# Patient Record
Sex: MALE | Race: WHITE | NOT HISPANIC OR LATINO | Employment: OTHER | URBAN - METROPOLITAN AREA
[De-identification: names, ages, dates, MRNs, and addresses within clinical notes are randomized per-mention and may not be internally consistent; named-entity substitution may affect disease eponyms.]

---

## 2017-06-07 ENCOUNTER — HOSPITAL ENCOUNTER (INPATIENT)
Facility: CLINIC | Age: 62
LOS: 15 days | Discharge: HOME | DRG: 885 | End: 2017-06-23
Attending: EMERGENCY MEDICINE | Admitting: PSYCHIATRY & NEUROLOGY

## 2017-06-07 DIAGNOSIS — F31.9 BIPOLAR I DISORDER (H): Primary | ICD-10-CM

## 2017-06-07 LAB
ANION GAP SERPL CALCULATED.3IONS-SCNC: 7 MMOL/L (ref 3–14)
BASOPHILS # BLD AUTO: 0 10E9/L (ref 0–0.2)
BASOPHILS NFR BLD AUTO: 0.1 %
BUN SERPL-MCNC: 26 MG/DL (ref 7–30)
CALCIUM SERPL-MCNC: 8.7 MG/DL (ref 8.5–10.1)
CHLORIDE SERPL-SCNC: 110 MMOL/L (ref 94–109)
CO2 SERPL-SCNC: 28 MMOL/L (ref 20–32)
CREAT SERPL-MCNC: 1.05 MG/DL (ref 0.66–1.25)
DIFFERENTIAL METHOD BLD: ABNORMAL
EOSINOPHIL # BLD AUTO: 0 10E9/L (ref 0–0.7)
EOSINOPHIL NFR BLD AUTO: 0.5 %
ERYTHROCYTE [DISTWIDTH] IN BLOOD BY AUTOMATED COUNT: 14.5 % (ref 10–15)
ETHANOL SERPL-MCNC: <0.01 G/DL
GFR SERPL CREATININE-BSD FRML MDRD: 72 ML/MIN/1.7M2
GLUCOSE SERPL-MCNC: 101 MG/DL (ref 70–99)
HCT VFR BLD AUTO: 44.6 % (ref 40–53)
HGB BLD-MCNC: 15.3 G/DL (ref 13.3–17.7)
IMM GRANULOCYTES # BLD: 0 10E9/L (ref 0–0.4)
IMM GRANULOCYTES NFR BLD: 0.1 %
LYMPHOCYTES # BLD AUTO: 1.2 10E9/L (ref 0.8–5.3)
LYMPHOCYTES NFR BLD AUTO: 15.1 %
MCH RBC QN AUTO: 30.5 PG (ref 26.5–33)
MCHC RBC AUTO-ENTMCNC: 34.3 G/DL (ref 31.5–36.5)
MCV RBC AUTO: 89 FL (ref 78–100)
MONOCYTES # BLD AUTO: 0.7 10E9/L (ref 0–1.3)
MONOCYTES NFR BLD AUTO: 9.2 %
NEUTROPHILS # BLD AUTO: 5.9 10E9/L (ref 1.6–8.3)
NEUTROPHILS NFR BLD AUTO: 75 %
NRBC # BLD AUTO: 0 10*3/UL
NRBC BLD AUTO-RTO: 0 /100
PLATELET # BLD AUTO: 138 10E9/L (ref 150–450)
POTASSIUM SERPL-SCNC: 4 MMOL/L (ref 3.4–5.3)
RBC # BLD AUTO: 5.02 10E12/L (ref 4.4–5.9)
SODIUM SERPL-SCNC: 145 MMOL/L (ref 133–144)
WBC # BLD AUTO: 7.8 10E9/L (ref 4–11)

## 2017-06-07 PROCEDURE — 25000132 ZZH RX MED GY IP 250 OP 250 PS 637: Performed by: EMERGENCY MEDICINE

## 2017-06-07 PROCEDURE — 99285 EMERGENCY DEPT VISIT HI MDM: CPT | Mod: 25 | Performed by: EMERGENCY MEDICINE

## 2017-06-07 PROCEDURE — 80048 BASIC METABOLIC PNL TOTAL CA: CPT | Performed by: EMERGENCY MEDICINE

## 2017-06-07 PROCEDURE — 85025 COMPLETE CBC W/AUTO DIFF WBC: CPT | Performed by: EMERGENCY MEDICINE

## 2017-06-07 PROCEDURE — 99285 EMERGENCY DEPT VISIT HI MDM: CPT | Mod: Z6 | Performed by: EMERGENCY MEDICINE

## 2017-06-07 PROCEDURE — 80320 DRUG SCREEN QUANTALCOHOLS: CPT | Performed by: EMERGENCY MEDICINE

## 2017-06-07 RX ORDER — OLANZAPINE 10 MG/2ML
10 INJECTION, POWDER, FOR SOLUTION INTRAMUSCULAR ONCE
Status: DISCONTINUED | OUTPATIENT
Start: 2017-06-07 | End: 2017-06-07

## 2017-06-07 RX ORDER — OLANZAPINE 5 MG/1
10 TABLET, ORALLY DISINTEGRATING ORAL ONCE
Status: DISCONTINUED | OUTPATIENT
Start: 2017-06-07 | End: 2017-06-07

## 2017-06-07 RX ORDER — OLANZAPINE 5 MG/1
10 TABLET, ORALLY DISINTEGRATING ORAL ONCE
Status: COMPLETED | OUTPATIENT
Start: 2017-06-07 | End: 2017-06-07

## 2017-06-07 RX ORDER — OLANZAPINE 10 MG/2ML
INJECTION, POWDER, FOR SOLUTION INTRAMUSCULAR
Status: DISCONTINUED
Start: 2017-06-07 | End: 2017-06-07 | Stop reason: WASHOUT

## 2017-06-07 RX ADMIN — OLANZAPINE 10 MG: 5 TABLET, ORALLY DISINTEGRATING ORAL at 14:20

## 2017-06-07 ASSESSMENT — ENCOUNTER SYMPTOMS
AGITATION: 1
HALLUCINATIONS: 0

## 2017-06-07 NOTE — ED NOTES
Bed: HW03  Expected date: 6/7/17  Expected time: 2:05 PM  Means of arrival: Ambulance  Comments:  [place] [facility]  62Y M  Psych eval, currently under arrest

## 2017-06-07 NOTE — ED NOTES
Pt increasingly agitated.  He is barely staying in his room.  He remains hyper sexual.  Spoke with MD.  Pt is refusing to cooperate with instructions.  MD ordered seclusion.

## 2017-06-07 NOTE — ED NOTES
Pt is more calm and cooperative.  He is willing to follow instructions.  A chair is brought into the room and patient is sitting in the room.  Door remains unlocked.

## 2017-06-07 NOTE — IP AVS SNAPSHOT
MRN:[id number]                      After Visit Summary   6/7/2017    Austyn Leach    MRN: [id number]           Thank you!     Thank you for choosing [facility] for your care. Our goal is always to provide you with excellent care.        Patient Information     Date Of Birth          1955        Designated Caregiver       Most Recent Value    Caregiver    Will someone help with your care after discharge? no [I'll take care of myself, I've lived alone before]      About your hospital stay     You were admitted on:  June 8, 2017 You last received care in the:  UR 20[facility]    You were discharged on:  June 23, 2017       Who to Call     For medical emergencies, please call 911.  For non-urgent questions about your medical care, please call your primary care provider or clinic, [phone]          Attending Provider     Provider Specialty    Irasema Can MD Emergency Medicine    Jeff Spears MD Emergency Medicine    Reuben Lopez MD Psychiatry    [facility], Rohan Fisher MD Psychiatry    [facility], Grant Power MD Psychiatry       Primary Care Provider Office Phone # Fax #    Anna Reyes [phone]      Further instructions from your care team       Behavioral Discharge Planning and Instructions    Summary: Patient admitted for delusional, aggressive and inappropriate sexual behavior. A petition for commitment with [facility] was supported. He is now on a stay of commitment and will be discharged to home to work with his out patient team.     Main Diagnosis: Bipolar Disorder    Major Treatments, Procedures and Findings: psychological assessment, medication management, [facility] resources    Symptoms to Report: feeling more aggressive, increased confusion, losing more sleep, mood getting worse or thoughts of suicide    Lifestyle Adjustment: please take medication as prescribed and follow up with case management    Psychiatry Follow-up:     Patient does not have insurance at  "this time. He is utilizing the crisis appointment with [facility].     [facility] [place] [facility] [place] [phone]  [address] [place]  Dr. Morales Wednesday July 5th @ 9:30am  You will need to set up a therapy appointment if you are going to continue services with Dr. Morales        [facility] : Betty Pepe  [phone]        Resources:   [facility] Crisis: [phone]  Crisis Intervention: 462.286.4621 or 504-849-0646 (TTY: 669.508.5114).  Call anytime for help.  National [place] on Mental Illness (www.[place].nicole.org): 270.562.1100 or 585-374-2017.  National Suicide Prevention Line (www.mentalhealthmn.org): 590-719-GSNC (4143)  Mental Health Association of [place] (www.mentalhealth.org): 859.285.4425 or 485-021-3014    General Medication Instructions:   See your medication sheet(s) for instructions.   Take all medicines as directed.  Make no changes unless your doctor suggests them.   Go to all your doctor visits.  Be sure to have all your required lab tests. This way, your medicines can be refilled on time.  Do not use any drugs not prescribed by your doctor.  Avoid alcohol.      The treatment team has appreciated the opportunity to work with you.  We wish you the best in the future. If you have any questions or concerns our unit number is [phone].    Pending Results     No orders found from 6/5/2017 to 6/8/2017.            Statement of Approval     Ordered          06/23/17 1205  I have reviewed and agree with all the recommendations and orders detailed in this document.  EFFECTIVE NOW     Approved and electronically signed by:  Grant Marques MD             Admission Information     Date & Time Provider Department Dept. Phone    6/7/2017 Grant Marques MD UR 20NB [phone]      Your Vitals Were     Blood Pressure Pulse Temperature Respirations Height Weight    132/71 94 97  F (36.1  C) (Oral) 16 1.854 m (6' 1\") 88.5 kg (195 lb)    Pulse Oximetry BMI (Body Mass " "Index)                99% 25.73 kg/m2          Geno Information     Geno lets you send messages to your doctor, view your test results, renew your prescriptions, schedule appointments and more. To sign up, go to [email or web address] . Click on \"Log in\" on the left side of the screen, which will take you to the Welcome page. Then click on \"Sign up Now\" on the right side of the page.     You will be asked to enter the access code listed below, as well as some personal information. Please follow the directions to create your username and password.     Your access code is: [email or web address]  Expires: 2017  1:48 PM     Your access code will  in 90 days. If you need help or a new code, please call your [facility] clinic or [phone].        Care EveryWhere ID     This is your Care EveryWhere ID. This could be used by other organizations to access your [facility] medical records  [id number]        Equal Access to Services     CATHERINE MONIQUE : Hadii aad ku hadasho Soambarali, waaxda luqadaha, qaybta kaalmada adeegyada, janet chin . So [facility] [phone].    ATENCIÓN: Si habla español, tiene a gunderson disposición servicios gratuitos de asistencia lingüística. Llame al [phone].    We comply with applicable federal civil rights laws and [place] laws. We do not discriminate on the basis of race, color, national origin, age, disability sex, sexual orientation or gender identity.               Review of your medicines      START taking        Dose / Directions    OLANZapine 15 MG tablet   Commonly known as:  zyPREXA   Used for:  Bipolar I disorder (H)        Dose:  15 mg   Take 1 tablet (15 mg) by mouth At Bedtime   Quantity:  30 tablet   Refills:  1            Where to get your medicines      These medications were sent to [facility] [place], [place] - [address]  [address] [address]     Phone:  [phone]     OLANZapine 15 MG tablet    "             Protect others around you: Learn how to safely use, store and throw away your medicines at www.disposemymeds.org.             Medication List: This is a list of all your medications and when to take them. Check marks below indicate your daily home schedule. Keep this list as a reference.      Medications           Morning Afternoon Evening Bedtime As Needed    OLANZapine 15 MG tablet   Commonly known as:  zyPREXA   Take 1 tablet (15 mg) by mouth At Bedtime   Last time this was given:  10 mg on 6/15/2017  1:20 AM

## 2017-06-07 NOTE — ED NOTES
Pt moderately uncooperative, came out of bathroom with genitals exposed. Security able to redirect pt. Delusional, paranoid. Says people are putting drugs in him. Making overtly sexual comments to female staff. Yelling about needing a  and insisting on a cigarette. Pt given water and threw it on the floor. Becoming quieter and calmer over the past 15 minutes. Will continue to monitor.

## 2017-06-07 NOTE — IP AVS SNAPSHOT
[address] [address]    Phone:  [phone]                                       After Visit Summary   6/7/2017    Austyn Leach    MRN: [id number]           After Visit Summary Signature Page     I have received my discharge instructions, and my questions have been answered. I have discussed any challenges I see with this plan with the nurse or doctor.    ..........................................................................................................................................  Patient/Patient Representative Signature      ..........................................................................................................................................  Patient Representative Print Name and Relationship to Patient    ..................................................               ................................................  Date                                            Time    ..........................................................................................................................................  Reviewed by Signature/Title    ...................................................              ..............................................  Date                                                            Time

## 2017-06-07 NOTE — PROGRESS NOTES
"Pt agitated and refusing to stay in room while staff address safety and privacy of a different pt.  Staff continually trying to redirect pt to room, which he continues to refuse.  Pt making sexually inapproapriate comments to multiple female staff, threatening to touch staff inappropriately, stating \"you have an amazing body.  You have beautiful hair.  I know what I would do to you.\"  Pt asked to walk to seclusion room for purpose of opening more rooms as well as ability to shut door if necessary.  Pt continued to be extremely sexually inappropriate to staff, given option to stay in room with door closed due to safety and privacy concerns of other patients and staff.  Pt again refused and was brought to seclusion room by staff.  "

## 2017-06-07 NOTE — ED NOTES
Pt instructed that he needs to go to the seclusion room for refusing to stay in his room and for his ongoing hypersexual threatening behavior.  Pt refuses to walk to room.  Code 21 team on standby and assisted with placing patient in seclusion which occurred without incident.  Pt immediately begins to calm down when staff leaves the room.

## 2017-06-07 NOTE — ED PROVIDER NOTES
"  History     Chief Complaint   Patient presents with     Agitation     bizarre behavior, paranoid, predatory toward children     The history is provided by a relative (son).     Austyn Leach is a 62 year old male who presents to the ED via EMS due to \"patient driving around city saying boys are stupid and girls are smart.  And having signs taped to himself and car saying similar.\"  He told police that the FBI was watching him on the internet, he is an inventor. \" Son called and says the patient has delusional disorder.  (Son Minh Elizondo #[phone]).  His son tells me that his father has refused treatment/meds in the past and has only gone to see a provider a few times due to threats by family.  The patient lives with step mom.  Today he posted paragraphs of very graphic disturbing sexual comments which caused the patient's neighborhood to become upset and call police.   They are apparently concerned about the children of the neighborhood based on the facebook comments.   The step mom left the house due to the neighborhood response.  The patient then left their home and police found him in above mentioned state.  The son says the patient has never actually hurt anyone but they are concerned about his progressive worsening, complete lack of insight into his disorder and has lost boundaries.     I have reviewed the Medications, Allergies, Past Medical and Surgical History, and Social History in the Epic system.    Review of Systems   Psychiatric/Behavioral: Positive for agitation and behavioral problems. Negative for hallucinations.   All other systems reviewed and are negative.      Physical Exam   BP: (!) 157/130 (pt insists on crossing his legs very tightly during BP check)  Pulse: 125  Temp:  (refused)  Resp: 20  SpO2: 99 %  Physical Exam   Constitutional: He is oriented to person, place, and time.   disheveled   HENT:   Head: Normocephalic and atraumatic.   Right Ear: External ear normal.   Left Ear: " External ear normal.   Eyes: EOM are normal.   Neck: Normal range of motion.   Cardiovascular: Normal rate, regular rhythm and normal heart sounds.    Pulmonary/Chest: Effort normal and breath sounds normal.   Abdominal: Soft.   Musculoskeletal: Normal range of motion.   Neurological: He is alert and oriented to person, place, and time.   Skin: Skin is warm and dry.   Psychiatric: His speech is normal. His affect is labile. Thought content is delusional. Cognition and memory are normal. He expresses impulsivity and inappropriate judgment. He is inattentive.   Nursing note and vitals reviewed.      ED Course     ED Course     Procedures         No results found for this or any previous visit (from the past 24 hour(s)).    Critical care time: 60 min       Assessments & Plan (with Medical Decision Making)   The patient arrived screaming at staff.  He was brought via EMS on transfer hold.  He has hx of delusional disorder and posted very graphic sexual comments on [employer].  Per his son he has refused medicine, has been getting worse, and they are worried about his lack of insight.  He required zyprexa 10 mg po and restraints due to his inappropriate behavior with staff (sexual).  He was placed on a 72 hour hold and will be admitted to inpatient for safety concerns to others.      In person face to face assessment completed, including an evaluation of the patient's immediate reaction to the intervention, complete review of systems assessment, behavioral assessment and review/assessment of history, drugs and medications, recent labs, etc., and behavioral condition.  The patient experienced: No adverse physical outcome from seclusion/restraint initiation.  The intervention of restraint or seclusion needs to continue.          I have reviewed the nursing notes.    I have reviewed the findings, diagnosis, plan and need for follow up with the patient.    There are no discharge medications for this patient.      Final  diagnoses:   Delusional Disorder       6/7/2017   [facility], [place], EMERGENCY DEPARTMENT     Irasema Can MD  06/09/17 2020       Irasema Can MD  06/09/17 2021

## 2017-06-08 PROBLEM — F22 DELUSIONS (H): Status: ACTIVE | Noted: 2017-06-08

## 2017-06-08 LAB
AMPHETAMINES UR QL SCN: NORMAL
BARBITURATES UR QL: NORMAL
BENZODIAZ UR QL: NORMAL
CANNABINOIDS UR QL SCN: NORMAL
COCAINE UR QL: NORMAL
ETHANOL UR QL SCN: NORMAL
OPIATES UR QL SCN: NORMAL

## 2017-06-08 PROCEDURE — 25000132 ZZH RX MED GY IP 250 OP 250 PS 637: Performed by: FAMILY MEDICINE

## 2017-06-08 PROCEDURE — 25000132 ZZH RX MED GY IP 250 OP 250 PS 637: Performed by: PSYCHIATRY & NEUROLOGY

## 2017-06-08 PROCEDURE — 80320 DRUG SCREEN QUANTALCOHOLS: CPT | Performed by: EMERGENCY MEDICINE

## 2017-06-08 PROCEDURE — 80307 DRUG TEST PRSMV CHEM ANLYZR: CPT | Performed by: EMERGENCY MEDICINE

## 2017-06-08 PROCEDURE — 12400001 ZZH R&B MH UMMC

## 2017-06-08 RX ORDER — TRAZODONE HYDROCHLORIDE 50 MG/1
50 TABLET, FILM COATED ORAL
Status: DISCONTINUED | OUTPATIENT
Start: 2017-06-08 | End: 2017-06-23 | Stop reason: HOSPADM

## 2017-06-08 RX ORDER — BISACODYL 10 MG
10 SUPPOSITORY, RECTAL RECTAL DAILY PRN
Status: DISCONTINUED | OUTPATIENT
Start: 2017-06-08 | End: 2017-06-23 | Stop reason: HOSPADM

## 2017-06-08 RX ORDER — HALOPERIDOL 5 MG/ML
5-10 INJECTION INTRAMUSCULAR EVERY 4 HOURS PRN
Status: DISCONTINUED | OUTPATIENT
Start: 2017-06-08 | End: 2017-06-23 | Stop reason: HOSPADM

## 2017-06-08 RX ORDER — HYDROXYZINE HYDROCHLORIDE 25 MG/1
25-50 TABLET, FILM COATED ORAL EVERY 4 HOURS PRN
Status: DISCONTINUED | OUTPATIENT
Start: 2017-06-08 | End: 2017-06-23 | Stop reason: HOSPADM

## 2017-06-08 RX ORDER — OLANZAPINE 5 MG/1
10 TABLET, ORALLY DISINTEGRATING ORAL ONCE
Status: COMPLETED | OUTPATIENT
Start: 2017-06-08 | End: 2017-06-08

## 2017-06-08 RX ORDER — ALUMINA, MAGNESIA, AND SIMETHICONE 2400; 2400; 240 MG/30ML; MG/30ML; MG/30ML
30 SUSPENSION ORAL EVERY 4 HOURS PRN
Status: DISCONTINUED | OUTPATIENT
Start: 2017-06-08 | End: 2017-06-23 | Stop reason: HOSPADM

## 2017-06-08 RX ORDER — HALOPERIDOL 5 MG/1
5-10 TABLET ORAL EVERY 4 HOURS PRN
Status: DISCONTINUED | OUTPATIENT
Start: 2017-06-08 | End: 2017-06-23 | Stop reason: HOSPADM

## 2017-06-08 RX ORDER — OLANZAPINE 10 MG/2ML
10 INJECTION, POWDER, FOR SOLUTION INTRAMUSCULAR
Status: DISCONTINUED | OUTPATIENT
Start: 2017-06-08 | End: 2017-06-22

## 2017-06-08 RX ORDER — OLANZAPINE 10 MG/1
10 TABLET ORAL
Status: DISCONTINUED | OUTPATIENT
Start: 2017-06-08 | End: 2017-06-22

## 2017-06-08 RX ORDER — ACETAMINOPHEN 325 MG/1
650 TABLET ORAL EVERY 4 HOURS PRN
Status: DISCONTINUED | OUTPATIENT
Start: 2017-06-08 | End: 2017-06-23 | Stop reason: HOSPADM

## 2017-06-08 RX ORDER — LORAZEPAM 2 MG/ML
1 INJECTION INTRAMUSCULAR EVERY 4 HOURS PRN
Status: DISCONTINUED | OUTPATIENT
Start: 2017-06-08 | End: 2017-06-23 | Stop reason: HOSPADM

## 2017-06-08 RX ORDER — LORAZEPAM 1 MG/1
1 TABLET ORAL EVERY 4 HOURS PRN
Status: DISCONTINUED | OUTPATIENT
Start: 2017-06-08 | End: 2017-06-23 | Stop reason: HOSPADM

## 2017-06-08 RX ADMIN — OLANZAPINE 10 MG: 5 TABLET, ORALLY DISINTEGRATING ORAL at 15:53

## 2017-06-08 RX ADMIN — HALOPERIDOL 10 MG: 5 TABLET ORAL at 17:32

## 2017-06-08 RX ADMIN — HALOPERIDOL 10 MG: 5 TABLET ORAL at 23:18

## 2017-06-08 RX ADMIN — TRAZODONE HYDROCHLORIDE 50 MG: 50 TABLET ORAL at 23:18

## 2017-06-08 NOTE — ED NOTES
The pt finally is going to the mental health.  He is up and about. He is confrontational mildly.  Will give a dose of zyprexa     Jeff Spears MD  06/08/17 3794

## 2017-06-08 NOTE — ED NOTES
Pt requesting to move into room 12 where he can have a bed instead of mattress on the floor. He agrees to remain calm and cooperative.

## 2017-06-08 NOTE — PROGRESS NOTES
06/08/17 1716   Patient Belongings   Did you bring any home meds/supplements to the hospital?  No   Patient Belongings cell phone/electronics;clothing;shoes;keys   Disposition of Belongings Pt room on station 20, locker on station 20   Belongings Search Yes   Clothing Search Yes   Second Staff Mathew, Psych Assoc   General Info Comment See note     In patient room:  White t-shirt, glasses, jeans    In patient locker:  Shoes, underwear, Dress shirt, Keys, [place] ID, cigs, Samsung cell phone (works)    Nothing was sent to security.  Pt had no other belongings on admission.  Pt had no cash, credit/debit cards, or medications.      ADMISSION:  I am responsible for any personal items that are not sent to the safe or pharmacy. [facility] is not responsible for loss, theft or damage of any property in my possession.    Patient Signature _____________________ Date/Time _____________________    Staff Signature _______________________ Date/Time _____________________    2nd Staff person, if patient is unable/unwilling to sign  ___________________________________ Date/Time _____________________    DISCHARGE:  My personal items have been returned to me.   Patient Signature _____________________ Date/Time _____________________

## 2017-06-08 NOTE — PROGRESS NOTES
"Writer completed clothing search with staff for pt.  Pt flashed his genitals at male staff multiple times.  Pt required frequent redirection to close the bathroom door.  Pt kept laughing at staff stating \"What I am not huizar, I go shower at the gym all the time\".  Staff told pt that it was not appropriate, but pt displayed no evidence of learning.  "

## 2017-06-08 NOTE — PROGRESS NOTES
"Pt was agitated, making inappropriate comments, and loud in the milieu.  Pt was asked to take PRN medication, which he refused so staff asked pt to sit in his room for 30 minutes, due to inappropriate behaviors, which he was agreeable.  Pt then requested PRN medication.  Writer approached pt with PRN PO haldol 10 mg, which pt was willing to take.  Pt took the medication, but attempted to \"cheek\" the medication under his tongue.  Pt swallowed the medication when pt realized staff had identified that he had \"cheeked\" the medication.  "

## 2017-06-08 NOTE — PHARMACY-ADMISSION MEDICATION HISTORY
Admission medication history interview status for the 6/7/2017 admission is complete. See Epic admission navigator for allergy information, pharmacy, prior to admission medications and immunization status.     Medication history interview sources:  chart review, Care Everywhere review, called [facility] and [facility] (pharmacies listed in our [facility]), reviewed [facility] pharmacies    Changes made to PTA medication list (reason)  Added: none  Deleted: none  Changed: none    Additional medication history information (including reliability of information, actions taken by pharmacist):  -Discussed patient with nurse and psych associate. The nurse did not recommend I talk to the patient due to his hypersexuality. Patient was also difficult with the nurse when he asked the patient about his medications earlier. Nurse thinks my conversation with the patient would not be productive and I agree based off of staff comments/notes.  -Reviewed chart and called pharmacies instead. [facility], and [facility] do not have any record of filling prescriptions for the patient ever. It seems doubtful he takes any medications at this time. An office visit note from November 2015 in Care Everywhere did not mention any medications or chronic conditions.    Prior to Admission medications    Not on File     Medication history completed by:   Makenna Merino, Pharm.D.

## 2017-06-08 NOTE — PLAN OF CARE
Problem: General Plan of Care (Inpatient Behavioral)  Goal: Individualization/Patient Specific Goal (IP Behavioral)  Illness Management Recovery model: Objectives    Patient will identify reason(s) for hospitalization from their perspective.  Patient will identify a minimum of three goals for discharge.  Patient will identify a minimum of three triggers that may increase their symptoms.  Patient will identify a minimum of three coping skills they can do to stay well.   Patient will identify their support system to demonstrate readiness for discharge.  Outcome: No Change  Patient verbalized poor insights about his MI. He is unable to verbalize understanding of the reason for his admission to the hospital. He denied having a MI. Patient is unable to identify goals for this hospitalization, stressors, or support system at this time.   Patient also declined to take medications while here in the hospital.

## 2017-06-09 PROCEDURE — 99223 1ST HOSP IP/OBS HIGH 75: CPT | Mod: AI | Performed by: PSYCHIATRY & NEUROLOGY

## 2017-06-09 PROCEDURE — 12400001 ZZH R&B MH UMMC

## 2017-06-09 PROCEDURE — 97150 GROUP THERAPEUTIC PROCEDURES: CPT | Mod: GO

## 2017-06-09 RX ORDER — OLANZAPINE 5 MG/1
5 TABLET ORAL AT BEDTIME
Status: DISCONTINUED | OUTPATIENT
Start: 2017-06-09 | End: 2017-06-12

## 2017-06-09 ASSESSMENT — ACTIVITIES OF DAILY LIVING (ADL)
ORAL_HYGIENE: INDEPENDENT
GROOMING: INDEPENDENT;PROMPTS
GROOMING: INDEPENDENT;PROMPTS
ORAL_HYGIENE: INDEPENDENT
DRESS: INDEPENDENT
DRESS: INDEPENDENT

## 2017-06-09 NOTE — PROGRESS NOTES
Pt declined to sign MANDA for his wife and son. Writer left message with PPS screener Lenora Pepe regarding the above.

## 2017-06-09 NOTE — PROGRESS NOTES
Betty Pepe from [facility] came out to screen patient. She will call on Monday with their decision.

## 2017-06-09 NOTE — PROGRESS NOTES
Initial Psychosocial Assessment    I have reviewed the chart, met with the patient, and developed Care Plan. Information for assessment was obtained from: Pt, medical record                                                           72 hour hold    Presenting Problem: Patient was brought to the hospital by paramedics for delusions, inappropriate sexual behavior and aggression. A petition for commitment was presented to [facility].      History of Mental Health and Chemical Dependency:  Pt has been hospitalized at [facility]      Significant Life Events   (Illness, Abuse, Trauma, Death):    Family:  and two adult children    Living Situation: lives with wife in [place]      Educational Background: reports educated as an        Financial Status: [employer]    Legal Issues:  none    Ethnic/Cultural Issues: none    Spiritual Orientation:  Not assessed     Service History: none    Social Functioning (organization, interests): not assessed    Current Treatment Providers are:  none      Social Service Assessment/Plan:     Provide safe environment  Psychological assessment  Medication management per psychiatry  Offer groups  CTC to follow up with [facility]

## 2017-06-09 NOTE — PROGRESS NOTES
Austyn was upset today because he said he should have gotten his patient rights last night and didn't get them until I gave them to him today.  He wanted me to sign the patient bill of rights handout with the date and time it was given to him.  This was not done.  He called patient relations.  He then called 911 saying that patient relations advised him to call 911.  Explained to him that he cannot call 911 from this unit and his calls will be dialed for him from now on.  He remains angry and edgy about not getting his bill of rights last night.  He asked this RN's name (first and last).  He was given my first name.  He said the internal revenue service would be looking me up.  He said this in a threatening manner.

## 2017-06-09 NOTE — PLAN OF CARE
Problem: General Plan of Care (Inpatient Behavioral)  Goal: Team Discussion  Team Plan:   BEHAVIORAL TEAM DISCUSSION     Participants: ESTHER Leung, RN Lita Forrest  Progress: none  Continued Stay Criteria/Rationale: 72 hour hold     Precautions:   Behavioral Orders   Procedures     Code 1 - Restrict to Unit     Routine Programming       As clinically indicated     Sexual precautions       For both male and female pts and staff.  Pt is to have male only for 1:1.     Status 15       Every 15 minutes.     Status Individual Observation       Male only 1:1.       Order Specific Question:   CONTINUOUS 24 hours / day       Answer:   Distance and Exceptions       Order Specific Question:   Distance       Answer:   5 feet       Order Specific Question:   Exceptions       Answer:   none     Plan: petition for commitment, with ESTHER dunne to continue contact with [facility]  Rationale for change in precautions or plan: continue with 1:1 staffing for sexually inappropriate behavior

## 2017-06-09 NOTE — PROGRESS NOTES
"INITIAL OT ATTENDANCE:  Pt participated in OT clinic and self-initiated task to draw a picture of his backyard and rainbow bridge. He told OTR the bridge was rainbow colored and his wife was mad at him because the rainbow was a common symbol for huizar people. He commented that huizar people \"have S.  E.  X.  differently\"   He also made multiple statements about thinking that dead people and animals are \"orbs\" and every time he posts something about his theory on the Internet someone takes it down.  He made unusual statements about the FBI and IRS also, related to his orb theory.     Male staff was with pt on 1:1 during full time in group session.        Pt will be given Self Assessment form, explain the purpose of including them in their treatment plan and offer options for meeting their needs.       "

## 2017-06-09 NOTE — PROGRESS NOTES
"Austyn claims he had a wallet with $600.00 in it.  It is not on the property list.  I called the ED and they would not look for it.  They said if he'd had anything they would have sent it to security.  I called security and they didn't have anything.  Security said, 'the ED rarely sends us anything.\"  He then thought that it was possible that the police put it in his car.  "

## 2017-06-10 PROCEDURE — 12400001 ZZH R&B MH UMMC

## 2017-06-10 PROCEDURE — 90853 GROUP PSYCHOTHERAPY: CPT

## 2017-06-10 ASSESSMENT — ACTIVITIES OF DAILY LIVING (ADL)
GROOMING: INDEPENDENT
DRESS: INDEPENDENT
LAUNDRY: WITH SUPERVISION
ORAL_HYGIENE: INDEPENDENT

## 2017-06-10 NOTE — PROGRESS NOTES
"Attended OT group focused on topic of identifying positive aspects about self. He requested to not tell his name to author, \"I'm just here observing\". He did speak up and participate when opportunity was offered at his turns. He stated he is happy \"my wife wants a divorce. She wants my money. My son wants to go to college and play video games. I can live on the streets.\" A day he would like to relive was \"The day I invented removing negative ions which can change the weather. 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 Initially invented that.\" Comments were tangential and at times difficult to follow. He teased \"It's hard to change something in my self, when you start with perfection. No, nothing is ever perfect.\" Some jokes were in context, and with comments that were directing towards poor boundaries, he seemed willing to be redirected. As session progressed, he seemed more willing to participate. Generally appears easily distracted, guarded, cautious with answers and information wanting to divulge, with question of some disorganization of thought.  "

## 2017-06-10 NOTE — PLAN OF CARE
"Problem: General Plan of Care (Inpatient Behavioral)  Goal: Individualization/Patient Specific Goal (IP Behavioral)  Illness Management Recovery model: Objectives    Patient will identify reason(s) for hospitalization from their perspective.  Patient will identify a minimum of three goals for discharge.  Patient will identify a minimum of three triggers that may increase their symptoms.  Patient will identify a minimum of three coping skills they can do to stay well.   Patient will identify their support system to demonstrate readiness for discharge.   Outcome: No Change  Illness Management Recovery model:  Ways to [place].     Patient identified the following specific strategies to cope with their symptoms:     1. \"I don't believe in shit like this\"             "

## 2017-06-10 NOTE — PROGRESS NOTES
"Pt filled out MANDA for his wife. In addition, pt states that \"I don't want to take any medications. I was cheeking them the other day, so people would think I was following the rules. People told me to do whatever the hospital told me to do.\" Writer assured pt that he does not need to take his medications. However, that he should speak to his doctor regarding his medications.     Pt declined his scheduled HS Zyprexa    "

## 2017-06-10 NOTE — PROGRESS NOTES
"Austyn slept a maximum of 6 hours this night shift.  When awake, he is in the lounge and is very loud, irritable, and demanding. Many complaints.  Many statements about calling his  and reading his \"record\". Does not take redirection well.  Odd statements such as \"I am allergic to water\".  Will continue to monitor.  "

## 2017-06-10 NOTE — PLAN OF CARE
Problem: Psychotic Symptoms  Goal: Psychotic Symptoms  Pt will have safe behaviors on the unit until discharge.  Pt will maintain safe sexual behaviors on the unit before discharge.  Pt will be medication compliant on the unit until discharge.  Pt will be visible and safe in the milieu until discharge.  Pt will develop coping skills before discharge.  Pt will have decreased paranoia before discharge.  Pt will deny SI and SIB thoughts before discharge.  Pt will sleep 8 hours a night before discharge.  Pt will have adequate intake before discharge.   Outcome: No Change  Nursing assessment completed; assessed mood, anxiety, thoughts, and behavior. No significant change in clinical presentation this period: patient remains grandiose, refuses to take medications as ordered and although he has been going to groups he may not participate appropriately or up to expectation nor is he socially engaged with staff or peers.  Encouraged participation in groups and in developing of healthy coping skills. Denies hallucinations, denies any thought or intent to harm self or others; also denies depression and anxiety both of which he rated at 0 out 10, where ten is the worst case of clinical presentation. Mood is labile and easily irritated, affect corresponds with labile mood; speech is clear but can be tangential and incoherent  and he appears unkempt and disheveled, hygiene and grooming only fair. Thought process appears to be irrational and not focused on current treatment activities, grandiose with evidence of delusions. Insight into present condition is poor and judgment remains impaired. Patient remains on Status Individual Observation for safety and his behaviors remain unpredictable enough to require 1:1 staff on all shifts. No actual acts of aggression this period. Will continue to assess per protocol. Refer to daily notes for implementation of individualized treatment plan.

## 2017-06-10 NOTE — H&P
Austyn Leach was seen for 70 minutes on 06/09/2017.  Greater than 50% of the time was spent in counseling and coordinating care, clarifying diagnostic prognostic issues, presence of support in community.      CHIEF COMPLAINT AND REASON FOR ADMISSION:  The patient is a 62-year-old   male admitted on 72-hour hold via EMS due to disorganized and psychotic behavior.      HISTORY OF PRESENT ILLNESS:  The patient presented as minimally reliable historian, challenging and easily agitated.  He states that he lives with his mother; however, things have not been going on between the two of them and wife had mentioned divorce a number of times.  The patient would not comment on why she wanted to do so.  He reports occasional periods of getting not enough sleep; however, typically does not stay awake for a number of nights in a row.  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  He told me about his last invention: abilities to remove clouds from the alona without any energy involved.  Also talked about making invention such as cards playing robot and vacuum poring station.  Talks about being able to use a device called celescope to watch electron's movement.  While talking to other members of the treatment team, the patient also stated that the [place] controlled the weather around the world and it is the reason why we have tornados and rain on the people's houses.  Was talking about government spying on people and controlling people.  Reports that he had been targeted and someone has put a virus in his computer, focused on people erasing information from his phone.  Talked about the FBI targeting him because of his inventions.  Reports that he has been putting things on Facebook about the women being smarter than the men.  Believes that people were honking while he was driving because they had seen what he had posted on the Internet.  He denied that he had sexually exposed himself in the  "Emergency Department, that he has been targeting children; however, he admitted that he was randomly crossing women on the street, offering children and adults food because \"I would like to give people food.\"  He also says that he had \"bridge painted in the rainbow colors for years.\"      PAST PSYCHIATRIC HISTORY:  Reports 1 previous psychiatric hospitalization at [facility] but would not tell under what circumstances and would not discuss his symptoms present.  When asked how long he had all these ideas, he told me \"those were not ideas, those are facts.\"  When asked how long he knew about those facts, said that for at least 5 years.  He will not provide any additional information.  According to collateral sources, the patient strongly disagred that he has mental illness.  Was offered a neuroleptic medication but has always refused it.  His son Leo said that the patient was diagnosed with delusional disorder and had refused treatment/meds in the past.  Has only gone to see provider a few times due to pressure by the family.  Has a history of posting some graphic and disturbing sexual comments which caused the patient's neighborhood to become upset and call the police and this is how the patient was admitted.  There was also report that the patient has been driving around the cities saying that boys are stupid and girls are smart and having signs taped to himself and car saying similar.      ALLERGIES:  Reports being allergic to bees and to propranolol.        MEDICATIONS:  Does not take any medications on a regular basis.      PAST MEDICAL HISTORY:  Told me that he has bad knees, probably arthritis in both knees and that he had great success with cortisone shots, \"cortisone shots had cured my knees for 3 years.\"      FAMILY HISTORY AND SOCIAL HISTORY:  Said that he lives with his wife, has 2 grownup children but no grandchildren.  According to other information, the patient lives with stepmother who left " house because of the patient's behavior.  It is unclear if he has any family history of mental illness.      PHYSICAL EXAMINATION:  Please refer to Dr. Irasema Can's emergency room note from 06/07/2017.        REVIEW OF SYSTEMS:  A 12-point review of system was positive for mental health, otherwise negative.      VITAL SIGNS:  Temperature 97.9, respirations 17, blood pressure 144/75, heart rate 80.      MENTAL STATUS EXAMINATION:  The patient is disheveled and unshaven  male dressed in hospital garbs, has poor dentition, few front teeth missing.  He is irritable and challenging, tends to talk in short phrases, then, goes on long tangents.  He sounded sarcastic.  He voices clearly delusional ideas and repeatedly denied ever experiencing auditory or visual hallucinations.  Thought processes are illogical but superficially organized.  There is no clear loosening of associations or flight of ideas.  He is alert and oriented x3, appears to have average intelligence with appropriate usage of vocabulary.  Ability to focus and concentrate, immediate short and long-term memory were intact.  He had mild upper extremity tremor.  Denied presence of suicidal or homicidal thoughts.  He denied that he sexually exposed himself in the emergency room and has been targeting children.  I would describe his insight and judgment as both poor.      IMPRESSION:  Other psychotic disorder.  Prior diagnosis of delusional disorder, persecutory type.  Rule out schizophrenia, paranoid with late onset.  The patient reports prior excessive drinking.  Denies abusing alcohol now.      TREATMENT PLAN:  The patient is going to be continued on a 72-hour hold.  Will file for petition for commitment and [facility] (I offered patient neuroleptics and he refused it).  Denied that he had any mental illness.  His care will be taken over on Monday by another provider.         SLIM LIAO MD             D: 06/09/2017 16:44   T: 06/09/2017 21:31    MT:       Name:     ASHANTI ESPINOZA   MRN:      [id number]        Account:      [id number]   :      1955           Admitted:     [id number]      Document: [id number]

## 2017-06-10 NOTE — PROGRESS NOTES
"Pt continues on male only SIO for hypersexual behavior (exposing his genitals, inappropriate comments to female staff).  Pt has a blocked bed for a private room.  Pt had inappropriate comments in the milieu and needed frequent staff redirection.  Pt has made other pt's feel uncomfortable on the unit.    Pt requested to fill out 20 release of informations, so that everyone can help him alexandra the hospital.  When pt's family calls, he refuses all calls stating \"Why do those idiots keep calling me I don't want to talk to them\".    Pt refused HS medication.  Pt stated \"My doctor says I don't need to take anything\".     Pt later approached writer and asked what would happen if he did not take medications.  Writer stated he would not be forced to take medications, but the pt would need to maintain safe behaviors on the unit.    Pt then told writer that \"I have spit out every pill you have given me so far\".  Writer had given pt PRN haldol medication the previous evening shift.  Writer had carefully inspected pt's mouth for \"cheeking\".  Writer gave pt an entire glass of water with his medications.    Pt would require ODT to ensure pt does not cheek medications.    Will continue to monitor and assess.  "

## 2017-06-11 PROCEDURE — 12400001 ZZH R&B MH UMMC

## 2017-06-11 ASSESSMENT — ACTIVITIES OF DAILY LIVING (ADL)
DRESS: PROMPTS
HYGIENE/GROOMING: PROMPTS
ORAL_HYGIENE: PROMPTS
DRESS: INDEPENDENT
LAUNDRY: UNABLE TO COMPLETE
GROOMING: INDEPENDENT
ORAL_HYGIENE: INDEPENDENT

## 2017-06-11 NOTE — PROGRESS NOTES
Pt has been visible all shift, he was yelling at staff this morning, and on the phone. He seemed to become more quieter later in the shift. He also was paranoid, believing staff was recording his phone calls. He denies any Suicidal thoughts or plans to hurt others.

## 2017-06-11 NOTE — PROGRESS NOTES
"Patient did have a  \"goodshift\" , stated daily goal to \"make people happy towards changing all the negative information  posted on face book in relation to men forced to have vasectomy and women hysterectomy to control population and and welfare,which the police are not happy about because someone complain to the police and they brought me here\"  Pt  plans towards discharge include\" No ideas towards discharge in denial of any mental health symptoms for hospitalization therefore confused about having any steps towards discharge\"  Pt .attended, participate in groups,socialized  and  was visible in the milieu.   Pt appears normal for age,  fussy  anxious, irritable, agitated and paranoid  Patient had agood concentration, cooperative, pleasant and calm, affect is agitated alert subdued flat and is hopeful.   Patient denies  pain. Patient is eating 100%.  Patient denies current or recent suicidal ideation ,Pt denies SIB  HI: denies current or recent homicidal ideation or behaviors.  Hallucinations: NO  auditory and visual hallucination  Patient is progressing toward goals. Concerns (explain) - Go home and help my wife with  domestic work, cut grass , maintenance and help fix stuff around the house  Patient evaluation continues as patient is encouraged to participate in groups an develop healthy coping skills.  Pt reported cheeking  meds ,throwng it away cause meds cause problem and mess up his blood.   06/11/17 9219   Behavioral Health   Hallucinations denies / not responding to hallucinations   Thinking confused;distractable;paranoid   Orientation person: oriented;place: oriented;date: oriented;time: oriented   Memory confabulation;baseline memory   Insight denial of illness;poor   Judgement impaired   Eye Contact at examiner   Affect blunted, flat;sad;tense;full range affect;irritable   Mood labile;irritable   Physical Appearance/Attire appears stated age;attire appropriate to age and situation   Hygiene (Pt indicated " wantingto shower when followed through he decl)   Suicidality (Pt deniesSI/SIB)   Self Injury (Pt denies SI/SIB)   Activity (Pt participated in group,socialized,visible in the milieu)   Speech tangential;clear;coherent   Medication Sensitivity no stated side effects;no observed side effects  (Pt indicated cheeking meds,throwing away,meds cause problem)   Psychomotor / Gait balanced;steady   Psycho Education   Type of Intervention 1:1 intervention   Response participates with encouragement   Hours 0.5   Daily Care   Activity activity encouraged   Patient Performed Hygiene (Pt did not shower during this shift)   Activities of Daily Living   Hygiene/Grooming independent   Oral Hygiene independent   Dress independent   Laundry unable to complete   Room Organization independent   Activity   Activity Level of Assistance independent   Behavioral Health Interventions   Psychotic Symptoms encourage nutrition and hydration;encourage participation / independence with adls;provide emotional support;establish therapeutic relationship;assist with developing & utilizing healthy coping strategies;build upon strengths   Social and Therapeutic Interventions (Psychotic Symptoms) encourage socialization with peers;encourage effective boundaries with peers;encourage participation in therapeutic groups and milieu activities

## 2017-06-11 NOTE — BRIEF OP NOTE
Pt's wife visited, whom pt had previously said that he wanted little to no contact with.  Visit was initially going fine, but staff noticed that pt's wife seemed to antagonize and pick at him unnecessarily, getting him riled up.  She would ask him the same question over and over, as if he was unsure of what he wanted at that time.  For instance, even though pt had said he was not hungry, his wife continually tried to offer him the sandwich she brought every couple of minutes.  Staff had to end visit a few minutes early as pt became more and more agitated.

## 2017-06-12 PROCEDURE — 12400001 ZZH R&B MH UMMC

## 2017-06-12 PROCEDURE — 25000132 ZZH RX MED GY IP 250 OP 250 PS 637: Performed by: PSYCHIATRY & NEUROLOGY

## 2017-06-12 PROCEDURE — 99232 SBSQ HOSP IP/OBS MODERATE 35: CPT | Performed by: PSYCHIATRY & NEUROLOGY

## 2017-06-12 RX ORDER — OLANZAPINE 5 MG/1
5 TABLET, ORALLY DISINTEGRATING ORAL AT BEDTIME
Status: DISCONTINUED | OUTPATIENT
Start: 2017-06-12 | End: 2017-06-19

## 2017-06-12 RX ADMIN — OLANZAPINE 10 MG: 10 TABLET, FILM COATED ORAL at 19:23

## 2017-06-12 ASSESSMENT — ACTIVITIES OF DAILY LIVING (ADL)
GROOMING: INDEPENDENT
ORAL_HYGIENE: INDEPENDENT
DRESS: STREET CLOTHES;INDEPENDENT
LAUNDRY: WITH SUPERVISION

## 2017-06-12 NOTE — PROGRESS NOTES
"[facility], [place]   Psychiatric Progress Note        Interim History:   The patient's care was discussed with the treatment team during the daily team meeting and/or staff's chart notes were reviewed.  Staff report patient has been refusing or cheeking medications. Petition for [facility] filed and needed additional signatures.     The patient reports that he had a good weekend. Is doing well. Says that his wife visited. Says that he doesn't want to be on medications as he was on \"propefenal\" in the past and he had bad side effects. Mood is \"anxious to go home.\" Reports good sleep and appetite. Denies SI or HI. Denies AH or VH. Denies paranoia.          Medications:       OLANZapine zydis  5 mg Oral At Bedtime          Allergies:     Allergies   Allergen Reactions     Bee Venom      Propranolol           Labs:   No results found for this or any previous visit (from the past 24 hour(s)).       Psychiatric Examination:     /80  Pulse 71  Temp 98.4  F (36.9  C) (Oral)  Resp 18  Ht 1.854 m (6' 1\")  Wt 87.1 kg (192 lb)  SpO2 99%  BMI 25.33 kg/m2  Weight is 192 lbs 0 oz  Body mass index is 25.33 kg/(m^2).                Sitting Orthostatic BP: 145/84      Sitting Orthostatic Pulse: 64 bpm      Standing Orthostatic BP: 156/88      Standing Orthostatic Pulse: 81 bpm       Appearance: awake, alert and adequately groomed  Attitude:  cooperative  Eye Contact:  fair  Mood:  anxious and good  Affect:  mood congruent  Speech:  rambling  Psychomotor Behavior:  no evidence of tardive dyskinesia, dystonia, or tics  Thought Process:  tangental  Associations:  no loose associations  Thought Content:  no evidence of suicidal ideation or homicidal ideation  Insight:  limited  Judgement:  limited  Oriented to:  time, person, and place  Attention Span and Concentration:  fair  Recent and Remote Memory:  poor         Precautions:     Behavioral Orders   Procedures     Code 1 - Restrict to Unit     " Routine Programming     As clinically indicated     Sexual precautions     For both male and female pts and staff.  Pt is to have male only for 1:1.     Status 15     Every 15 minutes.     Status Individual Observation     Male only 1:1.     Order Specific Question:   CONTINUOUS 24 hours / day     Answer:   Distance and Exceptions     Order Specific Question:   Distance     Answer:   5 feet     Order Specific Question:   Exceptions     Answer:   none          DIagnoses:     Other psychotic disorder.  Prior diagnosis of delusional disorder, persecutory type.  Rule out schizophrenia, paranoid with late onset.         Plan:     1) Change Zyprexa 5mg Qhs to ODT.   2) Petition for commitment with [facility] filed.   3) Disposition to be determined. Patient to show improvement in terms of psychosis prior to discharge.

## 2017-06-13 PROCEDURE — 25000132 ZZH RX MED GY IP 250 OP 250 PS 637: Performed by: PSYCHIATRY & NEUROLOGY

## 2017-06-13 PROCEDURE — H2032 ACTIVITY THERAPY, PER 15 MIN: HCPCS

## 2017-06-13 PROCEDURE — 12400001 ZZH R&B MH UMMC

## 2017-06-13 PROCEDURE — 90853 GROUP PSYCHOTHERAPY: CPT

## 2017-06-13 PROCEDURE — 99232 SBSQ HOSP IP/OBS MODERATE 35: CPT | Performed by: PSYCHIATRY & NEUROLOGY

## 2017-06-13 RX ADMIN — OLANZAPINE 5 MG: 5 TABLET, ORALLY DISINTEGRATING ORAL at 21:07

## 2017-06-13 RX ADMIN — HYDROXYZINE HYDROCHLORIDE 50 MG: 25 TABLET ORAL at 00:25

## 2017-06-13 ASSESSMENT — ACTIVITIES OF DAILY LIVING (ADL)
HYGIENE/GROOMING: INDEPENDENT
DRESS: INDEPENDENT
ORAL_HYGIENE: INDEPENDENT
ORAL_HYGIENE: INDEPENDENT
DRESS: SCRUBS (BEHAVIORAL HEALTH)
GROOMING: INDEPENDENT
LAUNDRY: WITH SUPERVISION

## 2017-06-13 NOTE — PROGRESS NOTES
"   06/13/17 1340   Behavioral Health   Hallucinations denies / not responding to hallucinations   Thinking poor concentration;distractable   Orientation time: oriented;date: oriented;place: oriented;person: oriented   Memory baseline memory   Insight poor   Judgement impaired   Eye Contact at examiner   Affect irritable;angry   Mood irritable;anxious   Physical Appearance/Attire appears stated age   Hygiene well groomed   Suicidality other (see comments)  (Denies)   Self Injury other (see comment)  (Denies)   Activity hyperactive (agitated, impulsive)   Speech coherent;clear   Psychomotor / Gait steady;balanced   Psycho Education   Type of Intervention 1:1 intervention   Response participates, initiates socially appropriate   Hours 0.5   Activities of Daily Living   Hygiene/Grooming independent   Oral Hygiene independent   Dress scrubs (behavioral health)   Laundry with supervision   Room Organization independent   Activity   Activity Level of Assistance independent   Behavioral Health Interventions   Psychotic Symptoms maintain safety precautions;reality orientation;assist patient in developing safety plan;assist patient in following safety plan;provide emotional support   Social and Therapeutic Interventions (Psychotic Symptoms) encourage socialization with peers;encourage participation in therapeutic groups and milieu activities   Pt  Called the writer almaz, casey apple and  showing fingers fuck you,The pt appeared aggressive behind another peer and he became aggressive to this writer and he was six inches from the writer and statedt\" kiss me and that you are not huizar'. He appears intrusive and not redirectable to staff. He appears abusive to staff and other peers. He was given time out couple time to his room and he was observed on the phone. Pt was heard on phone telling someone that he saw a six year old girl [place] the beach and video recorded her. He asked one of the staff to give him her  phone number and " pt would kiss her feet.

## 2017-06-13 NOTE — PROGRESS NOTES
"[facility], [place]   Psychiatric Progress Note        Interim History:   The patient's care was discussed with the treatment team during the daily team meeting and/or staff's chart notes were reviewed.  Staff report that the patient's petition has been supported.     The patient reports that \"these drugs are good - I feel drunk all the time.\" Mood is good. Focused on how he feels on Zyprexa. Denies SI or HI. Denies AH or VH. Says that \"three priests are coming to visit me.\" Says that his handwriting has changed on documents he has signed so he thinks someone is forging his signature. Also feels that his phone numbers written down are disappearing from his room.          Medications:       OLANZapine zydis  5 mg Oral At Bedtime          Allergies:     Allergies   Allergen Reactions     Bee Venom      Propranolol           Labs:   No results found for this or any previous visit (from the past 24 hour(s)).       Psychiatric Examination:     /80  Pulse 71  Temp 98  F (36.7  C) (Oral)  Resp 16  Ht 1.854 m (6' 1\")  Wt 86.6 kg (191 lb)  SpO2 99%  BMI 25.2 kg/m2  Weight is 191 lbs 0 oz  Body mass index is 25.2 kg/(m^2).                Sitting Orthostatic BP: 145/84      Sitting Orthostatic Pulse: 64 bpm      Standing Orthostatic BP: 156/88      Standing Orthostatic Pulse: 81 bpm       Appearance: awake, alert and adequately groomed  Attitude:  cooperative  Eye Contact:  fair  Mood:  good  Affect:  intensity is heightened  Speech:  rambling  Psychomotor Behavior:  no evidence of tardive dyskinesia, dystonia, or tics  Thought Process:  tangental  Associations:  no loose associations  Thought Content:  no evidence of suicidal ideation or homicidal ideation, delusional thinking noted  Insight:  limited  Judgement:  limited  Oriented to:  time, person, and place  Attention Span and Concentration:  fair  Recent and Remote Memory:  poor         Precautions:     Behavioral Orders "   Procedures     Code 1 - Restrict to Unit     Routine Programming     As clinically indicated     Sexual precautions     For both male and female pts and staff.  Pt is to have male only for 1:1.     Status 15     Every 15 minutes.     Status Individual Observation     Male only 1:1.     Order Specific Question:   CONTINUOUS 24 hours / day     Answer:   Distance and Exceptions     Order Specific Question:   Distance     Answer:   5 feet     Order Specific Question:   Exceptions     Answer:   none     Status Individual Observation     Order Specific Question:   CONTINUOUS 24 hours / day     Answer:   Distance and Exceptions     Order Specific Question:   Distance     Answer:   5 feet     Order Specific Question:   Exceptions     Answer:   meetings and assessments / procedures     Order Specific Question:   Exceptions     Answer:   therapeutic groups     Order Specific Question:   Exceptions     Answer:   family and visitors are present          DIagnoses:     Other psychotic disorder.  Prior diagnosis of delusional disorder, persecutory type.  Rule out schizophrenia, paranoid with late onset.         Plan:     1) Continue Zyprexa 5mg Qhs ODT. Will add IM backup if patient refuses.   2) Petition for commitment with [employer] filed and supported.   3) Continue SIO for now. Patient continues to be intrusive.   4) Disposition to be determined. Patient to show improvement in terms of psychosis prior to discharge.

## 2017-06-13 NOTE — PROGRESS NOTES
During 1:1 pt was intrusive and inappropriate towards staff and other patients, making inappropriate comments.For example: offering staff to kiss staff feet, asking for staff number, saying to other patients that he will miss all the female staffs and patients, defending his comment with a response that he's not huizar for saying that.

## 2017-06-13 NOTE — PLAN OF CARE
Problem: Psychotic Symptoms  Goal: Psychotic Symptoms  Pt will have safe behaviors on the unit until discharge.  Pt will maintain safe sexual behaviors on the unit before discharge.  Pt will be medication compliant on the unit until discharge.  Pt will be visible and safe in the milieu until discharge.  Pt will develop coping skills before discharge.  Pt will have decreased paranoia before discharge.  Pt will deny SI and SIB thoughts before discharge.  Pt will sleep 8 hours a night before discharge.  Pt will have adequate intake before discharge.   Outcome: No Change  48 Hour Assessment    Pt remains on 24 hour SIO 1:1    Pt was pleasant and agreeable to meet with writer. He states that he does not want to take medications because he does not want to become addicted. Writer told pt that he should voice these concerns at this provider. Pt did become agitated with the support staff who was monitoring his SIO. He was swearing at staff, and displaying general irritation. Staff redirected pt, but he continued to appear irritable. Did not display inappropriate sexual behaviors.    Pt took prn Zyprexa 10 mg, so HS dose of Zyprexa was not given.     SI/SIB: Pt denies     Auditory/Visual Hallucinations: Pt denies     No additional concerns at this time. Will continue to monitor and assess.

## 2017-06-13 NOTE — SIGNIFICANT EVENT
"Pts wife, Angela Wilkinson, called at 0545 requesting information on pt's status and how his new medication worked throughout the night. RN checked for MNADA and found one to be declined by pt. RN informed pts wife that she was unable to share any information at this time. Pt's wife became very upset demanding to know why, stating that she was \"on the list\", stating that every other time she called she was given information, and threatening to report this RN. RN apologized for any misunderstandings that may have occurred but reaffirmed that at this time no information would be shared. Pt's continued above statements for approximately 10 minutes becoming increasingly angry before RN stated she had to end the phone call. At this time there is not a signed MANDA for pt's wife.   "

## 2017-06-13 NOTE — PROGRESS NOTES
"Pt came to desk at 0540 reporting that the medications that he took make him feel \"really good\". Pt then asked how police could test him for medications if he was pulled over while driving, specifically if they could do a blood test on the spot. RN stated she was unsure how they verified medications and that they would likely do a sobriety test. RN also told pt numerous times that he should not drive if they medications made him feel impaired. Pt responded \"Oh, but it would be so much fun\". Pt then commented that RN \"looked good this morning\". RN did not respond. Pt then returned to his room.   "

## 2017-06-13 NOTE — PROGRESS NOTES
"Attended 0.5 of 2.0 OT groups today.   During topic group focused on setting boundaries, pt held side conversation with peers and occasionally made off topic verbal comment \"let's party\".     He left mid session and did not return.  No evidence of learning.   "

## 2017-06-13 NOTE — SIGNIFICANT EVENT
At 0605 pts son Minh Nelson called to see if his father was okay. MANDA present and signed. Son informed that his father was okay. Son expressed confusion as to why his step-mother was not given information. RN relayed generic information regarding ROIs and state law regarding patient information. Pt's son expressed understanding and ended phone call.    At 0620 pt's wife called again reporting that all of the other nurses have been giving her information in regards to her  and his medications. RN again apologized for situation but stated that she could do nothing more at this time. Pts wife demanded that this RN review chart again, find her name, and call her back with an update. Pts wife then hung up.     Upon further search of chart, RN found three ROIs for pts wife in various placed throughout chart. One MANDA was not signed or dated. ONe was dated 6/9 and signed. The third was stapled with the previous, was dated 6/9 and 'Declined' was written.     Clarification needed for MANDA for wife.

## 2017-06-13 NOTE — PROGRESS NOTES
Early in the shift patient made several explicit sexual and drug-related comments in conversation with staff and other patients. When staff insisted that such comments are inappropriate on the psychiatric unit the patient became highly angry with that staff and repeatedly targeted that staff with obscene insults for the remainder of the evening. The patient remained calm and pleasant when interacting with other staff and patients but continued to direct sexual, violent and otherwise obscene comments towards the particular staff until the end of the shift.

## 2017-06-14 PROCEDURE — 25000125 ZZHC RX 250: Performed by: PSYCHIATRY & NEUROLOGY

## 2017-06-14 PROCEDURE — S0166 INJ OLANZAPINE 2.5MG: HCPCS | Performed by: PSYCHIATRY & NEUROLOGY

## 2017-06-14 PROCEDURE — 25000128 H RX IP 250 OP 636: Performed by: PSYCHIATRY & NEUROLOGY

## 2017-06-14 PROCEDURE — H2032 ACTIVITY THERAPY, PER 15 MIN: HCPCS

## 2017-06-14 PROCEDURE — 90853 GROUP PSYCHOTHERAPY: CPT

## 2017-06-14 PROCEDURE — 12400001 ZZH R&B MH UMMC

## 2017-06-14 RX ADMIN — HALOPERIDOL LACTATE 10 MG: 5 INJECTION, SOLUTION INTRAMUSCULAR at 23:14

## 2017-06-14 RX ADMIN — OLANZAPINE 10 MG: 10 INJECTION, POWDER, LYOPHILIZED, FOR SOLUTION INTRAMUSCULAR at 21:04

## 2017-06-14 RX ADMIN — LORAZEPAM 1 MG: 2 INJECTION INTRAMUSCULAR; INTRAVENOUS at 23:23

## 2017-06-14 ASSESSMENT — ACTIVITIES OF DAILY LIVING (ADL)
ORAL_HYGIENE: INDEPENDENT
HYGIENE/GROOMING: PROMPTS
LAUNDRY: UNABLE TO COMPLETE
ORAL_HYGIENE: INDEPENDENT
GROOMING: PROMPTS
DRESS: SCRUBS (BEHAVIORAL HEALTH)

## 2017-06-14 NOTE — PROGRESS NOTES
"Austyn attended an OT discussion group focusing on personal traits and life roles. Comments ranged from off-topic to provocative (e.g. \"I don't think anyone goes to hell no matter what terrible things they do; we're all going to heaven\"). Named \",\" \"inventor\" and \"orbs\" as three of his primary life roles. Appeared not to be engaging in the group in a therapeutic manner but instead used the opportunity to bring up uncomfortable and strange topics.     "

## 2017-06-14 NOTE — PROGRESS NOTES
06/13/17 2246   Behavioral Health   Hallucinations denies / not responding to hallucinations   Thinking distractable;poor concentration   Orientation place: oriented   Memory baseline memory   Insight poor   Judgement impaired   Affect blunted, flat   Mood mood is calm   Suicidality other (see comments)  (pt denies)   Self Injury other (see comment)  (pt denies)   Activity other (see comment)  (community meeting)   Activities of Daily Living   Hygiene/Grooming independent   Oral Hygiene independent   Dress independent   Room Organization independent   Behavioral Health Interventions   Psychotic Symptoms maintain safety precautions;maintain safe secure environment   Social and Therapeutic Interventions (Psychotic Symptoms) encourage socialization with peers;encourage effective boundaries with peers;encourage participation in therapeutic groups and milieu activities

## 2017-06-14 NOTE — PROGRESS NOTES
"Pt was visible off and on, he was talking to another client about dating, and what he likes in a date, and staff asked him to stop talking about his dating proclivities. He yelled at staff, and said, \"shut the hell up,and your voice is making me sick.\" He went to a group, and denies all mental health issues.  "

## 2017-06-14 NOTE — PROGRESS NOTES
"Pt continues on SIO.  Pt can have inappropriate comments (hypersexual comments, drug use, etc.) in the milieu and needs redirection. When pt is redirected he becomes easily agitated.    Writer overheard pt talking to a staff member stating that the medications he took (zyprexa) make him feel \"loopy\" and \"like a balloon\" and that it would be fun to drive while sedated from zyprexa. Staff told pt that was a poor idea, but pt stated \"the  would never know and it sounds like fun\".    Pt requested his HS medication (5 mg zydis, SEE MAR).  Writer placed the medication in pt's hand.  Pt brought his hand to his mouth, but tried to \"pocket the medication\", by not placing the medication in his mouth, and instead holding the medication in his hand.  Writer realized pt tried to hide the medication.  Pt became upset that he was discovered. Writer observed pt place the medication on his tongue and writer observed the medication dissolve.      Pt later told writer that the zydis gave him tooth pain and that he would need all of his teeth pulled.      Pt has displayed poor insight into his mental health.  Pt has displayed poor judgement concerning his mental health.  "

## 2017-06-15 PROCEDURE — 25000132 ZZH RX MED GY IP 250 OP 250 PS 637: Performed by: PSYCHIATRY & NEUROLOGY

## 2017-06-15 PROCEDURE — 12400001 ZZH R&B MH UMMC

## 2017-06-15 PROCEDURE — 99233 SBSQ HOSP IP/OBS HIGH 50: CPT | Performed by: PSYCHIATRY & NEUROLOGY

## 2017-06-15 PROCEDURE — 90853 GROUP PSYCHOTHERAPY: CPT

## 2017-06-15 PROCEDURE — 99207 ZZC CDG-MDM COMPONENT: MEETS MODERATE - UP CODED: CPT | Performed by: PSYCHIATRY & NEUROLOGY

## 2017-06-15 RX ADMIN — TRAZODONE HYDROCHLORIDE 50 MG: 50 TABLET ORAL at 01:20

## 2017-06-15 RX ADMIN — OLANZAPINE 10 MG: 10 TABLET, FILM COATED ORAL at 01:20

## 2017-06-15 RX ADMIN — HYDROXYZINE HYDROCHLORIDE 50 MG: 25 TABLET ORAL at 01:20

## 2017-06-15 RX ADMIN — OLANZAPINE 5 MG: 5 TABLET, ORALLY DISINTEGRATING ORAL at 21:01

## 2017-06-15 ASSESSMENT — ACTIVITIES OF DAILY LIVING (ADL)
ORAL_HYGIENE: PROMPTS
HYGIENE/GROOMING: PROMPTS
DRESS: STREET CLOTHES

## 2017-06-15 NOTE — PLAN OF CARE
Problem: Restraint/Seclusion for Violent Self-Destructive Behavior  Goal: Prevent future episodes of restraint or seclusion  Identify nonphysical alternatives to restraint or seclusion.  Identify additional de-escalation supportive measures to use as alternatives to restraint or seclusion.   Outcome: Improving  Debriefing was completed--pt met criteria to be taken out of seclusion. Pt was taken out of seclusion at 0215. Pt reported that he understood why he was in seclusion and contracted for safe behaviors while in the unit. Pt was unable to answer what could be done differently next time. Pt patient was offered food and fluids but refused. Pt denied any discomfort. Pt was able to walk to his room with staff assistance.

## 2017-06-15 NOTE — PROVIDER NOTIFICATION
Debriefing was completed--pt met criteria to be taken out of seclusion. Pt was taken out of seclusion at 0215. Pt reported that he understood why he was in seclusion and contracted for safe behaviors while in the unit. Pt was unable to answer what could be done differently next time. Pt patient was offered food and fluids but refused. Pt denied any discomfort. Pt was able to walk to his room with staff assistance.

## 2017-06-15 NOTE — PROGRESS NOTES
While in presence of 1:1 staff, pt attended OT leisure group.  He sat apart from peers and observed, but primarily sat with his eyes closed and head down.  While at the beginning of session he introduced himself to others (in context of introduction activity) he did not respond to OTR when called on, by name, to participate in group activity.

## 2017-06-15 NOTE — PROGRESS NOTES
"Pt reportedly became increasingly agitated while in the seclusion room. Pt was kicking seclusion room door and yelling for 5 uninterrupted minutes. Pt refused prompts to de-escalate. Code green called, breif hold initiated for <30 seconds to administer ativan 1mg and Haldol 10mg IM for increased aggression & agitation. No injury noted after brief hold, respirations are even, pt continues to yell and kick the door demanding, \"I want to speak with my wife, give me a telephone, tell her I want a divorce, I'm not going to court tomorrow.\" Pt continues to present with disorganized thinking and aggressive/unpredicatble behaviors. Seclusion continued.  "

## 2017-06-15 NOTE — PROGRESS NOTES
"Writer met with pt for face to face assessment.  Pt was lying on the seclusion mattress.  Writer attempted to talk to pt about the events leading to seclusion.  Pt just screamed \"fuck you\" at writer.  Pt was not receptive to any discussion.  Writer told pt the criteria for the seclusion to discontinue, but pt just screamed \"fuck you\".    1:1 staff stated pt had been lying on the mattress for the previous 30 minutes, ignoring any question asked of him.      Pt is in no acute distress.  Pt has no complaints of pain.  Pt has no apparent signs of injury.      Will continue to monitor and assess.  "

## 2017-06-15 NOTE — PLAN OF CARE
"Problem: Psychotic Symptoms  Goal: Psychotic Symptoms  Pt will have safe behaviors on the unit until discharge.  Pt will maintain safe sexual behaviors on the unit before discharge.  Pt will be medication compliant on the unit until discharge.  Pt will be visible and safe in the milieu until discharge.  Pt will develop coping skills before discharge.  Pt will have decreased paranoia before discharge.  Pt will deny SI and SIB thoughts before discharge.  Pt will sleep 8 hours a night before discharge.  Pt will have adequate intake before discharge.   Outcome: No Change     Attempted to meet with pt for 1:1, pt refused.  Data is from observations.  Pt continues on SIO for sexual precautions and requiring frequent redirection in the milieu.  Pt denies all mental health concerns.  Pt      Pt met with court  physician at the start of the shift.  Pt told the  that he believed he would be DC on 6/15.  Court  stated to writer that when she told pt that he might not DC tomorrow he tried to \"stare her down\".      After the conversation with the court  pt was agitated and irritable in the lounge.   Pt required frequent redirection from staff.  Staff asked pt multiple times to return to his room due to non-therapeutic language (sexual discussions, drug use, threatening language).  Pt required multiple staff to redirect him to his room.  When in his room, pt displayed inappropriate behaviors.  He \"mooned\" (flashed his butt) at the male 1:1  staff who was monitoring the pt.  When a female staff went to talk to the pt, he made sexually inappropriate remarks.    At approx 2045, pt was extremely agitated in the lounge, because his 1:1 staff had previously been on a code with the pt.  Pt was asked to go to his room. Pt refused at first, but with staff redirection pt walked to his room.  Writer approached pt with his HS medications.  Pt stated he was going to \"create problems\" and cause a disturbance on " "the unit and stated he was going to \"kick the medications out of your (Writer's) hand.\".  Pt tried to rush past staff and did not want to complete the 15 minute break that staff had created as a limit.  Pt was posturing aggressively towards staff and yelling.  Pt was assessed as a threat to others by writer.    Tonia Moss was called for pt's escalating behavior.  Pt was brought to seclusion on station 22 utilizing [facility] procedures.  Pt received IM medication (see mar).  See accompanying notes for specific information regarding code.    Later on in the shift, pt was kicking at the seclusion door and would not stop.  Pt was extremely agitated.  Code green was called and pt was given IM medication (SEE MAR).      Will continue to monitor and assess.      "

## 2017-06-15 NOTE — PROGRESS NOTES
"Pt was yelling, verbally threatening staff and peers in the milieu.  Pt was asked to quiet down and retreat to room for an open time out several times.  Pt refused multiple de-escalation attempts: offer for medication, change in environment, distraction activities.  When offered scheduled meds Pt attempted to kick meds out of RN's hand, pt also postured repeatedly, slamming his fists on tables in the milieu, stating \"I'm gonna cause some real trouble in here!\"  Pt received 10mg IM zyprexa and was taken via back board to seclusion room. No injury noted. Pt continues to yell and threaten staff.     Doctor notified  No medication changes at this time     06/14/17 2120   Seclusion or Restraint Order   Length of Order 4   Order Obtained Yes   Attending Physician Notified Yes  (Deb Naegele)   Attending Physician's Name Deb Naegele   Patient Experienced No adverse physical outcome from seclusion/restraint initiation   Continuation of Seclus/Restraint indicated at this time Yes     "

## 2017-06-15 NOTE — PROGRESS NOTES
"Pt went to court at 830 am this morning, prior to his leaving the unit he was swearing at staff and angry that he had to go to court. He stated that,\"this fucking place is killing me.\" Also, his appearance was very messy and he was unkempt.  "

## 2017-06-15 NOTE — PROGRESS NOTES
Pt was in seclusion upon arrival to the shift.This staff checked on the patient, who appeared to be sleeping at 0030. Staff watching the patient informed this staff that the patient was up shortly upon my arrival, questioning when he would be taken out of seclusion.     After checking the orders, this staff realized that the seclusion order that was initiated at 2120 had  before the 4-hour guilherme at 0120. This staff called the Dr. Naegele who approved to restart the seclusion order at 0115. This staff went to debrief the patient out of seclusion, but the patient was resistant--refused to contract for safe behavior on the unit and the pt reported that he didn't know the reason why he was in seclusion. Pt did agree to take his PO medications-Trazodone 50mg, Hydroxyzine 50mg, and Zyprexa 10mg at 0120. Staff will attempt to debrief the patient at 0200.

## 2017-06-15 NOTE — PROGRESS NOTES
"[facility], [place]   Psychiatric Progress Note        Interim History:   The patient's care was discussed with the treatment team during the daily team meeting and/or staff's chart notes were reviewed.  Staff report that the patient did end up in seclusion last night. Has court today.     The patient is seen briefly before he is taken to court. Informs this provider that \"they gave me three hypos last night after tying me down.\" Rambling speech. Denies SI or HI.          Medications:       OLANZapine zydis  5 mg Oral At Bedtime          Allergies:     Allergies   Allergen Reactions     Bee Venom      Propranolol           Labs:   No results found for this or any previous visit (from the past 24 hour(s)).       Psychiatric Examination:     /78  Pulse 79  Temp 97  F (36.1  C)  Resp 16  Ht 1.854 m (6' 1\")  Wt 86.2 kg (190 lb)  SpO2 99%  BMI 25.07 kg/m2  Weight is 190 lbs 0 oz  Body mass index is 25.07 kg/(m^2).                Sitting Orthostatic BP: 145/84      Sitting Orthostatic Pulse: 64 bpm      Standing Orthostatic BP: 156/88      Standing Orthostatic Pulse: 81 bpm       Appearance: awake, alert and adequately groomed  Attitude:  cooperative  Eye Contact:  fair  Mood:  angry  Affect:  intensity is heightened  Speech:  rambling  Psychomotor Behavior:  no evidence of tardive dyskinesia, dystonia, or tics  Thought Process:  tangental  Associations:  no loose associations  Thought Content:  no evidence of suicidal ideation or homicidal ideation, delusional thinking noted  Insight:  limited  Judgement:  limited  Oriented to:  time, person, and place  Attention Span and Concentration:  fair  Recent and Remote Memory:  poor         Precautions:     Behavioral Orders   Procedures     Code 1 - Restrict to Unit     Routine Programming     As clinically indicated     Sexual precautions     For both male and female pts and staff.  Pt is to have male only for 1:1.     Status 15     " Every 15 minutes.     Status Individual Observation     Male only 1:1.     Order Specific Question:   CONTINUOUS 24 hours / day     Answer:   Distance and Exceptions     Order Specific Question:   Distance     Answer:   5 feet     Order Specific Question:   Exceptions     Answer:   none     Status Individual Observation     Order Specific Question:   CONTINUOUS 24 hours / day     Answer:   Distance and Exceptions     Order Specific Question:   Distance     Answer:   5 feet     Order Specific Question:   Exceptions     Answer:   meetings and assessments / procedures     Order Specific Question:   Exceptions     Answer:   therapeutic groups     Order Specific Question:   Exceptions     Answer:   family and visitors are present          DIagnoses:     Other psychotic disorder.  Prior diagnosis of delusional disorder, persecutory type.  Rule out schizophrenia, paranoid with late onset.         Plan:     1) Continue Zyprexa 5mg Qhs ODT. Will add IM backup if patient refuses.   2) Petition for commitment with [employer] filed and supported.   3) Continue SIO for now. Patient continues to be intrusive.   4) Disposition to be determined. Patient to show improvement in terms of psychosis prior to discharge.

## 2017-06-15 NOTE — PROGRESS NOTES
"Pt's wife (Angela) called (MANDA signed). Angela asked to speak with Pt, this writer informed Angela that Pt was unavailable to speak at this time.  Angela then asked \"did anything happen today?\" Staff informed Angela that Pt became aggressive and was placed in seclusion to maintain safety to self and others.  "

## 2017-06-16 PROCEDURE — 97150 GROUP THERAPEUTIC PROCEDURES: CPT | Mod: GO

## 2017-06-16 PROCEDURE — 90853 GROUP PSYCHOTHERAPY: CPT

## 2017-06-16 PROCEDURE — 99207 ZZC CDG-MDM COMPONENT: MEETS MODERATE - UP CODED: CPT | Performed by: PSYCHIATRY & NEUROLOGY

## 2017-06-16 PROCEDURE — 25000132 ZZH RX MED GY IP 250 OP 250 PS 637: Performed by: PSYCHIATRY & NEUROLOGY

## 2017-06-16 PROCEDURE — 99233 SBSQ HOSP IP/OBS HIGH 50: CPT | Performed by: PSYCHIATRY & NEUROLOGY

## 2017-06-16 PROCEDURE — 12400003 ZZH R&B MH CRITICAL UMMC

## 2017-06-16 RX ADMIN — OLANZAPINE 5 MG: 5 TABLET, ORALLY DISINTEGRATING ORAL at 21:55

## 2017-06-16 ASSESSMENT — ACTIVITIES OF DAILY LIVING (ADL)
DRESS: PROMPTS
ORAL_HYGIENE: INDEPENDENT
HYGIENE/GROOMING: INDEPENDENT
DRESS: INDEPENDENT
ORAL_HYGIENE: PROMPTS
HYGIENE/GROOMING: PROMPTS

## 2017-06-16 NOTE — PROGRESS NOTES
"[facility], [place]   Psychiatric Progress Note        Interim History:   The patient's care was discussed with the treatment team during the daily team meeting and/or staff's chart notes were reviewed.  Staff report that the patient has continued to be intrusive and impulsive and needs the SIO.     The patient reports that he has a lot of tooth pain and wants \"them all pulled.\" Didn't sleep well due to the tooth pain. Mood is \"okay.\" Denies SI or HI. Denies AH or VH. Agreeable to stay on the medication for now. Has been attending some groups.          Medications:       OLANZapine zydis  5 mg Oral At Bedtime          Allergies:     Allergies   Allergen Reactions     Bee Venom      Propranolol           Labs:   No results found for this or any previous visit (from the past 24 hour(s)).       Psychiatric Examination:     /78  Pulse 79  Temp 98.3  F (36.8  C) (Oral)  Resp 16  Ht 1.854 m (6' 1\")  Wt 86.2 kg (190 lb)  SpO2 99%  BMI 25.07 kg/m2  Weight is 190 lbs 0 oz  Body mass index is 25.07 kg/(m^2).                Sitting Orthostatic BP: 145/84      Sitting Orthostatic Pulse: 64 bpm      Standing Orthostatic BP: 156/88      Standing Orthostatic Pulse: 81 bpm       Appearance: awake, alert and adequately groomed  Attitude:  somewhat cooperative  Eye Contact:  fair  Mood:  okay  Affect:  less elevated  Speech:  rambling  Psychomotor Behavior:  no evidence of tardive dyskinesia, dystonia, or tics  Thought Process:  tangental, focused on tooth  Associations:  no loose associations  Thought Content:  no evidence of suicidal ideation or homicidal ideation, delusional thinking noted  Insight:  limited  Judgement:  limited  Oriented to:  time, person, and place  Attention Span and Concentration:  fair  Recent and Remote Memory:  poor         Precautions:     Behavioral Orders   Procedures     Code 1 - Restrict to Unit     Routine Programming     As clinically indicated     Sexual " precautions     For both male and female pts and staff.  Pt is to have male only for 1:1.     Status 15     Every 15 minutes.     Status Individual Observation     Male only 1:1.     Order Specific Question:   CONTINUOUS 24 hours / day     Answer:   Distance and Exceptions     Order Specific Question:   Distance     Answer:   5 feet     Order Specific Question:   Exceptions     Answer:   none     Status Individual Observation     Order Specific Question:   CONTINUOUS 24 hours / day     Answer:   Distance and Exceptions     Order Specific Question:   Distance     Answer:   5 feet     Order Specific Question:   Exceptions     Answer:   meetings and assessments / procedures     Order Specific Question:   Exceptions     Answer:   therapeutic groups     Order Specific Question:   Exceptions     Answer:   family and visitors are present     Status Individual Observation     Order Specific Question:   CONTINUOUS 24 hours / day     Answer:   Distance and Exceptions     Order Specific Question:   Distance     Answer:   5 feet     Order Specific Question:   Exceptions     Answer:   meetings and assessments / procedures     Order Specific Question:   Exceptions     Answer:   therapeutic groups     Order Specific Question:   Exceptions     Answer:   family and visitors are present          DIagnoses:     Other psychotic disorder.  Prior diagnosis of delusional disorder, persecutory type.  Rule out schizophrenia, paranoid with late onset.         Plan:     1) Continue Zyprexa 5mg Qhs ODT. Will add IM backup if patient refuses.   2) Petition for commitment with [facility] filed and supported.   3) Continue SIO for now. Patient continues to be intrusive.   4) Disposition to be determined. Patient to show improvement in terms of psychosis prior to discharge.   5) The patient will be followed by Dr. Marques starting on Monday and he voiced understanding of this.

## 2017-06-16 NOTE — PROGRESS NOTES
"Pt has been swearing and calling staff \"idioits\" during meetings. He also has been giving staff the finger during continuous observation. He also stated that he \"fucking hates staff.\" He denies all mental health issues.  "

## 2017-06-16 NOTE — PROGRESS NOTES
Writer received voicemail from Betty at [facility]. Pt is now on court hold, paperwork was received. Final court hearing is 6/22. Per Betty, Court and [facility] will need: DA, Medication order, Physicians assessment and new recommendations.   Writer placed call to Rochelle in business office to set up MA application. Reports she will come to the unit this afternoon to complete.     Update: Rochelle in [place] will come on Monday to complete MA application.

## 2017-06-16 NOTE — PROGRESS NOTES
Attended 2.0 OT groups today, due to many ins and outs.  He was an active participant in structured activity, contributing to group cooking task by assisting with dishes / clean up, when provided with concrete verbal instructions by OTR.  He continues to take an active interest in female peers and staff, for instance winking at female OT student present and telling a peer that if he met her in a bar he would want to talk to her and get to know her better. OTR overheard him giving the same female peer multiple verbal compliments later in the day.

## 2017-06-17 PROCEDURE — 12400003 ZZH R&B MH CRITICAL UMMC

## 2017-06-17 PROCEDURE — 25000132 ZZH RX MED GY IP 250 OP 250 PS 637: Performed by: PSYCHIATRY & NEUROLOGY

## 2017-06-17 RX ADMIN — OLANZAPINE 5 MG: 5 TABLET, ORALLY DISINTEGRATING ORAL at 21:24

## 2017-06-17 ASSESSMENT — ACTIVITIES OF DAILY LIVING (ADL)
HYGIENE/GROOMING: INDEPENDENT
LAUNDRY: WITH SUPERVISION
ORAL_HYGIENE: INDEPENDENT
ORAL_HYGIENE: INDEPENDENT
LAUNDRY: WITH SUPERVISION
DRESS: STREET CLOTHES;SCRUBS (BEHAVIORAL HEALTH)
DRESS: STREET CLOTHES;SCRUBS (BEHAVIORAL HEALTH)
GROOMING: INDEPENDENT

## 2017-06-17 NOTE — PROGRESS NOTES
06/17/17 1343   Behavioral Health   Hallucinations denies / not responding to hallucinations   Thinking distractable   Orientation time: oriented;date: oriented;place: oriented;person: oriented   Memory baseline memory   Insight poor   Judgement impaired   Eye Contact at examiner   Affect irritable;angry   Mood irritable;anxious;labile   Physical Appearance/Attire appears stated age   Hygiene well groomed   Suicidality other (see comments)  (Denies)   Self Injury other (see comment)  (Denies)   Activity hyperactive (agitated, impulsive)   Speech clear;coherent   Psychomotor / Gait balanced;steady   Psycho Education   Type of Intervention 1:1 intervention   Response participates, initiates socially appropriate   Hours 0.5   Activities of Daily Living   Hygiene/Grooming independent   Oral Hygiene independent   Dress street clothes;scrubs (behavioral health)   Laundry with supervision   Room Organization independent   Activity   Activity Level of Assistance independent   Pt observed in the lounge with other peers and he has been talking inappropriately with other peers. He played cards with other peers in the lounge. Pt appears aggressive when things do not go on his way. He has been complaining  Of pain in his mouth. Pt sometimes he is intrusive..

## 2017-06-17 NOTE — PROGRESS NOTES
"Pt had an improved mood during this shift.  Pt approached the medication window and took his HS medication and did not try and \"cheek\" or \"pocket\" the medication.  Pt joked around with writer and said \"please\" and \"thank you\"  "

## 2017-06-17 NOTE — PROGRESS NOTES
During 1:1 while playing agueda card game with staff and other patients, patient reached over and rubbed his foot on staff leg and winked.  Patient also made several inappropriate comments about staff and female patient.  Patient was redirected and told to go to his room.  This writer told the nurse.

## 2017-06-18 PROCEDURE — 12400003 ZZH R&B MH CRITICAL UMMC

## 2017-06-18 PROCEDURE — 90853 GROUP PSYCHOTHERAPY: CPT

## 2017-06-18 PROCEDURE — 25000132 ZZH RX MED GY IP 250 OP 250 PS 637: Performed by: PSYCHIATRY & NEUROLOGY

## 2017-06-18 RX ADMIN — ALUMINUM HYDROXIDE, MAGNESIUM HYDROXIDE, AND DIMETHICONE 30 ML: 400; 400; 40 SUSPENSION ORAL at 00:23

## 2017-06-18 RX ADMIN — OLANZAPINE 5 MG: 5 TABLET, ORALLY DISINTEGRATING ORAL at 20:57

## 2017-06-18 ASSESSMENT — ACTIVITIES OF DAILY LIVING (ADL)
HYGIENE/GROOMING: INDEPENDENT
ORAL_HYGIENE: INDEPENDENT
HYGIENE/GROOMING: INDEPENDENT
ORAL_HYGIENE: INDEPENDENT
DRESS: STREET CLOTHES
LAUNDRY: WITH SUPERVISION
DRESS: SCRUBS (BEHAVIORAL HEALTH);STREET CLOTHES
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
## 2017-06-18 NOTE — PROGRESS NOTES
Awake at 0015. Loud conversation. Eructating loudly. Complains of heartburn and abdominal pain. Agreed to try Maalox. Sleeping by 0145.

## 2017-06-18 NOTE — PROGRESS NOTES
06/18/17 1433   Behavioral Health   Hallucinations denies / not responding to hallucinations   Thinking delusional;paranoid   Orientation time: oriented;date: oriented;place: oriented;person: oriented   Memory baseline memory   Insight poor   Judgement impaired   Eye Contact at examiner   Affect sad;angry;tense   Mood anxious;irritable   Physical Appearance/Attire appears stated age   Hygiene well groomed   Suicidality other (see comments)  (Denies)   Self Injury other (see comment)  (Denies)   Activity hyperactive (agitated, impulsive)   Speech clear;coherent   Psychomotor / Gait steady;balanced   Psycho Education   Type of Intervention 1:1 intervention   Response participates, initiates socially appropriate   Hours 0.5   Activities of Daily Living   Hygiene/Grooming independent   Oral Hygiene independent   Dress scrubs (behavioral health);street clothes   Laundry with supervision   Room Organization independent   Activity   Activity Level of Assistance independent   Pt has been preoccupied with outcome of the court and he appears paranoid . He attended groups this shift and he does not participate in group. He was observed watching TV.

## 2017-06-19 PROCEDURE — 99207 ZZC CDG-MDM COMPONENT: MEETS LOW - DOWN CODED: CPT | Performed by: PSYCHIATRY & NEUROLOGY

## 2017-06-19 PROCEDURE — 25000132 ZZH RX MED GY IP 250 OP 250 PS 637: Performed by: PSYCHIATRY & NEUROLOGY

## 2017-06-19 PROCEDURE — 99232 SBSQ HOSP IP/OBS MODERATE 35: CPT | Performed by: PSYCHIATRY & NEUROLOGY

## 2017-06-19 PROCEDURE — 97150 GROUP THERAPEUTIC PROCEDURES: CPT | Mod: GO

## 2017-06-19 PROCEDURE — 12400003 ZZH R&B MH CRITICAL UMMC

## 2017-06-19 RX ORDER — OLANZAPINE 10 MG/1
10 TABLET, ORALLY DISINTEGRATING ORAL AT BEDTIME
Status: DISCONTINUED | OUTPATIENT
Start: 2017-06-19 | End: 2017-06-23 | Stop reason: HOSPADM

## 2017-06-19 RX ADMIN — OLANZAPINE 10 MG: 10 TABLET, ORALLY DISINTEGRATING ORAL at 20:01

## 2017-06-19 ASSESSMENT — ACTIVITIES OF DAILY LIVING (ADL)
GROOMING: INDEPENDENT
ORAL_HYGIENE: INDEPENDENT
ORAL_HYGIENE: INDEPENDENT
LAUNDRY: UNABLE TO COMPLETE
DRESS: SCRUBS (BEHAVIORAL HEALTH);INDEPENDENT
DRESS: STREET CLOTHES;INDEPENDENT
GROOMING: INDEPENDENT

## 2017-06-19 NOTE — PROGRESS NOTES
06/19/17 1700   General Information   Date Initially Attended OT 06/09/17   Clinical Impression   Affect Incongruent;Restricted   Orientation Oriented to person, place and time   Appearance and ADLs General cleanliness observed in most areas;Disheveled   Attention to Internal Stimuli Needs further assessment   Interaction Skills Interacts appropriately with staff;Interacts with prompts, minimal response;Intrusive;Needs further assessment   Ability to Communicate Needs Independent;Does so with prompts;Needs further assessment   Verbal Content Loose   Ability to Maintain Boundaries Maintains appropriate physical boundaries;Maintains appropriate verbal boundaries;Accepts and maintains boundaries with one cue;Needs occasional cues   Participation Independently participates;Observes only   Concentration Concentrates 20-30 minutes;Needs further assessment   Ability to Concentrate With structure;Needs further assessment   Follows and Comprehends Directions Independently follows 1 step verbal directions;Needs direction simplified   Memory Needs further assessment   Organization Independently organizes simple tasks;Needs occasional assistance ;Disorganized   Decision Making Needs further assessment   Planning and Problem Solving Needs further assessment   Ability to Apply and Learn Concepts Needs further assessment   Frustrations / Stress Tolerance Independently identifies sources of frustration/stress   Level of Insight Needs further assessment   Self Esteem Needs further assessment   Social Supports Identifies utilizing supports
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
## 2017-06-19 NOTE — PROGRESS NOTES
"Attended 1.0  Of 3.0 OT groups today, with one to one staff present.  Pt has been given a Self Assessment form, explained the purpose of including them in their treatment plan and offered options for meeting their needs.       Pt declined offer to work on group task project and asked for paper and a pencil to draw, which was provided.       On self assessment form:   Pt identified reason for hospitalization (\"push ups in room\"), minimal coping skills (\"hobby's at home\"), GOOD supports outside the hospital (family, friends) and  personal strengths \"take my drugs so I can go home\".      Pt  did not identified experiencing symptoms (feelings; thoughts; behaviors / issues)   He did write in the column marked other:  \"$cigerets\"         His only goals for OT while hospitalized were \"Get out of her alive\"     In the past week, pt identified being   NOT BOTHERED due to lack of energy because of poor sleep and identified sleep satisfaction as EXCELLENT     Over the last 2 weeks, pt identified severity of insomnia as follows:    NO difficulty falling asleep    NO difficulty staying asleep    NO problems waking up too early     Pt considers sleep problems as  NOT AT ALL INTERFERING with daily function.      PLAN: Encourage feelings identification and expression in healthy ways. Pt. Will engage in goal directed tasks to enhance concentration, organization, and problem solving. Pt. Will explore and practice coping skills to reduce stress in daily life. Encourage attendance and participation in scheduled Occupational Therapy sessions. Continue to assess and document progress.         "

## 2017-06-19 NOTE — PROGRESS NOTES
Was irritable today. Is focused on winning his court case and has been on the phone with his son and wife arguing over why he is here and getting upset. Seems to be having inappropriate conversations with other patients at times about why he is here and what we are doing to keep him here.

## 2017-06-19 NOTE — PLAN OF CARE
Problem: General Plan of Care (Inpatient Behavioral)  Goal: Individualization/Patient Specific Goal (IP Behavioral)  Illness Management Recovery model: Objectives    Patient will identify reason(s) for hospitalization from their perspective.  Patient will identify a minimum of three goals for discharge.  Patient will identify a minimum of three triggers that may increase their symptoms.  Patient will identify a minimum of three coping skills they can do to stay well.   Patient will identify their support system to demonstrate readiness for discharge.   Outcome: No Change  Illness Management Recovery model:  Triggers.      Patient identified the following triggers which may exacerbate their illness:     1. Talking about my stuff  2.   3.
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
## 2017-06-20 PROCEDURE — 12400003 ZZH R&B MH CRITICAL UMMC

## 2017-06-20 PROCEDURE — 97150 GROUP THERAPEUTIC PROCEDURES: CPT | Mod: GO

## 2017-06-20 PROCEDURE — 25000132 ZZH RX MED GY IP 250 OP 250 PS 637: Performed by: PSYCHIATRY & NEUROLOGY

## 2017-06-20 PROCEDURE — 99233 SBSQ HOSP IP/OBS HIGH 50: CPT | Performed by: PSYCHIATRY & NEUROLOGY

## 2017-06-20 RX ADMIN — OLANZAPINE 10 MG: 10 TABLET, ORALLY DISINTEGRATING ORAL at 20:34

## 2017-06-20 ASSESSMENT — ACTIVITIES OF DAILY LIVING (ADL)
ORAL_HYGIENE: INDEPENDENT
HYGIENE/GROOMING: INDEPENDENT
GROOMING: INDEPENDENT
LAUNDRY: WITH SUPERVISION
LAUNDRY: WITH SUPERVISION
DRESS: STREET CLOTHES
DRESS: STREET CLOTHES
ORAL_HYGIENE: INDEPENDENT

## 2017-06-20 NOTE — PROGRESS NOTES
"[facility], [place]   Psychiatric Progress Note        Interim History:   The patient's care was discussed with the treatment team during the daily team meeting and/or staff's chart notes were reviewed.  Staff reports that patient continues to be inappropriate and irritable.    I returned phone call to patient's wife at her request. She was concerned about him being in the hospital for a long time due to loss of insurance and rising bills. She indicates that he's never been violent toward her or anyone else. She reports that he's always had his \"quirky\" and inappropriate attitude. She hopes that he can be released Friday or Thursday. She will make sure he takes his medications. She believes he just made one mistake by going to the police. She has no concerns over his safety and wants him home soon. She doesn't think that the medications will help him change his \"attitude.\" She talks about him making inventions.    The patient had highlighted a potential side effect of priapism on his olanzapine printout. We discussed this and he is not currently experiencing these effects. He again indicates that he doesn't want medications and has no interest in a mood stabilizer.         Medications:       OLANZapine zydis  10 mg Oral At Bedtime          Allergies:     Allergies   Allergen Reactions     Bee Venom      Propranolol           Labs:   No results found for this or any previous visit (from the past 24 hour(s)).       Psychiatric Examination:     /87  Pulse 96  Temp 97.5  F (36.4  C) (Oral)  Resp 16  Ht 1.854 m (6' 1\")  Wt 86.6 kg (191 lb)  SpO2 99%  BMI 25.2 kg/m2  Weight is 191 lbs 0 oz  Body mass index is 25.2 kg/(m^2).    Appearance: awake, alert, adequately groomed and casually dressed  Attitude:  somewhat cooperative  Eye Contact:  fair  Mood:  labile  Affect:  intensity is heightened and reactive  Speech:  clear, coherent   Language: Intact  Psychomotor Behavior:  no " evidence of tardive dyskinesia, dystonia, or tics  Thought Process:  goal oriented  Associations:  no loose associations  Thought Content:  no evidence of suicidal ideation or homicidal ideation, hypersexual  Insight:  limited  Judgement:  limited  Oriented to:  time, person, and place  Attention Span and Concentration:  fair  Recent and Remote Memory:  limited   Fund of Knowledge: [place]         Precautions:     Behavioral Orders   Procedures     Code 1 - Restrict to Unit     Routine Programming     As clinically indicated     Sexual precautions     For both male and female pts and staff.  Pt is to have male only for 1:1.     Status 15     Every 15 minutes.     Status Individual Observation     Male only 1:1.     Order Specific Question:   CONTINUOUS 24 hours / day     Answer:   Distance and Exceptions     Order Specific Question:   Distance     Answer:   5 feet     Order Specific Question:   Exceptions     Answer:   none     Status Individual Observation     Order Specific Question:   CONTINUOUS 24 hours / day     Answer:   Distance and Exceptions     Order Specific Question:   Distance     Answer:   5 feet     Order Specific Question:   Exceptions     Answer:   meetings and assessments / procedures     Order Specific Question:   Exceptions     Answer:   therapeutic groups     Order Specific Question:   Exceptions     Answer:   family and visitors are present     Status Individual Observation     Order Specific Question:   CONTINUOUS 24 hours / day     Answer:   Distance and Exceptions     Order Specific Question:   Distance     Answer:   5 feet     Order Specific Question:   Exceptions     Answer:   meetings and assessments / procedures     Order Specific Question:   Exceptions     Answer:   therapeutic groups     Order Specific Question:   Exceptions     Answer:   family and visitors are present          DIagnoses:     Most likely bipolar disorder         Plan:     1) Continue Zyprexa 10 mg qHS ODT  2)  Petition for commitment with [facility] filed and supported. Final hearing on Thursday.  3) Continue SIO for now. Patient continues to be intrusive.   4) Disposition to be determined. Patient to show improvement in terms of psychosis prior to discharge.

## 2017-06-20 NOTE — PROGRESS NOTES
"[facility], [place]   Psychiatric Progress Note        Interim History:   The patient's care was discussed with the treatment team during the daily team meeting and/or staff's chart notes were reviewed.  Staff reports that patient continues to be agitated and requiring of constant redirection. Quite inappropriate with female staff and peers.    Patient asks to leave. He indicates that he doesn't need to be here. When I indicate my concerns with his behavior, he tells me that he simply didn't know the rules and that he was trying to be funny. He believes that he was bringing a smile to people's faces. I asked how he could possibly think that unwanted sexual advances would bring a smile to someone's face. He told me that Juan Monae made people laugh by being inappropriate at times and even was sued several times.         Medications:       OLANZapine zydis  10 mg Oral At Bedtime          Allergies:     Allergies   Allergen Reactions     Bee Venom      Propranolol           Labs:   No results found for this or any previous visit (from the past 24 hour(s)).       Psychiatric Examination:     /87  Pulse 96  Temp 97.5  F (36.4  C) (Oral)  Resp 16  Ht 1.854 m (6' 1\")  Wt 87.1 kg (192 lb)  SpO2 99%  BMI 25.33 kg/m2  Weight is 192 lbs 0 oz  Body mass index is 25.33 kg/(m^2).    Appearance: awake, alert, adequately groomed and casually dressed  Attitude:  somewhat cooperative  Eye Contact:  fair  Mood:  labile  Affect:  intensity is heightened and reactive  Speech:  rambling   Language: Intact  Psychomotor Behavior:  no evidence of tardive dyskinesia, dystonia, or tics  Thought Process:  illogical  Associations:  no loose associations  Thought Content:  no evidence of suicidal ideation or homicidal ideation, delusional thinking noted, hypersexual  Insight:  limited  Judgement:  limited  Oriented to:  time, person, and place  Attention Span and Concentration:  fair  Recent and " Remote Memory:  poor   Fund of Knowledge: Adeuqate         Precautions:     Behavioral Orders   Procedures     Code 1 - Restrict to Unit     Routine Programming     As clinically indicated     Sexual precautions     For both male and female pts and staff.  Pt is to have male only for 1:1.     Status 15     Every 15 minutes.     Status Individual Observation     Male only 1:1.     Order Specific Question:   CONTINUOUS 24 hours / day     Answer:   Distance and Exceptions     Order Specific Question:   Distance     Answer:   5 feet     Order Specific Question:   Exceptions     Answer:   none     Status Individual Observation     Order Specific Question:   CONTINUOUS 24 hours / day     Answer:   Distance and Exceptions     Order Specific Question:   Distance     Answer:   5 feet     Order Specific Question:   Exceptions     Answer:   meetings and assessments / procedures     Order Specific Question:   Exceptions     Answer:   therapeutic groups     Order Specific Question:   Exceptions     Answer:   family and visitors are present     Status Individual Observation     Order Specific Question:   CONTINUOUS 24 hours / day     Answer:   Distance and Exceptions     Order Specific Question:   Distance     Answer:   5 feet     Order Specific Question:   Exceptions     Answer:   meetings and assessments / procedures     Order Specific Question:   Exceptions     Answer:   therapeutic groups     Order Specific Question:   Exceptions     Answer:   family and visitors are present          DIagnoses:     Other psychotic disorder.  Prior diagnosis of delusional disorder, persecutory type.  Rule out schizophrenia, paranoid with late onset. Also consider bipolar disorder with psychosis.         Plan:     1) Increase Zyprexa 10 mg Qhs ODT  2) Petition for commitment with [employer] filed and supported. Final hearing on Thursday.  3) Continue SIO for now. Patient continues to be intrusive.   4) Disposition to be determined. Patient to show  improvement in terms of psychosis prior to discharge.

## 2017-06-20 NOTE — PROGRESS NOTES
06/20/17 1400   Significant Event   Significant Event (pt. needed many  rediections to curtail intrusive behaviors)     Pt. Had many outbursts of anger. He was loud and abrupt while complaining about his meds to peers. Pt at first refused to stop discussing his meds and their side effects to peers even after  Given limits by staff and the pt. He spoke angrily to his MD. Pt later became loud in the lounge and was asked to spend some time in his room - he reluctantly complied.

## 2017-06-21 PROCEDURE — 97150 GROUP THERAPEUTIC PROCEDURES: CPT | Mod: GO

## 2017-06-21 PROCEDURE — H2032 ACTIVITY THERAPY, PER 15 MIN: HCPCS

## 2017-06-21 PROCEDURE — 99207 ZZC CDG-HISTORY COMP: MEETS EXP. PROBLEM FOCUSED-DOWN CODED LACK OF ROS: CPT | Performed by: PSYCHIATRY & NEUROLOGY

## 2017-06-21 PROCEDURE — 12400003 ZZH R&B MH CRITICAL UMMC

## 2017-06-21 PROCEDURE — 25000132 ZZH RX MED GY IP 250 OP 250 PS 637: Performed by: PSYCHIATRY & NEUROLOGY

## 2017-06-21 PROCEDURE — 99232 SBSQ HOSP IP/OBS MODERATE 35: CPT | Performed by: PSYCHIATRY & NEUROLOGY

## 2017-06-21 RX ADMIN — OLANZAPINE 10 MG: 10 TABLET, ORALLY DISINTEGRATING ORAL at 20:41

## 2017-06-21 NOTE — PLAN OF CARE
Problem: Psychotic Symptoms  Goal: Psychotic Symptoms  Pt will have safe behaviors on the unit until discharge.  Pt will maintain safe sexual behaviors on the unit before discharge.  Pt will be medication compliant on the unit until discharge.  Pt will be visible and safe in the milieu until discharge.  Pt will develop coping skills before discharge.  Pt will have decreased paranoia before discharge.  Pt will deny SI and SIB thoughts before discharge.  Pt will sleep 8 hours a night before discharge.  Pt will have adequate intake before discharge.   Outcome: No Change  Pt intrusive , insulting , inappropriate. He did attend group but interrupts and is focused on what he has to say . He did one time apologize for his devaluing comments.Denies si or any mental illness. Remains on sio

## 2017-06-21 NOTE — PROGRESS NOTES
Wife called at 1600-requesting update on whether pt showered and apologized for his rude behavior earlier.    Son called at 1615 requesting update and information on Civil commitment process; writer gave information and recommend may want to consider having or choosing one person in family to be the spokesperson if at all possible or two at most. Son agreed and plans to check with his step mother, pt's wife.

## 2017-06-21 NOTE — PROGRESS NOTES
"Austyn continues to be loud and irritable while out in the lounge.  Swearing loudly and almost in a manic state. Does not take redirection and uses the words \"f-off\" when staff tries to redirect patient.  Now in the lounge talking about conspiracy theories.  Appears to treat othr patients much better than the staff.     "

## 2017-06-21 NOTE — PROGRESS NOTES
24 hour SIO for sexual precautions renewed for patient. Approved by on-call provider.    Pt took scheduled Zyprexa, and did not appear to cheek these medications. Will continue to monitor.

## 2017-06-21 NOTE — PROGRESS NOTES
"[facility], [place]   Psychiatric Progress Note        Interim History:   The patient's care was discussed with the treatment team during the daily team meeting and/or staff's chart notes were reviewed.  Staff reports that patient continues to insist on discharge and claims he has no need for hospitalization. He continues to be intrusive. He was repeatedly interrupting of other members in group. He was insulting and inappropriate.    The patient expresses anger toward me for not meeting with him yesterday. I reminded him that we met but that he terminated the interview in a few minutes when I asked if he'd rather switch to a mood stabilizer. He then again tells me that he won't take a mood stabilizer and after talking with his son he does not feel that he needs any medications. 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 He walks away following that comment.         Medications:       OLANZapine zydis  10 mg Oral At Bedtime          Allergies:     Allergies   Allergen Reactions     Bee Venom      Propranolol           Labs:   No results found for this or any previous visit (from the past 24 hour(s)).       Psychiatric Examination:     /71  Pulse 94  Temp 98  F (36.7  C) (Oral)  Resp 16  Ht 1.854 m (6' 1\")  Wt 86.6 kg (191 lb)  SpO2 99%  BMI 25.2 kg/m2  Weight is 191 lbs 0 oz  Body mass index is 25.2 kg/(m^2).    Appearance: awake, alert, adequately groomed and casually dressed  Attitude:  somewhat cooperative  Eye Contact:  intense  Mood:  labile  Affect:  intensity is heightened and reactive  Speech:  pressured speech and interrupting   Language: Intact  Psychomotor Behavior:  no evidence of tardive dyskinesia, dystonia, or tics  Thought Process:  goal oriented  Associations:  no loose associations  Thought Content:  no evidence of suicidal " ideation or homicidal ideation, hypersexual  Insight:  limited  Judgement:  limited  Oriented to:  time, person, and place  Attention Span and Concentration:  fair  Recent and Remote Memory:  limited   Fund of Knowledge: Adeuqate         Precautions:     Behavioral Orders   Procedures     Code 1 - Restrict to Unit     Routine Programming     As clinically indicated     Sexual precautions     For both male and female pts and staff.  Pt is to have male only for 1:1.     Status 15     Every 15 minutes.     Status Individual Observation     Male only 1:1.     Order Specific Question:   CONTINUOUS 24 hours / day     Answer:   Distance and Exceptions     Order Specific Question:   Distance     Answer:   5 feet     Order Specific Question:   Exceptions     Answer:   none     Status Individual Observation     Order Specific Question:   CONTINUOUS 24 hours / day     Answer:   Distance and Exceptions     Order Specific Question:   Distance     Answer:   5 feet     Order Specific Question:   Exceptions     Answer:   meetings and assessments / procedures     Order Specific Question:   Exceptions     Answer:   therapeutic groups     Order Specific Question:   Exceptions     Answer:   family and visitors are present     Status Individual Observation     Order Specific Question:   CONTINUOUS 24 hours / day     Answer:   Distance and Exceptions     Order Specific Question:   Distance     Answer:   5 feet     Order Specific Question:   Exceptions     Answer:   meetings and assessments / procedures     Order Specific Question:   Exceptions     Answer:   therapeutic groups     Order Specific Question:   Exceptions     Answer:   family and visitors are present     Status Individual Observation     Due to sexual precautions     Order Specific Question:   CONTINUOUS 24 hours / day     Answer:   Distance and Exceptions     Order Specific Question:   Distance     Answer:   5 feet     Order Specific Question:   Exceptions     Answer:    bathroom and shower          Diagnoses:     Bipolar disorder, currently manic         Plan:     Medications: Continue Zyprexa 10 mg qHS ODT. Patient is not agreeable to mood stabilizer at this time.      Safety:  Continue SIO for now. Patient continues to be intrusive and sexually inappropriate.     Legal: Petition for commitment with [employer] filed and supported. Final hearing on Thursday.    Disposition to be determined. Patient to show improvement in terms of nicole and psychosis prior to discharge.

## 2017-06-21 NOTE — PROGRESS NOTES
Patient denies any mental illness and states he should be able to leave. Patient repeatedly told staff he plans to alexandra us all and send us to retirement for forcing inappropriate treatment upon him. Patient expressed paranoid delusions about his reasons for being hospitalized. Patient mocked other patients' appearances, diagnoses, and medications and had to be frequently redirected.     06/20/17 2100   Behavioral Health   Hallucinations denies / not responding to hallucinations   Thinking delusional;paranoid   Orientation person: oriented;place: oriented;date: oriented;time: oriented   Memory baseline memory   Insight denial of illness;poor   Judgement impaired   Eye Contact at examiner   Affect full range affect;irritable   Mood irritable   Physical Appearance/Attire appears stated age;attire appropriate to age and situation   Hygiene well groomed   Suicidality other (see comments)  (Denies)   Self Injury other (see comment)  (Denies)   Activity refusal   Speech clear;coherent   Medication Sensitivity no stated side effects;no observed side effects   Psychomotor / Gait balanced;steady   Activities of Daily Living   Hygiene/Grooming independent   Oral Hygiene independent   Dress street clothes   Laundry with supervision   Room Organization independent

## 2017-06-22 PROCEDURE — 12400003 ZZH R&B MH CRITICAL UMMC

## 2017-06-22 PROCEDURE — 25000132 ZZH RX MED GY IP 250 OP 250 PS 637: Performed by: PSYCHIATRY & NEUROLOGY

## 2017-06-22 RX ORDER — OLANZAPINE 10 MG/2ML
10 INJECTION, POWDER, FOR SOLUTION INTRAMUSCULAR
Status: DISCONTINUED | OUTPATIENT
Start: 2017-06-22 | End: 2017-06-23 | Stop reason: HOSPADM

## 2017-06-22 RX ORDER — OLANZAPINE 10 MG/1
10 TABLET, ORALLY DISINTEGRATING ORAL
Status: DISCONTINUED | OUTPATIENT
Start: 2017-06-22 | End: 2017-06-23 | Stop reason: HOSPADM

## 2017-06-22 RX ADMIN — OLANZAPINE 10 MG: 10 TABLET, ORALLY DISINTEGRATING ORAL at 22:00

## 2017-06-22 ASSESSMENT — ACTIVITIES OF DAILY LIVING (ADL)
DRESS: INDEPENDENT
HYGIENE/GROOMING: INDEPENDENT
ORAL_HYGIENE: INDEPENDENT

## 2017-06-22 NOTE — PROGRESS NOTES
Pt left for court at 745 am, and returned to the unit at 200 pm. Upon return, he told staff to shut up, and don't talk to him. He than went to his room, and spent the rest of the shift in his room.

## 2017-06-22 NOTE — PROGRESS NOTES
"Austyn attended OT clinic, did not actively participate in the group. Sat next to the window and stared out of it. Made one comment during the group, which related to the music we were listening to (): \"in my opinion, he's at the bottom of the barrel.\"   "

## 2017-06-22 NOTE — PROGRESS NOTES
"Pt entered mid afternoon OT group session, 10 minutes before session end.  He made multiple comments of complaint - for instance dissatisfaction about not having access to [employer] computer program for illustration while in the hospital, as well as comments about how family \"just takes all your money\" and about a  holding an M-16 to the head of a teenager.          No charge due to limited time in session.   "

## 2017-06-22 NOTE — PROGRESS NOTES
"Pt returned from court, tennis shoes locked back in pt locker. Pt irritable and loud in communication which was mostly negative and demanding. Pt frustrated with system. Pt threw clothes on floor in admission room and stated \" pick them up\". Pt went to his room and is resting in bed at this time.  "

## 2017-06-22 NOTE — PROGRESS NOTES
"Pt became loud and agitated during reflection time.  Pt was banging on his door with his fist.  Staff approached pt to assess pt.  Pt stated he was upset because \"staff were laughing at me\".  Staff reported pt was calling his 1:1 names and was cursing at her.  Pt was loud and swore at staff.  Pt endorsed tooth pain, but refused any interventions.  Nurse asked pt if he wanted tylenol, or orajel, which pt refused.  Pt stated he was going to call his son and schedule a dentist appointment tomorrow.  Pt did not have insight and would not listen to staff explain his legal status. Pt was difficult to redirect and pt kept circling back to tooth pain.  Staff asked pt what staff could do to make his evening more comfortable, pt replied \"Get me a gun and shoot me\".    Pt stated \"What will they do if I escape?  The police will shoot me in the back, which is better then being here\"    After reflection time, pt was loud in the milieu and threatening towards his 1:1 staff and writer.  Pt was asked to return to his room, because of his agitation.  Pt was able to stay in his room for 5 minutes, but then reentered the milieu, calmer.    Will continue to monitor and asses.  "

## 2017-06-23 VITALS
DIASTOLIC BLOOD PRESSURE: 71 MMHG | HEIGHT: 73 IN | RESPIRATION RATE: 16 BRPM | SYSTOLIC BLOOD PRESSURE: 132 MMHG | TEMPERATURE: 97 F | BODY MASS INDEX: 25.84 KG/M2 | WEIGHT: 195 LBS | HEART RATE: 94 BPM | OXYGEN SATURATION: 99 %

## 2017-06-23 PROCEDURE — 99239 HOSP IP/OBS DSCHRG MGMT >30: CPT | Performed by: PSYCHIATRY & NEUROLOGY

## 2017-06-23 PROCEDURE — 90853 GROUP PSYCHOTHERAPY: CPT

## 2017-06-23 RX ORDER — OLANZAPINE 15 MG/1
15 TABLET ORAL AT BEDTIME
Qty: 30 TABLET | Refills: 1 | Status: ON HOLD | OUTPATIENT
Start: 2017-06-23 | End: 2017-09-29

## 2017-06-23 ASSESSMENT — ACTIVITIES OF DAILY LIVING (ADL)
DRESS: STREET CLOTHES;INDEPENDENT
ORAL_HYGIENE: INDEPENDENT
GROOMING: INDEPENDENT

## 2017-06-23 NOTE — PLAN OF CARE
Problem: Discharge Planning  Goal: Discharge Planning (Adult, OB, Behavioral, Peds)  Outcome: Adequate for Discharge Date Met:  06/23/17  Discharged to home via cab on a stay of commitment.  He was sent a 30 day supply of olanzapine.  He denied being suicidal.  He was looking forward to discharge.  He says he is going to leave the country and what will happen.   He was told that there would not be any resources to track him.  He signed his valuable sheet and his discharge instruction sheet.

## 2017-06-23 NOTE — DISCHARGE INSTRUCTIONS
Behavioral Discharge Planning and Instructions    Summary: Patient admitted for delusional, aggressive and inappropriate sexual behavior. A petition for commitment with [facility] was supported. He is now on a stay of commitment and will be discharged to home to work with his out patient team.     Main Diagnosis: Bipolar Disorder    Major Treatments, Procedures and Findings: psychological assessment, medication management, [facility]    Symptoms to Report: feeling more aggressive, increased confusion, losing more sleep, mood getting worse or thoughts of suicide    Lifestyle Adjustment: please take medication as prescribed and follow up with case management    Psychiatry Follow-up:     Patient does not have insurance at this time. He is utilizing the crisis appointment with [facility].     [facility] [phone]  [facility]  Dr. Morales Wednesday July 5th @ 9:30am  You will need to set up a therapy appointment if you are going to continue services with Dr. Morales        [facility] : [address] [place]  [phone]        Resources:   [facility] Crisis: [phone]  Crisis Intervention: 144.773.4178 or 631-840-6166 (TTY: 428.524.6984).  Call anytime for help.  National [place] on Mental Illness (www.[place].nicole.org): 649.655.1117 or 893-391-7472.  National Suicide Prevention Line (www.mentalhealthmn.org): 750-153-EOZF (2280)  Mental Health Association of [place] (www.mentalhealth.org): 651.477.7003 or 503-466-8470    General Medication Instructions:   See your medication sheet(s) for instructions.   Take all medicines as directed.  Make no changes unless your doctor suggests them.   Go to all your doctor visits.  Be sure to have all your required lab tests. This way, your medicines can be refilled on time.  Do not use any drugs not prescribed by your doctor.  Avoid alcohol.      The treatment team has appreciated the opportunity to work with you.  We wish you  the best in the future. If you have any questions or concerns our unit number is [phone].

## 2017-06-23 NOTE — DISCHARGE SUMMARY
Psychiatric Discharge Summary    Austyn Leach MRN# [id number]   Age: 62 year old YOB: 1955     Date of Admission:  6/7/2017  Date of Discharge:  6/23/2017  Admitting Physician:  Reuben Lopez MD  Discharge Physician:  Grant Marques MD         Event Leading to Hospitalization:   The patient presented as minimally reliable historian, challenging and easily agitated.  He states that he lives with his mother; however, things have not been going on between the two of them and wife had mentioned divorce a number of times.  The patient would not comment on why she wanted to do so.  He reports occasional periods of getting not enough sleep; however, typically does not stay awake for a number of nights in a row.  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  He told me about his last invention: abilities to remove clouds from the alona without any energy involved.  Also talked about making invention such as cards playing robot and vacuum poring station.  Talks about being able to use a device called celescope to watch electron's movement.  While talking to other members of the treatment team, the patient also stated that the [place] controlled the weather around the world and it is the reason why we have tornados and rain on the people's houses.  Was talking about government spying on people and controlling people.  Reports that he had been targeted and someone has put a virus in his computer, focused on people erasing information from his phone.  Talked about the FBI targeting him because of his inventions.  Reports that he has been putting things on Facebook about the women being smarter than the men.  Believes that people were honking while he was driving because they had seen what he had posted on the Internet.  He denied that he had sexually exposed himself in the Emergency Department, that he has been targeting children; however, he admitted that he was randomly crossing  "women on the street, offering children and adults food because \"I would like to give people food.\"  He also says that he had \"bridge painted in the rainbow colors for years.\"        See Admission note by Reuben Lopez MD on 6/6/2017 for additional details.          Diagnoses:     Bipolar Disorder, manic         Labs:     Results for orders placed or performed during the hospital encounter of 06/07/17   CBC with platelets differential   Result Value Ref Range    WBC 7.8 4.0 - 11.0 10e9/L    RBC Count 5.02 4.4 - 5.9 10e12/L    Hemoglobin 15.3 13.3 - 17.7 g/dL    Hematocrit 44.6 40.0 - 53.0 %    MCV 89 78 - 100 fl    MCH 30.5 26.5 - 33.0 pg    MCHC 34.3 31.5 - 36.5 g/dL    RDW 14.5 10.0 - 15.0 %    Platelet Count 138 (L) 150 - 450 10e9/L    Diff Method Automated Method     % Neutrophils 75.0 %    % Lymphocytes 15.1 %    % Monocytes 9.2 %    % Eosinophils 0.5 %    % Basophils 0.1 %    % Immature Granulocytes 0.1 %    Nucleated RBCs 0 0 /100    Absolute Neutrophil 5.9 1.6 - 8.3 10e9/L    Absolute Lymphocytes 1.2 0.8 - 5.3 10e9/L    Absolute Monocytes 0.7 0.0 - 1.3 10e9/L    Absolute Eosinophils 0.0 0.0 - 0.7 10e9/L    Absolute Basophils 0.0 0.0 - 0.2 10e9/L    Abs Immature Granulocytes 0.0 0 - 0.4 10e9/L    Absolute Nucleated RBC 0.0    Basic metabolic panel   Result Value Ref Range    Sodium 145 (H) 133 - 144 mmol/L    Potassium 4.0 3.4 - 5.3 mmol/L    Chloride 110 (H) 94 - 109 mmol/L    Carbon Dioxide 28 20 - 32 mmol/L    Anion Gap 7 3 - 14 mmol/L    Glucose 101 (H) 70 - 99 mg/dL    Urea Nitrogen 26 7 - 30 mg/dL    Creatinine 1.05 0.66 - 1.25 mg/dL    GFR Estimate 72 >60 mL/min/1.7m2    GFR Estimate If Black 87 >60 mL/min/1.7m2    Calcium 8.7 8.5 - 10.1 mg/dL   Alcohol ethyl   Result Value Ref Range    Ethanol g/dL <0.01 <0.01 g/dL   Drug abuse screen 6 urine (chem dep)   Result Value Ref Range    Amphetamine Qual Urine  NEG     Negative   Cutoff for a negative amphetamine is 500 ng/mL or less.      Barbiturates " Qual Urine  NEG     Negative   Cutoff for a negative barbiturate is 200 ng/mL or less.      Benzodiazepine Qual Urine  NEG     Negative   Cutoff for a negative benzodiazepine is 200 ng/mL or less.      Cannabinoids Qual Urine  NEG     Negative   Cutoff for a negative cannabinoid is 50 ng/mL or less.      Cocaine Qual Urine  NEG     Negative   Cutoff for a negative cocaine is 300 ng/mL or less.      Ethanol Qual Urine  NEG     Negative   Cutoff for a negative urine ethanol is 0.05 g/dL or less      Opiates Qualitative Urine  NEG     Negative   Cutoff for a negative opiate is 300 ng/mL or less.                Consults:   No consultations were requested during this admission         Hospital Course:   Austyn Leach was admitted to [facility] with attending Reuben Lopez MD on a 72 hour mental health hold. The patient was placed under status 15 (15 minute checks) to ensure patient safety.   He agreed to a stay of commitment through [facility] on 6/22/2017.    Prior to my assuming care in the middle of the court process, the patient had just started olanzapine at 5 mg. He remained very hypersexual and inappropriate with staff and other patients on the unit. I increased his olanzapine to 10 mg and then 15 at discharge for further mood stability. His behaviors and history provided by his wife were most consistent with a bipolar spectrum illness. It seems that he has chronically been somewhat grandiose. He has inventions and often has difficulty in interactions with other people. CTC learned that he came to attention of police for comments years earlier. I believe that he likely would benefit from a mood stabilizer, but he was not agreeable to even discussing this. He agreed to continue with olanzapine. After the patient returned from court with a Stay of Commitment he was asking to leave. Although he wasn't fully stable, his wife and son were requesting he come home. They planned to work with him on taking his  medication and making his appointments. They were not concerned with his safety. Even though the patient was intrusive, he was not believed to be at imminent risk to himself or others. Other than further stabilization, there was not believed to be a significant benefit to further hospitalization. And, as he was on a Stay, he was effectively voluntary and I do not believe his presentation currently meets criteria for a hold and vacating of his Stay. As such, his request for discharge was granted.    Austyn Leach was released to home. At the time of discharge Austyn Leach was determined to not be a danger to himself or others.          Discharge Medications:     Current Discharge Medication List      START taking these medications    Details   OLANZapine (ZYPREXA) 15 MG tablet Take 1 tablet (15 mg) by mouth At Bedtime  Qty: 30 tablet, Refills: 1    Associated Diagnoses: Bipolar I disorder (H)                  Psychiatric Examination:   Appearance:  awake, alert, adequately groomed and casually dressed  Attitude:  cooperative  Eye Contact:  good  Mood:  mild irritability  Affect:  intensity is heightened and reactive  Speech:  clear, coherent and normal prosody  Psychomotor Behavior:  no evidence of tardive dyskinesia, dystonia, or tics  Thought Process:  logical, linear and goal oriented  Associations:  no loose associations  Thought Content:  no evidence of suicidal ideation or homicidal ideation and no evidence of psychotic thought  Insight:  limited  Judgment:  limited  Oriented to:  time, person, and place  Attention Span and Concentration:  intact  Recent and Remote Memory:  intact  Language: Able to name objects, Able to repeat phrases and Able to read and write  Fund of Knowledge: appropriate  Muscle Strength and Tone: normal  Gait and Station: Normal         Discharge Plan:   Psychiatric Appointments: Dr. Morales on 7/5/17  Psychotherapy Appointments: Patient to [facility]. He currently lacks insurance and  will work with [facility] on follow-up options. Given his current state of stabilization, he likely would not yet find benefit from therapy.    The patient was discharged to home.    Attestation:  The patient has been seen and evaluated by me,  Grant Marques MD  On the day of discharge, I saw the patient and performed the above examination, reviewed discharge medications, reviewed follow-up plan, and assessed safety for discharge. I spent greater than 30 minutes on these tasks.

## 2017-06-28 ENCOUNTER — CARE COORDINATION (OUTPATIENT)
Dept: FAMILY MEDICINE | Facility: CLINIC | Age: 62
End: 2017-06-28

## 2017-06-28 NOTE — PROGRESS NOTES
Clinic Care Coordination Contact  OUTREACH  Left Voicemail    Referral Information:  Referral Source: IP/TCU Report  Reason for Contact: CC to outreach due to complex behavioral health   Care Conference: No     Universal Utilization:   ED Visits in last year: 0  Hospital visits in last year: 1  Last PCP appointment: 09/15/15  Missed Appointments: 0  Concerns: Untreated MH leading to hospitalizations  Multiple Providers or Specialists: No    Clinical Concerns:  Current Medical Concerns:   Patient Active Problem List   Diagnosis     Personal history of tobacco use, presenting hazards to health     Erectile dysfunction     Intention tremor     Living will, counseling/discussion     Health Care Home     ACP (advance care planning)     Delusions (H)       Current Behavioral Concerns: Paranoia, No PCP appointment for two years    Clinical Pathway: None    Medication Management:  Unsure     Functional Status:  Mobility Status: Independent  Equipment Currently Used at Home: none  Transportation: Previously driving     Psychosocial:  Current living arrangement:: Not Assessed  Financial/Insurance: No insurance     Resources and Interventions:  Current Resources: Discharged with no formal supports  Advanced Care Plans/Directives on file:: No     Outreach attempted x 1.  Left message on voicemail with call back information and requested return call.  Plan: Care Coordinator will try to reach patient again in 3-5 business days.    NIKKIE Abdul  Clinic Care Coordinator  [phone]  [email or web address]

## 2017-06-29 NOTE — PROGRESS NOTES
Called Pt to see how he is doing.   Got his VM and left a message to have him call me back on the CS line.   I will try again tomorrow to see if I am able to get a hold of him.     Denisa.YA Gordillo ([facility])

## 2017-07-05 NOTE — PROGRESS NOTES
Pt called me back.  Stating that he wrote something wrong on face book and the police came and got him and put him in a 3 week hold.   Pt was very upset with this and kept repeating himself.   But states that he is okay.  Asked if they started him on any new medication and he said they did and he has to be on it for 3 months.   He also stated that it upsets his stomach.     Denisa.YA Gordillo ([facility])

## 2017-08-22 ENCOUNTER — HOSPITAL ENCOUNTER (EMERGENCY)
Facility: CLINIC | Age: 62
Discharge: HOME | End: 2017-08-22
Attending: PSYCHIATRY & NEUROLOGY | Admitting: PSYCHIATRY & NEUROLOGY

## 2017-08-22 VITALS
DIASTOLIC BLOOD PRESSURE: 86 MMHG | WEIGHT: 192.13 LBS | HEART RATE: 79 BPM | RESPIRATION RATE: 16 BRPM | BODY MASS INDEX: 25.35 KG/M2 | TEMPERATURE: 97.7 F | SYSTOLIC BLOOD PRESSURE: 169 MMHG | OXYGEN SATURATION: 96 %

## 2017-08-22 DIAGNOSIS — F31.10 BIPOLAR I DISORDER, MOST RECENT EPISODE (OR CURRENT) MANIC (H): ICD-10-CM

## 2017-08-22 PROCEDURE — 99285 EMERGENCY DEPT VISIT HI MDM: CPT | Mod: 25 | Performed by: PSYCHIATRY & NEUROLOGY

## 2017-08-22 PROCEDURE — 99284 EMERGENCY DEPT VISIT MOD MDM: CPT | Mod: Z6 | Performed by: PSYCHIATRY & NEUROLOGY

## 2017-08-22 PROCEDURE — 90791 PSYCH DIAGNOSTIC EVALUATION: CPT

## 2017-08-22 ASSESSMENT — ENCOUNTER SYMPTOMS
MUSCULOSKELETAL NEGATIVE: 1
HALLUCINATIONS: 0
DECREASED CONCENTRATION: 1
RESPIRATORY NEGATIVE: 1
CONSTITUTIONAL NEGATIVE: 1
CARDIOVASCULAR NEGATIVE: 1
NERVOUS/ANXIOUS: 0
HEMATOLOGIC/LYMPHATIC NEGATIVE: 1
HYPERACTIVE: 0
SLEEP DISTURBANCE: 1
ENDOCRINE NEGATIVE: 1
GASTROINTESTINAL NEGATIVE: 1
EYES NEGATIVE: 1
NEUROLOGICAL NEGATIVE: 1

## 2017-08-22 NOTE — ED PROVIDER NOTES
"  History     Chief Complaint   Patient presents with     Manic Behavior     Refusing to take meds and manic.  Making inappropriate sexual comments to step daughter and posting things on her facebook page.  Paramedics state on way here was making inappropriate comments also.     The history is provided by the patient.     Austyn Leach is a 62 year old male who recently was hospitalized in June 2017 for a manic/delusional episode. Patient was placed on a stay of commitment. He is  for 21 years. He has kids from a previous relationship. Patient requested discharge once the court placed a stay of commitment and the treatment team abided to his wish. His wife and son felt comfortable having him come home. Patient lives with wife. He is retired. He is an inventor. He has persistent delusional thinking and grandiose thoughts. There also is hypersexual thinking. Patient was posting inappropriate sexual comments on adult stepdaughter's Facebook. She called patient's wife to have a talk with patient about his intrusiveness and inappropriateness. Wife was concerned that he has not been taking his meds and she called [facility] wanting them to \"lecture\" him on his boundary issues and taking his meds. Police was also notified and they were unable to do anything as no law has been violated. Patient was sent here for further evaluation. Patient reports taking his meds and following the conditions of his stay. He is denying any thoughts of harm, pursuing sexual activity or using drugs. He has no acute medical concerns. Patient reports having sex only with his wife. He denies sleep or appetite concerns.    Please see [facility] Crisis Assessment on 8/22/17 in [facility] for further details.    PERSONAL MEDICAL HISTORY  Past Medical History:   Diagnosis Date     Mild intermittent asthma     uses primatent     PAST SURGICAL HISTORY  Past Surgical History:   Procedure Laterality Date     NO HISTORY OF SURGERY       FAMILY " HISTORY  Family History   Problem Relation Age of Onset     DIABETES No family hx of      C.A.D. Father      Hypertension Father      CEREBROVASCULAR DISEASE No family hx of      Breast Cancer Mother      SOCIAL HISTORY  Social History   Substance Use Topics     Smoking status: Current Every Day Smoker     Packs/day: 2.00     Years: 33.00     Types: Cigarettes     Smokeless tobacco: Never Used     Alcohol use No     MEDICATIONS  No current facility-administered medications for this encounter.      Current Outpatient Prescriptions   Medication     OLANZapine (ZYPREXA) 15 MG tablet     ALLERGIES  Allergies   Allergen Reactions     Bee Venom      Propranolol          I have reviewed the Medications, Allergies, Past Medical and Surgical History, and Social History in the Epic system.    Review of Systems   Constitutional: Negative.    HENT: Negative.    Eyes: Negative.    Respiratory: Negative.    Cardiovascular: Negative.    Gastrointestinal: Negative.    Endocrine: Negative.    Genitourinary: Negative.    Musculoskeletal: Negative.    Skin: Negative.    Neurological: Negative.    Hematological: Negative.    Psychiatric/Behavioral: Positive for behavioral problems, decreased concentration and sleep disturbance. Negative for hallucinations and suicidal ideas. The patient is not nervous/anxious and is not hyperactive.    All other systems reviewed and are negative.      Physical Exam   BP: 166/87  Pulse: 88  Temp: 98.4  F (36.9  C)  SpO2: 95 %  Physical Exam   Constitutional: He appears well-developed.   HENT:   Head: Normocephalic.   Eyes: Pupils are equal, round, and reactive to light.   Neck: Normal range of motion.   Cardiovascular: Normal rate.    Pulmonary/Chest: Effort normal.   Abdominal: Soft.   Musculoskeletal: Normal range of motion.   Neurological: He is alert.   Skin: Skin is warm.   Psychiatric: His affect is inappropriate. His speech is tangential. He is not agitated, not aggressive, not hyperactive, not  actively hallucinating and not combative. Thought content is delusional. Thought content is not paranoid. Cognition and memory are normal. He expresses inappropriate judgment. He expresses no homicidal and no suicidal ideation. He is inattentive.   Nursing note and vitals reviewed.      ED Course     ED Course     Procedures    Labs Ordered and Resulted from Time of ED Arrival Up to the Time of Departure from the ED - No data to display         Assessments & Plan (with Medical Decision Making)   Patient with history of paranoid delusions who appears at baseline regarding his paranoia. He has symptoms of grandiose thinking and sexual preoccupation but appears to be able to contain his impulses. He is calm and cooperative here. I do not feel he is at imminent dangerousness requiring admitting him against his will or pursuing a revocation. Wife's intent was just to have someone tell him to stop his current intrusive behavior and activity, and to take his meds. I do not feel patient has decompensated to the point of needing forced hospitalization. The  did consult with the discharge psychiatrist from his last hospitalization and there were no concerns regarding emergently rehospitalizing patient. Patient hence will be discharged. He is to follow-up established care and services.    I have reviewed the nursing notes.    I have reviewed the findings, diagnosis, plan and need for follow up with the patient.    New Prescriptions    No medications on file       Final diagnoses:   Bipolar I disorder, most recent episode (or current) manic (H)       8/22/2017   [facility], [place], EMERGENCY DEPARTMENT     Sin Goldberg MD  08/22/17 8378

## 2017-08-22 NOTE — ED NOTES
Bed: ED16  Expected date: 8/22/17  Expected time: 12:53 PM  Means of arrival: Ambulance  Comments:  Neeraj, 62 M, on a hold and uncooperative, restrained.

## 2017-08-22 NOTE — DISCHARGE INSTRUCTIONS
Please take your meds and continue with following the conditions of your stay of commitment  AVOID any messages and communication with your stepdaughter (or anyone else) regarding your sexual thoughts or impulse.  Follow-up established care and services

## 2017-08-22 NOTE — ED AVS SNAPSHOT
[facility], [place], Emergency Department    [address]    [facility] [address]    Phone:  [phone]    Fax:  [phone]                                       Austyn Leach   MRN: [id number]    Department:  [facility], Emergency Department   Date of Visit:  8/22/2017           After Visit Summary Signature Page     I have received my discharge instructions, and my questions have been answered. I have discussed any challenges I see with this plan with the nurse or doctor.    ..........................................................................................................................................  Patient/Patient Representative Signature      ..........................................................................................................................................  Patient Representative Print Name and Relationship to Patient    ..................................................               ................................................  Date                                            Time    ..........................................................................................................................................  Reviewed by Signature/Title    ...................................................              ..............................................  Date                                                            Time

## 2017-08-22 NOTE — ED AVS SNAPSHOT
[facility], Emergency Department    [address]    [place] [address]    Phone:  [phone]    Fax:  [phone]                                       Austyn Leach   MRN: [id number]    Department:  [facility], Emergency Department   Date of Visit:  8/22/2017           Patient Information     Date Of Birth          1955        Your diagnoses for this visit were:     Bipolar I disorder, most recent episode (or current) manic (H)        You were seen by Sin Goldberg MD.      Follow-up Information     Follow up with No Ref-Primary, Physician.        Discharge Instructions       Please take your meds and continue with following the conditions of your stay of commitment  AVOID any messages and communication with your stepdaughter (or anyone else) regarding your sexual thoughts or impulse.  Follow-up hospitals care and services    24 Hour Appointment Hotline       To make an appointment at any [facility] clinic, call [phone] ([phone]). If you don't have a family doctor or clinic, we will help you find one. [facility] clinics are conveniently located to serve the needs of you and your family.             Review of your medicines      Our records show that you are taking the medicines listed below. If these are incorrect, please call your family doctor or clinic.        Dose / Directions Last dose taken    OLANZapine 15 MG tablet   Commonly known as:  zyPREXA   Dose:  15 mg   Quantity:  30 tablet        Take 1 tablet (15 mg) by mouth At Bedtime   Refills:  1                Orders Needing Specimen Collection     Ordered          08/22/17 1322  Drug abuse screen 6 urine (tox) - STAT, [facility]: STAT, Needs to be Collected     Scheduled Task Status   08/22/17 1323 Collect Drug abuse screen 6 urine (tox) Open   Order Class:  PCU Collect                  Pending Results     No orders found from 8/20/2017 to 8/23/2017.            Pending Culture Results     No orders found from 8/20/2017 to  "2017.            Pending Results Instructions     If you had any lab results that were not finalized at the time of your Discharge, you can call the ED Lab Result RN at [phone]. You will be contacted by this team for any positive Lab results or changes in treatment. The nurses are available 7 days a week from 10A to 6:30P.  You can leave a message 24 hours per day and they will return your call.        Thank you for choosing [facility]       Thank you for choosing [facility] for your care. Our goal is always to provide you with excellent care. Hearing back from our patients is one way we can continue to improve our services. Please take a few minutes to complete the written survey that you may receive in the mail after you visit with us. Thank you!        [facility] Information     [facility] lets you send messages to your doctor, view your test results, renew your prescriptions, schedule appointments and more. To sign up, go to [email or web address] . Click on \"Log in\" on the left side of the screen, which will take you to the Welcome page. Then click on \"Sign up Now\" on the right side of the page.     You will be asked to enter the access code listed below, as well as some personal information. Please follow the directions to create your username and password.     Your access code is: [email or web address]  Expires: 2017  1:48 PM     Your access code will  in 90 days. If you need help or a new code, please call your [facility] clinic or [phone].        Care EveryWhere ID     This is your Care EveryWhere ID. This could be used by other organizations to access your [facility] medical records  [id number]        Equal Access to Services     [facility] HospitalDENYS : Hadii devin Blandon, renetta lucas, janet hartmann. So [facility] [place] [phone].    ATENCIÓN: Si habla español, tiene a gunderson disposición servicios gratuitos de asistencia lingüística. Llame al " [phone].    We comply with applicable federal civil rights laws and [place] laws. We do not discriminate on the basis of race, color, national origin, age, disability sex, sexual orientation or gender identity.            After Visit Summary       This is your record. Keep this with you and show to your community pharmacist(s) and doctor(s) at your next visit.

## 2017-08-23 ENCOUNTER — NURSE TRIAGE (OUTPATIENT)
Dept: NURSING | Facility: CLINIC | Age: 62
End: 2017-08-23

## 2017-08-23 ENCOUNTER — CARE COORDINATION (OUTPATIENT)
Dept: CARE COORDINATION | Facility: CLINIC | Age: 62
End: 2017-08-23

## 2017-08-23 NOTE — PROGRESS NOTES
Clinic Care Coordination Contact  OUTREACH    Referral Information:  Referral Source: IP/TCU Report  Reason for Contact: Hospital Follow-up mental health  Care Conference: No     Universal Utilization:   ED Visits in last year: 0  Hospital visits in last year: 1  Last PCP appointment: 09/15/15  Missed Appointments: 0  Concerns: Untreated MH leading to hospitalizations  Multiple Providers or Specialists: No    Clinical Concerns:  Current Medical Concerns:   Patient Active Problem List   Diagnosis     Personal history of tobacco use, presenting hazards to health     Erectile dysfunction     Intention tremor     Living will, counseling/discussion     Health Care Home     ACP (advance care planning)     Delusions (H)      Current Behavioral Concerns:  Delusions   Education Provided to patient: Care Coordination    Medication Management:  Pt reports that he is on a court order to take medications for two more months- Pt's spouse reports he is receiving a jhon to pay for the Zyprexa    Functional Status:  Mobility Status: Independent   Equipment Currently Used at Home: None  Transportation: Pt drives     Psychosocial:  Current living arrangement:: Lives in a private home with family  Financial/Insurance: No insurance- over income qualifications for MA, Waiting until December to register for Medicare?     Resources and Interventions:  Current Resources: Neema WOOD/ LOLLY  Advanced Care Plans/Directives on file:: No    Patient/Caregiver understanding: Spoke with patient who continues to have ongoing delusions. Pt is not a reliable historian. Requested permission to speak with his spouse. Care Coordinator spoke with patient who gave verbal consent to speak with Angela.  An authorization to discuss protected health information was mailed to the patient on August 23, 2017. Patient advised to return form to their primary clinic. Patient verbalized understanding and had no further questions or concerns. Re-iterated  instructions to pt's wife. Pt's wife reports that pt has had ongoing delusions for the past three years. Pt's spouse reports that pt is currently receiving a jhon for his Zyprexa medication because they do not have insurance at this time. Pt's wife has met with several people about insurance coverage and thinks they will qualify during open enrollment period this fall. Pt's wife reports that typically pt is stable, but recently his symptoms were exacerbated and some inappropriate comments on social medica lead to the hospitalization. Pt's wife continues to lend support to the patient. Call placed to LOLLY Pepe requesting update on pt's status/services in place.    Frequency of Care Coordination: .No further outreaches will be made at this time unless a new referral is made or a change in the pt's status occurs. Pt is not in a shared savings plan. Patient was provided with this writer's contact information and encouraged to call with any questions or concerns.    Upcoming appointment:  None scheduled, encouraged pt and pt's wife to have pt come into the clinic to be seen by PCP.     Plan: SW CC will be available as needed. Pt will continue to see his psychiatrist and take his medication.    Psychiatry:  Denisa Morales MD  [address] [place] [address]   Phone: [phone] Fax: [phone]  [facility]    Kimberly Meier [facility]  Clinic Care Coordinator  [phone]  [email or web address]

## 2017-09-05 ENCOUNTER — HOSPITAL ENCOUNTER (INPATIENT)
Facility: CLINIC | Age: 62
LOS: 24 days | Discharge: HOME | DRG: 885 | End: 2017-09-29
Attending: FAMILY MEDICINE | Admitting: PSYCHIATRY & NEUROLOGY

## 2017-09-05 DIAGNOSIS — F31.9 BIPOLAR I DISORDER (H): ICD-10-CM

## 2017-09-05 DIAGNOSIS — F22 DELUSIONS (H): ICD-10-CM

## 2017-09-05 DIAGNOSIS — H10.33 ACUTE CONJUNCTIVITIS OF BOTH EYES, UNSPECIFIED ACUTE CONJUNCTIVITIS TYPE: Primary | ICD-10-CM

## 2017-09-05 PROCEDURE — 99285 EMERGENCY DEPT VISIT HI MDM: CPT | Mod: Z6 | Performed by: FAMILY MEDICINE

## 2017-09-05 PROCEDURE — 25000125 ZZHC RX 250: Performed by: FAMILY MEDICINE

## 2017-09-05 PROCEDURE — S0166 INJ OLANZAPINE 2.5MG: HCPCS

## 2017-09-05 PROCEDURE — S0166 INJ OLANZAPINE 2.5MG: HCPCS | Performed by: FAMILY MEDICINE

## 2017-09-05 PROCEDURE — 12400003 ZZH R&B MH CRITICAL UMMC

## 2017-09-05 PROCEDURE — 90791 PSYCH DIAGNOSTIC EVALUATION: CPT

## 2017-09-05 PROCEDURE — 25000125 ZZHC RX 250

## 2017-09-05 PROCEDURE — 99285 EMERGENCY DEPT VISIT HI MDM: CPT | Mod: 25 | Performed by: FAMILY MEDICINE

## 2017-09-05 PROCEDURE — 96372 THER/PROPH/DIAG INJ SC/IM: CPT | Performed by: FAMILY MEDICINE

## 2017-09-05 RX ORDER — ACETAMINOPHEN 325 MG/1
650 TABLET ORAL EVERY 4 HOURS PRN
Status: DISCONTINUED | OUTPATIENT
Start: 2017-09-05 | End: 2017-09-29 | Stop reason: HOSPADM

## 2017-09-05 RX ORDER — TRAZODONE HYDROCHLORIDE 50 MG/1
50 TABLET, FILM COATED ORAL
Status: DISCONTINUED | OUTPATIENT
Start: 2017-09-05 | End: 2017-09-29 | Stop reason: HOSPADM

## 2017-09-05 RX ORDER — ALUMINA, MAGNESIA, AND SIMETHICONE 2400; 2400; 240 MG/30ML; MG/30ML; MG/30ML
30 SUSPENSION ORAL EVERY 4 HOURS PRN
Status: DISCONTINUED | OUTPATIENT
Start: 2017-09-05 | End: 2017-09-29 | Stop reason: HOSPADM

## 2017-09-05 RX ORDER — OLANZAPINE 10 MG/2ML
10 INJECTION, POWDER, FOR SOLUTION INTRAMUSCULAR ONCE
Status: DISCONTINUED | OUTPATIENT
Start: 2017-09-05 | End: 2017-09-11 | Stop reason: CLARIF

## 2017-09-05 RX ORDER — OLANZAPINE 10 MG/2ML
10 INJECTION, POWDER, FOR SOLUTION INTRAMUSCULAR ONCE
Status: COMPLETED | OUTPATIENT
Start: 2017-09-05 | End: 2017-09-05

## 2017-09-05 RX ORDER — OLANZAPINE 10 MG/2ML
10 INJECTION, POWDER, FOR SOLUTION INTRAMUSCULAR
Status: DISCONTINUED | OUTPATIENT
Start: 2017-09-05 | End: 2017-09-29 | Stop reason: HOSPADM

## 2017-09-05 RX ORDER — OLANZAPINE 10 MG/2ML
INJECTION, POWDER, FOR SOLUTION INTRAMUSCULAR
Status: COMPLETED
Start: 2017-09-05 | End: 2017-09-05

## 2017-09-05 RX ORDER — HYDROXYZINE HYDROCHLORIDE 25 MG/1
25-50 TABLET, FILM COATED ORAL EVERY 4 HOURS PRN
Status: DISCONTINUED | OUTPATIENT
Start: 2017-09-05 | End: 2017-09-29 | Stop reason: HOSPADM

## 2017-09-05 RX ORDER — OLANZAPINE 10 MG/1
10 TABLET ORAL
Status: DISCONTINUED | OUTPATIENT
Start: 2017-09-05 | End: 2017-09-29 | Stop reason: HOSPADM

## 2017-09-05 RX ORDER — BISACODYL 10 MG
10 SUPPOSITORY, RECTAL RECTAL DAILY PRN
Status: DISCONTINUED | OUTPATIENT
Start: 2017-09-05 | End: 2017-09-29 | Stop reason: HOSPADM

## 2017-09-05 RX ADMIN — OLANZAPINE 10 MG: 10 INJECTION, POWDER, LYOPHILIZED, FOR SOLUTION INTRAMUSCULAR at 14:27

## 2017-09-05 RX ADMIN — OLANZAPINE 10 MG: 10 INJECTION, POWDER, LYOPHILIZED, FOR SOLUTION INTRAMUSCULAR at 20:10

## 2017-09-05 ASSESSMENT — ENCOUNTER SYMPTOMS
DYSPHORIC MOOD: 1
AGITATION: 1
NERVOUS/ANXIOUS: 1
SHORTNESS OF BREATH: 0
FEVER: 0
ABDOMINAL PAIN: 0
HALLUCINATIONS: 1

## 2017-09-05 NOTE — IP AVS SNAPSHOT
[address] [address]    Phone:  [phone]                                       After Visit Summary   9/5/2017    Austyn Leach    MRN: [id number]           After Visit Summary Signature Page     I have received my discharge instructions, and my questions have been answered. I have discussed any challenges I see with this plan with the nurse or doctor.    ..........................................................................................................................................  Patient/Patient Representative Signature      ..........................................................................................................................................  Patient Representative Print Name and Relationship to Patient    ..................................................               ................................................  Date                                            Time    ..........................................................................................................................................  Reviewed by Signature/Title    ...................................................              ..............................................  Date                                                            Time

## 2017-09-05 NOTE — ED PROVIDER NOTES
History     Chief Complaint   Patient presents with     Agitation     911 called, was standing in traffic and yelling at then for speeding,        HPI  Austyn Leach is a 62 year old male who presents emergency room with acute agitation and delusional thought disorder patient has had problems with psychosis and delusions for the last several years unclear as to the exact etiology and believe that he has underlying schizoaffective disorder but at this time is acutely decompensating and unable to manage.  Patient was seen and evaluated by the  also talked with patient's wife of 21 years states that he has been increasingly agitated and not taking his medications as well as he should be and at this time requires further stabilization and treatment.    I have reviewed the Medications, Allergies, Past Medical and Surgical History, and Social History in the Epic system.    PERSONAL MEDICAL HISTORY  Past Medical History:   Diagnosis Date     Mild intermittent asthma     uses primatent     PAST SURGICAL HISTORY  Past Surgical History:   Procedure Laterality Date     NO HISTORY OF SURGERY       FAMILY HISTORY  Family History   Problem Relation Age of Onset     DIABETES No family hx of      C.A.D. Father      Hypertension Father      CEREBROVASCULAR DISEASE No family hx of      Breast Cancer Mother      SOCIAL HISTORY  Social History   Substance Use Topics     Smoking status: Current Every Day Smoker     Packs/day: 2.00     Years: 33.00     Types: Cigarettes     Smokeless tobacco: Never Used     Alcohol use No     MEDICATIONS  No current facility-administered medications for this encounter.      Current Outpatient Prescriptions   Medication     OLANZapine (ZYPREXA) 15 MG tablet     ALLERGIES  Allergies   Allergen Reactions     Bee Venom      Propranolol          Review of Systems   Constitutional: Negative for fever.   Respiratory: Negative for shortness of breath.    Cardiovascular: Negative for chest pain.    Gastrointestinal: Negative for abdominal pain.   Psychiatric/Behavioral: Positive for agitation, behavioral problems, dysphoric mood and hallucinations. The patient is nervous/anxious.    All other systems reviewed and are negative.      Physical Exam   BP: (!) 185/114  Pulse: 80  Temp: 97.6  F (36.4  C)  Resp: 16  SpO2: 97 %  Physical Exam   Constitutional: No distress.   HENT:   Head: Atraumatic.   Mouth/Throat: Oropharynx is clear and moist. No oropharyngeal exudate.   Eyes: Pupils are equal, round, and reactive to light. No scleral icterus.   Cardiovascular: Normal heart sounds and intact distal pulses.    Pulmonary/Chest: Breath sounds normal. No respiratory distress.   Abdominal: Soft. Bowel sounds are normal. There is no tenderness.   Musculoskeletal: He exhibits no edema or tenderness.   Neurological: He is alert. No cranial nerve deficit. He exhibits normal muscle tone. Coordination normal.   Skin: Skin is warm. No rash noted. He is not diaphoretic.   Psychiatric: His mood appears anxious. His affect is angry and labile. His speech is rapid and/or pressured. He is agitated. Thought content is paranoid and delusional. He expresses impulsivity.       ED Course     ED Course     Procedures    Patient was seen by the  please refer to their report in the notes section of the Epic chart dated 9/5/17      Critical Care time:  none         Assessments & Plan (with Medical Decision Making)       I have reviewed the nursing notes.    I have reviewed the findings, diagnosis, plan and need for follow up with the patient.  Patient with ongoing delusions thought disorder or underlying psychosis patient will be admitted for stabilization and treatment he will be placed on a 72 hold.    New Prescriptions    No medications on file       Final diagnoses:   Delusions (H)       9/5/2017   [facility] [place], EMERGENCY DEPARTMENT     Steven Bueno MD  09/05/17 1310

## 2017-09-05 NOTE — PHARMACY-ADMISSION MEDICATION HISTORY
"Admission Medication History     Admission medication history interview status for the September 5, 2017 admission is complete. See Epic admission navigator for allergy information, pharmacy, prior to admission medications and immunization status.     Medication history interview sources:  Patient, [facility] (via [employer]    Medication compliance: Poor - per pharmacy records patient has not refilled medication since 6/23/17    Changes made to PTA medication list (reason)  Added: none  Deleted: none  Changed: none    Additional medication history information (including reliability of information, actions taken by pharmacist):  - Patient is not a reliable historian given his current presentation. He was rambling and asking to talk to the police about \"his contract being violated.\" He made several comments about being forced to take a medication he doesn't want. He shared concerns about the side effects he has read about the medication.   - olanzapine - 30 day supply filled by [facility] on 6/23/17, and has never been refilled. Contacted [facility] and [facility] pharmacy - both of which have never filled medications for the patient.     Prior to Admission medications    Medication Sig Last Dose Taking? Auth Provider   OLANZapine (ZYPREXA) 15 MG tablet Take 1 tablet (15 mg) by mouth At Bedtime Not taking  Grant Marques MD       Time spent: 15 minutes    Medication history completed by:   Nelly Rutledge, VanessaD      "

## 2017-09-05 NOTE — IP AVS SNAPSHOT
MRN:[id number]                      After Visit Summary   9/5/2017    Austyn Leach    MRN: [id number]           Thank you!     Thank you for choosing [facility] for your care. Our goal is always to provide you with excellent care.        Patient Information     Date Of Birth          1955        About your hospital stay     You were admitted on:  September 5, 2017 You last received care in the:  [facility] 12NB    You were discharged on:  September 29, 2017       Who to Call     For medical emergencies, please call 911.  For non-urgent questions about your medical care, please call your primary care provider or clinic, None          Attending Provider     Provider Specialty    Steven Bueno MD Emergency Medicine    Jerald, Grant Power MD Psychiatry       Primary Care Provider Fax #    Physician No Ref-Primary [phone]      Further instructions from your care team        Behavioral Discharge Planning and Instructions      Summary:  You were admitted on 9/5/2017  due to Disorganized Thinking/Behaviors, Delusional Thoughts and Manic Symptomology.  You were treated by Dr. Gratn Marques MD and discharged on 09/29/17 from [facility] Home     You were dually committed to the [facility] and the Commissioner of Human Services.  You are being discharged on a Provisional Discharge Agreement which shall remain in effect for the duration of the Commitment. You were also court ordered to take the medications the doctor ordered for you.       Principal Diagnoses:  Bipolar Disorder, currently manic     Health Care Follow-up Appointments:   - Psychiatry:   Appointment: Saturday, October 14th @ 1:05pm w/ Dr. Denisa Morales [place]  Address:  [facility]  [address] [place] [address]  Phone: [phone]  Fax: [phone]  [facility] TO FAX AVS    - [facility] :  Betty Pepe  Phone: [phone]  Fax: [phone]  [facility] TO FAX AVS   Continue to  follow-up with your [facility]  on a regular basis.       - [place] [facility] [place] [facility] [facility]  Meeting location: [address] [address]  :Behind Annika Pandya [facility]  Phone: [phone]  Attendance: Open  Time and duration:   Sunday's 4:00 PM -- 60 Minutes  Monday's 6:00PM - 60 Minutes  Format: Prevention  Ages: All ages  Gender: Mixed    Attend all scheduled appointments with your outpatient providers. Call at least 24 hours in advance if you need to reschedule an appointment to ensure continued access to your outpatient providers.   Major Treatments, Procedures and Findings:  You were provided with: a psychiatric assessment, medication evaluation and/or management, group therapy and milieu management    Symptoms to Report: feeling more aggressive, increased confusion, losing more sleep, mood getting worse or thoughts of suicide    Early warning signs can include: increased depression or anxiety sleep disturbances increased thoughts or behaviors of suicide or self-harm  increased unusual thinking, such as paranoia or hearing voices    Safety and Wellness:  Take all medicines as directed.  Make no changes unless your doctor suggests them.      Follow treatment recommendations.  Refrain from alcohol and non-prescribed drugs.  If there is a concern for safety, call 911.    Resources:   Crisis Intervention: 493.595.8522 or 333-375-1070 (TTY: 883.154.1638).  Call anytime for help.  National [place] on Mental Illness (www.mn.nicole.org): 563.393.7569 or 467-414-7315.  [place] Association for Children's Mental Health (www.macmh.org): 679.888.1408.  Alcoholics Anonymous (www.alcoholics-anonymous.org): Check your phone book for your local chapter.  Suicide Awareness Voices of Education (SAVE) (www.save.org): 268-171-ZUGA (6173)  National Suicide Prevention Line (www.mentalhealthmn.org): 314-515-UMWH (7108)  Mental Health Consumer/Survivor Network of [place] (www.mhcsn.net): 976.720.6660 or  "152-870-0957  Mental Health Association of [place] (www.mentalhealth.org): 551.656.7675 or 087-614-8474  Self- Management and Recovery Training., SMART-- Toll free: 249.857.4656  www.mimoOn.[facility]  [facility] Crisis Response 653-751-5154  [facility] Crisis (COPE) Response - Adult 450 707-4118  [facility] Crisis Response - Adult 812 667-8447  Text 4 Life: txt \"LIFE\" to 27922 for immediate support and crisis intervention  Crisis text line: Text \"START\" to 564-858. Free, confidential, 24/7.  Crisis Intervention: 582.963.9752 or 997-435-4134. Call anytime for help.   [facility] Mental Health Crisis Team - Child: 645.840.9510  [facility] Childrens Mental Health Crisis Response Team - Child: [phone]    The treatment team has appreciated the opportunity to work with you. If you have any questions or concerns our unit number is [phone].            Pending Results     No orders found from 9/3/2017 to 9/6/2017.            Statement of Approval     Ordered          09/29/17 1340  I have reviewed and agree with all the recommendations and orders detailed in this document.  EFFECTIVE NOW     Approved and electronically signed by:  Jessa Sandoval MD             Admission Information     Date & Time Provider Department Dept. Phone    9/5/2017 Grant Marques MD [address] [phone]      Your Vitals Were     Blood Pressure Pulse Temperature Respirations Weight Pulse Oximetry    120/73 78 97.4  F (36.3  C) (Tympanic) 16 89.8 kg (198 lb) 92%    BMI (Body Mass Index)                   26.12 kg/m2           Equal Access to Services     [facility] AH: Hadoumar Blnadon, wamackenzieda luqadaha, qaybta kaalmada janet oconnell. So [facility] [phone].    ATENCIÓN: Si habla español, tiene a gunderson disposición servicios gratuitos de asistencia lingüística. Llame al [phone].    We comply with applicable federal civil rights laws and [place] laws. " We do not discriminate on the basis of race, color, national origin, age, disability, sex, sexual orientation, or gender identity.               Review of your medicines      START taking        Dose / Directions    hypromellose-dextran Soln ophthalmic solution   Used for:  Acute conjunctivitis of both eyes, unspecified acute conjunctivitis type        Dose:  1 drop   Place 1 drop Into the left eye every hour as needed for dry eyes   Refills:  0       trimethoprim-polymyxin b ophthalmic solution   Commonly known as:  POLYTRIM   Used for:  Acute conjunctivitis of both eyes, unspecified acute conjunctivitis type        Dose:  2 drop   Place 2 drops Into the left eye 4 times daily   Quantity:  1 Bottle   Refills:  0         CONTINUE these medicines which may have CHANGED, or have new prescriptions. If we are uncertain of the size of tablets/capsules you have at home, strength may be listed as something that might have changed.        Dose / Directions    OLANZapine 20 MG tablet   Commonly known as:  zyPREXA   This may have changed:    - medication strength  - how much to take   Used for:  Bipolar I disorder (H)        Dose:  20 mg   Take 1 tablet (20 mg) by mouth At Bedtime   Quantity:  30 tablet   Refills:  1            Where to get your medicines      These medications were sent to [facility] [place], [place] - [address]  [address] [address]     Phone:  [phone]     OLANZapine 20 MG tablet         Some of these will need a paper prescription and others can be bought over the counter. Ask your nurse if you have questions.     You don't need a prescription for these medications     hypromellose-dextran Soln ophthalmic solution    trimethoprim-polymyxin b ophthalmic solution                Protect others around you: Learn how to safely use, store and throw away your medicines at www.disposemymeds.org.             Medication List: This is a list of all your medications  and when to take them. Check marks below indicate your daily home schedule. Keep this list as a reference.      Medications           Morning Afternoon Evening Bedtime As Needed    hypromellose-dextran Soln ophthalmic solution   Place 1 drop Into the left eye every hour as needed for dry eyes                                OLANZapine 20 MG tablet   Commonly known as:  zyPREXA   Take 1 tablet (20 mg) by mouth At Bedtime                                trimethoprim-polymyxin b ophthalmic solution   Commonly known as:  POLYTRIM   Place 2 drops Into the left eye 4 times daily   Last time this was given:  2 drops on 9/29/2017  1:49 PM

## 2017-09-06 PROCEDURE — 25000132 ZZH RX MED GY IP 250 OP 250 PS 637: Performed by: PSYCHIATRY & NEUROLOGY

## 2017-09-06 PROCEDURE — 99207 ZZC CDG-HISTORY COMP: MEETS EXP. PROB FOCUSED- DOWN CODED LACK OF PFSH: CPT | Performed by: PSYCHIATRY & NEUROLOGY

## 2017-09-06 PROCEDURE — 12400003 ZZH R&B MH CRITICAL UMMC

## 2017-09-06 PROCEDURE — 99221 1ST HOSP IP/OBS SF/LOW 40: CPT | Mod: AI | Performed by: PSYCHIATRY & NEUROLOGY

## 2017-09-06 RX ORDER — OLANZAPINE 10 MG/1
10 TABLET, ORALLY DISINTEGRATING ORAL 2 TIMES DAILY
Status: DISCONTINUED | OUTPATIENT
Start: 2017-09-06 | End: 2017-09-07

## 2017-09-06 RX ADMIN — TRAZODONE HYDROCHLORIDE 50 MG: 50 TABLET ORAL at 01:36

## 2017-09-06 RX ADMIN — OLANZAPINE 15 MG: 10 TABLET, ORALLY DISINTEGRATING ORAL at 01:35

## 2017-09-06 RX ADMIN — OLANZAPINE 10 MG: 10 TABLET, ORALLY DISINTEGRATING ORAL at 20:33

## 2017-09-06 ASSESSMENT — ACTIVITIES OF DAILY LIVING (ADL)
DRESS: SCRUBS (BEHAVIORAL HEALTH)
GROOMING: INDEPENDENT
LAUNDRY: UNABLE TO COMPLETE
DRESS: SCRUBS (BEHAVIORAL HEALTH)
GROOMING: INDEPENDENT
ORAL_HYGIENE: INDEPENDENT
ORAL_HYGIENE: INDEPENDENT
LAUNDRY: UNABLE TO COMPLETE

## 2017-09-06 NOTE — PROGRESS NOTES
This writer spoke to Neema Pepe from [facility] (Phone: [phone]) She is the assigned CM for this patient.  She has already completed paperwork and sent to the court for vacating the stay of commitment.  We will get a court order for this. He is on a hold until we get this.      This writer also faxed petition for [place] order to [phone].  Copy placed in pt's chart and one to scanning.

## 2017-09-06 NOTE — PROGRESS NOTES
09/05/17 2010   Seclusion or Restraint Order   Length of Order 4   Order Obtained Yes   Leadership   Seclus/Restraint >12H or 2x/24H Notified   Assessment   Less Restrictive Alternative Decreased stimulation;Verbal de-escalation;Reality orientation;Modified programming;Immediate action required, no least restrictive measures could be attempted   Justification   Clinical Justification Others   Education   Discontinuation Criteria Cessation of behavior that precipitated seclusion or restraint   Criteria Explained Yes   Patient's Response NL   Restraint Type   Seclusion () Start     Patient was admitted from the ED after being brought in by police for standing in traffic yelling at cars for speeding. HE was placed on a 72 hour hold. Once on the unit he was minimally cooperative with the search making vulgar and derogatory statements to staff. Once search was concluded he was shown his room. In about 5 mins he walked up to staff and postured at him. Staff intervened and patient was asked to go in his room. He continued to make threats. Code was called and patient was walked to seclusion. IM medication was given at this time.

## 2017-09-06 NOTE — PROGRESS NOTES
09/06/17 0045   Seclusion or Restraint Order   In Person Face to Face Assessment Conducted Yes-Eval of pt's immediate situation, reaction to intervention, complete review of systems assessment, behavioral assessment & review/assessment of hx, drugs & meds, recent labs, etc, behavioral condition, need to continue/terminate restraint/seclusion   Patient denied any injury. Remains in seclusion as unable/unwilling to follow any redirection.

## 2017-09-06 NOTE — PROGRESS NOTES
Initial Psychosocial Assessment    I have reviewed the chart, met with the patient, and developed Care Plan.  Information for assessment was obtained from: chart review and team meeting.  Pt was unable to be seen due to code/seclusion      Presenting Problem:  Pt presented to the ED via police.  He was at a elementary school in his [place] where he was screaming, yelling and jumping into traffic.  Pt was saying things like his wife isn't feeding him, his step-daughter wants to have sex with him and that [employer] stole a patent from him.  OF Note:  He has 2 order of protections/restraining orders against him from his step-daughter and the electric company.  When this patient gets symptomatic it is reported that he becomes very sexually preoccupied and racially preoccupied.  When he was hospitalized in June 2017 he reportedly exposed himself to a child while he was in the ED. Pt is extremely delusional in his thought process at this time.    Lenora Pepe from [facility] told the DEC  that she is getting daily calls from police and family about the pt's behavior.  He has not been following up with his monthly appointments with his psychiatry as well.   This writer spoke to pt's current CM Lenora Pepe through [facility] and they plan to vacate the pt's Stay of Commitment.  We also faxed a petition to impose treatment for a beltran order.       History of Mental Health and Chemical Dependency:  Pt was here at [facility] from 6/7/17 - 6/23/17 - on [facility] [address].  They filed a petition for commitment that was supported and he d/c'd on a STAY of commitment.   Pt admitted at [facility] hospital in the past     Unknown hx of drugs or alcohol abuse.     Family Description (Constellation, Family Psychiatric History):  Pt is  and has 2 grown children.  Pt' wife is originally from the [facility] [place]    Significant Life Events (Illness, Abuse, Trauma, Death):  None reported    Living Situation:  Pt  resides with his wife in [place]    Educational Background:  Graduated HS     Occupational History:  Has worked as a ,  in his past     Financial Status:  [employer] benefits      Legal Issues:  Pt has 2 orders of protection against him.   Stay of commitment    Ethnic/Cultural Issues:  none    Spiritual Orientation:  none     Service History:  none    Social Functioning (organization, interests):  none    Current Treatment Providers are:  Psychiatry:   Dr. Denisa Morales MD  [facility]  Phone: [phone]    [facility] :  Betty Pepe  Phone: [phone]  Fax: [phone]    Social Service Assessment/Plan:  Patient will have psychiatric assessment and medication management by the psychiatrist. Medications will be reviewed and adjusted per MD as indicated. The treatment team will continue to assess and stabilize the patient's mental health symptoms with the use of medications and therapeutic programming. Hospital staff will provide a safe environment and a therapeutic milieu. Staff will continue to assess patient as needed. Patient will participate in unit groups and activities. Patient will receive individual and group support on the unit.     CTC will do individual inpatient treatment planning and after care planning. CTC will discuss options for increasing community supports with the patient. CTC will coordinate with outpatient providers and will place referrals to ensure appropriate follow up care is in place.    We are vacating the pt's Stay of commitment and petitioned for a beltran order at this time.

## 2017-09-06 NOTE — PROGRESS NOTES
When asked if he would take some medications did say yes at this time.  Given the hs dose of zyprexa and trazadone.  Did take these with out difficulty and mouth was checked for pills after drinking water.  About 5 minutes later did split into his urinal.  When examined no pill fragments were noted at this time.  Argumentative at this time.

## 2017-09-06 NOTE — PLAN OF CARE
Problem: General Plan of Care (Inpatient Behavioral)  Goal: Team Discussion  Team Plan:   BEHAVIORAL TEAM DISCUSSION     Participants: Jessa Cartagena (resident), Laverne Slade RN, Yun He LMFT, [facility]   Progress: Continue to assess  Continued Stay Criteria/Rationale: pt continues to exhibit disorganized, racist, sexually pre-occupied behaviors  Medical/Physical: see medical chart  Precautions:   Behavioral Orders   Procedures     Assault precautions     Code 1 - Restrict to Unit     Routine Programming       As clinically indicated     Sexual precautions     Status 15       Every 15 minutes.     Status Individual Observation       For aggression       Order Specific Question:   CONTINUOUS 24 hours / day       Answer:   Distance and Exceptions       Order Specific Question:   Distance       Answer:   5 feet       Order Specific Question:   Exceptions       Answer:   bathroom and shower     Plan: conduct initial psychosocial assessment, meet with psychiatrist and treatment team  Rationale for change in precautions or plan: no change warranted

## 2017-09-06 NOTE — PROGRESS NOTES
09/05/17 2243   Patient Belongings   Patient Belongings clothing;shoes       In pt locker:  Shoes  Socks  Black coat  White t shirt  Plaid long sleeve shirt  Jeans   Belt    No velasquez, wallet or mobile phone upon admission    ADMISSION:  I am responsible for any personal items that are not sent to the safe or pharmacy. [facility] is not responsible for loss, theft or damage of any property in my possession.    Patient Signature _____________________ Date/Time _____________________    Staff Signature _______________________ Date/Time _____________________    2nd Staff person, if patient is unable/unwilling to sign  ___________________________________ Date/Time _____________________  DISCHARGE:  All personal items have been returned to me.    Patient Signature _____________________ Date/Time _____________________    Staff Signature _______________________ Date/Time _____________________

## 2017-09-06 NOTE — PROGRESS NOTES
Stated that he would like to go to his room at this time.  Did cooperate with requests that were made of him but did get upset quite easily.  Did walk to his room at this time and was given a sandwich to eat.  Resting on the bed at this time.

## 2017-09-06 NOTE — PROGRESS NOTES
09/06/17 0045   Seclusion or Restraint Order   Patient Experienced No adverse physical outcome from seclusion/restraint initiation   Patient denies any injury

## 2017-09-06 NOTE — PROGRESS NOTES
09/05/17 2020   Seclusion or Restraint Order   Attending Physician Notified Yes  (via message routing)   Attending Physician's Name Dr. Marques   In Person Face to Face Assessment Conducted Yes-Eval of pt's immediate situation, reaction to intervention, complete review of systems assessment, behavioral assessment & review/assessment of hx, drugs & meds, recent labs, etc, behavioral condition, need to continue/terminate restraint/seclusion   Patient Experienced No adverse physical outcome from seclusion/restraint initiation   Continuation of Seclus/Restraint indicated at this time Yes     Patient denies experiencing any injuries during the initiation of seclusion (he walked without incident). Dr. Vallejo was notified of these findings.

## 2017-09-06 NOTE — PROGRESS NOTES
When patient left seclusion room, nurse made him another sandwich which he ate it all.  Patient followed directions from nurse to remain in room and to ask writer for any other assistance. For the rest of the shift patient slept and was monitor by 1:1 staff.

## 2017-09-06 NOTE — PROGRESS NOTES
Pt was offered to leave seclusion upon taking his night time medication. Pt refused and swore at RN and proceeded to pound on the seclusion room door.

## 2017-09-06 NOTE — PLAN OF CARE
Problem: Psychotic Symptoms  Goal: Psychotic Symptoms  Signs and symptoms of listed problems will be absent or manageable.  Outcome: No Change  Patient continue to be on SIO, tense, denies SI/SIB.  Needed a lot of redirection this shift for poor boundaries.  Refused scheduled zyprexa when offered.  Patient easily irritated, will continue to monitor closely.

## 2017-09-06 NOTE — H&P
"Psychiatry History & Physical    Date of admission: 9/5/2017    Chief Complaint: \"You guys have me so many drugs I can't walk or eat. Go away!\"    HPI:  Austyn Leach is a 62 year old male with a past psychiatric history of Bipolar Disorder who presented to the ED via police and ambulance on 9/5/2017 after he was found yelling and running into traffic in front of an elementary school.     Per DEC assessment, pt is on a stay of commitment and [facility] since June 2017. He lives at home with his wife who apparently monitors his medication intake (Zyprexa 15 mg daily). Per wife, symptoms started sometime in 2015 when he apparently went on a trip to the [facility] unannounced. Of note, this is where the wife is originally from.     The patient has not been sleeping. He is preoccupied with sexual topics and has a h/o exposing himself to a minor in the ED during a prior visit. During past admission, was also sexually preoccupied prior to admission and during admission. Has a long history of grandiose thoughts, per chart.     Of note, there are two orders for protection filed against him. One by a woman (?stepdaughter) for sexually inappropriate and the other from [employer] as he has called and made accusations towards them for stealing his supposed patents and inventions.     Patient refused to talk this morning. Since then, he has been targeting other vulnerable patients on the unit and harassing them, having a hard time taking redirection from staff.     Psychiatric History:   - Inpatient treatment: [facility] in the past. This hospital June 2017.   - Suicide attempts: None known  - Self-injurious behavior: None known  - Diagnoses: Bipolar Disorder, Delusional Disorder  - ECT: None known  - Medication trials: Zyprexa  - Commitment: On stay of commitment following June 2017 admission     Substance use History:  - Unknown    Family History:  - Unknown    Social History:  - He has previously worked as a " .   - Wife of 21 years, 2 grown children    Past Medical History:  - Arthritis in knees    Allergies:     Allergies   Allergen Reactions     Bee Venom      Propranolol      Current Medications:  Prescriptions Prior to Admission   Medication Sig Dispense Refill Last Dose     OLANZapine (ZYPREXA) 15 MG tablet Take 1 tablet (15 mg) by mouth At Bedtime 30 tablet 1 Not taking     Physical ROS: Negative except as discussed in HPI.    Physical Exam completed by Dr. Bueno, please see his note for complete details.     Mental Status Exam:  Appearance: dressed in hospital scrubs, appeared older than stated age and lying in hospital bed   Psychomotor Behavior: no evidence of tardive dyskinesia, dystonia, or tics  Eye Contact: poor   Attitude: uncooperative  Mood: annoyed  Affect: irritable  Speech: clear, coherent, Loud volume  Thought Process: disorganized  Thought Content: paranoia  Insight: limited  Judgement: limited  Oriented to: difficult to assess  Attention and Concentration: difficult to assess  Recent and Remote Memory: difficult to assess    Labs:  No results found for this or any previous visit (from the past 48 hour(s)).  -------------------------------------------------------------------------------------------------------------------  Assessment: Austyn Leach is a 62 year old male with a past psychiatric history of Bipolar Disorder vs Delusional Disorder who presented to the ED on 9/5/2017 after he was found yelling and running into traffic in front of an elementary school. He is likely not adherent with medication (Zyprexa 15 mg) given lack of sleep and other erratic behaviors as well as ongoing sexual preoccupation.      Plan:   Legal Status: 72 hour hold    Safety Assessment:   Behavioral Orders   Procedures     Assault precautions     Code 1 - Restrict to Unit     Routine Programming     As clinically indicated     Sexual precautions     Status 15     Every 15 minutes.     Status Individual  Observation     For aggression     Order Specific Question:   CONTINUOUS 24 hours / day     Answer:   Distance and Exceptions     Order Specific Question:   Distance     Answer:   5 feet     Order Specific Question:   Exceptions     Answer:   bathroom and shower     - Pt has required locked seclusion or restraints in the past 24 hours to maintain safety.    Principal Diagnosis:   - Bipolar Disorder, currently manic     Medications:   - Increase Zyprexa to 10 mg BID  - PRN Olanzapine    Laboratory/Imaging:   - No further studies     Social/Therapeutic:  - Patient will be treated in therapeutic milieu with appropriate individual and group therapies as described.    Medical diagnoses to be addressed this admission: N/A      Patient discussed with Dr. Marques  =============================================================================  Jessa Sandoval M.D.  PGY-4 Psychiatry Resident

## 2017-09-06 NOTE — PROGRESS NOTES
Talked with pt and asked if he would take medications to help him relax.  Stated that he would not take the medications and did not want to do what was asked of him to do.  Md contacted with this information

## 2017-09-06 NOTE — PROGRESS NOTES
Patient has been sleeping. He was woken up and offered his HS medications. He acted like he was going to take them but refused. He then acted like he was going to throw water at writer. When seclusion was not discontinued he started to pound on the doors. Will try again later to process him out.

## 2017-09-06 NOTE — PLAN OF CARE
Problem: General Plan of Care (Inpatient Behavioral)  Goal: Individualization/Patient Specific Goal (IP Behavioral)  The patient and/or their representative will achieve their patient-specific goals related to the plan of care.    The patient-specific goals include:   Pt was not cooperative with personal plan of care, had a code and unable to discuss.

## 2017-09-07 PROCEDURE — 25000132 ZZH RX MED GY IP 250 OP 250 PS 637: Performed by: PSYCHIATRY & NEUROLOGY

## 2017-09-07 PROCEDURE — 12400003 ZZH R&B MH CRITICAL UMMC

## 2017-09-07 RX ORDER — OLANZAPINE 10 MG/1
20 TABLET, ORALLY DISINTEGRATING ORAL AT BEDTIME
Status: DISCONTINUED | OUTPATIENT
Start: 2017-09-07 | End: 2017-09-22

## 2017-09-07 RX ADMIN — OLANZAPINE 20 MG: 10 TABLET, ORALLY DISINTEGRATING ORAL at 21:11

## 2017-09-07 ASSESSMENT — ACTIVITIES OF DAILY LIVING (ADL)
HYGIENE/GROOMING: INDEPENDENT
DRESS: SCRUBS (BEHAVIORAL HEALTH)
LAUNDRY: UNABLE TO COMPLETE
ORAL_HYGIENE: INDEPENDENT

## 2017-09-07 NOTE — PROGRESS NOTES
Pt was irritable today and required redirection for inappropriate conversations with peers and insulting staff.Pt talked about how the hospital staff were killing people downstairs, this had upset a pt a lot during the previous shift.  Pt continued to insult staff throughout the evening and was asked to spend the rest of the evening in his room as he was agitating his peers continuing to talk about hospital staff killing pts. Pt is hostile, insulting, and uncooperative, pts speech was pressured and rambling this evening. Staff will continue to monitor closely.      09/06/17 2138   Behavioral Health   Hallucinations denies / not responding to hallucinations   Thinking poor concentration;paranoid   Orientation person: oriented;place: oriented   Memory confabulation   Insight poor   Judgement impaired   Eye Contact at examiner   Affect irritable   Mood labile;irritable   Hygiene neglected grooming - unclean body, hair, teeth   Suicidality other (see comments)  (none noted)   Self Injury other (see comment)  (none noted)   Elopement Statements about wanting to leave;Hypervigilance to activities on and off the unit   Activity restless   Speech rambling;pressured   Medication Sensitivity no observed side effects;no stated side effects   Psychomotor / Gait balanced;steady;slow   Activities of Daily Living   Hygiene/Grooming independent   Oral Hygiene independent   Dress scrubs (behavioral health)   Laundry unable to complete   Room Organization independent   Behavioral Health Interventions   Psychotic Symptoms maintain safety precautions;monitor need to revise level of observation;maintain safe secure environment;reality orientation;simple, clear language;provide emotional support;assist with developing & utilizing healthy coping strategies;establish therapeutic relationship;build upon strengths;monitor need for prn medication;redirection of aggressive behaviors;redirection of intrusive behaviors;decrease environmental  stimulation   Social and Therapeutic Interventions (Psychotic Symptoms) encourage socialization with peers;encourage effective boundaries with peers;encourage participation in therapeutic groups and milieu activities

## 2017-09-07 NOTE — PROGRESS NOTES
"Pt refused AM dose of zyprexa, says \"I only take meds at night.\" Pt was encouraged to discuss his preferences with MD as pt is not receptive to a discussion of current medication regime and dosing changes.   "

## 2017-09-08 PROCEDURE — 25000132 ZZH RX MED GY IP 250 OP 250 PS 637: Performed by: PSYCHIATRY & NEUROLOGY

## 2017-09-08 PROCEDURE — 99207 ZZC CDG-HISTORY COMP: MEETS EXP. PROBLEM FOCUSED - DOWN CODED LACK OF HPI: CPT | Performed by: PSYCHIATRY & NEUROLOGY

## 2017-09-08 PROCEDURE — 25000125 ZZHC RX 250: Performed by: PSYCHIATRY & NEUROLOGY

## 2017-09-08 PROCEDURE — S0166 INJ OLANZAPINE 2.5MG: HCPCS | Performed by: PSYCHIATRY & NEUROLOGY

## 2017-09-08 PROCEDURE — 99232 SBSQ HOSP IP/OBS MODERATE 35: CPT | Mod: GC | Performed by: PSYCHIATRY & NEUROLOGY

## 2017-09-08 PROCEDURE — H2032 ACTIVITY THERAPY, PER 15 MIN: HCPCS

## 2017-09-08 PROCEDURE — 90853 GROUP PSYCHOTHERAPY: CPT

## 2017-09-08 PROCEDURE — 12400003 ZZH R&B MH CRITICAL UMMC

## 2017-09-08 RX ADMIN — OLANZAPINE 10 MG: 10 INJECTION, POWDER, LYOPHILIZED, FOR SOLUTION INTRAMUSCULAR at 02:41

## 2017-09-08 RX ADMIN — OLANZAPINE 20 MG: 10 TABLET, ORALLY DISINTEGRATING ORAL at 21:28

## 2017-09-08 ASSESSMENT — ACTIVITIES OF DAILY LIVING (ADL)
GROOMING: INDEPENDENT;PROMPTS
ORAL_HYGIENE: INDEPENDENT;PROMPTS
DRESS: SCRUBS (BEHAVIORAL HEALTH)

## 2017-09-08 NOTE — PROGRESS NOTES
09/08/17 0241   Seclusion or Restraint Order   Patient Experienced No adverse physical outcome from seclusion/restraint initiation   Per Kristin DREW RN, patient suffered no injuries during the restraint.

## 2017-09-08 NOTE — PROGRESS NOTES
"Patient came out of his room and was loudly saying \"I need a cigarette - get me a cigarette!\".  Patient difficult to redirect, saying \"I'm going to alexandra you to staff\" frequently, then c/o \"They gave me the wrong medication, they gave me 20 mg and I only take 15 mg. Now I'm tired and I shouldn't be tired!\".  Patient began knocking loudly on his wall, while he was laying in bed, deliberately attempting to wake other patients up (which did happen), and was given firm limits regarding his behavior.  Patient remains on 1:1 status at this time. If behavior continues will attempt prn medication (which he has refused thus far). Continue to attempt to redirect.    01:25 - Patient quieter - laying in bed, talking to himself more quietly, occasionally clapping his hands more loudly but then quieting down again.  Continue with 1:1 staffing, attempt to redirect as able, prn medication as patient willing.  "

## 2017-09-08 NOTE — PLAN OF CARE
Problem: Psychotic Symptoms  Intervention: Social and Therapeutic Interv (Psychotic Symptoms)     Pt attended group today for the first time since admit.  Pt attended the OT discussion group which involved reading a selected quote, discussing it's meaning, and relating it to personal life experience or situation.  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  Pt also stated he can prevent tornadoes.  Made a few complaints about this being an unnecessary hospitalization, and he is hoping to leave- frequently looking around to meet with the doctor.

## 2017-09-08 NOTE — PROGRESS NOTES
"[facility], [place]   Psychiatric Progress Note  Hospital Day: 3        Interim History:   The patient's care was discussed with the treatment team during the daily team meeting and/or staff's chart notes were reviewed.  Staff report patient went to seclusion overnight (which required a behavioral code) after he was screaming in his room, pounding and walls and trying to wake up other people.     Upon interview, the patient expressed that he is under the impression he will be leaving today as his 72 hour hold is up. Says \"you guys tried to kill me off once already\". When discussing Court Hold explains that he will be suing us.     Psychiatric Symptoms: Denies    Medication side effects reported: None    Other issues reported by patient: Wondering why olanzapine was increased and requests 15 mg as he was previously on. Explained that our recommendation was to increase to 20 mg.          Medications:       OLANZapine zydis  20 mg Oral At Bedtime     OLANZapine  10 mg Intramuscular Once          Allergies:     Allergies   Allergen Reactions     Bee Venom      Propranolol           Labs:   No results found for this or any previous visit (from the past 24 hour(s)).       Psychiatric Examination:     /85  Pulse 84  Temp 97.8  F (36.6  C) (Oral)  Resp 16  SpO2 93%  Weight is 0 lbs 0 oz  There is no height or weight on file to calculate BMI.    Appearance: awake, alert, dressed in hospital scrubs, appeared older than stated age and unkempt  Attitude:  somewhat hostile  Eye Contact:  intense  Mood:  angry  Affect:  intensity is heightened  Speech:  somewhat pressured  Language: fluent and intact in English  Psychomotor, Gait, Musculoskeletal:  no evidence of tardive dyskinesia, dystonia, or tics and intact station, gait and muscle tone  Throught Process:  tangential  Associations:  no loose associations  Thought Content:  no evidence of suicidal ideation or homicidal ideation  Insight:  " limited  Judgement:  limited  Oriented to:  time, person, and place  Attention Span and Concentration:  intact  Recent and Remote Memory:  intact  Fund of Knowledge:  appropriate         Precautions:     Behavioral Orders   Procedures     Assault precautions     Code 1 - Restrict to Unit     Routine Programming     As clinically indicated     Sexual precautions     Status 15     Every 15 minutes.     Status Individual Observation     For aggression     Order Specific Question:   CONTINUOUS 24 hours / day     Answer:   Distance and Exceptions     Order Specific Question:   Distance     Answer:   5 feet     Order Specific Question:   Exceptions     Answer:   bathroom and shower          Diagnoses:     Bipolar Disorder, currently manic          Assessment & Plan:     Assessment and hospital summary:  Patient presented after found yelling and running into traffic in the context of medication non adherence. Additionally, had been having limited sleep, erratic behaviors and ongoing sexual preoccupation. Has been harassing other patients and making threats towards staff and patients.     Target psychiatric symptoms and interventions:  - Zyprexa 20 mg QHS (increased 9/7)  - PRN Zyprexa    Medical Problems and Treatments: N/A    Legal:  - Admitted on 72 hour hold. Stay of commitment vacated.     Disposition:  - Pending outcome of court process and improvement of symptoms.       Patient seen and discussed with Dr. Jerald Sandoval MD  PGY-4

## 2017-09-08 NOTE — PROGRESS NOTES
Pt sat through dance/movement therapy (DMT) making agitating comments but only mildly disruptive and paranoid.  He did state he was looking forward to this group and did not want it to end, but when giving his reasons were sexist with strong gender stereotypes.  He was fixated on not being able to smoke despite other group members asking him to stop talking about that subject since it was triggering their addiction responses.  He did not change his behaviors despite the effects on others.

## 2017-09-08 NOTE — PROGRESS NOTES
"Patient given bedtime medication (Zyprexa 20 mg). \" I want 15 mg. The law wants me to take 15 mg\". Patient later agreed to take the dose saying \" I am taking it but I will alexandra you\". Patient's insight is poor with disorganized and confabulated thoughts. He is disheveled with neglected grooming. He told one of the staff that he doesn't need 1:1  "

## 2017-09-08 NOTE — PROGRESS NOTES
Patient remains in seclusion as of yet, and alternates between resting quietly for 10 to 15 minutes at the most, and then he is either yelling or pounding on the seclusion room walls and door & yelling threats at staff.  Patient remains oppositional & defiant in his actions, argumentative and unwilling to cooperate with staff's requests.  Continue with seclusion at this time as patient would appear to be a danger to others if he was taken out of seclusion at this time.     06:25AM - Patient quieter in seclusion, and upon questioning he agreed to be cooperative with staff and to no longer threaten other patients or staff.  Cooperative but sarcastic, making comments about nurses teaching him to cry just like politicians did. Escorted out of seclusion and to his room, where he was resettled with 1:1 staff.  Continue with 1:1 staffing, reassurance, firm limits, redirect and distract as able.

## 2017-09-08 NOTE — PLAN OF CARE
Problem: Restraint/Seclusion for Violent Self-Destructive Behavior  Goal: Prevent/manage potential problems during restraint/seclusion  Maintain safety of patient and others during period of restraint/seclusion.  Promote psychological and physical wellbeing.  Prevent injury to skin and involved body parts.  Promote nutrition, hydration, and elimination.   Outcome: Declining  Patient screaming in his room, pounding on his walls & attempting to wake other patients up, and threatened to go after other patients and staff. Patient has refused any oral medication, unable to redirect, and aggressive towards all unit members (patients and staff).  [employer] called & patient taken to seclusion.

## 2017-09-08 NOTE — PROGRESS NOTES
Pt's CCM Betty Johnson called and asked being that the pt is inpatient if we could have a Neuro-psych evaluation and a psycho-sexual evaluation completed on him.  This is something that we could possible look at in the future, however, he is too ill right now and we would need to wait until he is stable.

## 2017-09-09 PROCEDURE — 25000132 ZZH RX MED GY IP 250 OP 250 PS 637: Performed by: PSYCHIATRY & NEUROLOGY

## 2017-09-09 PROCEDURE — 12400003 ZZH R&B MH CRITICAL UMMC

## 2017-09-09 PROCEDURE — 25000132 ZZH RX MED GY IP 250 OP 250 PS 637: Performed by: PHYSICIAN ASSISTANT

## 2017-09-09 RX ADMIN — OLANZAPINE 20 MG: 10 TABLET, ORALLY DISINTEGRATING ORAL at 21:34

## 2017-09-09 RX ADMIN — MICONAZOLE NITRATE: 2 POWDER TOPICAL at 19:37

## 2017-09-09 ASSESSMENT — ACTIVITIES OF DAILY LIVING (ADL)
DRESS: SCRUBS (BEHAVIORAL HEALTH);INDEPENDENT
GROOMING: INDEPENDENT
ORAL_HYGIENE: PROMPTS;INDEPENDENT

## 2017-09-09 NOTE — PROGRESS NOTES
Pt intrusive with staff and pts. Was labile as far as intrusion. Calm and redirectable for part of the shift, intrusive and hyperactive for the other. Still making sexual statements towards staff.

## 2017-09-09 NOTE — PROGRESS NOTES
Initially yelling about wanting a cigarette but was able to redirect back to his room with minimal redirection. Did stay in his room the rest of the shift

## 2017-09-09 NOTE — PROGRESS NOTES
"During previous hour of 1:1 observation, pt was agitated and threatening, making statements such as that he wish he had the internet \"so I could burn this place. Psychologically.\" Said to author, \"If you were out in the woods I would take care of you.\" Pt generally dismissive of staff direction, and intrusive about matters concerning other patients. At one point, pt moved towards author with his shoulder forward, stopping about a foot from author.  "

## 2017-09-09 NOTE — PLAN OF CARE
"Problem: Psychotic Symptoms  Goal: Psychotic Symptoms  Signs and symptoms of listed problems will be absent or manageable.   Outcome: No Change  Pt spent much of the evening in the lounge saying \"I hate this place, but I like the people.\"  He needed redirection multiple times for sitting in front of the pod doors, being disrespectful and/or threatening toward staff, and for having negative conversations with other patients.  He is quite paranoid about staff watching him, or listening to his conversations. He has poor insight into why he is in the hospital and mentioned multiple times how the police hate him and how men target him.  He reported that the reason he is here is because the  were called on him for telling school buses to slow down in his neighborhood.  Pt refused PRN medications and was difficult to redirect.  He did take his evening medications without incident, despite saying early in the shift that he has a court order to take 15 mg of Zyprexa, not the 20 mg that he is prescribed here.          "

## 2017-09-10 PROCEDURE — 25000132 ZZH RX MED GY IP 250 OP 250 PS 637: Performed by: PSYCHIATRY & NEUROLOGY

## 2017-09-10 PROCEDURE — 12400003 ZZH R&B MH CRITICAL UMMC

## 2017-09-10 RX ADMIN — OLANZAPINE 20 MG: 10 TABLET, ORALLY DISINTEGRATING ORAL at 21:22

## 2017-09-10 RX ADMIN — MICONAZOLE NITRATE: 2 POWDER TOPICAL at 18:49

## 2017-09-10 RX ADMIN — MICONAZOLE NITRATE: 2 POWDER TOPICAL at 08:47

## 2017-09-10 ASSESSMENT — ACTIVITIES OF DAILY LIVING (ADL)
GROOMING: INDEPENDENT;PROMPTS
DRESS: SCRUBS (BEHAVIORAL HEALTH)
ORAL_HYGIENE: INDEPENDENT;PROMPTS
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
## 2017-09-11 PROCEDURE — 90853 GROUP PSYCHOTHERAPY: CPT

## 2017-09-11 PROCEDURE — 25000132 ZZH RX MED GY IP 250 OP 250 PS 637: Performed by: PSYCHIATRY & NEUROLOGY

## 2017-09-11 PROCEDURE — H2032 ACTIVITY THERAPY, PER 15 MIN: HCPCS

## 2017-09-11 PROCEDURE — 12400003 ZZH R&B MH CRITICAL UMMC

## 2017-09-11 PROCEDURE — 99232 SBSQ HOSP IP/OBS MODERATE 35: CPT | Performed by: PSYCHIATRY & NEUROLOGY

## 2017-09-11 PROCEDURE — 99207 ZZC CDG-HISTORY COMP: MEETS EXP. PROBLEM FOCUSED - DOWN CODED LACK OF HPI: CPT | Performed by: PSYCHIATRY & NEUROLOGY

## 2017-09-11 RX ADMIN — OLANZAPINE 20 MG: 10 TABLET, ORALLY DISINTEGRATING ORAL at 21:30

## 2017-09-11 ASSESSMENT — ACTIVITIES OF DAILY LIVING (ADL)
LAUNDRY: UNABLE TO COMPLETE
DRESS: SCRUBS (BEHAVIORAL HEALTH)
ORAL_HYGIENE: INDEPENDENT
DRESS: SCRUBS (BEHAVIORAL HEALTH)
LAUNDRY: UNABLE TO COMPLETE
GROOMING: INDEPENDENT
ORAL_HYGIENE: INDEPENDENT
HYGIENE/GROOMING: HANDWASHING

## 2017-09-11 NOTE — PROGRESS NOTES
Pt has upcoming psychiatry appointment in October at [facility].  Also has CCM to follow up with Betty Mueller.

## 2017-09-11 NOTE — PROGRESS NOTES
"Of note, pt was overheard on the phone with his wife, requesting that she come visit him and sneak him in a cigarette and a match. \"I can smoke it in the bathroom--there aren't smoke alarms in there.\"   "

## 2017-09-11 NOTE — PLAN OF CARE
"Problem: Psychotic Symptoms  Intervention: Social and Therapeutic Interv (Psychotic Symptoms)     Pt attended the morning OT discussion group on gratitude.  Was not necessarily able to stay on topic, yet was not making inappropriate comments.  Focused mostly on his use of \"high tech cameras to see electrons move in the air\". Made several grandiose statements about how gifted he is, and how he spends his free time inventing things.     Made few statements that were likely going in an inappropriate direction though would state \"I probably shouldn't say it, because you guys don't like to hear that around here\", and writer affirmed that he shouldn't say what he was about to say.     Yesterday, however, while sitting in the lounge during group but not attending, pt needed to be redirected from inappropriate comments - I.e.wanting a naked asa doll to use to draw pictures of, wanting a drone to be able to look into people's windowns, etc.            "
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
## 2017-09-12 PROCEDURE — 25000132 ZZH RX MED GY IP 250 OP 250 PS 637: Performed by: PSYCHIATRY & NEUROLOGY

## 2017-09-12 PROCEDURE — 99207 ZZC CDG-MDM COMPONENT: MEETS MODERATE - UP CODED: CPT | Performed by: PSYCHIATRY & NEUROLOGY

## 2017-09-12 PROCEDURE — 12400003 ZZH R&B MH CRITICAL UMMC

## 2017-09-12 PROCEDURE — 99232 SBSQ HOSP IP/OBS MODERATE 35: CPT | Performed by: PSYCHIATRY & NEUROLOGY

## 2017-09-12 RX ADMIN — OLANZAPINE 20 MG: 10 TABLET, ORALLY DISINTEGRATING ORAL at 20:22

## 2017-09-12 ASSESSMENT — ACTIVITIES OF DAILY LIVING (ADL)
DRESS: INDEPENDENT
GROOMING: INDEPENDENT
ORAL_HYGIENE: INDEPENDENT

## 2017-09-12 NOTE — PLAN OF CARE
"Problem: Psychotic Symptoms  Goal: Psychotic Symptoms  Signs and symptoms of listed problems will be absent or manageable.   Outcome: No Change    09/12/17 1238   Psychotic Symptoms   Psychotic Symptoms Assessed all   Psychotic Symptoms Present none   48 hour nursing assessment:  Patient evaluation continues. Assessed mood,anxiety,thoughts and behavior. Is progressing towards goals. Encourage participation in groups and developing healthy coping skills. Will continue to assess. Patient denies auditory or visual  Hallucinations. Refer to daily team meeting notes for individualized plan of care.  Pt work early and was quiet in milieu when he was alone. Pt was hard to redirect once peers were in milieu. Pt was able to eat breakfast with peers and was able to stay focused. Pt became agitated shortly after with peer. Pt made statements to peer \"shut the hell up and quit telling lies.\" Pt stated he was going to his room to take a nap and on the way to his room pt turned to Male RN and said \"hey you! This is for you.\" Pt made gesture of pulling his pants down and sticking his buttocks towards male RN. Pt was quickly redirected for inappropriate behavior.   Pt spoke with writer about Zyprexa dose. Pt stated he feels more depressed when he is taking the medication. It also makes him have increased paranoia. Writer asked about benefits and he stated he is able to stay calm and sleep much better when taking the medications.       Pt has been making numerous phone calls to [employer] stating \"We are keeping him hostage.\"  Staff will dial phone for pt when pt requests to use phone.   "

## 2017-09-12 NOTE — PROGRESS NOTES
[facility], [place]   Psychiatric Progress Note  Hospital Day: 7        Interim History:   The patient's care was discussed with the treatment team during the daily team meeting and/or staff's chart notes were reviewed.  Staff report patient had a good evening yesterday but this AM was being triggered by a new patient that was transferred here. He was argumentative with the staff and mimiced mooning RN this AM.    With me, the patient insisted on being seen on camera. He demanded that I give him all notes related to his medication adjustments ever since he was first started on the treatment during his previous hospitalization including his outpatient notes. He continues to say that he wants to leave because we are not a good hospital as we kill people and we don't have 15 mg doses of olanzapine. I again state that his increase was due to lack of symptom control and that we do indeed have 15 mg tabs. He then smiles and claims that he caught me lying on camera.    Psychiatric Symptoms: patient has no insight and denies any problems.    Medication side effects reported: Denies             Medications:       miconazole   Topical BID     OLANZapine zydis  20 mg Oral At Bedtime          Allergies:     Allergies   Allergen Reactions     Bee Venom      Propranolol           Labs:   No results found for this or any previous visit (from the past 24 hour(s)).       Psychiatric Examination:     /85  Pulse 84  Temp 99.1  F (37.3  C) (Tympanic)  Resp 18  SpO2 93%  Weight is 0 lbs 0 oz  There is no height or weight on file to calculate BMI.    Appearance: awake, alert, dressed in hospital scrubs, appeared older than stated age and unkempt  Attitude:  somewhat cooperative  Eye Contact:  intense  Mood:  angry  Affect:  intensity is heightened  Speech:  somewhat pressured  Language: fluent and intact in English  Psychomotor, Gait, Musculoskeletal:  no evidence of tardive dyskinesia, dystonia, or  tics and intact station, gait and muscle tone  Throught Process:  illogical  Associations:  no loose associations  Thought Content:  no evidence of suicidal ideation or homicidal ideation and significant paraniod delusions  Insight:  limited  Judgement:  limited  Oriented to:  time, person, and place  Attention Span and Concentration:  intact  Recent and Remote Memory:  intact  Fund of Knowledge:  appropriate         Precautions:     Behavioral Orders   Procedures     Assault precautions     Code 1 - Restrict to Unit     Routine Programming     As clinically indicated     Sexual precautions     Status 15     Every 15 minutes.          Diagnoses:     Bipolar Disorder, currently manic          Assessment & Plan:     Assessment and hospital summary:  Patient presented after found yelling and running into traffic in the context of medication non adherence. Additionally, had been having limited sleep, erratic behaviors and ongoing sexual preoccupation. He had been harassing other patients and making threats towards staff and patients. He has begun to show improvement, however he continues to be quite delusional.    Target psychiatric symptoms and interventions:  - Zyprexa 20 mg QHS (increased 9/7)  - PRN Zyprexa    Medical Problems and Treatments: N/A    Legal:  - Admitted on 72 hour hold. Currently on a court hold. Stay of commitment being vacated. Hearing for vacating stay on 9/14.    Disposition:  - Pending outcome of court process and improvement of symptoms.

## 2017-09-12 NOTE — PLAN OF CARE
"Problem: Psychotic Symptoms  Intervention: Social and Therapeutic Interv (Psychotic Symptoms)     Pt attended the OT self awareness topic group that was more abstract in nature, involving using song titles to identify feelings, and talk about memories/life events.  Pt was only focused on group for a few minutes, enough to say he \"only likes songs that make girls cry\", then started talking to a new peer in the lounge.  (not billed)        "

## 2017-09-12 NOTE — PROGRESS NOTES
"Patient was visible and social in the milieu majority of the evening. He was calm and cooperative with all conversations with peers. With staff he was occasionally tense, agitated, and paranoid stating to staff on several occasions that he is \"working on 6 lawsuits against the hospital.\" During dinner he attempted to grab food off of dirty trays in the meal cart and became agitated when staff redirected him and offered food from the kitchen, he then walked back to the [facility] stating \"im adding on to your lawsuits\" then sat down and watched football eventually becoming calm within only minutes. His ADL's appeared poor and he was uncooperative with staff when prompted.     09/11/17 2200   Behavioral Health   Hallucinations denies / not responding to hallucinations   Thinking poor concentration;paranoid   Orientation person: oriented;place: oriented   Memory new learning, recall loss   Insight poor   Judgement impaired   Eye Contact at examiner   Affect tense;blunted, flat;full range affect   Mood irritable;mood is calm   Physical Appearance/Attire disheveled;attire appropriate to age and situation   Hygiene neglected grooming - unclean body, hair, teeth   Suicidality other (see comments)  (none stated or observed)   Self Injury other (see comment)  (none stated or observed)   Elopement (no statements or activity noted)   Activity other (see comment)  (visible and social in the milieu)   Speech clear;coherent   Medication Sensitivity no stated side effects;no observed side effects   Psychomotor / Gait balanced;steady   Sleep/Rest/Relaxation   Day/Evening Time Hours up all shift   Coping/Psychosocial   Verbalized Emotional State frustration   Supportive Measures active listening utilized;goal setting facilitated;positive reinforcement provided;problem solving facilitated;relaxation techniques promoted;self-care encouraged;verbalization of feelings encouraged   Trust Relationship/Rapport care explained;empathic listening " provided;questions answered;questions encouraged;reassurance provided;thoughts/feelings acknowledged   Daily Care   Activity up ad evonne   Patient Performed Hygiene (hand hygiene)   Hygiene Care   Oral Care other (see comments)  (neglected, not cooperative with staff prompting)   Activities of Daily Living   Hygiene/Grooming handwashing   Oral Hygiene independent  (neglected, not cooperative with staff prompting)   Dress scrubs (behavioral health)   Laundry unable to complete   Room Organization independent   Activity   Activity Level of Assistance independent   Behavioral Health Interventions   Psychotic Symptoms maintain safety precautions;maintain safe secure environment;reality orientation;simple, clear language;encourage participation / independence with adls;monitor confusion, memory loss, decision making ability and reorient / intervent as needed   Social and Therapeutic Interventions (Psychotic Symptoms) encourage participation in therapeutic groups and milieu activities

## 2017-09-12 NOTE — PROGRESS NOTES
Has been awake during the night and when by self would sit and not make any noise.  When up with a peer has been talking a lot and is not wanting to stay in his room

## 2017-09-13 PROCEDURE — 25000132 ZZH RX MED GY IP 250 OP 250 PS 637: Performed by: PSYCHIATRY & NEUROLOGY

## 2017-09-13 PROCEDURE — 12400003 ZZH R&B MH CRITICAL UMMC

## 2017-09-13 PROCEDURE — 99207 ZZC CDG-MDM COMPONENT: MEETS MODERATE - UP CODED: CPT | Performed by: PSYCHIATRY & NEUROLOGY

## 2017-09-13 PROCEDURE — 99232 SBSQ HOSP IP/OBS MODERATE 35: CPT | Mod: GC | Performed by: PSYCHIATRY & NEUROLOGY

## 2017-09-13 RX ADMIN — OLANZAPINE 20 MG: 10 TABLET, ORALLY DISINTEGRATING ORAL at 19:41

## 2017-09-13 RX ADMIN — MICONAZOLE NITRATE: 2 POWDER TOPICAL at 19:41

## 2017-09-13 ASSESSMENT — ACTIVITIES OF DAILY LIVING (ADL)
GROOMING: INDEPENDENT
GROOMING: HANDWASHING;SHOWER;INDEPENDENT
DRESS: SCRUBS (BEHAVIORAL HEALTH);INDEPENDENT
ORAL_HYGIENE: INDEPENDENT
LAUNDRY: WITH SUPERVISION
ORAL_HYGIENE: INDEPENDENT
DRESS: SCRUBS (BEHAVIORAL HEALTH);INDEPENDENT

## 2017-09-13 NOTE — PROGRESS NOTES
[facility], [place]   Psychiatric Progress Note  Hospital Day: 8        Interim History:   The patient's care was discussed with the treatment team during the daily team meeting and/or staff's chart notes were reviewed.  Staff report patient attempted to attend a group yesterday.     On interview, patient is wondering when he can go home and is aware of court date tomorrow. Explained this is the next step in the process to determining discharge. Reports sleeping 8 hours a night. Would like to smoke cigarettes as he's missing nicotine and is not interested in nicotine replacement options as they all have averse side effects. Expressed concern about cost of hospital stay and lack of insurance.     Psychiatric Symptoms: patient has no insight and denies any problems.    Medication side effects reported: Denies         Medications:       miconazole   Topical BID     OLANZapine zydis  20 mg Oral At Bedtime          Allergies:     Allergies   Allergen Reactions     Bee Venom      Propranolol           Labs:   No results found for this or any previous visit (from the past 24 hour(s)).       Psychiatric Examination:     /85  Pulse 84  Temp 97.3  F (36.3  C) (Tympanic)  Resp 16  SpO2 93%  Weight is 0 lbs 0 oz  There is no height or weight on file to calculate BMI.    Appearance: awake, alert, dressed in hospital scrubs, appeared older than stated age and unkempt  Attitude:  somewhat cooperative  Eye Contact:  fair, less intense than previous  Mood:  angry  Affect:  intensity is heightened  Speech:  clear, coherent  Language: fluent and intact in English  Psychomotor, Gait, Musculoskeletal:  no evidence of tardive dyskinesia, dystonia, or tics and intact station, gait and muscle tone  Throught Process:  illogical  Associations:  no loose associations  Thought Content:  no evidence of suicidal ideation or homicidal ideation and significant paraniod delusions  Insight:  limited  Judgement:   limited  Oriented to:  time, person, and place  Attention Span and Concentration:  intact  Recent and Remote Memory:  intact  Fund of Knowledge:  appropriate         Precautions:     Behavioral Orders   Procedures     Assault precautions     Code 1 - Restrict to Unit     Routine Programming     As clinically indicated     Sexual precautions     Status 15     Every 15 minutes.          Diagnoses:     Bipolar Disorder, currently manic          Assessment & Plan:     Assessment and hospital summary:  Patient presented after found yelling and running into traffic in the context of medication non adherence. Additionally, had been having limited sleep, erratic behaviors and ongoing sexual preoccupation. He had been harassing other patients and making threats towards staff and patients. He has begun to show improvement, however he continues to be quite delusional.    Target psychiatric symptoms and interventions:  - Zyprexa 20 mg QHS (increased 9/7)  - PRN Zyprexa    Medical Problems and Treatments: N/A    Legal:  - Admitted on 72 hour hold. Currently on a court hold. Stay of commitment being vacated. Hearing for vacating stay on 9/14.    Disposition:  - Pending outcome of court process and improvement of symptoms.       Discussed with Dr. Jerald Sandoval MD  PGY-4

## 2017-09-13 NOTE — PLAN OF CARE
"Problem: Psychotic Symptoms  Goal: Psychotic Symptoms  Signs and symptoms of listed problems will be absent or manageable.   Outcome: Improving  RN ASSESSMENT   Patient presents visible in the milieu, and selectively social with staff and peers. Interactions with others noted to be appropriate. Denies auditory and visual hallucinations. Denied suicidal and homicidal thoughts. Mood overall calm, affect blunted.  Patient approached this writer and asked in appropriate manner, \" Is there anything I can do or can I behave in a certain way to expedite my discharge? Showered this shift. Sleep remains poor. Per chart review patient has been sleeping average of 3-4 hours per night.   Current legal status committed.  Refer to case manger notes for discharge planing and recommendations. Continue with current treatment plan and recommendations. Continue to monitor and reassess symptoms. Monitor response to medications. Monitor progress towards treatment goals. Encourage groups and participation.      "

## 2017-09-14 PROCEDURE — 12400003 ZZH R&B MH CRITICAL UMMC

## 2017-09-14 ASSESSMENT — ACTIVITIES OF DAILY LIVING (ADL)
GROOMING: HANDWASHING;SHOWER;INDEPENDENT
GROOMING: PROMPTS
ORAL_HYGIENE: INDEPENDENT
ORAL_HYGIENE: INDEPENDENT
DRESS: SCRUBS (BEHAVIORAL HEALTH)
DRESS: SCRUBS (BEHAVIORAL HEALTH);INDEPENDENT

## 2017-09-14 NOTE — PROGRESS NOTES
"Pt awoke early this morning for breakfast, vitals, and to get dressed for court. Pt was picked up around 0815. Pt indicated that he chose not to wear a t-shirt that he had specially made, as he recognized it was likely not appropriate for court. Pt returned from court around 1300, complied with the search, and settled back into the unit. Upon return to the unit, pt stated to writer, \"I'M BACK! Remember me!?\" Reports that his court date went \"great.\" Ate lunch upon returning to the unit, and he reported being pleased that we saved his lunch tray for him. Requested that staff \"get a raise.\" Initially visible in the milieu, but then laid down for a nap around 1400. No behavioral concerns today. Presented as less agitated/impulsive impulsive on the unit than in previous days, although reportedly was quite combative and delusional at his court date.      09/14/17 1451   Behavioral Health   Hallucinations denies / not responding to hallucinations   Thinking distractable;poor concentration   Orientation person: oriented;place: oriented;date: oriented;time: oriented   Memory baseline memory   Insight poor   Judgement impaired   Eye Contact at examiner   Affect full range affect;irritable   Mood labile;mood is calm   Physical Appearance/Attire attire appropriate to age and situation   Hygiene other (see comment)  (adequate)   Suicidality other (see comments)  (none stated or observed)   Self Injury other (see comment)  (none stated or observed)   Elopement (no behaviors or statements indicative of elopement risk)   Activity hyperactive (agitated, impulsive);other (see comment)  (less agitated/impulsive today than in previous days)   Speech flight of ideas;pressured;rambling;tangential   Medication Sensitivity no stated side effects   Psychomotor / Gait balanced;steady   Activities of Daily Living   Hygiene/Grooming handwashing;shower;independent   Oral Hygiene independent   Dress scrubs (behavioral health);independent   Room " Organization independent   Activity   Activity Level of Assistance independent
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
## 2017-09-14 NOTE — PROGRESS NOTES
"Patient came back form court. He stated that even though the hearing was difficult, the outcome was \"great\" He stated that he yelled at the  and at the . States, \"Now the  does not want to talk to me anymore, and my  does not want talk to me anymore, and my  does not want to talk to me anymore. I told them about everything that happened downstairs and how those people were trying to kill me, but they said that there is nothing they could do so I got mad.\" When I asked what was \"great\" about the outcome patient stated, \" Well the  told me that I am voluntary and don't have to take meds anymore, so I will not be. He also told that they are getting me a new doctor!\"   "

## 2017-09-14 NOTE — PROGRESS NOTES
09/13/17 1944   Behavioral Health   Thinking distractable;poor concentration   Orientation person: oriented;place: oriented   Insight poor   Judgement impaired   Eye Contact at examiner   Affect tense;irritable   Mood anxious;labile   Hygiene other (see comment)  (adequate)   Suicidality other (see comments)  (SI not verbalized)   Self Injury other (see comment)  (SIB urges not verbalized)   Elopement (no behaviors or statements indicative of elopement risk)   Activity hyperactive (agitated, impulsive)  (at times)   Speech circumstantial;flight of ideas;pressured;rambling;tangential   Psychomotor / Gait balanced;steady   Overt Aggression Scale   Verbal Aggression 1   Aggression against Property 0   Auto-Aggression 0   Physical Aggression 0   Overt Aggression Total Score 1   Sleep/Rest/Relaxation   Day/Evening Time Hours resting in bed   Safety   Assault status 15   Sexual status 15   Activities of Daily Living   Hygiene/Grooming independent   Oral Hygiene independent   Dress scrubs (behavioral health);independent   Room Organization independent   Behavioral Health Interventions   Psychotic Symptoms maintain safety precautions;maintain safe secure environment;simple, clear language;redirection of intrusive behaviors;redirection of aggressive behaviors;establish therapeutic relationship;assist with developing & utilizing healthy coping strategies;build upon strengths;monitor confusion, memory loss, decision making ability and reorient / intervent as needed   Social and Therapeutic Interventions (Psychotic Symptoms) encourage effective boundaries with peers     Patient was visible in the milieu most of this shift socializng with peers, watching TV, etc. He became a bit loud, tangential, grandiose, and inappropriate at one point for which he required redirection but was able to contain himself  for the rest of the shift after that, with no need for further intervention (at least from this writer).   Raul Rosario    09/13/2017

## 2017-09-15 PROCEDURE — 99233 SBSQ HOSP IP/OBS HIGH 50: CPT | Mod: GC | Performed by: PSYCHIATRY & NEUROLOGY

## 2017-09-15 PROCEDURE — 12400003 ZZH R&B MH CRITICAL UMMC

## 2017-09-15 ASSESSMENT — ACTIVITIES OF DAILY LIVING (ADL)
ORAL_HYGIENE: INDEPENDENT
GROOMING: PROMPTS
ORAL_HYGIENE: INDEPENDENT
GROOMING: INDEPENDENT
DRESS: SCRUBS (BEHAVIORAL HEALTH)
DRESS: SCRUBS (BEHAVIORAL HEALTH)
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
## 2017-09-15 NOTE — PROGRESS NOTES
"     09/15/17 1346   Behavioral Health   Hallucinations denies / not responding to hallucinations   Thinking distractable;delusional;paranoid;poor concentration   Orientation person: oriented;place: oriented;date: oriented;time: oriented   Memory baseline memory   Insight denial of illness;poor   Judgement impaired   Eye Contact at examiner   Affect full range affect   Mood labile   Physical Appearance/Attire untidy   Hygiene other (see comment)  (Pt could use a shower.)   Suicidality other (see comments)  (Pt denies.)   Self Injury other (see comment)  (Pt denies.)   Activity other (see comment)  (Visible in milieu all shift, social with peers.)   Speech flight of ideas;rambling   Medication Sensitivity no stated side effects;no observed side effects   Psychomotor / Gait unsteady   Psycho Education   Type of Intervention 1:1 intervention   Response participates with encouragement   Hours 0.5   Treatment Detail (1:1 check in.)   Activities of Daily Living   Hygiene/Grooming prompts   Oral Hygiene independent   Dress scrubs (behavioral health)   Room Organization independent   Behavioral Health Interventions   Psychotic Symptoms maintain safety precautions   Social and Therapeutic Interventions (Psychotic Symptoms) encourage participation in therapeutic groups and milieu activities     Pt visible in milieu socializing, watching TV and playing Chess with peers. Not attending or participating in groups. Pt is fixated on discharging so he can have a cigarette. He was inappropriate towards staff at times while in the milieu and had poor boundaries. Pt needed some redirections to be appropriate with his conversations and statements. Pt stated \"by telling other pts not to take their medications will cause him to get discharge early , due to staff  being sick of him\".    "

## 2017-09-15 NOTE — PROGRESS NOTES
"  Pt did not attend group, though sat in the lounge grumbling about medications, telling others repeately that the  said he doesn's have to take medications, and encourages others to do the same.  Pt also states \"they said I'm bipolar, but were all bipolar, aren't we?\"  Needed much redirection for not giving others advice regarding their medication, and was eventually asked to go to the other lounge.    Pt was sharing his personal contact information with a peer that was d/c today (both were instructed not to do so), was going on about tests he's developed about looking children in the eye to determine - encouraging peer to do that, then have his sister do the test to kids as well.  "

## 2017-09-15 NOTE — PLAN OF CARE
"Problem: Psychotic Symptoms  Goal: Psychotic Symptoms  Signs and symptoms of listed problems will be absent or manageable.   Outcome: No Change  Pt refused his medication tonight, saying \"I don't take that medication anymore, I have a new doctor.\"  Writer tried to clarify and encourage medication, but pt was adamant that he would not take the medication and that he has a new doctor.  Pt needed redirection for being rude to another pt, but was receptive to that feedback.  Pt was labile this evening, being calm at points and being quite irritable at others.  He denies SI/SIB or any psychotic symptoms.        "

## 2017-09-16 PROCEDURE — 12400003 ZZH R&B MH CRITICAL UMMC

## 2017-09-16 ASSESSMENT — ACTIVITIES OF DAILY LIVING (ADL)
GROOMING: INDEPENDENT;PROMPTS
DRESS: SCRUBS (BEHAVIORAL HEALTH)
DRESS: SCRUBS (BEHAVIORAL HEALTH)
GROOMING: PROMPTS
ORAL_HYGIENE: INDEPENDENT
ORAL_HYGIENE: INDEPENDENT

## 2017-09-16 NOTE — PROGRESS NOTES
"Austyn was visible in the milieu and social with other patients. No signs of psychosis or aggression observed by this writer. Austyn played a game of chess with another patient. When asked if he needed anything Austyn replied humorously \"A pack of cigarettes.\" He reports an improved mood.       09/15/17 2149   Behavioral Health   Hallucinations denies / not responding to hallucinations   Thinking intact   Orientation person: oriented;place: oriented;date: oriented;time: oriented   Memory baseline memory   Insight admits / accepts   Judgement impaired   Eye Contact at examiner   Affect full range affect   Mood mood is calm   Physical Appearance/Attire attire appropriate to age and situation;appears stated age   Hygiene other (see comment)  (adequate)   Suicidality other (see comments)  (none reported)   Self Injury other (see comment)  (none observed)   Activity other (see comment)  (visible in milieu)   Speech clear;coherent   Coping/Psychosocial   Verbalized Emotional State acceptance   Activities of Daily Living   Hygiene/Grooming independent   Oral Hygiene independent   Dress scrubs (behavioral health)   Room Organization independent       "
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
## 2017-09-17 PROCEDURE — 12400003 ZZH R&B MH CRITICAL UMMC

## 2017-09-17 PROCEDURE — S0166 INJ OLANZAPINE 2.5MG: HCPCS | Performed by: PSYCHIATRY & NEUROLOGY

## 2017-09-17 PROCEDURE — 25000125 ZZHC RX 250: Performed by: PSYCHIATRY & NEUROLOGY

## 2017-09-17 PROCEDURE — 25000132 ZZH RX MED GY IP 250 OP 250 PS 637: Performed by: PSYCHIATRY & NEUROLOGY

## 2017-09-17 RX ADMIN — OLANZAPINE 20 MG: 10 TABLET, ORALLY DISINTEGRATING ORAL at 20:22

## 2017-09-17 RX ADMIN — OLANZAPINE 10 MG: 10 INJECTION, POWDER, LYOPHILIZED, FOR SOLUTION INTRAMUSCULAR at 13:21

## 2017-09-17 ASSESSMENT — ACTIVITIES OF DAILY LIVING (ADL)
HYGIENE/GROOMING: PROMPTS
GROOMING: PROMPTS
DRESS: SCRUBS (BEHAVIORAL HEALTH)
DRESS: SCRUBS (BEHAVIORAL HEALTH)
ORAL_HYGIENE: INDEPENDENT
ORAL_HYGIENE: INDEPENDENT
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
## 2017-09-18 PROCEDURE — 99233 SBSQ HOSP IP/OBS HIGH 50: CPT | Performed by: PSYCHIATRY & NEUROLOGY

## 2017-09-18 PROCEDURE — 12400003 ZZH R&B MH CRITICAL UMMC

## 2017-09-18 PROCEDURE — 25000132 ZZH RX MED GY IP 250 OP 250 PS 637: Performed by: PSYCHIATRY & NEUROLOGY

## 2017-09-18 PROCEDURE — H2032 ACTIVITY THERAPY, PER 15 MIN: HCPCS

## 2017-09-18 RX ADMIN — OLANZAPINE 20 MG: 10 TABLET, ORALLY DISINTEGRATING ORAL at 18:39

## 2017-09-18 ASSESSMENT — ACTIVITIES OF DAILY LIVING (ADL)
DRESS: SCRUBS (BEHAVIORAL HEALTH)
DRESS: PROMPTS
ORAL_HYGIENE: PROMPTS
ORAL_HYGIENE: INDEPENDENT
GROOMING: PROMPTS
GROOMING: PROMPTS
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
## 2017-09-18 NOTE — PROGRESS NOTES
"Patient came out to get dinner tray and was very tremulous. Staff notified writer and said he was shaking so bad carrying his tray that stuff was falling off. Writer went to check on patient and patient appeared to be shaking less, but was very agitated. Patient states \"What do you want?! They told me to go to my room. Now go away!\" Asked patient if he needed help or why he was upset and he just kept saying to go away. When patient brought tray back was tremulous again and shuffling when walking. Have never seen this behavior before from him. Will continue to monitor and assess.  "

## 2017-09-18 NOTE — PROGRESS NOTES
2nd examiner was here from [facility] to see this patient.  This case is on a continuance until this coming Thursday.  We are waiting to hear what the 2nd examiner decides.
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
## 2017-09-18 NOTE — PLAN OF CARE
Problem: Psychotic Symptoms  Goal: Psychotic Symptoms  Signs and symptoms of listed problems will be absent or manageable.   Outcome: No Change  Pt continues to have symptoms of psychosis. Pt appears significantly less agitated following two doses of anti-psychotic medication yesterday. Pt was more blunted in presentation but remained irritable and rude towards staff. Pt was generally compliant with directions this shift. Pt remained perseverative on staff poisoning and killing people and needed redirection for discussing these topics with other pts. Pt's insight remains poor and he was unable to process the events that led to the episode of restraints yesterday. Pt's judgement remains impaired. Pt's thinking continues to be distractible and paranoid. Pt appears to be having trouble organizing his thoughts at times. Speech is clear and coherent, not pressured. Pt did not report physical health symptoms this shift. Pt's blood pressure remains elevated, other vital signs remain WDL.

## 2017-09-18 NOTE — PLAN OF CARE
"Problem: Psychotic Symptoms  Goal: Social and Therapeutic (Psychotic Symptoms)  Signs and symptoms of listed problems will be absent or manageable.         Attended half of the Dance/Movement Therapy session today.  Presented with closed in body attitude.  Stated feeling like \"I'll never get out of here\".  I reflected back to him that it sounds like he's feeling powerless right now.  He did not respond to my comment.  \"Kendall\" seemed interested in listening to music but not interested in moving today.      "

## 2017-09-19 PROCEDURE — 25000132 ZZH RX MED GY IP 250 OP 250 PS 637: Performed by: PSYCHIATRY & NEUROLOGY

## 2017-09-19 PROCEDURE — 12400003 ZZH R&B MH CRITICAL UMMC

## 2017-09-19 PROCEDURE — 99233 SBSQ HOSP IP/OBS HIGH 50: CPT | Mod: GC | Performed by: PSYCHIATRY & NEUROLOGY

## 2017-09-19 RX ADMIN — OLANZAPINE 20 MG: 10 TABLET, ORALLY DISINTEGRATING ORAL at 20:27

## 2017-09-19 ASSESSMENT — ACTIVITIES OF DAILY LIVING (ADL)
ORAL_HYGIENE: INDEPENDENT
DRESS: SCRUBS (BEHAVIORAL HEALTH)
LAUNDRY: UNABLE TO COMPLETE
HYGIENE/GROOMING: HANDWASHING;INDEPENDENT
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
## 2017-09-19 NOTE — PROGRESS NOTES
"   09/18/17 2000   Behavioral Health   Hallucinations denies / not responding to hallucinations   Thinking paranoid   Orientation person: oriented;place: oriented;date: oriented;time: oriented   Memory baseline memory;confabulation   Insight poor   Judgement impaired   Eye Contact at floor   Affect angry;tense;irritable   Mood mood is calm;irritable   Physical Appearance/Attire disheveled;untidy   Hygiene neglected grooming - unclean body, hair, teeth   Suicidality other (see comments)  (denies)   Self Injury other (see comment)  (denies)   Elopement Statements about wanting to leave   Activity other (see comment)  (minimally sociable)   Speech clear;coherent   Medication Sensitivity other (see comment);no stated side effects  (severe shaking of hands)   Psychomotor / Gait balanced;steady   Activities of Daily Living   Hygiene/Grooming prompts   Oral Hygiene prompts   Dress prompts   Room Organization angelika Zaman has spent most of the shift in his room. He came out of his room to spend time in the lounge more often later in the shift. He was minimally sociable with peers, in part because peers seemed uninterested in interacting with him and sometimes avoided speaking to him. He complained to a peer that he is \"sick of bein here\" and wants to \"get the hell out\". He maintains that \"the hospital wants to keep me here forever to make money\". He was hostile towards staff at times. He yelled at a nurse for offering a topical PRN that he believes is for \"jock itch\". He is averse to redirection. He was unwilling to speak with staff for a check-in.   "

## 2017-09-20 PROCEDURE — 12400003 ZZH R&B MH CRITICAL UMMC

## 2017-09-20 PROCEDURE — 99233 SBSQ HOSP IP/OBS HIGH 50: CPT | Mod: GC | Performed by: PSYCHIATRY & NEUROLOGY

## 2017-09-20 PROCEDURE — 25000132 ZZH RX MED GY IP 250 OP 250 PS 637: Performed by: PSYCHIATRY & NEUROLOGY

## 2017-09-20 PROCEDURE — 90853 GROUP PSYCHOTHERAPY: CPT

## 2017-09-20 RX ADMIN — OLANZAPINE 20 MG: 10 TABLET, ORALLY DISINTEGRATING ORAL at 19:26

## 2017-09-20 ASSESSMENT — ACTIVITIES OF DAILY LIVING (ADL)
ORAL_HYGIENE: INDEPENDENT
DRESS: SCRUBS (BEHAVIORAL HEALTH)
GROOMING: INDEPENDENT

## 2017-09-20 NOTE — PLAN OF CARE
Problem: Psychotic Symptoms  Intervention: Social and Therapeutic Interv (Psychotic Symptoms)     Pt was the only person to attend afternoon group, chose to play games.  Spent over an hour playing two different games.  Remained focused and socially appropriate the entire time.  Made a few complaints about medications, though not in a hostile way.  Demonstrated good problem solving, picked up on new games immediately.

## 2017-09-20 NOTE — PROGRESS NOTES
Patient calm in lounge majority of the evening working on [employer]. He kept to himself for most of the evening but was occasionally pleasant and social with staff. At times he appeared agitated and would talk about his anger towards the circumstance leading up to his admit. His gait appeared steady but he did appear to have a slight limp upon first rising from sitting that seemed to improve as he walked. He was independent with meals and his ADL's were minimal mainly handwashing.

## 2017-09-20 NOTE — PROGRESS NOTES
"[facility], [place]   Psychiatric Progress Note  Hospital Day: 15        Interim History:   The patient's care was discussed with the treatment team during the daily team meeting and/or staff's chart notes were reviewed.  Staff reports Austyn is appropriate at times and other times will discuss anger about being admitted and events leading to admission.     On interview, Austyn talks about how \"we're both screwing up\" and explains that he was lying to the police because he was often intoxicated on alcohol or marijuana but never disclosed that. He feels the unit should have more AA resources. Again talked about accidentally providing his Facebook passwords which he believes got him in trouble as he doesn't really remember doing this and thinks he was under the influence of alcohol when he did that. Goes on to ask if he could print out something to take to court tomorrow. Wants to show the  his scientific plan to control the weather, specifically tornadoes. Feels he has an invention that will allow him to prevent tornadoes from hitting towns. He also reported that he is not planning to [facility] anymore and he doesn't know where anyone got that idea. I did explain that he told me more than once he plans to [facility]. Austyn no longer plans to [facility]lexandra. He is hoping to discharge soon.     Psychiatric Symptoms: patient has no insight and denies any psychiatric problems.    Medication side effects reported: Denies         Medications:       miconazole   Topical BID     OLANZapine zydis  20 mg Oral At Bedtime          Allergies:     Allergies   Allergen Reactions     Bee Venom      Propranolol           Labs:   No results found for this or any previous visit (from the past 24 hour(s)).       Psychiatric Examination:     /85  Pulse 84  Temp 97.4  F (36.3  C) (Tympanic)  Resp 16  Wt 85.7 kg (189 lb)  SpO2 93%  BMI 24.94 kg/m2  Weight is 189 lbs 0 oz  Body mass index is 24.94 " kg/(m^2).    Appearance: awake, alert, dressed in hospital scrubs, appeared older than stated age and unkempt  Attitude:  more cooperative than previous  Eye Contact:  fair  Mood:  less angry than previous  Affect:  intensity is heightened  Speech:  clear, coherent and pressured speech  Language: fluent and intact in English  Psychomotor, Gait, Musculoskeletal:  intact station, gait and muscle tone and tremor observed   Throught Process:  illogical  Associations:  no loose associations  Thought Content:  no evidence of suicidal ideation or homicidal ideation and significant paraniod delusions  Insight:  limited  Judgement:  limited  Oriented to:  time, person, and place  Attention Span and Concentration:  intact  Recent and Remote Memory:  intact  Fund of Knowledge:  appropriate         Precautions:     Behavioral Orders   Procedures     Assault precautions     Code 1 - Restrict to Unit     Routine Programming     As clinically indicated     Sexual precautions     Status 15     Every 15 minutes.          Diagnoses:     Bipolar Disorder, currently manic          Assessment & Plan:     Assessment and hospital summary:  Patient presented after found yelling and running into traffic in the context of medication non adherence. Additionally, had been having limited sleep, erratic behaviors and ongoing sexual preoccupation. He had been harassing other patients and making threats towards staff and patients. He initially was demonstrating improvement that then worsened when he stopped the olanzapine for a few days.    Target psychiatric symptoms and interventions:  - Zyprexa 20 mg QHS (increased 9/7)  - PRN Zyprexa    Medical Problems and Treatments:   1. Patient with tremor. It appears to be of intention variety as it is not notable at rest. Will consider neurology consult as patient improves or if tremor worsens. Ordered a DISCUS to be completed.    Legal:  - Admitted on 72 hour hold. Currently on a court hold. Stay of  commitment being vacated. Hearing for vacating stay on 9/14 during which pt was difficult to redirect. Court examiner came to see him today. Final hearing 9/21.     Disposition:  - Pending outcome of court process and improvement of symptoms.

## 2017-09-20 NOTE — PLAN OF CARE
"Problem: Psychotic Symptoms  Goal: Psychotic Symptoms  Signs and symptoms of listed problems will be absent or manageable.   Since patient has resume taking his medications he appears to be doing slightly better.  He is not as agitated overall and is less problematic when being redirected.  His inappropriate comments are less frequent and less caustic.  He is being given feedback that he seems to be doing better but he responds by saying \"Well I don't even need to be here.\"  He then begins ranting and raving about how he is being held here  inappropriately.  He was given permission today to eat lunch in [employer] since his behavior so far today has been OK.  He sat with 3 other patients and there were no behavioral issues.        "

## 2017-09-20 NOTE — PROGRESS NOTES
This writer spoke to Neema Pepe about this pt and gave an update.  Also faxed records with recommendations to her.

## 2017-09-21 PROCEDURE — 12400003 ZZH R&B MH CRITICAL UMMC

## 2017-09-21 PROCEDURE — H2032 ACTIVITY THERAPY, PER 15 MIN: HCPCS

## 2017-09-21 ASSESSMENT — ACTIVITIES OF DAILY LIVING (ADL)
ORAL_HYGIENE: PROMPTS
HYGIENE/GROOMING: INDEPENDENT
DRESS: INDEPENDENT
HYGIENE/GROOMING: INDEPENDENT

## 2017-09-21 NOTE — PROGRESS NOTES
Irritable first thing in am. Went to court 845, came back 1315, Angry irritable. Rambling self defeating statements and delusional way to tell huizar people from straight people by the way they look at people.       09/21/17 1300   Behavioral Health   Hallucinations denies / not responding to hallucinations   Thinking delusional;paranoid;poor concentration;distractable   Orientation person: oriented;place: oriented;date: oriented;time: oriented   Memory baseline memory;confabulation   Insight poor   Judgement impaired   Eye Contact at examiner   Affect angry;tense;irritable   Mood irritable;grandiose   Physical Appearance/Attire disheveled   Hygiene neglected grooming - unclean body, hair, teeth   Activity hyperactive (agitated, impulsive)   Speech clear   Psychomotor / Gait balanced;steady   Activities of Daily Living   Hygiene/Grooming independent  (refused)   Oral Hygiene prompts   Dress independent   Room Organization independent

## 2017-09-21 NOTE — PROGRESS NOTES
Pt spent most of the evening in the lounge.  He was not social with peers.  Pt made grandiose statements about patents that he has for weather control.  Pt believes he will be able to stop tornados and cool the earth back down from global warming.  Pt is blaming the [place] Police for bringing him here, believing he should be in a detox facility instead.  Pt referencing being pulled over many times by many other police departments, but the [place] police are the only ones who bring him to the hospital.  Pt denies any responsibility for this admission.       09/20/17 2200   Behavioral Health   Hallucinations denies / not responding to hallucinations   Thinking paranoid;poor concentration   Orientation person: oriented;place: oriented   Insight poor   Judgement impaired   Eye Contact at examiner   Affect tense   Mood irritable;grandiose   Physical Appearance/Attire disheveled   Hygiene neglected grooming - unclean body, hair, teeth   Suicidality other (see comments)  (denies)   Self Injury other (see comment)  (denies)   Activity other (see comment)  (present in milieu, not social with peers)   Speech clear;other (see comments)  (ruminative)   Medication Sensitivity no observed side effects   Psychomotor / Gait balanced;steady

## 2017-09-21 NOTE — PROGRESS NOTES
Neema Robles CCM called after court hearing and said that the  has taken it under advisement.  They will inform us when a decision has been made.

## 2017-09-21 NOTE — PLAN OF CARE
Problem: General Plan of Care (Inpatient Behavioral)  Goal: Team Discussion  Team Plan:   BEHAVIORAL TEAM DISCUSSION     Participants: Ivett Sandoval MD (Resident), Alyson Burns RN, [facility]- Yun He LMFT, [facility]  Progress: pt went to court today, was combative and demanding.    Continued Stay Criteria/Rationale: pt still exhibiting symptoms requiring mental tam stay, also in court process  Medical/Physical: see medical chart  Precautions:   Behavioral Orders   Procedures     Assault precautions     Code 1 - Restrict to Unit     DISCUS     Routine Programming       As clinically indicated     Sexual precautions     Status 15       Every 15 minutes.     Plan: His outpatient team has vacated his Stay of commitment.  Today's trial was to place him on a full commitment, adding beltran order.  We are waiting to hear from court on their decision.   Rationale for change in precautions or plan: no change

## 2017-09-21 NOTE — PROGRESS NOTES
"Pt participated in dance/movement therapy (DMT) by moving in and out- appeariing to self-regulate at periods when he may be escalating in agitation and unable to participate in socially-appropriate ways. For example, as he was leaving, he would grumble some disparaging remark about some part of the session.  He returned regularly after calming some.  Though pt did not move, he did engage verbally with progressive increase in his agitation. For example, getting louder or closer to other patients.      The main theme of his agitation was that he will never be allowed to leave the hospital: warning other patients not to be truthful with staff \"or they will make you say here forever\", or \"watch out what you put on Facebook or they'll lock you up forever\".  Other patients' responses to these claims ranged from increased anxiety to increased agitation so regular intervention was needed.       Pt did continue to return to the group and ended the session much more regulated than he began.  "

## 2017-09-21 NOTE — PROGRESS NOTES
"Pt returned from court today, with paperwork indicating the stay of commitment has been revoked, and the beltran is in place. This was explained to the patient and pt was reassured that this paperwork is not conclusive and we have not received official court paperwork of such outcome. Despite this, pt yelled several times \"I will make your lives hell!\" Pt redirected and ultimately able to de-escalate. Moments later approached writer, flailed his arms and then said to writer, \"There, now you have the gift. If you stare at kids... In the shopping mall or at the park, just stare at the kids. If you are a woman, and another woman makes eye contact- they are a lesbian! If a man doesn't make eye contact, he is huizar.\" Pt's speech is loud and pressured as he continues to ramble that he had \"given the gift of knowing\" to this writer, and \"only shares this with women. Never men.\" Pt is disorganized, paranoid and delusional. Pt appears tense and was offered a PRN, to which he laughed and declined. Pt has since appeared to de-escalate to some degree, moving in and out of the music therapy group on the unit and eating lunch. Will continue to monitor closely, offering therapeutic interventions, reassurance and support.   "

## 2017-09-22 PROCEDURE — 25000128 H RX IP 250 OP 636: Performed by: PSYCHIATRY & NEUROLOGY

## 2017-09-22 PROCEDURE — 12400003 ZZH R&B MH CRITICAL UMMC

## 2017-09-22 PROCEDURE — S0166 INJ OLANZAPINE 2.5MG: HCPCS | Performed by: PSYCHIATRY & NEUROLOGY

## 2017-09-22 PROCEDURE — 25000125 ZZHC RX 250: Performed by: PSYCHIATRY & NEUROLOGY

## 2017-09-22 PROCEDURE — 99233 SBSQ HOSP IP/OBS HIGH 50: CPT | Mod: GC | Performed by: PSYCHIATRY & NEUROLOGY

## 2017-09-22 PROCEDURE — H2032 ACTIVITY THERAPY, PER 15 MIN: HCPCS

## 2017-09-22 RX ORDER — DIPHENHYDRAMINE HCL 50 MG
50 CAPSULE ORAL EVERY 4 HOURS PRN
Status: DISCONTINUED | OUTPATIENT
Start: 2017-09-22 | End: 2017-09-29 | Stop reason: HOSPADM

## 2017-09-22 RX ORDER — LORAZEPAM 2 MG/ML
1-2 INJECTION INTRAMUSCULAR EVERY 4 HOURS PRN
Status: DISCONTINUED | OUTPATIENT
Start: 2017-09-22 | End: 2017-09-24 | Stop reason: DRUGHIGH

## 2017-09-22 RX ORDER — OLANZAPINE 10 MG/2ML
10 INJECTION, POWDER, FOR SOLUTION INTRAMUSCULAR AT BEDTIME
Status: DISCONTINUED | OUTPATIENT
Start: 2017-09-22 | End: 2017-09-29

## 2017-09-22 RX ORDER — OLANZAPINE 10 MG/1
20 TABLET, ORALLY DISINTEGRATING ORAL AT BEDTIME
Status: DISCONTINUED | OUTPATIENT
Start: 2017-09-22 | End: 2017-09-29

## 2017-09-22 RX ORDER — LORAZEPAM 1 MG/1
1-2 TABLET ORAL EVERY 4 HOURS PRN
Status: DISCONTINUED | OUTPATIENT
Start: 2017-09-22 | End: 2017-09-24 | Stop reason: DRUGHIGH

## 2017-09-22 RX ORDER — DIPHENHYDRAMINE HYDROCHLORIDE 50 MG/ML
50 INJECTION INTRAMUSCULAR; INTRAVENOUS EVERY 4 HOURS PRN
Status: DISCONTINUED | OUTPATIENT
Start: 2017-09-22 | End: 2017-09-29 | Stop reason: HOSPADM

## 2017-09-22 RX ORDER — HALOPERIDOL 5 MG/1
5 TABLET ORAL EVERY 4 HOURS PRN
Status: DISCONTINUED | OUTPATIENT
Start: 2017-09-22 | End: 2017-09-29 | Stop reason: HOSPADM

## 2017-09-22 RX ORDER — HALOPERIDOL 5 MG/ML
5 INJECTION INTRAMUSCULAR EVERY 4 HOURS PRN
Status: DISCONTINUED | OUTPATIENT
Start: 2017-09-22 | End: 2017-09-29 | Stop reason: HOSPADM

## 2017-09-22 RX ORDER — LORAZEPAM 1 MG/1
1-2 TABLET ORAL EVERY 4 HOURS PRN
Status: DISCONTINUED | OUTPATIENT
Start: 2017-09-22 | End: 2017-09-22

## 2017-09-22 RX ORDER — LORAZEPAM 2 MG/ML
1-2 INJECTION INTRAMUSCULAR EVERY 4 HOURS PRN
Status: DISCONTINUED | OUTPATIENT
Start: 2017-09-22 | End: 2017-09-22

## 2017-09-22 RX ADMIN — LORAZEPAM 2 MG: 2 INJECTION INTRAMUSCULAR; INTRAVENOUS at 18:57

## 2017-09-22 RX ADMIN — DIPHENHYDRAMINE HYDROCHLORIDE 50 MG: 50 INJECTION, SOLUTION INTRAMUSCULAR; INTRAVENOUS at 18:58

## 2017-09-22 RX ADMIN — OLANZAPINE 10 MG: 10 INJECTION, POWDER, LYOPHILIZED, FOR SOLUTION INTRAMUSCULAR at 14:48

## 2017-09-22 RX ADMIN — HALOPERIDOL LACTATE 5 MG: 5 INJECTION, SOLUTION INTRAMUSCULAR at 18:57

## 2017-09-22 ASSESSMENT — ACTIVITIES OF DAILY LIVING (ADL)
LAUNDRY: UNABLE TO COMPLETE
ORAL_HYGIENE: INDEPENDENT
GROOMING: PROMPTS
GROOMING: HANDWASHING;INDEPENDENT
DRESS: SCRUBS (BEHAVIORAL HEALTH);INDEPENDENT
LAUNDRY: WITH SUPERVISION
ORAL_HYGIENE: PROMPTS
DRESS: SCRUBS (BEHAVIORAL HEALTH)

## 2017-09-22 NOTE — PROGRESS NOTES
"Antagonistic, angry. Thoughts of persecution and paranoia. Freq profanity and threats to make staff \"miserable because I have to stay here\". Inappropriate and antagonistic on the phone with family, phone privileges had to be terminated. Not social. Ate meals. No shower.       09/21/17 2000   Behavioral Health   Hallucinations denies / not responding to hallucinations   Thinking delusional;paranoid;distractable;poor concentration;other (see comment)  (Thoughts of persecution)   Orientation person: oriented;place: oriented;date: oriented;time: oriented   Memory confabulation   Insight poor   Judgement impaired   Affect angry;irritable   Mood irritable;grandiose;other (see comments)  (Antagonistic)   Physical Appearance/Attire disheveled   Hygiene neglected grooming - unclean body, hair, teeth   Activity hyperactive (agitated, impulsive)   Speech clear;tangential   Psychomotor / Gait balanced;steady   Overt Agression (WDL) Ex   Overt Aggression Scale   Verbal Aggression 2   Aggression against Property 0   Auto-Aggression 0   Physical Aggression 0   Overt Aggression Total Score 2   Activities of Daily Living   Hygiene/Grooming independent  (Refused )     "

## 2017-09-22 NOTE — PROGRESS NOTES
Patient disruptive and threatening in the milieu. Stated that is panning to act out in retaliation for his hospitalization. Difficult to redirect. When asked to return to his room due to threats in the milieu patient refused and continued to yell stating that staff has no rights as he is not committed or [employer]. Demanded commitment paperwork with and was provided with a copy. Patient returned to his room for several seconds but then opened the door and attempted to punch a staff outside of the door. Code 21 called, IM administered. Not placed into restraints

## 2017-09-22 NOTE — PLAN OF CARE
"Problem: Psychotic Symptoms  Goal: Psychotic Symptoms  Signs and symptoms of listed problems will be absent or manageable.   Outcome: No Change  RN ASSESSMENT   Patient presents visible in the milieu, intrusive with staff and peers. Observed to be loud and innapropriate with his comments to others and in general. Demonstrates maladaptive social behavior. When another patient was havign difficult time, patient went into his room and loudly imitated a peer. When asked to stop the behavior,he stated, \"Why he wants to home, I just want to go to another lounge!\"  When code missing adult was called over the speaker patient started yelling loudly, \" I am here. I am here. Please come get me. I am here.\" When asked to stop the behavior patient started walking to his room but continued yelling, \" Please come and get me! I will be in my room! waiting!\"  Made several inappropriate comments towards female RN, suggested that they exchange text messages after his discharge.  Denied suicidal and homicidal thoughts. Mood noted to be labile alternating between irritability and elation\"  Insight into situation limited.  Sleep last night noted to be poor of 3.75 hours. Patient also refused his bedtime medications yesterday.  Current legal status committed and [employer].  Refer to case manger notes for discharge planing and recommendations. Continue with current treatment plan and recommendations. Continue to monitor and reassess symptoms. Monitor response to medications. Monitor progress towards treatment goals. Encourage groups and participation. Encourage appropriate boundaries with others.       "

## 2017-09-22 NOTE — PROGRESS NOTES
Court order received from [facility] - This patient's stay has been vacated and he is now under Commitment w/ devin. Medications included in the court order are as follows:  Olanzapine  Haloperidol  Risperidone   Prolixin    Copy placed in pt's chart

## 2017-09-22 NOTE — PROGRESS NOTES
"[facility], [place]   Psychiatric Progress Note  Hospital Day: 17        Interim History:   The patient's care was discussed with the treatment team during the daily team meeting and/or staff's chart notes were reviewed.  Staff reports Austyn was agitated after returning from court yesterday and making delusional statements which continued into this morning. Refused zyprexa last evening.     On interview, Austyn is agitated. Talks about how he should be somewhere that has AA groups. The police missed that he was intoxicated so now he's in the wrong place. Frustrated that he \"can't talk to anyone\" because it's \"inappropriate.\" Wants to have a cigarette and a drink.     Psychiatric Symptoms: patient has no insight and denies any psychiatric problems.    Medication side effects reported: Denies         Medications:       miconazole   Topical BID     OLANZapine zydis  20 mg Oral At Bedtime          Allergies:     Allergies   Allergen Reactions     Bee Venom      Propranolol           Labs:   No results found for this or any previous visit (from the past 24 hour(s)).       Psychiatric Examination:     /85  Pulse 84  Temp 97.1  F (36.2  C) (Tympanic)  Resp 16  Wt 85.7 kg (189 lb)  SpO2 93%  BMI 24.94 kg/m2  Weight is 189 lbs 0 oz  Body mass index is 24.94 kg/(m^2).    Appearance: awake, alert, dressed in hospital scrubs, appeared older than stated age and unkempt  Attitude:  angry   Eye Contact:  fair  Mood:  angry  Affect:  intensity is heightened  Speech:  clear, coherent and pressured speech  Language: fluent and intact in English  Psychomotor, Gait, Musculoskeletal:  intact station, gait and muscle tone and tremor observed   Throught Process:  illogical  Associations:  no loose associations  Thought Content:  no evidence of suicidal ideation or homicidal ideation and significant paraniod delusions  Insight:  limited  Judgement:  limited  Oriented to:  time, person, and " place  Attention Span and Concentration:  intact  Recent and Remote Memory:  intact  Fund of Knowledge:  appropriate         Precautions:     Behavioral Orders   Procedures     Assault precautions     Code 1 - Restrict to Unit     DISCUS     Routine Programming     As clinically indicated     Sexual precautions     Status 15     Every 15 minutes.          Diagnoses:     Bipolar Disorder, currently manic          Assessment & Plan:     Assessment and hospital summary:  Patient presented after found yelling and running into traffic in the context of medication non adherence. Additionally, had been having limited sleep, erratic behaviors and ongoing sexual preoccupation. He had been harassing other patients and making threats towards staff and patients. He initially was demonstrating improvement that then worsened when he stopped the olanzapine for a few days.    Target psychiatric symptoms and interventions:  - Zyprexa 20 mg QHS (increased 9/7) PO or IM   - PRN Zyprexa    Medical Problems and Treatments:   1. Patient with tremor. It appears to be of intention variety as it is not notable at rest. Will consider neurology consult as patient improves or if tremor worsens. Ordered a DISCUS to be completed.    Legal:  - Admitted on 72 hour hold. Currently on a court hold. Stay of commitment being vacated. Hearing for vacating stay on 9/14 during which pt was difficult to redirect. Final hearing 9/21, stay was vacated. Is on full commitment with [facility] (olanzapine, haloperidol, risperidone, prolixin).     Disposition: Pending improvement of symptoms, will likely discharge home

## 2017-09-22 NOTE — PROGRESS NOTES
"Pt participated in dance/movement therapy (DMT) by moving in and out during the session with both direct and indirect attempts to disrupt the group process.  While patient was in session, he continued to make negative remarks to other patients as well as this therapist.  When reminded about clear boundaries for group participation and respectful behaviors, patient would either leave, or quiet down and sit glaring.  Pt made no efforts to participate in the movement, and occasionally attempted to hook other patients into negative perceptions of the staff, the session or other patients.  For example, when another patient was discharging and group members were wishing him well and sending out loving movements with the music, this patient tensed his muscles and said loudly that day [of discharge] would never come for him.  Pt clearly lacks insight into his current circumstances and what his role is.      Pt did appear to be positively impacted by some others in the group who described feeling \"peaceful\" or having a \"happy heart\" during DMT interventions.  Unfortunately, it did take significant events and strong feelings to overcome the pt's negativity which he appears to be holding to tightly.     "

## 2017-09-23 PROCEDURE — 25000132 ZZH RX MED GY IP 250 OP 250 PS 637: Performed by: PSYCHIATRY & NEUROLOGY

## 2017-09-23 PROCEDURE — 12400003 ZZH R&B MH CRITICAL UMMC

## 2017-09-23 PROCEDURE — 25000128 H RX IP 250 OP 636: Performed by: PSYCHIATRY & NEUROLOGY

## 2017-09-23 PROCEDURE — 25000125 ZZHC RX 250: Performed by: PSYCHIATRY & NEUROLOGY

## 2017-09-23 PROCEDURE — S0166 INJ OLANZAPINE 2.5MG: HCPCS | Performed by: PSYCHIATRY & NEUROLOGY

## 2017-09-23 RX ADMIN — LORAZEPAM 2 MG: 2 INJECTION INTRAMUSCULAR; INTRAVENOUS at 12:14

## 2017-09-23 RX ADMIN — HALOPERIDOL LACTATE 5 MG: 5 INJECTION, SOLUTION INTRAMUSCULAR at 12:14

## 2017-09-23 RX ADMIN — OLANZAPINE 20 MG: 10 TABLET, ORALLY DISINTEGRATING ORAL at 00:23

## 2017-09-23 RX ADMIN — DIPHENHYDRAMINE HYDROCHLORIDE 50 MG: 50 INJECTION, SOLUTION INTRAMUSCULAR; INTRAVENOUS at 12:14

## 2017-09-23 RX ADMIN — OLANZAPINE 10 MG: 10 INJECTION, POWDER, LYOPHILIZED, FOR SOLUTION INTRAMUSCULAR at 20:49

## 2017-09-23 ASSESSMENT — ACTIVITIES OF DAILY LIVING (ADL)
ORAL_HYGIENE: PROMPTS
DRESS: SCRUBS (BEHAVIORAL HEALTH)
GROOMING: PROMPTS

## 2017-09-23 NOTE — PROGRESS NOTES
"   09/22/17 2015   Education   Discontinuation Criteria Cessation of behavior that precipitated seclusion or restraint   Criteria Explained Yes   Patient's Response VU   Family Notification D     Pt is sedated but remains adamant that he \"will not rest\" until he is provided a cigarette. When discussing HS medication, a previous point of contention and agitation, pt states he \"won't take it willingly\" referring to his scheduled dose of HS Zyprexa, saying he \"will take the shot.\" Although pt remains verbally oppositional and argumentative, he is able to agree to safe behaviors on the unit. Restraint discontinued at this time. Pt is now resting in bed. Will continue to monitor closely and reassess.   "

## 2017-09-23 NOTE — PROGRESS NOTES
"   09/22/17 1930   Education   Discontinuation Criteria Cessation of behavior that precipitated seclusion or restraint   Criteria Explained Yes   Patient's Response NL   Family Notification D     Pt continues to scream intermittently, screaming to \"call the police! Call my wife! Call the police, tell them my social! Save me!\" Pt remains focused on cigarette use, saying \"i refuse to be safe until you get me a cigarette every 3 hours. Don't think I'm not on to you bitch. I don't even want to have sex with you. My penis don't work. Just cut it off bitch.\" Pt nonreceptive to teaching and education on safe behaviors at this time and criteria for discontinuation for restraint. Will continue to attempt.   "

## 2017-09-23 NOTE — PROGRESS NOTES
09/22/17 1915   Seclusion or Restraint Order   In Person Face to Face Assessment Conducted Yes-Eval of pt's immediate situation, reaction to intervention, complete review of systems assessment, behavioral assessment & review/assessment of hx, drugs & meds, recent labs, etc, behavioral condition, need to continue/terminate restraint/seclusion   Patient Experienced No adverse physical outcome from seclusion/restraint initiation   Continuation of Seclus/Restraint indicated at this time Yes     Patient in 5 pt restraints. Yelling, making derogatory comments to staff, demanding. No apparent injuries incurred during restraint process. Patient is moving as freely as restraints allow. CMS is within normal limits. Continuous observation for safety.

## 2017-09-23 NOTE — PROGRESS NOTES
09/23/17 1435   Debriefing   Debriefing DO   Attempted to process patient out of seclusion. He is verbally agreeing for safe behaviors, however unable to answer open ended questions due to sedation. Needed assistance getting up from the the mattress and walking back to his room. Fall precautions initiated. VS taken /68  HR 84 R ( regulal ) 16 O2 92 %. Will continue to monitor.

## 2017-09-23 NOTE — PROGRESS NOTES
Attempted to process patient out of 5 point restraints. Patient could not contract for safety and stated once he was released he was going to smoke.

## 2017-09-23 NOTE — PROGRESS NOTES
"   09/23/17 1240   Justification   Clinical Justification All   Patiens is in the lounge during lunch hour. He is belligerent, loud, and difficult to redirect. Yelling out loudly, \" Give me a cigarette! Now! Give me a cigarette! I want to smoke a cigarette!\" Other patients were visibly upset by patients yelling. Patient was asked to stay in his room if he was not willing to stop yelling. Patient refused and continued to yell. Was offered PRN medication, which patient declined stating loudly, \" You will have to fight me again! Come on, go ahead!\"  Continued to yell and demand cigarettes and also to go to AA meeting. Presented agitated, glaring at staff, and stated that he will continue to yell. Refused to stop the behavior. Code 21 was called in attempts to give forced medication. At this time patient returned to his room and began banging on the door and walls  with his fists with significant amount of force that could potentially inflict injury to self. Posturing in the room. Struggled during the hold and had to be placed in 5 points, during which patient continued to struggle and would not allow injection to be given. Had to be placed back on the backboard restraint in order to safely administer injection. Patient yelled profanities during the restraint and injection administration. Once injection was administered started repeatedly and very loudly scream \" Call the police! 911! 911!\"  To reduce disruption to the therapeutic milieu and promote privacy and dignity to patient patient was  placed in five points in open seclusion area, as patient continued to scream.      "

## 2017-09-23 NOTE — PROGRESS NOTES
09/23/17 1255   Seclusion or Restraint Order   In Person Face to Face Assessment Conducted Yes-Eval of pt's immediate situation, reaction to intervention, complete review of systems assessment, behavioral assessment & review/assessment of hx, drugs & meds, recent labs, etc, behavioral condition, need to continue/terminate restraint/seclusion   Patient Experienced No adverse physical outcome from seclusion/restraint initiation   Continuation of Seclus/Restraint indicated at this time Yes

## 2017-09-23 NOTE — PROGRESS NOTES
Awaken and with much encouragement did take the Zyprexa.  Up to the br and the was sitting in the lounge and was encouraged to go back to his room due to his inability to keep his eyes open.  With much encouragement he did return to his room and has been sleeping.

## 2017-09-23 NOTE — PROGRESS NOTES
Pt has declined q2hr VS assessment this shift during each attempt. Is awake, alert and has been seated in the lounge. Skin is pink and warm, no signs or symptoms of acute distress. Respirations of normal rate and rhythm. Per MD, continue to monitor VS per unit routine.

## 2017-09-23 NOTE — PROGRESS NOTES
"   09/22/17 1845   Seclusion or Restraint Order   Length of Order 4   Order Obtained Yes   Attending Physician Notified Yes   Attending Physician's Name Andish   Leadership   Seclus/Restraint >12H or 2x/24H Notified   Assessment   Less Restrictive Alternative Decreased stimulation;Verbal de-escalation;Medication administration;Modified programming;Reassurance / Support   Risk Factors N   Justification   Clinical Justification All     Pt has been irritable , aggressive and required ongoing redirection throughout the shift for verbally hostile and threatening behavior. Pt has made several sexually explicit gestures, screaming profanities at staff and made threats to harm staff, such as saying \"I will call the police and have them shoot you with the big guns!\" Pt was redirected to his room multiple times, each time returning to his room and slamming the door very hard, and then returning to the therapeutic milieu moments later, still agitated, tense and hostile. Pt was offered verbal de-escalation, PRN medication, which he refused, modified programming (i.e. Activities in his room such as music and art therapy) which he declined, and reassurance of his safety on the unit. Despite attempts to reintegrate pt into the therapeutic milieu, pt remained uncooperative and threatening. Upon redirection a third time and a discussion of the behavioral and safety expectations on the unit, pt began to open and re-slam his door repeatedly, screaming \"call a code 21! I am out of controllll!\" Pt then threatened to \"fight you mother fuckers!\" at which time a code 21 was called due to severe agitation, and threats to harm staff. Clinical decision was made to placed pt in 5pt restraints due demonstrated physical aggression, including punching doors, the TV and slamming his feet and fists on the door to his room. Pt deemed unsafe for seclusion at this time, as he is at risk of self harm due to disorganization, agitation, and inability to " "self regulate physical aggression. At this time pt is at risk for injury to hands and/or feet as a result of pounding/punching and kicking. Pt struggled throughout behavioral code thrashing and screaming derogatory terms at staff, among a variety of expletives and profanities. PRN Ativan, Benadryl and Haldol given as ordered per on call MD Dr. Vallejo. Pt continues to scream \"Give me a cigarrette, and call the real police and fix my TV!\" Pt has repeated this several times, over the past 30 minutes, screaming at the top of his lungs and thrashing in restraints, bucking on the mattress, requiring readjustment of wrist restraints 1x. Pt has also screamed several sexually explicit remarks directed at staff, who he feels \"Just want to fuck me!\", \"You faggots!\" and screamed \"You (writer) are a fucking lesbian!!!! I gave you a gift!\" Will continue to monitor and reassess for therapeutic effect of PRN medication and safety to discontinue restraint as soon as possible. Will continue to offer education, de-escalation and monitor patient status closely.   "

## 2017-09-23 NOTE — PLAN OF CARE
"Problem: Psychotic Symptoms  Goal: Psychotic Symptoms  Signs and symptoms of listed problems will be absent or manageable.   Outcome: Declining  Pt is declining this shift and is unable to make progress towards therapeutic goals at this time. Pt is agitated, hostile and aggressive this shift, requiring a code 21 and intervention per [facility] protocol (see previous note this shift). Pt received PRN Benadryl, Haldol and Ativan with therapeutic effect at approximately 18:45. Note that pt had received IM PRN Zyprexa during day shift during a code 21 event, with minimal therapeutic response. Upon receiving Haldol, Benadryl and Ativan, pt states \"well I know you gave me a downer\" but agrees he feels calmer, is no longer agitated and is able to agree to safe behavior, and has been able to demonstrate behavioral control. Pt denies physical health concerns, and denies SEs to medication. States \"needing a cigarette\" is his primary concern. Pt vows to \"make your life hell\" until he is able to smoke and promises \"I will have this place up in flames in 2 weeks. You just wait. It's going down.\" Will continue to monitor closely.       "

## 2017-09-24 PROCEDURE — 25000132 ZZH RX MED GY IP 250 OP 250 PS 637: Performed by: PSYCHIATRY & NEUROLOGY

## 2017-09-24 PROCEDURE — 12400003 ZZH R&B MH CRITICAL UMMC

## 2017-09-24 RX ORDER — LORAZEPAM 1 MG/1
1 TABLET ORAL EVERY 4 HOURS PRN
Status: DISCONTINUED | OUTPATIENT
Start: 2017-09-24 | End: 2017-09-29 | Stop reason: HOSPADM

## 2017-09-24 RX ORDER — BENZTROPINE MESYLATE 1 MG/ML
1 INJECTION, SOLUTION INTRAMUSCULAR; INTRAVENOUS 2 TIMES DAILY PRN
Status: DISCONTINUED | OUTPATIENT
Start: 2017-09-24 | End: 2017-09-29 | Stop reason: HOSPADM

## 2017-09-24 RX ORDER — LORAZEPAM 2 MG/ML
1 INJECTION INTRAMUSCULAR EVERY 4 HOURS PRN
Status: DISCONTINUED | OUTPATIENT
Start: 2017-09-24 | End: 2017-09-29 | Stop reason: HOSPADM

## 2017-09-24 RX ORDER — BENZTROPINE MESYLATE 1 MG/1
1 TABLET ORAL 2 TIMES DAILY PRN
Status: DISCONTINUED | OUTPATIENT
Start: 2017-09-24 | End: 2017-09-29 | Stop reason: HOSPADM

## 2017-09-24 RX ADMIN — DIPHENHYDRAMINE HYDROCHLORIDE 50 MG: 50 CAPSULE ORAL at 01:05

## 2017-09-24 RX ADMIN — OLANZAPINE 20 MG: 10 TABLET, ORALLY DISINTEGRATING ORAL at 20:09

## 2017-09-24 RX ADMIN — ACETAMINOPHEN 650 MG: 325 TABLET, FILM COATED ORAL at 01:05

## 2017-09-24 ASSESSMENT — ACTIVITIES OF DAILY LIVING (ADL)
LAUNDRY: WITH SUPERVISION
ORAL_HYGIENE: INDEPENDENT
DRESS: SCRUBS (BEHAVIORAL HEALTH);INDEPENDENT
GROOMING: HANDWASHING;INDEPENDENT

## 2017-09-24 NOTE — PROGRESS NOTES
"   09/23/17 4592   Education   Discontinuation Criteria Cessation of behavior that precipitated seclusion or restraint   Criteria Explained Yes   Patient's Response RT   Family Notification D     Again attempted to process pt out of seclusion at which time he remains irritable, and angry. States \"I will not be safe.\" Pt's agitation and verbal hostility is incongruent with presentation in which he appears sedated. However, pt states \"no matter what, I will fight.\" continues to use profanities directed at staff.   "

## 2017-09-24 NOTE — PROGRESS NOTES
"   09/23/17 2100   Leadership   Seclus/Restraint >12H or 2x/24H Notified   Assessment   Less Restrictive Alternative Reassurance / Support;Reality orientation;Medication administration;Verbal de-escalation   Risk Factors N   Justification   Clinical Justification Others     Pt offered HS oral medication, which he declined. Pt states \"no. I am not taking it.\" When offered medication on [facility] order, pt refused, stating \"this is all voluntary and I don't consent. This is a lie. I did all the things they said in the proceedings and you are lying.\" Pt nonreceptive to teaching, states \"I will fight and scream. I will yell and fight you all. Get me a cigarette.\" At that point a code green was called and pt was again offered HS medication. Pt declined, stating he would \"rather be dead.\" Pt was offered IM which he stated he would not take willing and would \"fight us all.\" Control was taken at that time, per [facility] protocol and IM medication administered per [facility] order. Pt struggled throughout IM medication administration, screaming \"call the police!\" repeatedly. Also stated \"I am gonna get you fuckers next time! You assholes! I wont be so easy next time! I will get you next time!\" Pt the screamed \"just kill me now! I would rather be dead than this! Kill me, just kill me! I want to die!\" Due to threats to harm staff, and self, clinical decision was made for pt to be placed in restraints. On call Psychiatrist notified. Will continue to monitor closely.   "

## 2017-09-24 NOTE — PROGRESS NOTES
"Pt very sedated at beginning of shift. As he became more active he became more intrusive and disruptive. He refused his evening JARVISED meds and needed a forced injection.   Pt fought and threatened staff at this time and also displayed suicidal ideation. QUOTE: \"I don't want to live anymore. I'm not leaving this hospital alive. I want to die.\"   No insight into his mental illness. Denies all and any mental health symptoms. Pt perseverated on various vague delusions of having his \"inventions stolen.\"    Pt placed in five points and remains in five point restraints at this time.  "

## 2017-09-24 NOTE — PLAN OF CARE
"Problem: Psychotic Symptoms  Goal: Psychotic Symptoms  Signs and symptoms of listed problems will be absent or manageable.   Outcome: Improving  RN ASSESSMENT   Patient presents visible in the milieu, isolative to self. Affect noted to be angry and blunted, mood depressed. He angrily responded, \" I am not supposed to talk to anyone, or talk at all!\" to every attempt of staff to initiate conversation or assess his wellbeing. Hand tremor was observed, when asked about it patient stated, \" Yes these meds are making my hands shake, I can't even hold the cup!\" I offered patient a PRN to relieve his symptoms. He instantly became angry, posture, clinched his fists and stated, \" I am not supposed to talk to anyone. You all trying to brainwash me!\" Minutes later another RN attempted to reassure patient and ancouraged to take a PRN to relieve hand tremor. Patient got angry stating, \" I am not going to take your damn medicine !\" Provider was notified. PRN orders were placed. Slept 6.5 hours last night.   Current legal status committed and [facility]. Refer to case manger notes for discharge planing and recommendations. Continue with current treatment plan and recommendations. Continue to monitor and reassess symptoms. Monitor response to medications. Monitor progress towards treatment goals. Encourage groups and participation.      "

## 2017-09-24 NOTE — PROGRESS NOTES
"   09/23/17 2200   Debriefing   Debriefing DO   Does patient understand why the event happened? Patient refuses to answer   Does patient agree to safe behaviors? Yes   What can we do differently so this doesn't happen again? Patient refuses to answer   Plan of care reviewed and modified Yes     Upon approach pt appears calm, is resting and when asked pt agrees he feels calmer. Pt refuses to fully participate in debriefing and does not verbalize what can be done in the future so this does not happen again, and does not acknowledge why this event occurred. However, pt is able to agree to safe behavior on the unit (e.g. No threats, aggression, etc.), but continues to endorse SI. States \"everyone here is, and so am I. Its my own way out, and I am going to die.\" Restraints DC'd at this time, pt given snack and fluids. Pts threats of self harm and SI discussed with Nursing Supervisor and On-call MD, Dr. Vallejo. Pt placed on status individual observation due to SI with a plan and refusal to disclose plan to staff or contract for safety. Will continue to monitor closely and reassess need for SIO staffing each shift.   "

## 2017-09-24 NOTE — PROGRESS NOTES
Did come out of his room and requested tylenol and something for sleep.  When given the options he did choose benadryl and went to his room again.

## 2017-09-24 NOTE — PROGRESS NOTES
"Pt appears restless at change of shift, pacing the halls and glaring at staff. On approach he endorses anxiety about \"all the money you are getting for keeping me here!\" Pt expressed frustration re: his current hospitalization, and states \"I am paying you guys a hundred dollars a visit and my wife is diviorcing me. I need a .\" Pt's speech is pressured, mood is irritable and angry. When asked about a tremor in his hands, pt reports \"this has been there forever! Don't you know that!?\" States he had a tremor PTA, but that is it worse \"after the meds. I got arthritis too! A bad elbow, bad knees, bad brain.\" Pt offered PRN cogentin or benadryl, for suspected EPSE, as well as tylenol for pain associated with arthritis. Pt declined all interventions, states \"I don't trust your pills.\" Offered to show patient medication packaging, which he continued to decline. Then began a discussion of \"the gift\" he has \"given all the female staff,\" and \"needing a cigarette.\" Pt nonreceptive to reality orientation or reassurance/support. Conversation was ended at this time due to patients recent history of agitation when discussing \"the gift\" and \"cigarettes.\" Encouraged to seek out staff in the event he would like a PRN or has any further questions or comments.   "

## 2017-09-24 NOTE — PROGRESS NOTES
"   09/23/17 2115   Education   Discontinuation Criteria Cessation of behavior that precipitated seclusion or restraint   Criteria Explained Yes   Patient's Response NL   Family Notification D     Staff report pt appears sedated and sleeping intermittantly. Attempted to process pt out of restraint, however patient remains unable to meet discontinuation criteria at this time. Pt states he \"is not taking those pills ever\" and that \"this is all a lie\" and \"you know what you are doing is wrong. The children will look at you differently now.\" Pt calls writer an \"asshole\" repeatedly, states \"I am not calm and I never will be.\" Despite sedation, pt continues to make threats to harm staff, is unable to contract for safety and further, states he is suicidal. Pt says \"I am gonna kill myself. I won't tell you how or when but I have a plan. You would tell on me.\" Pt is unable to contract for safety, and states \"I am not gonna make it out of this hospital alive, I can tell you that much.\" Pt is nonreceptive to verbal reassurance, support and explanation at this time. Will continue to reassess and attempt to debrief with pt.   "

## 2017-09-24 NOTE — PROGRESS NOTES
09/23/17 2115   Seclusion or Restraint Order   In Person Face to Face Assessment Conducted Yes-Eval of pt's immediate situation, reaction to intervention, complete review of systems assessment, behavioral assessment & review/assessment of hx, drugs & meds, recent labs, etc, behavioral condition, need to continue/terminate restraint/seclusion   Patient Experienced No adverse physical outcome from seclusion/restraint initiation   Continuation of Seclus/Restraint indicated at this time Yes   Pt had no adverse physical outcome as a result of the code or restraints.  Pt is was unwilling to answer writers questions, and is currently sleeping.  Pt does not appear to be in any pain at this time.

## 2017-09-25 PROCEDURE — 90853 GROUP PSYCHOTHERAPY: CPT

## 2017-09-25 PROCEDURE — 12400003 ZZH R&B MH CRITICAL UMMC

## 2017-09-25 PROCEDURE — 99233 SBSQ HOSP IP/OBS HIGH 50: CPT | Performed by: PSYCHIATRY & NEUROLOGY

## 2017-09-25 PROCEDURE — 25000132 ZZH RX MED GY IP 250 OP 250 PS 637: Performed by: PSYCHIATRY & NEUROLOGY

## 2017-09-25 RX ADMIN — OLANZAPINE 20 MG: 10 TABLET, ORALLY DISINTEGRATING ORAL at 20:40

## 2017-09-25 RX ADMIN — MICONAZOLE NITRATE: 2 POWDER TOPICAL at 01:35

## 2017-09-25 ASSESSMENT — ACTIVITIES OF DAILY LIVING (ADL)
DRESS: SCRUBS (BEHAVIORAL HEALTH);INDEPENDENT
HYGIENE/GROOMING: INDEPENDENT
DRESS: INDEPENDENT
LAUNDRY: UNABLE TO COMPLETE
ORAL_HYGIENE: INDEPENDENT
LAUNDRY: UNABLE TO COMPLETE
ORAL_HYGIENE: INDEPENDENT
GROOMING: HANDWASHING;INDEPENDENT

## 2017-09-25 NOTE — PROGRESS NOTES
"Pt's mood remains labile and pt remains delusional and confused regarding his legal status. Pt feels this \"is all experimental\" and that \"my  told me I don't have to be here or take these meds.\" States \"I have all the paperwork but it is at home!\" Upon further discussion, it becomes clear pt believes his previous legal proceedings and hospitalization (in which his petition for commitment was not supported by the court and instead given a stay of commitment) are still in effect. Pt is disorganized and unable to track feedback explaining to him that he is now under a full commitment with [facility] order. Pt was offered PO HS medication despite his adamant refusal of oral medication. States \"you will have to roll me over and give me the shot.\" States he would comply with IM, but is \"not gonna take it by mouth.\" Pt continues to argue that \"this is all a lie\" when offered education and an additional copy of the [employer] order, says \"you are making this up.\" With extensive encouragement and feedback pt agreed to take PO medication as opposed to IM. Since receiving HS dose of Zyprexa pt had one episode of agitation during which he approached a staff member calling him an \"asshole for turning on football, I wanted to watch 60 Minutes!\" Since that incident, pt has been visible in the milieu, remains irritable at times but redirectable. Note that pt was offered PRN cogentin for tremor, which he declined. Has no other symptoms of EPSE. Will continue to monitor closely and reassess.   "

## 2017-09-25 NOTE — PROGRESS NOTES
Pt remains upset at being here.  Argumentative with staff +  Re:d/c, legal issues.  Pt denies SI, SIB.  Confirms high 8 of 10 anx/dep.  Remains hostile to suggestions of taking meds/ following the advice of

## 2017-09-25 NOTE — PROGRESS NOTES
Pt attended the morning OT group, played a game with writer and one peer without problem.  Relaxed while engaged in the game, otherwise was overheard prior to the activity making complaints about being in the hospital and wanting cigarettes.

## 2017-09-26 PROCEDURE — 25000132 ZZH RX MED GY IP 250 OP 250 PS 637: Performed by: PSYCHIATRY & NEUROLOGY

## 2017-09-26 PROCEDURE — 99221 1ST HOSP IP/OBS SF/LOW 40: CPT | Performed by: PHYSICIAN ASSISTANT

## 2017-09-26 PROCEDURE — 99233 SBSQ HOSP IP/OBS HIGH 50: CPT | Mod: GC | Performed by: PSYCHIATRY & NEUROLOGY

## 2017-09-26 PROCEDURE — 25000132 ZZH RX MED GY IP 250 OP 250 PS 637: Performed by: PHYSICIAN ASSISTANT

## 2017-09-26 PROCEDURE — 99207 ZZC CDG-MDM COMPONENT: MEETS MODERATE - UP CODED: CPT | Performed by: PSYCHIATRY & NEUROLOGY

## 2017-09-26 PROCEDURE — 99207 ZZC CDG-CODE CATEGORY CHANGED: CPT | Performed by: PHYSICIAN ASSISTANT

## 2017-09-26 PROCEDURE — 12400003 ZZH R&B MH CRITICAL UMMC

## 2017-09-26 PROCEDURE — 90853 GROUP PSYCHOTHERAPY: CPT

## 2017-09-26 RX ORDER — POLYMYXIN B SULFATE AND TRIMETHOPRIM 1; 10000 MG/ML; [USP'U]/ML
2 SOLUTION OPHTHALMIC 4 TIMES DAILY
Status: DISCONTINUED | OUTPATIENT
Start: 2017-09-26 | End: 2017-09-29 | Stop reason: HOSPADM

## 2017-09-26 RX ORDER — POLYMYXIN B SULFATE AND TRIMETHOPRIM 1; 10000 MG/ML; [USP'U]/ML
2 SOLUTION OPHTHALMIC 4 TIMES DAILY
Status: DISCONTINUED | OUTPATIENT
Start: 2017-09-26 | End: 2017-09-26

## 2017-09-26 RX ADMIN — OLANZAPINE 20 MG: 10 TABLET, ORALLY DISINTEGRATING ORAL at 20:30

## 2017-09-26 RX ADMIN — POLYMYXIN B SULFATE AND TRIMETHOPRIM SULFATE 2 DROP: 10000; 1 SOLUTION/ DROPS OPHTHALMIC at 17:22

## 2017-09-26 RX ADMIN — POLYMYXIN B SULFATE AND TRIMETHOPRIM 2 DROP: 1; 10000 SOLUTION OPHTHALMIC at 17:22

## 2017-09-26 RX ADMIN — POLYMYXIN B SULFATE AND TRIMETHOPRIM 2 DROP: 1; 10000 SOLUTION OPHTHALMIC at 20:31

## 2017-09-26 ASSESSMENT — ACTIVITIES OF DAILY LIVING (ADL)
LAUNDRY: UNABLE TO COMPLETE
DRESS: INDEPENDENT
GROOMING: INDEPENDENT
ORAL_HYGIENE: INDEPENDENT
GROOMING: INDEPENDENT
ORAL_HYGIENE: INDEPENDENT
DRESS: INDEPENDENT

## 2017-09-26 NOTE — PROGRESS NOTES
Pt was visible in the milieu entire evening shift, spent most of his time working on a puzzle.  Pt affect appears to be somewhat tense, however has improved since admission.  There were no behavioral concerns this shift.     09/25/17 2240   Behavioral Health   Hallucinations denies / not responding to hallucinations   Thinking distractable   Orientation person: oriented;place: oriented;date: oriented;time: oriented   Memory baseline memory   Insight poor   Judgement impaired   Eye Contact at examiner   Affect blunted, flat;tense   Mood mood is calm;irritable   Physical Appearance/Attire untidy   Hygiene neglected grooming - unclean body, hair, teeth   Suicidality other (see comments)  (pt denies)   Self Injury other (see comment)  (pt denies)   Activity other (see comment)  (visible in milieu)   Speech pressured   Medication Sensitivity no stated side effects;no observed side effects   Psychomotor / Gait balanced;steady   Coping/Psychosocial   Verbalized Emotional State frustration   Psycho Education   Type of Intervention 1:1 intervention   Response participates, initiates socially appropriate   Hours (15)   Treatment Detail mh check in   Activities of Daily Living   Hygiene/Grooming independent   Oral Hygiene independent   Dress independent   Laundry unable to complete   Room Organization independent

## 2017-09-26 NOTE — PLAN OF CARE
Problem: Psychotic Symptoms  Goal: Psychotic Symptoms  Signs and symptoms of listed problems will be absent or manageable.   Outcome: Improving  Pt continues to have psychotic symptoms. Pt's status continues to improve at this time. Pt does not have overt symptoms of psychosis this shift. Pt continues to be blunted overall and declines to engage in treatment planning with RN. Pt seems to have a clearer understanding of his legal status than previously. Pt's speech is appropriate as his activity. Pt did not need significant redirection for intrusive behaviors this shift. Pt's insight and judgement remain poor. Pt did endorse pain and redness of his eye this shift. Pt was seen by internal medicine. Pt reported no other physical health symptoms or side effects from medications at this time. Pt's VS were WDL this shift.

## 2017-09-26 NOTE — CONSULTS
S: Medicine was asked to evaluate for eye redness and drainage.    Patient notes he woke up this morning and his eye had increased drainage and was stuck together. He notes he first noted some mild redness yesterday. He denies changes in vision, eye pain or URI symptoms. He denies pruritis. He denies photophobia or difficulty keeping his eye open. He just notes drainage, purulent in nature. He denies trauma. He notes a h/o this, improved with penicillin.     O: /73  Pulse 78  Temp 98  F (36.7  C) (Tympanic)  Resp 16  Wt 85.7 kg (189 lb)  SpO2 92%  BMI 24.94 kg/m2  GEN: Awake, alert, ambulating comfortably around unit  HEENT: Atraumatic, normocephalic. Left eye conjunctiva w/ diffuse mild erythema and edema on palpebral and bulbar conjunctiva, mild scleral injection.    A/P:  #Bacterial conjunctivitis  -Start Polytrim eye drops QID  -Artificial tears PRN  -Warm packs TID  -Urgent opthalmology consult if any vision change/loss or eye pain    Medicine will sign off. Please notify on call REK[facility] if any vision change/loss or eye pain.    [employer]

## 2017-09-26 NOTE — PLAN OF CARE
"Problem: Psychotic Symptoms  Intervention: Social and Therapeutic Interv (Psychotic Symptoms)     Pt was the only person to attend the afternoon group, asked to play a game.  Open to learning a new game, caught on fairly quickly.  Remains friendly and interactive with this writer.  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     When writer asked if he likes to play board games outside of the hospital, i.e. with is wife, as writer noticed he seems to enjoy games and working on puzzles in his free time, pt got a bit irritable, \"I don't know what's happening with my marriage, she's the crazy one that needs to be here, why do you want to talk about my marriage!\"  Slightly agitated and talked over writer when I tried to explain I wasn't trying to talk about his marriage, though eventually calmed again.        "

## 2017-09-26 NOTE — PROGRESS NOTES
[facility], [place]   Psychiatric Progress Note  Hospital Day: 21        Interim History:   The patient's care was discussed with the treatment team during the daily team meeting and/or staff's chart notes were reviewed.  Staff reports he attended a group yesterday.     On interview Austyn asks if he's doing okay. He spoke with  yesterday. Has been taking medications. Reports that he is planning to go home after discharge. Wife is supposed to return from vacation today. Talked about lack of interest in going on vacation with her. Reports that his left eye is red and swollen and sticky. Wondering if he could get eye drops. Agreeable to talking with Internal Medicine about this.     Psychiatric Symptoms: patient has no insight and denies any psychiatric problems.    Medication side effects reported: Denies         Medications:       OLANZapine zydis  20 mg Oral At Bedtime    Or     OLANZapine  10 mg Intramuscular At Bedtime     miconazole   Topical BID          Allergies:     Allergies   Allergen Reactions     Bee Venom      Propranolol           Labs:   No results found for this or any previous visit (from the past 24 hour(s)).       Psychiatric Examination:     /68  Pulse 84  Temp 97.1  F (36.2  C) (Tympanic)  Resp 16  Wt 85.7 kg (189 lb)  SpO2 92%  BMI 24.94 kg/m2  Weight is 189 lbs 0 oz  Body mass index is 24.94 kg/(m^2).    Appearance: awake, alert, dressed in hospital scrubs, appeared older than stated age and unkempt  Attitude:  cooperative   Eye Contact:  less intense than previous  Mood:  angry  Affect:  intensity is exaggerated  Speech:  clear, coherent and pressured speech  Language: fluent and intact in English  Psychomotor, Gait, Musculoskeletal:  intact station, gait and muscle tone and tremor observed   Throught Process:  illogical  Associations:  no loose associations  Thought Content:  no evidence of suicidal ideation or homicidal ideation and  significant paraniod delusions  Insight:  limited  Judgement:  limited  Oriented to:  time, person, and place  Attention Span and Concentration:  intact  Recent and Remote Memory:  intact  Fund of Knowledge:  appropriate         Precautions:     Behavioral Orders   Procedures     Assault precautions     Code 1 - Restrict to Unit     DISCUS     Fall precautions     Routine Programming     As clinically indicated     Sexual precautions     Status 15     Every 15 minutes.     Suicide precautions          Diagnoses:     Bipolar Disorder, currently manic          Assessment & Plan:     Assessment and hospital summary:  Patient presented after found yelling and running into traffic in the context of medication non adherence. Additionally, had been having limited sleep, erratic behaviors and ongoing sexual preoccupation. He had been harassing other patients and making threats towards staff and patients. He initially was demonstrating improvement that then worsened when he stopped the olanzapine for a few days. He has again restarted olanzapine since [facility] order returned from court.    Target psychiatric symptoms and interventions:  - Zyprexa 20 mg QHS (increased 9/7) PO or IM   - PRN Zyprexa    Medical Problems and Treatments:   1. Patient with tremor. It appears to be of intention variety as it is not notable at rest. Will consider neurology consult as patient improves or if tremor worsens. DISCUS = 0.  2. Conjunctivitis of L eye. Internal Medicine consult.     Legal:  - Admitted on 72 hour hold. Currently on a court hold. Stay of commitment being vacated. Hearing for vacating stay on 9/14 during which pt was difficult to redirect. Final hearing 9/21, stay was vacated. Is on full commitment with [facility] (olanzapine, haloperidol, risperidone, prolixin).     Disposition: Pending improvement of symptoms, will likely discharge home

## 2017-09-26 NOTE — PROGRESS NOTES
Patient spoke to his  this afternoon and now states that he understands that he is required to take his medication. He has been calm and cooperative since this time. He came up to the [facility] window to get his medication this evening. He has been calm and putting together puzzles. He agrees to safe behavior and feels like he no longer needs a 1:1 staff as he does not want it to slow down his discharge. He states that Dr Marques told him that he needs to behave and take his medication and he will be able to go home at the end of the week. Order was obtained to d/c 1:1. Will continue to monitor.

## 2017-09-26 NOTE — PROGRESS NOTES
[facility], [place]   Psychiatric Progress Note  Hospital Day: 20        Interim History:   The patient's care was discussed with the treatment team during the daily team meeting and/or staff's chart notes were reviewed.  Staff reports that the patient continues to be intrusive and inappropriate. He required seclusion several times over the weekend.    On interview patient is demanding discharge. When I explain his signs of grandiosity and intrusive behaviors, he claims that he will control them if it means he can leave. He then demands that I put something in writing and sign it. I indicate that I will not be doing that, but that he can request his records from the stay. The patient the proceeds to interrupt me when other patients and follows me around the unit demanding for something in writing. When I suggest that these are some of the behaviors that are keeping him here, he becomes angry and indicates that he is doing it because he hasn't received anything in writing yet.    Psychiatric Symptoms: patient has no insight and denies any psychiatric problems.    Medication side effects reported: Denies         Medications:       OLANZapine zydis  20 mg Oral At Bedtime    Or     OLANZapine  10 mg Intramuscular At Bedtime     miconazole   Topical BID          Allergies:     Allergies   Allergen Reactions     Bee Venom      Propranolol           Labs:   No results found for this or any previous visit (from the past 24 hour(s)).       Psychiatric Examination:     /68  Pulse 84  Temp 97.1  F (36.2  C) (Tympanic)  Resp 16  Wt 85.7 kg (189 lb)  SpO2 92%  BMI 24.94 kg/m2  Weight is 189 lbs 0 oz  Body mass index is 24.94 kg/(m^2).    Appearance: awake, alert, dressed in hospital scrubs, appeared older than stated age and unkempt  Attitude:  angry   Eye Contact:  intense  Mood:  angry  Affect:  intensity is heightened  Speech:  clear, coherent and pressured speech  Language: fluent and  intact in English  Psychomotor, Gait, Musculoskeletal:  intact station, gait and muscle tone and tremor observed   Throught Process:  illogical  Associations:  no loose associations  Thought Content:  no evidence of suicidal ideation or homicidal ideation and significant paraniod delusions  Insight:  limited  Judgement:  limited  Oriented to:  time, person, and place  Attention Span and Concentration:  intact  Recent and Remote Memory:  intact  Fund of Knowledge:  appropriate         Precautions:     Behavioral Orders   Procedures     Assault precautions     Code 1 - Restrict to Unit     DISCUS     Fall precautions     Routine Programming     As clinically indicated     Sexual precautions     Status 15     Every 15 minutes.     Status Individual Observation     Suicide precautions          Diagnoses:     Bipolar Disorder, currently manic          Assessment & Plan:     Assessment and hospital summary:  Patient presented after found yelling and running into traffic in the context of medication non adherence. Additionally, had been having limited sleep, erratic behaviors and ongoing sexual preoccupation. He had been harassing other patients and making threats towards staff and patients. He initially was demonstrating improvement that then worsened when he stopped the olanzapine for a few days. He has again restarted olanzapine since [facility] order returned from court.    Target psychiatric symptoms and interventions:  - Zyprexa 20 mg QHS (increased 9/7) PO or IM   - PRN Zyprexa    Medical Problems and Treatments:   1. Patient with tremor. It appears to be of intention variety as it is not notable at rest. Will consider neurology consult as patient improves or if tremor worsens. DISCUS = 0.    Legal:  - Admitted on 72 hour hold. Currently on a court hold. Stay of commitment being vacated. Hearing for vacating stay on 9/14 during which pt was difficult to redirect. Final hearing 9/21, stay was vacated. Is on full  commitment with [facility] (olanzapine, haloperidol, risperidone, prolixin).     Disposition: Pending improvement of symptoms, will likely discharge home

## 2017-09-27 PROCEDURE — 25000132 ZZH RX MED GY IP 250 OP 250 PS 637: Performed by: PSYCHIATRY & NEUROLOGY

## 2017-09-27 PROCEDURE — 99207 ZZC CDG-MDM COMPONENT: MEETS LOW - DOWN CODED: CPT | Performed by: PSYCHIATRY & NEUROLOGY

## 2017-09-27 PROCEDURE — 99232 SBSQ HOSP IP/OBS MODERATE 35: CPT | Mod: GC | Performed by: PSYCHIATRY & NEUROLOGY

## 2017-09-27 PROCEDURE — 90853 GROUP PSYCHOTHERAPY: CPT

## 2017-09-27 PROCEDURE — 12400003 ZZH R&B MH CRITICAL UMMC

## 2017-09-27 RX ADMIN — POLYMYXIN B SULFATE AND TRIMETHOPRIM 2 DROP: 1; 10000 SOLUTION OPHTHALMIC at 09:43

## 2017-09-27 RX ADMIN — OLANZAPINE 20 MG: 10 TABLET, ORALLY DISINTEGRATING ORAL at 20:27

## 2017-09-27 RX ADMIN — POLYMYXIN B SULFATE AND TRIMETHOPRIM 2 DROP: 1; 10000 SOLUTION OPHTHALMIC at 16:40

## 2017-09-27 RX ADMIN — POLYMYXIN B SULFATE AND TRIMETHOPRIM 2 DROP: 1; 10000 SOLUTION OPHTHALMIC at 12:53

## 2017-09-27 RX ADMIN — POLYMYXIN B SULFATE AND TRIMETHOPRIM 2 DROP: 1; 10000 SOLUTION OPHTHALMIC at 20:29

## 2017-09-27 ASSESSMENT — ACTIVITIES OF DAILY LIVING (ADL)
ORAL_HYGIENE: INDEPENDENT
DRESS: SCRUBS (BEHAVIORAL HEALTH)
LAUNDRY: UNABLE TO COMPLETE
ORAL_HYGIENE: INDEPENDENT
GROOMING: INDEPENDENT
DRESS: SCRUBS (BEHAVIORAL HEALTH)
HYGIENE/GROOMING: INDEPENDENT

## 2017-09-27 NOTE — PROGRESS NOTES
[facility], [place]   Psychiatric Progress Note  Hospital Day: 22        Interim History:   The patient's care was discussed with the treatment team during the daily team meeting and/or staff's chart notes were reviewed.  Staff report Austyn required less redirection yesterday.     On interview Austyn talked about his wife and how he thinks he will likely be getting a divorce. Talked about the clothes his wife buys and his concern that she might have 4-5 credit cards he doesn't know about. Also shared dynamics in their relationship that are difficult for him. Expressed interest in discharging by Friday.     Psychiatric Symptoms: reports feeling depressed    Medication side effects reported: Denies         Medications:       trimethoprim-polymyxin b  2 drop Left Eye 4x Daily     OLANZapine zydis  20 mg Oral At Bedtime    Or     OLANZapine  10 mg Intramuscular At Bedtime     miconazole   Topical BID          Allergies:     Allergies   Allergen Reactions     Bee Venom      Propranolol           Labs:   No results found for this or any previous visit (from the past 24 hour(s)).       Psychiatric Examination:     /73  Pulse 78  Temp 98  F (36.7  C) (Tympanic)  Resp 16  Wt 85.7 kg (189 lb)  SpO2 92%  BMI 24.94 kg/m2  Weight is 189 lbs 0 oz  Body mass index is 24.94 kg/(m^2).    Appearance: awake, alert, dressed in hospital scrubs, appeared older than stated age and unkempt  Attitude:  cooperative   Eye Contact:  good  Mood:  sad   Affect:  intensity is dramatic at times but more   Speech:  clear, coherent and pressured speech  Language: fluent and intact in English  Psychomotor, Gait, Musculoskeletal:  intact station, gait and muscle tone and tremor observed   Throught Process:  goal oriented  Associations:  no loose associations  Thought Content:  no evidence of suicidal ideation or homicidal ideation and significant paraniod delusions  Insight:  limited  Judgement:   limited  Oriented to:  time, person, and place  Attention Span and Concentration:  intact  Recent and Remote Memory:  intact  Fund of Knowledge:  appropriate         Precautions:     Behavioral Orders   Procedures     Assault precautions     Code 1 - Restrict to Unit     DISCUS     Fall precautions     Routine Programming     As clinically indicated     Sexual precautions     Status 15     Every 15 minutes.     Suicide precautions          Diagnoses:     Bipolar Disorder, currently manic          Assessment & Plan:     Assessment and hospital summary:  Patient presented after found yelling and running into traffic in the context of medication non adherence. Additionally, had been having limited sleep, erratic behaviors and ongoing sexual preoccupation. He had been harassing other patients and making threats towards staff and patients. He initially was demonstrating improvement that then worsened when he stopped the olanzapine for a few days. He has again restarted olanzapine since [facility] order returned from court.    Target psychiatric symptoms and interventions:  - Zyprexa 20 mg QHS (increased 9/7) PO or IM   - PRN Zyprexa    Medical Problems and Treatments:   1. Patient with tremor. It appears to be of intention variety as it is not notable at rest. Will consider neurology consult as patient improves or if tremor worsens. DISCUS = 0.  2. Conjunctivitis of L eye. Internal Medicine consulted 9/26, see their note for details and recs. They have signed off.     Legal:  - Admitted on 72 hour hold. Currently on a court hold. Stay of commitment being vacated. Hearing for vacating stay on 9/14 during which pt was difficult to redirect. Final hearing 9/21, stay was vacated. Is on full commitment with [facility] (olanzapine, haloperidol, risperidone, prolixin).     Disposition: Pending improvement of symptoms, will likely discharge home

## 2017-09-27 NOTE — PLAN OF CARE
Problem: Psychotic Symptoms  Intervention: Social and Therapeutic Interv (Psychotic Symptoms)     Pt was often the only person in group, generally only wants to play games.  Did so for 2.5 hours today througout the span of 3 group sessions.  Remains on track during activity.

## 2017-09-27 NOTE — PROGRESS NOTES
Pt had a good shift. He was appropriate with staff and peers while he was in the milieu. There are no behavioral issues to report.

## 2017-09-27 NOTE — PROGRESS NOTES
This writer returned phone call from pt's son- Minh Elizondo (phone: [phone]); he had some questions about his father's treatment.

## 2017-09-28 PROCEDURE — 12400003 ZZH R&B MH CRITICAL UMMC

## 2017-09-28 PROCEDURE — 25000132 ZZH RX MED GY IP 250 OP 250 PS 637: Performed by: PSYCHIATRY & NEUROLOGY

## 2017-09-28 PROCEDURE — 90853 GROUP PSYCHOTHERAPY: CPT

## 2017-09-28 RX ADMIN — POLYMYXIN B SULFATE AND TRIMETHOPRIM 2 DROP: 1; 10000 SOLUTION OPHTHALMIC at 13:29

## 2017-09-28 RX ADMIN — MICONAZOLE NITRATE: 2 POWDER TOPICAL at 08:35

## 2017-09-28 RX ADMIN — POLYMYXIN B SULFATE AND TRIMETHOPRIM 2 DROP: 1; 10000 SOLUTION OPHTHALMIC at 16:13

## 2017-09-28 RX ADMIN — OLANZAPINE 20 MG: 10 TABLET, ORALLY DISINTEGRATING ORAL at 20:31

## 2017-09-28 RX ADMIN — POLYMYXIN B SULFATE AND TRIMETHOPRIM 2 DROP: 1; 10000 SOLUTION OPHTHALMIC at 08:35

## 2017-09-28 RX ADMIN — POLYMYXIN B SULFATE AND TRIMETHOPRIM 2 DROP: 1; 10000 SOLUTION OPHTHALMIC at 20:30

## 2017-09-28 ASSESSMENT — ACTIVITIES OF DAILY LIVING (ADL)
DRESS: SCRUBS (BEHAVIORAL HEALTH)
LAUNDRY: UNABLE TO COMPLETE
HYGIENE/GROOMING: INDEPENDENT
ORAL_HYGIENE: INDEPENDENT

## 2017-09-28 NOTE — PLAN OF CARE
Problem: Psychotic Symptoms  Goal: Psychotic Symptoms  Signs and symptoms of listed problems will be absent or manageable.   Outcome: No Change  48 hour nursing assessment completed. Patient awake and visible throughout the shift. Patient attends some groups, and spends time in the lounge working on a puzzle and watching TV. Patient maintains appropriate boundaries with other patients. Patient presents as disheveled, with a full range affect. His thought process appears to be clearing, as he is able to have a linear conversation with staff and peers. Medication compliant. Denies SI/SIB. Continue to monitor and assess.

## 2017-09-28 NOTE — PROGRESS NOTES
[facility], [place]   Psychiatric Progress Note  Hospital Day: 23        Interim History:   The patient's care was discussed with the treatment team during the daily team meeting and/or staff's chart notes were reviewed.  Staff report Austyn attended groups yesterday.    On interview Austyn reports that his eye was bothering him again. Has been taking his drops, thinks it might be a blocked tear duct because he's had that before. 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     Psychiatric Symptoms: denies    Medication side effects reported: Denies         Medications:       trimethoprim-polymyxin b  2 drop Left Eye 4x Daily     OLANZapine zydis  20 mg Oral At Bedtime    Or     OLANZapine  10 mg Intramuscular At Bedtime     miconazole   Topical BID          Allergies:     Allergies   Allergen Reactions     Bee Venom      Propranolol           Labs:   No results found for this or any previous visit (from the past 24 hour(s)).       Psychiatric Examination:     /73  Pulse 78  Temp 96.6  F (35.9  C) (Tympanic)  Resp 16  Wt 89.8 kg (198 lb)  SpO2 92%  BMI 26.12 kg/m2  Weight is 198 lbs 0 oz  Body mass index is 26.12 kg/(m^2).    Appearance: awake, alert, dressed in hospital scrubs, appeared older than stated age and unkempt  Attitude:  cooperative   Eye Contact:  good  Mood:  sad   Affect:  intensity is normal  Speech:  clear, coherent and normal prosody  Language: fluent and intact in English  Psychomotor, Gait, Musculoskeletal:  intact station, gait and muscle tone and tremor observed   Throught Process:  goal oriented  Associations:  no loose associations  Thought Content:  no evidence of suicidal ideation or homicidal ideation and significant paraniod delusions  Insight:  limited  Judgement:  limited  Oriented to:  time, person, and place  Attention Span and Concentration:  intact  Recent and Remote Memory:  intact  Fund of  Knowledge:  appropriate         Precautions:     Behavioral Orders   Procedures     Assault precautions     Code 1 - Restrict to Unit     DISCUS     Fall precautions     Routine Programming     As clinically indicated     Sexual precautions     Status 15     Every 15 minutes.     Suicide precautions          Diagnoses:     Bipolar Disorder, currently manic          Assessment & Plan:     Assessment and hospital summary:  Patient presented after found yelling and running into traffic in the context of medication non adherence. Additionally, had been having limited sleep, erratic behaviors and ongoing sexual preoccupation. He had been harassing other patients and making threats towards staff and patients. He initially was demonstrating improvement that then worsened when he stopped the olanzapine for a few days. He has again restarted olanzapine since [facility] order returned from court.    Target psychiatric symptoms and interventions:  - Zyprexa 20 mg QHS (increased 9/7) PO or IM   - PRN Zyprexa    Medical Problems and Treatments:   1. Patient with tremor. It appears to be of intention variety as it is not notable at rest. Will consider neurology consult as patient improves or if tremor worsens. DISCUS = 0.  2. Conjunctivitis of L eye. Internal Medicine consulted 9/26, see their note for details and recs. They have signed off.     Legal:  - Admitted on 72 hour hold. Currently on a court hold. Stay of commitment being vacated. Hearing for vacating stay on 9/14 during which pt was difficult to redirect. Final hearing 9/21, stay was vacated. Is on full commitment with [facility] (olanzapine, haloperidol, risperidone, prolixin).     Disposition: Pending improvement of symptoms, will likely discharge home

## 2017-09-29 VITALS
HEART RATE: 78 BPM | SYSTOLIC BLOOD PRESSURE: 120 MMHG | TEMPERATURE: 97.4 F | DIASTOLIC BLOOD PRESSURE: 73 MMHG | OXYGEN SATURATION: 92 % | RESPIRATION RATE: 16 BRPM | BODY MASS INDEX: 26.12 KG/M2 | WEIGHT: 198 LBS

## 2017-09-29 PROCEDURE — 99239 HOSP IP/OBS DSCHRG MGMT >30: CPT | Mod: GC | Performed by: PSYCHIATRY & NEUROLOGY

## 2017-09-29 RX ORDER — POLYMYXIN B SULFATE AND TRIMETHOPRIM 1; 10000 MG/ML; [USP'U]/ML
2 SOLUTION OPHTHALMIC 4 TIMES DAILY
Qty: 1 BOTTLE
Start: 2017-09-29 | End: 2018-04-09

## 2017-09-29 RX ORDER — OLANZAPINE 20 MG/1
20 TABLET ORAL AT BEDTIME
Qty: 30 TABLET | Refills: 1 | Status: ON HOLD | OUTPATIENT
Start: 2017-09-29 | End: 2018-04-19

## 2017-09-29 RX ORDER — OLANZAPINE 20 MG/1
20 TABLET ORAL AT BEDTIME
Status: DISCONTINUED | OUTPATIENT
Start: 2017-09-29 | End: 2017-09-29 | Stop reason: HOSPADM

## 2017-09-29 RX ADMIN — MICONAZOLE NITRATE: 2 POWDER TOPICAL at 08:21

## 2017-09-29 RX ADMIN — POLYMYXIN B SULFATE AND TRIMETHOPRIM 2 DROP: 1; 10000 SOLUTION OPHTHALMIC at 08:21

## 2017-09-29 RX ADMIN — POLYMYXIN B SULFATE AND TRIMETHOPRIM 2 DROP: 1; 10000 SOLUTION OPHTHALMIC at 13:49

## 2017-09-29 ASSESSMENT — ACTIVITIES OF DAILY LIVING (ADL)
ORAL_HYGIENE: INDEPENDENT
HYGIENE/GROOMING: INDEPENDENT
LAUNDRY: UNABLE TO COMPLETE
DRESS: SCRUBS (BEHAVIORAL HEALTH)

## 2017-09-29 NOTE — DISCHARGE INSTRUCTIONS
Behavioral Discharge Planning and Instructions      Summary:  You were admitted on 9/5/2017  due to Disorganized Thinking/Behaviors, Delusional Thoughts and Manic Symptomology.  You were treated by Dr. Grant Marques MD and discharged on 09/29/17 from [facility] to Home     You were dually committed to the [facility] and the [facility].  You are being discharged on a Provisional Discharge Agreement which shall remain in effect for the duration of the Commitment. You were also court ordered to take the medications the doctor ordered for you.       Principal Diagnoses:  Bipolar Disorder, currently manic     Health Care Follow-up Appointments:   - Psychiatry:   Appointment: Saturday, October 14th @ 1:05pm w/ Dr. Denisa Morales [place]  Address:  [facility]  [address] [place] [address]  Phone: [phone]  Fax: [phone]  [facility] TO FAX AVS    - [facility] :  Betty Pepe  Phone: [phone]  Fax: [phone]  [facility] TO FAX AVS   Continue to follow-up with your [facility]  on a regular basis.       - [place] [facility] [place] [facility]  Meeting location: [address] [address]  :[facility]  Phone: [phone]  Attendance: Open  Time and duration:   Sunday's 4:00 PM -- 60 Minutes  Monday's 6:00PM - 60 Minutes  Format: Prevention  Ages: All ages  Gender: Mixed    Attend all scheduled appointments with your outpatient providers. Call at least 24 hours in advance if you need to reschedule an appointment to ensure continued access to your outpatient providers.   Major Treatments, Procedures and Findings:  You were provided with: a psychiatric assessment, medication evaluation and/or management, group therapy and milieu management    Symptoms to Report: feeling more aggressive, increased confusion, losing more sleep, mood getting worse or thoughts of suicide    Early warning signs can include: increased  "depression or anxiety sleep disturbances increased thoughts or behaviors of suicide or self-harm  increased unusual thinking, such as paranoia or hearing voices    Safety and Wellness:  Take all medicines as directed.  Make no changes unless your doctor suggests them.      Follow treatment recommendations.  Refrain from alcohol and non-prescribed drugs.  If there is a concern for safety, call 911.    Resources:   Crisis Intervention: 130.281.1403 or 912-057-9089 (TTY: 192.488.9534).  Call anytime for help.  National [place] on Mental Illness (www.[place].nicole.org): 384.263.3295 or 144-058-6239.  [place] Association for Children's Mental Health (www.mac.org): 798.181.8125.  Alcoholics Anonymous (www.alcoholics-anonymous.org): Check your phone book for your local chapter.  Suicide Awareness Voices of Education (SAVE) (www.save.org): 654-821-SFVO (7500)  National Suicide Prevention Line (www.mentalhealthmn.org): 624-053-AJQH (2279)  Mental Health Consumer/Survivor Network of [place] (www.mhcsn.net): 668.235.4694 or 801-455-7156  Mental Health Association of [place] (www.mentalhealth.org): 685.845.4607 or 432-085-4594  Self- Management and Recovery Training., SMART-- Toll free: 924.449.1627  www.[employer] (VIC).org  [facility] Crisis Response 664-959-2361  [facility] Crisis (COPE) Response - Adult 884 446-8511  [facility] Crisis Response - Adult 236 915-0797  Text 4 Life: txt \"LIFE\" to 90499 for immediate support and crisis intervention  Crisis text line: Text \"START\" to 493-136. Free, confidential, 24/7.  Crisis Intervention: 805.676.1677 or 245-865-7516. Call anytime for help.   [facility] Crisis Team - Child: [phone]  [facility] Mental [facility] Crisis Response Team - Child: [phone]    The treatment team has appreciated the opportunity to work with you. If you have any questions or concerns our unit number is [phone].          "

## 2017-09-29 NOTE — PROGRESS NOTES
This writer consulted with pt's wife about him d/c'ing.  She was agreeable to d/c'ing home today.  Provisional discharge signed by patient and faxed to Travis Mueller CM to sign. CM is out of the office today - therefore will [facility].

## 2017-09-29 NOTE — PROGRESS NOTES
09/28/17 2115   Behavioral Health   Hallucinations denies / not responding to hallucinations   Thinking poor concentration   Orientation person: oriented;place: oriented;date: oriented;time: oriented   Memory baseline memory   Insight poor   Judgement impaired   Eye Contact at examiner   Affect blunted, flat   Mood mood is calm   Physical Appearance/Attire attire appropriate to age and situation   Hygiene well groomed   Suicidality other (see comments)  (pt denies )   Self Injury other (see comment)  (pt denies )   Activity restless   Speech clear;coherent   Medication Sensitivity no observed side effects   Psychomotor / Gait balanced;steady   Activities of Daily Living   Hygiene/Grooming independent   Oral Hygiene independent   Dress scrubs (behavioral health)   Laundry unable to complete   Room Organization independent   Behavioral Health Interventions   Psychotic Symptoms maintain safety precautions;monitor need to revise level of observation;maintain safe secure environment;reality orientation   Social and Therapeutic Interventions (Psychotic Symptoms) encourage participation in therapeutic groups and milieu activities   Pt had good shift. Pt was bright and active in the candy. Social with select peers. Pt spent the evening working on puzzle and watching TV. Pt was pleasant and polite upon approach. Pt reported of itchy and redness eye.  Pt said he is happy to discharge tomorrow. Pt denies SI and SIB. Behavior was calm and controlled. No other concern was noted this shift.

## 2017-09-29 NOTE — DISCHARGE SUMMARY
Psychiatric Discharge Summary    Austyn Leach MRN# [id number]   Age: 62 year old YOB: 1955     Date of Admission:  9/5/2017  Date of Discharge:  9/29/2017  Admitting Physician:  Grant Marques MD  Discharge Physician:  Grant Marques MD         Event Leading to Hospitalization:   Austyn Leach is a 62 year old male with a past psychiatric history of Bipolar Disorder who presented to the ED via police and ambulance on 9/5/2017 after he was found yelling and running into traffic [place] front of an elementary school.      Per DEC assessment, pt is on a stay of commitment [employer] since June 2017. He lives at home with his wife who apparently monitors his medication intake (Zyprexa 15 mg daily). Per wife, symptoms started sometime in 2015 when he apparently went on a trip to the [facility] unannounced. Of note, this is where the wife is originally from.      The patient has not been sleeping. He is preoccupied with sexual topics and has a h/o exposing himself to a minor in the ED during a prior visit. During past admission, was also sexually preoccupied prior to admission and during admission. Has a long history of grandiose thoughts, per chart.      Of note, there are two orders for protection filed against him. One by a woman (?stepdaughter) for sexually inappropriate and the other from [place] as he has called and made accusations towards them for stealing his supposed patents and inventions.      Patient refused to talk this morning. Since then, he has been targeting other vulnerable patients on the unit and harassing them, having a hard time taking redirection from staff.        See Admission note by Dr. Sandoval on 9/6/17 for additional details.          Diagnoses:     Bipolar Disorder, manic on admission         Labs:     Results for orders placed or performed during the hospital encounter of 09/05/17   Internal Medicine Adult IP Consult for [facility] in [place]  Building: Patient to be seen: Routine within 24 hrs; Call back #: [phone]; L eye conjunctivitis, pt had concern for discharge; Consultant may enter orders: Yes    Katie Mitchell PA-C     9/26/2017  4:05 PM  S: Medicine was asked to evaluate for eye redness and drainage.    Patient notes he woke up this morning and his eye had increased   drainage and was stuck together. He notes he first noted some   mild redness yesterday. He denies changes in vision, eye pain or   URI symptoms. He denies pruritis. He denies photophobia or   difficulty keeping his eye open. He just notes drainage, purulent   in nature. He denies trauma. He notes a h/o this, improved with   penicillin.     O: /73  Pulse 78  Temp 98  F (36.7  C) (Tympanic)  Resp   16  Wt 85.7 kg (189 lb)  SpO2 92%  BMI 24.94 kg/m2  GEN: Awake, alert, ambulating comfortably around unit  HEENT: Atraumatic, normocephalic. Left eye conjunctiva w/ diffuse   mild erythema and edema on palpebral and bulbar conjunctiva, mild   scleral injection.    A/P:  #Bacterial conjunctivitis  -Start Polytrim eye drops QID  -Artificial tears PRN  -Warm packs TID  -Urgent opthalmology consult if any vision change/loss or eye   pain    Medicine will sign off. Please notify on call REKHA if any vision   change/loss or eye pain.    TE[employer] Smart            Consults:   Consultation during this admission received from internal medicine as above.         Hospital Course:   Austyn Leach was admitted to Station 12 with attending Grant Marques MD on a 72 hour mental health hold, stay of commitment was vacated and he is now on a full commitment with Andersen (olanzapine, haloperidol, risperidone, prolixin). The patient was placed under status 15 (15 minute checks) to ensure patient safety.     Patient presented after found yelling and running into traffic in the context of medication non adherence. Additionally, had been having limited sleep, erratic behaviors  and ongoing sexual preoccupation. He had been harassing other patients and making threats towards staff and patients. He initially was demonstrating improvement that then worsened when he stopped the olanzapine for a few days. He has again restarted olanzapine since Andersen order returned from court. Has shown improvement since taking medications regularly and is now able to maintain personal boundaries better.    Austyn Leach was released to home. At the time of discharge Austyn Leach was determined to not be a danger to himself or others.          Discharge Medications:     Current Discharge Medication List      START taking these medications    Details   hypromellose-dextran (ARTIFICAL TEARS) SOLN ophthalmic solution Place 1 drop Into the left eye every hour as needed for dry eyes    Associated Diagnoses: Acute conjunctivitis of both eyes, unspecified acute conjunctivitis type      trimethoprim-polymyxin b (POLYTRIM) ophthalmic solution Place 2 drops Into the left eye 4 times daily  Qty: 1 Bottle    Associated Diagnoses: Acute conjunctivitis of both eyes, unspecified acute conjunctivitis type         CONTINUE these medications which have CHANGED    Details   OLANZapine (ZYPREXA) 20 MG tablet Take 1 tablet (20 mg) by mouth At Bedtime  Qty: 30 tablet, Refills: 1    Associated Diagnoses: Bipolar I disorder (H)                  Psychiatric Examination:   Appearance: awake, alert, dressed in hospital scrubs, appeared older than stated age and unkempt  Attitude:  cooperative   Eye Contact:  good  Mood:  sad   Affect:  intensity is normal  Speech:  clear, coherent and normal prosody  Language: fluent and intact in English  Psychomotor, Gait, Musculoskeletal:  intact station, gait and muscle tone and tremor observed   Throught Process:  goal oriented  Associations:  no loose associations  Thought Content:  no evidence of suicidal ideation or homicidal ideation and significant paraniod delusions  Insight:   limited  Judgement:  limited  Oriented to:  time, person, and place  Attention Span and Concentration:  intact  Recent and Remote Memory:  intact  Fund of Knowledge:  appropriate         Discharge Plan:   Health Care Follow-up Appointments:   - Psychiatry:   Appointment: Saturday, October 14th @ 1:05pm w/ Dr. Denisa PETERSON  Address:  [facility]  [facility] [place] [address]  Phone: [phone]  Fax: [phone]  [facility] TO FAX AVS     - [facility] :  Betty Pepe  Phone: [phone]  Fax: [phone]  [facility] TO FAX AVS   Continue to follow-up with your [facility]  on a regular basis.         - [place] [facility] & [place] [facility]  Meeting location: [address] [address]  :[facility]hind [facility]  Phone:           [phone]  Attendance: Open  Time and duration:   Sunday's 4:00 PM -- 60 Minutes  Monday's 6:00PM - 60 Minutes  Format: Prevention  Ages:             All ages  Gender: Mixed    Patient discussed with Dr. Marques    =============================================================================  Jessa Sandoval M.D.  PGY-4 Psychiatry Resident

## 2017-09-29 NOTE — PROGRESS NOTES
Patient was discharged home via taxi.  He was sent with all belongings and discharge medications.  The discharge instructions were reviewed with him.  Pt denies suicidal and homicidal ideation.

## 2017-09-29 NOTE — PROGRESS NOTES
Pt was in the milieu talking with peers and staff. Pt worked on a puzzle in the lounge for a good part of the day. Pt was polite and respectful to peers and staff. Pt is excited to discharge early this evening.

## 2018-01-12 NOTE — PROGRESS NOTES
"This writer faxed completed recommendation form to Neema Pepe @ [facility] along with updated notes.

## 2018-04-09 ENCOUNTER — HOSPITAL ENCOUNTER (INPATIENT)
Facility: CLINIC | Age: 63
LOS: 10 days | Discharge: HOME | DRG: 885 | End: 2018-04-20
Attending: EMERGENCY MEDICINE | Admitting: PSYCHIATRY & NEUROLOGY
Payer: MEDICARE

## 2018-04-09 DIAGNOSIS — F25.0 SCHIZOAFFECTIVE DISORDER, BIPOLAR TYPE (H): Primary | ICD-10-CM

## 2018-04-09 DIAGNOSIS — F17.213 CIGARETTE NICOTINE DEPENDENCE WITH WITHDRAWAL: ICD-10-CM

## 2018-04-09 PROCEDURE — 99285 EMERGENCY DEPT VISIT HI MDM: CPT | Mod: Z6 | Performed by: EMERGENCY MEDICINE

## 2018-04-09 PROCEDURE — 25000128 H RX IP 250 OP 636: Performed by: EMERGENCY MEDICINE

## 2018-04-09 PROCEDURE — 25000132 ZZH RX MED GY IP 250 OP 250 PS 637: Performed by: EMERGENCY MEDICINE

## 2018-04-09 PROCEDURE — 99285 EMERGENCY DEPT VISIT HI MDM: CPT | Mod: 25 | Performed by: EMERGENCY MEDICINE

## 2018-04-09 PROCEDURE — 96372 THER/PROPH/DIAG INJ SC/IM: CPT | Performed by: EMERGENCY MEDICINE

## 2018-04-09 RX ORDER — POLYETHYLENE GLYCOL 3350 17 G
2 POWDER IN PACKET (EA) ORAL
Status: DISCONTINUED | OUTPATIENT
Start: 2018-04-09 | End: 2018-04-20 | Stop reason: HOSPADM

## 2018-04-09 RX ORDER — HALOPERIDOL 5 MG/ML
10 INJECTION INTRAMUSCULAR ONCE
Status: COMPLETED | OUTPATIENT
Start: 2018-04-09 | End: 2018-04-09

## 2018-04-09 RX ADMIN — OLANZAPINE 15 MG: 10 TABLET, ORALLY DISINTEGRATING ORAL at 21:31

## 2018-04-09 RX ADMIN — HALOPERIDOL LACTATE 10 MG: 5 INJECTION, SOLUTION INTRAMUSCULAR at 22:04

## 2018-04-09 NOTE — IP AVS SNAPSHOT
` `     UR 12NB: [phone]            Medication Administration Report for Austyn Leach as of 04/18/18 6526   Legend:    Given Hold Not Given Due Canceled Entry Other Actions    Time Time (Time) Time  Time-Action       Inactive    Active    Linked        Medications 04/12/18 04/13/18 04/14/18 04/15/18 04/16/18 04/17/18 04/18/18    acetaminophen (TYLENOL) tablet 650 mg  Dose: 650 mg  Freq: EVERY 4 HOURS PRN Route: PO  PRN Reasons: mild pain,fever  Start: 04/17/18 1945   Admin Instructions: Maximum acetaminophen dose from all sources = 75 mg/kg/day not to exceed 4 grams/day.    Admin. Amount: 2 tablet (2 × 325 mg tablet)  Dispense Loc: [facility] ADS 12N                haloperidol (HALDOL) tablet 2 mg  Dose: 2 mg  Freq: EVERY 6 HOURS PRN Route: PO  PRN Reason: agitation  Start: 04/10/18 1347   Admin. Amount: 2 tablet (2 × 1 mg tablet)  Dispense Loc: [facility] ADS 12N               Or  haloperidol lactate (HALDOL) injection 2 mg  Dose: 2 mg  Freq: EVERY 6 HOURS PRN Route: IM  PRN Reason: agitation  Start: 04/10/18 1347   Admin Instructions: For ordered doses up to 5 mg, drug can be give IV Push undiluted over 1 minute.    Admin. Amount: 2 mg = 0.4 mL Conc: 5 mg/mL  Dispense Loc: [facility] ADS 12N   Infused Over: 1 Minutes  Volume: 1 mL               haloperidol (HALDOL) tablet 5 mg  Dose: 5 mg  Freq: AT BEDTIME Route: PO  Start: 04/10/18 2200   Admin. Amount: 1 tablet (1 × 5 mg tablet)  Last Admin: 04/18/18 2101  Dispense Loc: [facility] ADS 12N      2130 (5 mg)-Given        2012 (5 mg)-Given        2117 (5 mg)-Given        2018 (5 mg)-Given               2005 (5 mg)-Given               2025 (5 mg)-Given        2101 (5 mg)-Given          Or  haloperidol lactate (HALDOL) injection 5 mg  Dose: 5 mg  Freq: AT BEDTIME Route: IM  Start: 04/10/18 2200   Admin Instructions: For ordered doses up to 5 mg, drug can be give IV Push undiluted over 1 minute.    Admin. Amount: 5 mg = 1 mL Conc: 5 mg/mL  Dispense Loc: [facility] ADS 12N    Infused Over: 1 Minutes  Volume: 1 mL                                                                              hydrOXYzine (ATARAX) tablet 25 mg  Dose: 25 mg  Freq: EVERY 4 HOURS PRN Route: PO  PRN Reason: anxiety  Start: 04/10/18 1347   Admin. Amount: 1 tablet (1 × 25 mg tablet)  Dispense Loc: [facility] ADS 12N                nicotine polacrilex (COMMIT) lozenge 2 mg  Dose: 2 mg  Freq: EVERY 1 HOUR PRN Route: BU  PRN Reason: smoking cessation  Start: 18   Admin Instructions: Allow lozenge to dissolve completely.  Do Not Bite, Chew, or Swallow.    Admin. Amount: 1 lozenge (1 × 2 mg lozenge)  Last Admin: 18  Dispense Loc: [facility] ADS 12N      0810 (2 mg)-Given       1316 (2 mg)-Given       2130 (2 mg)-Given        0642 (2 mg)-Given       1322 (2 mg)-Given       1850 (2 mg)-Given        0718 (2 mg)-Given       1807 (2 mg)-Given           2137 (2 mg)-Given           nicotine polacrilex (NICORETTE) gum 4 mg  Dose: 4 mg  Freq: ONCE Route: BU  Start: 18   Admin Instructions: Gum should be chewed slowly until it tingles, then placed between cheek and gum:  when tingle gone, repeat process until tingle gone (about 30 minutes).    Admin. Amount: 1 each (1 × 4 mg each)  Dispense Loc: [facility] ADS 12N   Administrations Remainin              Future Medications  Medications 04/12/18 04/13/18 04/14/18 04/15/18 04/16/18 04/17/18 04/18/18       haloperidol decanoate (HALDOL DECANOATE) injection 50 mg  Dose: 50 mg  Freq: EVERY 30 DAYS Route: IM  Start: 18 0800   Admin Instructions: *For monthly IM use only*    Admin. Amount: 50 mg  Dispense Loc: [facility]  Volume: 1 mL

## 2018-04-09 NOTE — IP AVS SNAPSHOT
MRN:[id number]                      After Visit Summary   4/9/2018    Austyn Leach    MRN: [id number]           Thank you!     Thank you for choosing [facility] for your care. Our goal is always to provide you with excellent care.        Patient Information     Date Of Birth          1955        About your hospital stay     You were admitted on:  April 10, 2018 You last received care in the:   12[facility]    You were discharged on:  April 20, 2018       Who to Call     For medical emergencies, please call 911.  For non-urgent questions about your medical care, please call your primary care provider or clinic, None          Attending Provider     Provider Specialty    Jong Byrd MD Emergency Medicine    Sumeet, Lona Yanez MD Emergency Medicine    Raphael, Arturo LAKE MD Emergency Medicine    Linden, Christian Elise MD Psychiatry    Leigh, Katie Alves MD Psychiatry    Jerald, Grant Power MD Psychiatry       Primary Care Provider Fax #    Physician No Ref-Primary [phone]      Further instructions from your care team       Behavioral Discharge Planning and Instructions      Summary:  You were admitted to [facility] due to psychosis.   You had also stopped taking your meds.  While on [facility] under the care of Dr Marques, your Provisional Discharge was revoked.    Medication adjustments were made by Dr. Marques.  Your symptoms improved.      Neuropsych testing was completed during this admission by Psychologist, Dino Ruth.    Date of testing was 4-17-18.    A copy of the report was sent to [facility].         Also while in the hospital, your [facility]  referred you to an ACT team.    Rochelle [facility].  Her phone is:   [phone]      You are being Provisionally Discharged today back to your home in [place].      Main Diagnosis: Schizoaffective disorder, bipolar type       Mental Health Follow-Up:    [facility]   **You  first need to go to an appointment with a therapist.   After that first appointment (below) they will schedule you to see a medication provider.    Therapist:   Ghada Hirsch - Next appointment:  Tuesday May 1st - be there by 8:30am to fill out paperwork.          [address] [facility] [address], [place] [address]        Phone: [phone]      FAX:   [phone].    -----------------------------------------------    Until an ACT team is in place, you have targeted Case Management through [facility].   Your worker (until Lenora Pepe is back from vacation) is Poonam Mccarty at [phone]    Attend all scheduled appointments with your outpatient providers. Call at least 24 hours in advance if you need to reschedule an appointment to ensure continued access to your outpatient providers.   Major Treatments, Procedures and Findings:  You were provided with: a psychiatric assessment, assessed for medical stability, medication evaluation and/or management and group therapy    Symptoms to Report: feeling more aggressive, increased confusion, losing more sleep, mood getting worse or thoughts of suicide    Early warning signs can include: increased depression or anxiety sleep disturbances increased thoughts or behaviors of suicide or self-harm  increased unusual thinking, such as paranoia or hearing voices    Safety and Wellness:  Take all medicines as directed.  Make no changes unless your doctor suggests them.      Follow treatment recommendations.  Refrain from alcohol and non-prescribed drugs.  If there is a concern for safety, call 911.    Resources:   [facility] Crisis Response [phone]    Crisis Intervention: 364.946.9602 or 799-975-1659 (TTY: 508.490.1722).  Call anytime for help.  [place] [employer] [place] (www.[place].nicole.org): 862.904.2590 or 046-760-6464.  Mental Health Consumer/Survivor Network of [place] (www.mhcsn.net): 930.556.4142 or 252-905-5127  Mental [employer] Association of [place]  "(www.mental[facility].org): 719-264-3458 or 625-486-1888      The treatment team has appreciated the opportunity to work with you. If you have any questions or concerns our unit number is [phone].        Pending Results     No orders found from 2018 to 4/10/2018.            Statement of Approval     Ordered          18 1039  I have reviewed and agree with all the recommendations and orders detailed in this document.  EFFECTIVE NOW     Approved and electronically signed by:  Grant Marques MD             Admission Information     Date & Time Provider Department Dept. Phone    2018 Grant Marques MD [facility] [phone]      Your Vitals Were     Blood Pressure Pulse Temperature Respirations Height Weight    159/88 76 98.4  F (36.9  C) (Tympanic) 16 1.88 m (6' 2\") 91.6 kg (202 lb)    Pulse Oximetry BMI (Body Mass Index)                97% 25.94 kg/m2          [facility] [employer] Information     [email or web address] lets you send messages to your doctor, view your test results, renew your prescriptions, schedule appointments and more. To sign up, go to [email or web address] . Click on \"Log in\" on the left side of the screen, which will take you to the Welcome page. Then click on \"Sign up Now\" on the right side of the page.     You will be asked to enter the access code listed below, as well as some personal information. Please follow the directions to create your username and password.     Your access code is: [email or web address]  Expires: 2018 11:18 PM     Your access code will  in 90 days. If you need help or a new code, please call your [facility] clinic or [phone].        Equal Access to Services     [facility]: Hadii devin Blandon, wamackenzieda luclarissa, qasheyla kaalmada kourtney, janet mayer. So [facility] [phone].    ATENCIÓN: Si habla español, tiene a gunderson disposición servicios gratuitos de asistencia lingüística. Llame al [phone].    We comply with applicable " federal civil rights laws and [place] laws. We do not discriminate on the basis of race, color, national origin, age, disability, sex, sexual orientation, or gender identity.               Review of your medicines      START taking        Dose / Directions    haloperidol 5 MG tablet   Commonly known as:  HALDOL        Dose:  5 mg   Take 1 tablet (5 mg) by mouth At Bedtime   Quantity:  30 tablet   Refills:  1       haloperidol decanoate 50 MG/ML Soln injection   Commonly known as:  HALDOL DECANOATE        Dose:  50 mg   Start taking on:  5/19/2018   Inject 50 mg into the muscle every 30 days Next dose is due 5/19/2018   Quantity:  2.1 mL   Refills:  0         STOP taking     OLANZapine 20 MG tablet   Commonly known as:  zyPREXA                Where to get your medicines      These medications were sent to [facility] [place], [place] - [address]  [address] [facility] [address] [address]     Phone:  [phone]     haloperidol 5 MG tablet         Some of these will need a paper prescription and others can be bought over the counter. Ask your nurse if you have questions.     You don't need a prescription for these medications     haloperidol decanoate 50 MG/ML Soln injection                Protect others around you: Learn how to safely use, store and throw away your medicines at www.disposemymeds.org.             Medication List: This is a list of all your medications and when to take them. Check marks below indicate your daily home schedule. Keep this list as a reference.      Medications           Morning Afternoon Evening Bedtime As Needed    haloperidol 5 MG tablet   Commonly known as:  HALDOL   Take 1 tablet (5 mg) by mouth At Bedtime   Last time this was given:  5 mg on 4/19/2018  8:51 PM                                haloperidol decanoate 50 MG/ML Soln injection   Commonly known as:  HALDOL DECANOATE   Inject 50 mg into the muscle every 30 days Next dose is due 5/19/2018   Start  taking on:  5/19/2018   Last time this was given:  50 mg on 4/19/2018  9:22 AM

## 2018-04-09 NOTE — IP AVS SNAPSHOT
[address] [address]    Phone:  [phone]                                       After Visit Summary   4/9/2018    Austyn Leach    MRN: [id number]           After Visit Summary Signature Page     I have received my discharge instructions, and my questions have been answered. I have discussed any challenges I see with this plan with the nurse or doctor.    ..........................................................................................................................................  Patient/Patient Representative Signature      ..........................................................................................................................................  Patient Representative Print Name and Relationship to Patient    ..................................................               ................................................  Date                                            Time    ..........................................................................................................................................  Reviewed by Signature/Title    ...................................................              ..............................................  Date                                                            Time

## 2018-04-09 NOTE — ED NOTES
Bed: ED14  Expected date: 4/9/18  Expected time: 5:00 PM  Means of arrival:   Comments:  63 year old male off [facility] meds, violet outburst with police.  Restrained

## 2018-04-10 PROBLEM — F31.9 BIPOLAR 1 DISORDER (H): Status: ACTIVE | Noted: 2018-04-10

## 2018-04-10 LAB
AMPHETAMINES UR QL SCN: NEGATIVE
BARBITURATES UR QL: NEGATIVE
BENZODIAZ UR QL: NEGATIVE
CANNABINOIDS UR QL SCN: NEGATIVE
COCAINE UR QL: NEGATIVE
ETHANOL UR QL SCN: NEGATIVE
OPIATES UR QL SCN: NEGATIVE

## 2018-04-10 PROCEDURE — 80307 DRUG TEST PRSMV CHEM ANLYZR: CPT | Performed by: FAMILY MEDICINE

## 2018-04-10 PROCEDURE — 99223 1ST HOSP IP/OBS HIGH 75: CPT | Mod: AI | Performed by: PSYCHIATRY & NEUROLOGY

## 2018-04-10 PROCEDURE — 80320 DRUG SCREEN QUANTALCOHOLS: CPT | Performed by: FAMILY MEDICINE

## 2018-04-10 PROCEDURE — 25000132 ZZH RX MED GY IP 250 OP 250 PS 637: Performed by: PSYCHIATRY & NEUROLOGY

## 2018-04-10 PROCEDURE — 12400003 ZZH R&B MH CRITICAL UMMC

## 2018-04-10 RX ORDER — HYDROXYZINE HYDROCHLORIDE 25 MG/1
25 TABLET, FILM COATED ORAL EVERY 4 HOURS PRN
Status: DISCONTINUED | OUTPATIENT
Start: 2018-04-10 | End: 2018-04-20 | Stop reason: HOSPADM

## 2018-04-10 RX ORDER — HALOPERIDOL 5 MG/ML
5 INJECTION INTRAMUSCULAR AT BEDTIME
Status: DISCONTINUED | OUTPATIENT
Start: 2018-04-11 | End: 2018-04-10

## 2018-04-10 RX ORDER — HALOPERIDOL 5 MG/ML
2 INJECTION INTRAMUSCULAR EVERY 6 HOURS PRN
Status: DISCONTINUED | OUTPATIENT
Start: 2018-04-10 | End: 2018-04-20 | Stop reason: HOSPADM

## 2018-04-10 RX ORDER — HALOPERIDOL 5 MG/1
5 TABLET ORAL AT BEDTIME
Status: DISCONTINUED | OUTPATIENT
Start: 2018-04-11 | End: 2018-04-10

## 2018-04-10 RX ORDER — HALOPERIDOL 1 MG/1
2 TABLET ORAL EVERY 6 HOURS PRN
Status: DISCONTINUED | OUTPATIENT
Start: 2018-04-10 | End: 2018-04-20 | Stop reason: HOSPADM

## 2018-04-10 RX ORDER — HALOPERIDOL 0.5 MG/1
0.5 TABLET ORAL ONCE
Status: COMPLETED | OUTPATIENT
Start: 2018-04-10 | End: 2018-04-10

## 2018-04-10 RX ORDER — HALOPERIDOL 5 MG/ML
5 INJECTION INTRAMUSCULAR AT BEDTIME
Status: DISCONTINUED | OUTPATIENT
Start: 2018-04-10 | End: 2018-04-20 | Stop reason: HOSPADM

## 2018-04-10 RX ORDER — HALOPERIDOL 5 MG/1
5 TABLET ORAL AT BEDTIME
Status: DISCONTINUED | OUTPATIENT
Start: 2018-04-10 | End: 2018-04-20 | Stop reason: HOSPADM

## 2018-04-10 RX ADMIN — HALOPERIDOL 0.5 MG: 0.5 TABLET ORAL at 10:11

## 2018-04-10 RX ADMIN — HALOPERIDOL 5 MG: 5 TABLET ORAL at 21:12

## 2018-04-10 ASSESSMENT — ACTIVITIES OF DAILY LIVING (ADL)
SWALLOWING: 0-->SWALLOWS FOODS/LIQUIDS WITHOUT DIFFICULTY
TRANSFERRING: 0-->INDEPENDENT
DRESS: SCRUBS (BEHAVIORAL HEALTH)
AMBULATION: 0-->INDEPENDENT
DRESS: INDEPENDENT
LAUNDRY: UNABLE TO COMPLETE
GROOMING: INDEPENDENT
COGNITION: 2 - DIFFICULTY WITH ORGANIZING THOUGHTS
GROOMING: INDEPENDENT
RETIRED_COMMUNICATION: 0-->UNDERSTANDS/COMMUNICATES WITHOUT DIFFICULTY
TOILETING: 0-->INDEPENDENT
BATHING: 0-->INDEPENDENT
RETIRED_EATING: 0-->INDEPENDENT
FALL_HISTORY_WITHIN_LAST_SIX_MONTHS: NO
DRESS: 0-->INDEPENDENT
ORAL_HYGIENE: INDEPENDENT
ORAL_HYGIENE: INDEPENDENT

## 2018-04-10 NOTE — ED PROVIDER NOTES
"    [facility] EMERGENCY DEPARTMENT ([facility])    4/09/18       History     Chief Complaint   Patient presents with     Agitation     Stopped taking medications at home, scary behaviors seen by neighbors/public, mental abuse to wife, handcuffed  on ground.     HPI  Austyn Leach is a 63 year old male with a medical history significant for nicole and delusions who presents to the Emergency Department for evaluation of agitation.  The first thing that the patient mentioned when we entered the room was \"freedom of speech\".  He then told us about his website, which he told us to go to which is [employer].  The patient reports that he was at home today when the police came to his house and arrested him.  The patient reports that he was arrested because of his website, stating that the police were \"worried about his website\".  The patient then said that there are \"3 kinds of people on planet [place]: Straight, lesbian and huizar\".  The patient reports that the police pushed him down to the ground, jumped onto his back and handcuffed him.  The patient reports that they handcuffed him too tight and he is now complaining of some bilateral wrist pain.  The patient reports that he lives in [place], lives with his wife who is \"a pain in the ass\".  He then stated that she \"throws irons at him\".  The patient reports that his wife was not worried about him today, then made some comment again about Freedom of speech.  The patient reports that he has been otherwise feeling well, denies any recent illnesses, denies any cough or cold-like symptoms.  He denies any recent injuries or traumas.  The patient reports that he takes olanzapine daily, once at night before going to bed.  He denies any recent missed doses.  He denies any other daily medications.  He denies any illicit drug use or alcohol use.    I have reviewed the Medications, Allergies, Past Medical and Surgical History, and Social History in the Epic " "system.    Active Ambulatory Problems     Diagnosis Date Noted     Personal history of tobacco use, presenting hazards to health 10/07/2008     Erectile dysfunction 02/11/2009     Intention tremor 08/06/2010     Living will, counseling/discussion 04/03/2014     Health Care Home 09/09/2014     ACP (advance care planning) 08/28/2015     Delusions (H) 06/08/2017     Rosa (H) 09/05/2017     Resolved Ambulatory Problems     Diagnosis Date Noted     Mild intermittent asthma      Asthma, mild intermittent 11/22/2010     Past Medical History:   Diagnosis Date     Mild intermittent asthma      No current facility-administered medications for this encounter.      Current Outpatient Prescriptions   Medication     OLANZapine (ZYPREXA) 20 MG tablet        Allergies   Allergen Reactions     Bee Venom      Propranolol          Review of Systems   All other systems negative except as noted in the HPI.      Physical Exam   BP: (!) 171/100  Pulse: 86  Temp: 97.1  F (36.2  C)  Resp: 16  Height: 188 cm (6' 2\")  SpO2: 99 %      Physical Exam   Gen: NAD, sitting in chair, conversant and pleasant, non-toxic appearing  HEENT: NCAT, PERRL, EOMI, MMM  Neck: trachea midline, supple with FROM  Cardio: normal rate regular rhythm, no R/M/G, normally perfused and warm  Pulm: steady, non-labored respirations, normal WOB, CTAB, no w/r/r  Abd: soft nt/nd, no organomegaly, no CVA tenderness  Ext: normal peripheral pulses, no edema, neurovascularly intact distally.  Skin: no rashes or signs of trauma  Neuro: no focal deficits, 5/5 strength in all ext  Psych: euthymic mood, congruent affect. Normal syntax and thought process, though content with fixed delusions, concern for cameras in the room. No si/hi.       ED Course   7:33 PM  The patient was seen and examined by Jong Byrd MD in Room ED16.     ED Course     Procedures           Labs Ordered and Resulted from Time of ED Arrival Up to the Time of Departure from the ED - No data to display     "     Assessments & Plan (with Medical Decision Making)   This is a 63-year-old male with a history of bipolar affective disorder with episodes of nicole and psychosis who presents today and another episode of nicole and psychosis.  He was apparently brought in by police in handcuffs due to a violent outburst when they arrived to his home.  He had apparently been trying to take photographs of children or lure them to his house, per notes written by the [facility] crisis unit.  I was unable to get collateral information from wife or police, but it appears that he is not safe to go back to home at this time; so, he was placed on a hold due to being a danger to self and others.  He was given his nighttime dose of olanzapine, which he had not been taking and is likely the etiology for his decompensated mental health issues.  Unfortunately he became more and more agitated, worried about the cameras in the rooms, and became aggressive and was given IM haldol for his agitation, due to the fact that he was a danger to self/others. He was placed on a 72hr hold and he was admitted at this time to the hospital for continued management.    I have reviewed the nursing notes.    I have reviewed the findings, diagnosis, plan and need for follow up with the patient.    New Prescriptions    No medications on file       Final diagnoses:   Psychosis, unspecified psychosis type     I, Garrison Dyer, am serving as a trained medical scribe to document services personally performed by Jong Byrd MD, based on the provider's statements to me.   I, Jong Byrd MD, was physically present and have reviewed and verified the accuracy of this note documented by Garrison Dyer.    4/9/2018   [facility], EMERGENCY DEPARTMENT     Jong Byrd MD  04/10/18 0036

## 2018-04-10 NOTE — PROGRESS NOTES
A               Admission:  I am responsible for any personal items that are not sent to the safe or pharmacy.  [facility] is not responsible for loss, theft or damage of any property in my possession.    Items upon admission to [facility]  -no cash, cards or valuables present upon arrival to [facility]    -Items in [facility] locker   Coffman shoes  Brown belt  Matches  Blue jacket  White long t-shirt  Plaid shirt  Tan long underwear  Black long underwear  plastig bag containing 5 cigarettes      Signature:  _________________________________ Date: _______  Time: _____                                              Staff Signature:  ____________________________ Date: ________  Time: _____      2nd Staff person, if patient is unable/unwilling to sign:    Signature: ________________________________ Date: ________  Time: _____     Discharge:  [facility] has returned all of my personal belongings:    Signature: _________________________________ Date: ________  Time: _____                                          Staff Signature:  ____________________________ Date: ________  Time: _____

## 2018-04-10 NOTE — H&P
"[facility], [place]   Psychiatric History & Physical  Admission date: 4/9/2018  Austyn Leach  [id number]  04/10/18    Time: 76 minutes on encounter, >50% of which was spent in counseling and/or coordination of care consisting of: communication and education with the patient, communication with family and or friends if documented in note, lab/image/study evaluation, support staff communication, and other sources pertinent to excellent patient care.            Chief Complaint:   \"I am here because the police dragged me here\"        HPI:   Austyn Leach with a past medical history of asthma versus COPD, bipolar 1 disorder was admitted 4/9/2018 for paranoid behavior and possible delusions.    Austyn according to reports was picked up by police and has been paranoid about others being after him are monitoring him.  Previously in his chart there was mention of him being worried about the FBI and cameras watching him.  During this hospitalization it seems he was worried about the cameras in the room of the hospital.  He has also had a history of hypersexuality when he is manic.  According to documentation olanzapine at 20 mg was beneficial during his last hospitalization stay.  He was previously committed with a [facility] in place of olanzapine, haloperidol, risperidone, fluphenazine which is likely still in effect.    Upon meeting with him he reports stopping his olanzapine as soon as he was discharged from the hospital because it almost killed him.  He believes that his wife increased the insurance policy and believes that the medication will kill him eventually.  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  He denies threatening anyone.  He is frustrated " with his wife and says all she does yell at people she is Malian and also a lesbian and he is not sure as to why she stays at home with him.  He has not been involved in any prostitution or any hypersexual behaviors though in the past he was addicted to pornography and has not been doing that lately.  He mentions an intervention that takes rate on out of the air that he is trying to market and it only took them about 1 hour to build the machine that does this.  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  Denies any thoughts of harming himself or others or any hopelessness or helplessness attention or concentration issues or memory or sleep dysfunction.  He states that he is sleeping 6-8 hours each night and feels rested and energetic every day.  Denies any recent impulsive behaviors gambling addiction shopping addiction or any recent pornography or sexual addiction.  Denies any obsessive-compulsive disorder symptoms posttraumatic stress disorder symptoms generalized anxiety panic disorder hallucinations of any kind or any special messages from electronic devices or previous eating disorder symptoms.    Physically he feels very healthy now that he is off of his medication.  He is interested in going to smoke and not interested in nicotine supplementation.  We did discuss that he has might have continued commitment though this will need to be verified with the court and his  Neema Pepe at [facility].  He was willing to take haloperidol at this point in time we discussed the common risks and benefits associated with the medication.        Past Psychiatric History:     Past psychiatric history seems to have started in 2015 which is somewhat suspicious.  Could have prior history that I am not able to see currently 3 previous inpatient hospitalizations no traumatic brain injury seizures or electroconvulsive therapy.  Currently goes to  Dr. Burgess in [place] does not have a therapist.  Previously stabilized on olanzapine up to 20 mg.  2-3 previous commitments he believes and he believes he is still under current commitment with Neema Pepe with [facility].  Denies previous violence history of though has not there has been mention of aggressive behavior.  No long term time present time probation or parole currently.          Substance Use and History:     Substance use started at age 10 with cigarette smoking currently smoking 1 pack per day, alcohol use was highly prevalent in the past 1-3 chemical dependency treatments but no DUIs or legal charges related to drugs.          Past Medical History:   PAST MEDICAL HISTORY:   Past Medical History:   Diagnosis Date     Mild intermittent asthma     uses primatent       PAST SURGICAL HISTORY:   Past Surgical History:   Procedure Laterality Date     NO HISTORY OF SURGERY               Family History:   FAMILY HISTORY:   Family History   Problem Relation Age of Onset     C.A.D. Father      Hypertension Father      Breast Cancer Mother      DIABETES No family hx of      CEREBROVASCULAR DISEASE No family hx of            Social History:   SOCIAL HISTORY:   Social History     Social History     Marital status:      Spouse name: Angela     Number of children: 1     Years of education: N/A     Occupational History      [employer]     Social History Main Topics     Smoking status: Current Every Day Smoker     Packs/day: 1.00     Years: 58.00     Types: Cigarettes     Smokeless tobacco: Never Used     Alcohol use No     Drug use: No     Sexual activity: Yes     Partners: Female     Other Topics Concern     Parent/Sibling W/ Cabg, Mi Or Angioplasty Before 65f 55m? Yes     Social History Narrative    Born and raised in [place] has 2 siblings that he does not have contact with raised by both mother and father no abuse history or neglect.  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  Has the one marriage from a mail order bride service as he describes it 1 previous child from a previous relationship and one child with his wife.  He has contact with his son.  He was never in the .  Currently lives in a house with his wife enjoys writing books and going to the gym and exercising.  No access to guns or weapons at the house.            Physical ROS:   The patient endorsed the above issues. The remainder of 10-point review of systems was negative except as noted in HPI.         PTA Medications:     (Not in a hospital admission)       Allergies:     Allergies   Allergen Reactions     Bee Venom      Propranolol           Labs:     Recent Results (from the past 48 hour(s))   Drug abuse screen 6 urine (chem dep) ([facility])    Collection Time: 04/10/18  8:45 AM   Result Value Ref Range    Amphetamine Qual Urine Negative NEG^Negative    Barbiturates Qual Urine Negative NEG^Negative    Benzodiazepine Qual Urine Negative NEG^Negative    Cannabinoids Qual Urine Negative NEG^Negative    Cocaine Qual Urine Negative NEG^Negative    Ethanol Qual Urine Negative NEG^Negative    Opiates Qualitative Urine Negative NEG^Negative          Physical and Psychiatric Examination:     /86  Pulse 87  Temp 98.2  F (36.8  C) (Oral)  Resp 16  Ht 1.88 m (6' 2\")  SpO2 93%  Weight is 0 lbs 0 oz  There is no height or weight on file to calculate BMI.                                           Last 4 weights:  Wt Readings from Last 4 Encounters:   09/28/17 89.8 kg (198 lb)   08/22/17 87.1 kg (192 lb 2 oz)   06/22/17 88.5 kg (195 lb)   08/28/15 89.5 kg (197 lb 4 oz)       Physical Exam:  I have reviewed the physical exam as documented by Carson on 4/9 and agree with findings and assessment and have no additional findings to add at this time.    Mental Status Exam:  Austyn is a 63-year-old male with poor dentition and long white hair wearing hospital scrubs.  His speech is " of an appropriate rate and tone in his language is intact.  His behavior is appropriate and he does not have any abnormal movements.  His affect is neutral and he is slightly irritable.  His mood he describes as frustrated.  His thought content consists of the above with delusional content and no thoughts of harming self or others.  His thought process is slightly disorganized without looseness of association.  He does not have any abnormal perceptions that he is indulging in currently.  His attention and concentration appears adequate.  His cognition and fund of knowledge could be slightly below average.  His long-term/short-term/remote memory appears intact.  His insight and judgment are both impaired.         Admission Diagnoses:   Schizoaffective disorder bipolar type versus bipolar 1 disorder  Tobacco use disorder  Rule out pornography or sexual addiction outside of the context of manic episode         Assessment & Plan:     Assessment:  Austyn does have current delusions and paranoia as above and has not been taking the olanzapine believes it might be used to kill him and collect insurance money.  He is reluctant to take medications hence why he was previously committed.  We will need to verify that he has active current commitment and he was willing and agreeable to start haloperidol in a low dose at this point in time.  If he is in fact committed would likely start him on a long-acting injectable haloperidol once we understand what dose he will need.  My understanding that after stabilization he could potentially go back to his home.    Plan:  Admit to inpatient psychiatry service  Continue 72 hour hold and verify commitment with Andersen of olanzapine, haloperidol, risperidone, fluphenazine  Start haloperidol 0.5 mg with a titration up to 5 mg             Christian Obrien  [facility] Psychiatry      The following document has been created with voice recognition software and may contain  unintentional word substitutions.        Non clinically relevant CMS requirements:  Clinical Global Impressions  First:     Most recent:       # Pain Assessment:  Current Pain Score 4/9/2018   Patient currently in pain? yes   Pain score (0-10) -   Pain location Other (Comment)   Pain descriptors -       Any incidence of pain both chronic or acute reported will be documented in above documentation though further documentation can also be found in the internal medicine documentation or pain specialist documentation.

## 2018-04-10 NOTE — ED NOTES
Pt was moving the ceiling tiles and attempting to cover the camera in room 12. He was moved to room 14. He refused to take haldol . Code green called. Pt agreed to take a shot of haldol but as I attempted to give the IM t the pt began violently moving his arm and shaking. The syringe came out of the arm. The needle was intact. The IM was given and the pt was placed in seclusion.

## 2018-04-10 NOTE — ED NOTES
"Had to move patient to a different room due to escalation in aggressive behavior such as yelling, rise in anger, and standing in the door way after we told patient about 72 hr hold, and how he wants to leave to have a smoke. Me and another RN gave 15 mg (2tabs) of Zyprexa to calm down patient. Patient keeps stating \"look up my web site [email or web address]\". Patient states that web site shows no crime and police have nothing else to do.  "

## 2018-04-10 NOTE — ED NOTES
"ED to Behavioral Floor Handoff    SITUATION  Austyn Leach is a 63 year old male who speaks English and lives in a home with a spouse The patient arrived in the ED by ambulance from home with a complaint of Agitation (Stopped taking medications at home, scary behaviors seen by neighbors/public, mental abuse to wife, handcuffed  on ground.)  .The patient's current symptoms started/worsened 1-2 week(s) ago and during this time the symptoms have increased.   In the ED, pt was diagnosed with   Final diagnoses:   Psychosis, unspecified psychosis type        Initial vitals were: BP: (!) 171/100  Pulse: 86  Temp: 97.1  F (36.2  C)  Resp: 16  Height: 188 cm (6' 2\")  SpO2: 99 %   --------  Is the patient diabetic? No   If yes, last blood glucose? --     If yes, was this treated in the ED? --  --------  Is the patient inebriated (ETOH) No or Impaired on other substances? No  MSSA done? N/A  Last MSSA score: --    Were withdrawal symptoms treated? N/A  Does the patient have a seizure history? No. If yes, date of most recent seizure--  --------  Is the patient patient experiencing suicidal ideation? denies current or recent suicidal ideation     Homicidal ideation? denies current or recent homicidal ideation or behaviors.    Self-injurious behavior/urges? denies current or recent self injurious behavior or ideation.  ------  Was pt aggressive in the ED Yes  Was a code called Yes  Is the pt now cooperative? Yes  -------  Meds given in ED:   Medications   nicotine polacrilex (NICORETTE) gum 4 mg (4 mg Buccal Not Given 4/10/18 1016)   nicotine polacrilex (COMMIT) lozenge 2 mg (not administered)   haloperidol (HALDOL) tablet 5 mg (not administered)     Or   haloperidol lactate (HALDOL) injection 5 mg (not administered)   OLANZapine zydis (zyPREXA) ODT tab 15 mg (15 mg Oral Given 4/9/18 2131)   haloperidol lactate (HALDOL) injection 10 mg (10 mg Intramuscular Given 4/9/18 2204)   haloperidol (HALDOL) tablet 0.5 mg (0.5 mg Oral " "Given 4/10/18 1011)      Family present during ED course? No  Family currently present? No    BACKGROUND  Does the patient have a cognitive impairment or developmental disability? No  Allergies:   Allergies   Allergen Reactions     Bee Venom      Propranolol    .   Social demographics are   Social History     Social History     Marital status:      Spouse name: Angela     Number of children: 1     Years of education: N/A     Occupational History      [employer]     Social History Main Topics     Smoking status: Current Every Day Smoker     Packs/day: 1.00     Years: 58.00     Types: Cigarettes     Smokeless tobacco: Never Used     Alcohol use No     Drug use: No     Sexual activity: Yes     Partners: Female     Other Topics Concern     Parent/Sibling W/ Cabg, Mi Or Angioplasty Before 65f 55m? Yes     Social History Narrative    Born and raised in [place] has 2 siblings that he does not have contact with raised by both mother and father no abuse history or neglect.  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  Has the one marriage from a mail order bride service as he describes it 1 previous child from a previous relationship and one child with his wife.  He has contact with his son.  He was never in the .  Currently lives in a house with his wife enjoys writing books and going to the gym and exercising.  No access to guns or weapons at the house.        ASSESSMENT  Labs results   Labs Ordered and Resulted from Time of ED Arrival Up to the Time of Departure from the ED   DRUG ABUSE SCREEN 6 CHEM DEP URINE ([facility])      Imaging Studies: No results found for this or any previous visit (from the past 24 hour(s)).   Most recent vital signs /86  Pulse 87  Temp 98.2  F (36.8  C) (Oral)  Resp 16  Ht 1.88 m (6' 2\")  SpO2 93%   Abnormal labs/tests/findings requiring intervention:---   Pain control: pt had none  Nausea control: " pt had none    RECOMMENDATION  Are any infection precautions needed (MRSA, VRE, etc.)? No If yes, what infection? --  ---  Does the patient have mobility issues? independently. If yes, what device does the pt use? ---  ---  Is patient on 72 hour hold or commitment? Yes If on 72 hour hold, have hold and rights been given to patient? Yes  Are admitting orders written if after 10 p.m. ?N/A  Tasks needing to be completed:---     Anna Thomas   [facility]-- [phone] [place] ED   [phone] [facility] ED

## 2018-04-10 NOTE — PROGRESS NOTES
Initial Psychosocial Assessment     I have reviewed the chart, met with the patient, and developed Care Plan.  Information for assessment was obtained from: records        Presenting Problem:  Admitted to [facility] due to going off his meds for several weeks and becoming psychotic - paranoid delusions and grandiose delusions.  Additionally, pt was behaving inappropriately in public setting.   For example, approaching strangers in the library in a manner that made the strangers uncomfortable.  Also, he went to [employer] and was throwing paper airplanes around and snapping pictures during the [employer] service.        History of Mental Health and Chemical Dependency:  Most recent admission:  9-5-17 to 9-29-17, [facility].  Pt was here at [facility] from 6/7/17 - 6/23/17 - on Station 20.    Pt admitted at [facility] in the past.    Hx of commitments.      Unknown hx of drugs or alcohol abuse.      Family Description:  Pt is  and has 2 adult children.  P's' wife is originally from the [facility]     Significant Life Events (Illness, Abuse, Trauma, Death):  *When this patient gets symptomatic it is reported that he becomes very sexually preoccupied and racially preoccupied.  When he was hospitalized in June 2017 he reportedly exposed himself to a child while he was in the ED     Living Situation:  Pt resides with his wife in [place]     Educational Background:  Graduated       Occupational History:  Has worked as a ,  in his past      Financial Status:  [employer] benefits        Legal Issues:  Pt was committed as MI on 9-22-17 to [place] [facility].   It was continued on 3-15-18, for another 6 months.   It expires 9-15-18.  Pt has 2 orders for protection against him, one filed by his step daughter and the other filed by the electric company.         Ethnic/Cultural Issues:       Spiritual Orientation:  none      Service History:  none     Social Functioning (organization,  interests):  None known     Current Treatment Providers:  Psychiatry:   Dr. Denisa Morales MD    [facility]  [address] [place] [address]  Phone: [phone]    Fax: [phone]     [facility] :  Betty Pepe  Phone: [phone]    Fax: [phone]     Social Service Assessment/Plan:  -Pt is under commitment and the Provisional Discharge is being revoked by [facility].  -Coordinate with his [place] [facility].  -Assist with aftercare appointments.  -Determine whether pt will return home at time of discharge.  -When pt is discharged, he will need a Provisional Discharge Agreement completed.  -Refer to business office to assist patient with completing Medical Assistance Application.

## 2018-04-10 NOTE — PROGRESS NOTES
HP to follow seen by Chencho Obrien  [facility] Psychiatry      The following document has been created with voice recognition software and may contain unintentional word substitutions.

## 2018-04-10 NOTE — ED NOTES
.SIGN-OUT:  - Assumed care of this patient from Dr. Byrd  - Pending at shift change: ED Boarder - pending Admission to inpatient psychiatric bed once available  - Tentative plan: Admission to inpatient psychiatric bed once available. Pt on 72 hour hold.     REASSESSMENT:  - No acute issues or interventions necessary while still in the emergency department.     DISPOSITION:  - Patient carried on with their original disposition plan.       Lona Fay MD  04/10/18 0569

## 2018-04-10 NOTE — PLAN OF CARE
"Problem: Cognitive Impairment (Psychotic Signs/Symptoms) (Adult)  Goal: Improved Thought Clarity/Organization (Psychotic Signs/Symptoms)  Outcome: Improving  Pt admitted to the unit this shift from [facility] on a 72 hour hold. Pt calm and cooperative with search and changing into scrubs. Pt was shown the unit and his room. Pt cooperative with finishing admission profile this shift. Pt reports that he was taken from home by police after \"a misunderstanding about freedom of speech.\" Pt reports taking pictures of people at [employer] and the mall. Pt has delusional content to his speech about his wife and outpatient team trying to poison him with prescribed neuroleptics. Pt does not appear to be responding to internal stimuli and denies hallucinations. Pt's speech is overall clear. Pt's insight remains very limited and his judgement impaired. Pt continues to talk perseveratively about sexual things as well as making bizarre statements such as, \"oh are getting ready to fuck me in the ass?\" When a staff member brought him clean clothes. Pt denies SI/SIB/HI. Pt's VS were WDL with slightly elevated systolic blood pressure. Pt denies physical health symptoms or side effects from medications at this time.      04/10/18 1437   Improved Thought Clarity/Organization (Psychotic Signs/Symptoms)   Improved Thought Clarity/Organization Action Step/Short Term Goal (STG) Established 04/10/18   Improved Thought Clarity/Organization Time Frame for Action Step (STG) 2 days   Improved Thought Clarity/Organization Action Step (STG) Outcome making progress toward outcome       Problem: Sensory Perception Impairment (Psychotic Signs/Symptoms) (Adult)  Intervention: Minimize/Manage Sensory Impairment   04/10/18 1437   Minimize/Manage Sensory Impairment   Mutually Determined Action Steps (Minimize/Manage Sensory Impairment) adheres to medication regimen   Cognitive Interventions   Sensory Stimulation Regulation care clustered;music/television " provided for relaxation;quiet environment promoted   Perceptual State WDL   Hallucinations denies hallucinations   Perceptual State consistent with reality       Goal: Decrease Frequency/Intensity of Sensory Symptoms (Psychotic Signs/Symptoms)  Outcome: Improving   04/10/18 1437   Decrease Frequency/Intensity of Sensory Symptoms (Psychotic Signs/Symptoms)   Decrease in Sensory Symptom Frequency/Intensity Action Step/Short Term Goal (STG) Established 04/10/18   Decrease in Sensory Symptom Frequency/Intensity Time Frame for Action Step (STG) 2 days   Decrease in Sensory Symptom Frequency/Intensity Action Step (STG) Outcome making progress toward outcome

## 2018-04-10 NOTE — ED NOTES
Pt. up to BR, asked pt. for urine specimen, pt. stated that he already gave one and didn't need to due it again.  Will try later.

## 2018-04-11 PROCEDURE — 99232 SBSQ HOSP IP/OBS MODERATE 35: CPT | Performed by: PSYCHIATRY & NEUROLOGY

## 2018-04-11 PROCEDURE — A9270 NON-COVERED ITEM OR SERVICE: HCPCS | Mod: GY | Performed by: PSYCHIATRY & NEUROLOGY

## 2018-04-11 PROCEDURE — 12400003 ZZH R&B MH CRITICAL UMMC

## 2018-04-11 PROCEDURE — A9270 NON-COVERED ITEM OR SERVICE: HCPCS | Mod: GY | Performed by: EMERGENCY MEDICINE

## 2018-04-11 PROCEDURE — 25000132 ZZH RX MED GY IP 250 OP 250 PS 637: Mod: GY | Performed by: PSYCHIATRY & NEUROLOGY

## 2018-04-11 PROCEDURE — 25000132 ZZH RX MED GY IP 250 OP 250 PS 637: Mod: GY | Performed by: EMERGENCY MEDICINE

## 2018-04-11 RX ADMIN — NICOTINE POLACRILEX 2 MG: 2 LOZENGE ORAL at 16:47

## 2018-04-11 RX ADMIN — NICOTINE POLACRILEX 2 MG: 2 LOZENGE ORAL at 13:15

## 2018-04-11 RX ADMIN — HALOPERIDOL 5 MG: 5 TABLET ORAL at 21:27

## 2018-04-11 ASSESSMENT — ACTIVITIES OF DAILY LIVING (ADL)
LAUNDRY: UNABLE TO COMPLETE
DRESS: INDEPENDENT
ORAL_HYGIENE: INDEPENDENT
DRESS: INDEPENDENT
GROOMING: INDEPENDENT
GROOMING: INDEPENDENT
ORAL_HYGIENE: INDEPENDENT
LAUNDRY: UNABLE TO COMPLETE

## 2018-04-11 NOTE — PROGRESS NOTES
Austyn said that he is here for the same reason as before. He is being kept from free speech and his wife is crazy. He was out in the lounge for the whole shift and for the most part kept to himself with an occasional comment here and there. When talking about his mental state and what he thinks he might need to do to leave he became quiet and said that he didn't care and asked to have the channel changed on the tv. After a few minutes he stood up and went to his room.

## 2018-04-11 NOTE — PROGRESS NOTES
Patient has been in the lounge entire shift mostly appropriate conversation with peers. Redirectable with out getting angry at staff.     04/11/18 1452   Behavioral Health   Hallucinations denies / not responding to hallucinations   Thinking delusional;poor concentration;distractable   Orientation person: oriented;place: oriented;date: oriented;time: oriented   Memory baseline memory   Insight poor   Judgement impaired   Eye Contact at examiner   Affect full range affect   Mood mood is calm   Physical Appearance/Attire untidy;flamboyant;disheveled   Hygiene neglected grooming - unclean body, hair, teeth   Suicidality other (see comments)  (Denies)   1. Wish to be Dead No   2. Non-Specific Active Suicidal Thoughts  No   Self Injury other (see comment)  (No SIB observed)   Elopement (No behaviors)   Activity other (see comment)  (In milieu)   Speech clear;rambling;coherent   Medication Sensitivity (Left hand palsy, denies as problem, RN observed)   Psychomotor / Gait balanced;steady   Substance Withdrawal Interventions   Social and Therapeutic Interventions (Substance Withdrawal) encourage effective boundaries with peers   Sleep/Rest/Relaxation   Day/Evening Time Hours up all shift   Psycho Education   Type of Intervention 1:1 intervention   Response participates with encouragement   Hours 0.5   Treatment Detail Triggers   Daily Care   Activity up ad evonne   Activities of Daily Living   Hygiene/Grooming independent   Oral Hygiene independent   Dress independent   Laundry unable to complete   Room Organization independent   Activity   Activity Assistance Provided independent

## 2018-04-11 NOTE — PROGRESS NOTES
[facility], [place]   Psychiatric Progress Note  Hospital Day: 1        Interim History:   The patient's care was discussed with the treatment team during the daily team meeting and/or staff's chart notes were reviewed.  Staff report patient is doing better this time than when he was admitted last time, however he remains delusional with lack of insight. He believes that he is going to be raped here and continues to make reference to a delusion that he had from his last admission about ED staff intentionally killing people in the room next to him.    Upon interview, the patient indicates that he did nothing wrong. He doesn't feel that it was appropriate to be forcibly admitted here. I discussed some of my concerns about his behavior prior to admission. He was quite defensive and believes that it was perfectly within his right to talk to random children in the library and invite his neighbor's children over. He doesn't believe he needs to have their parents permission to initiate conversation and will continue to act this way when he leaves. He explains that his wife is a lesbian and that his child is huizar, so he has no grandchildren, which he believes is unfair.     At one point, patient began to berate me and his outpatient team for being too stupid to get him into some sort of groups. He claims that if we got him into a boating group, or something like that, he likely wouldn't have ended up back here. I explained that my role is to adjust medications and then recommend outpatient treatment and that any hobby groups he wishes to pursue will be up to him. I did agree that keeping busy will likely help him, but that without adherence to medications, he is likely to worsen and require hospitalization again soon.    Psychiatric Symptoms: nicole as characterized by delusional thinking, hypersexuality, mood lability    Medication side effects reported: None    Other issues reported by patient:  "None         Medications:       haloperidol  5 mg Oral At Bedtime    Or     haloperidol lactate  5 mg Intramuscular At Bedtime     nicotine polacrilex  4 mg Buccal Once          Allergies:     Allergies   Allergen Reactions     Bee Venom      Propranolol           Labs:     Recent Results (from the past 48 hour(s))   Drug abuse screen 6 urine (chem dep) ([facility])    Collection Time: 04/10/18  8:45 AM   Result Value Ref Range    Amphetamine Qual Urine Negative NEG^Negative    Barbiturates Qual Urine Negative NEG^Negative    Benzodiazepine Qual Urine Negative NEG^Negative    Cannabinoids Qual Urine Negative NEG^Negative    Cocaine Qual Urine Negative NEG^Negative    Ethanol Qual Urine Negative NEG^Negative    Opiates Qualitative Urine Negative NEG^Negative          Psychiatric Examination:     /61  Pulse 95  Temp 98.6  F (37  C) (Tympanic)  Resp 18  Ht 1.88 m (6' 2\")  SpO2 94%  Weight is 0 lbs 0 oz  There is no height or weight on file to calculate BMI.    Orthostatic Vitals       Most Recent      Sitting Orthostatic /87 04/11 0738    Sitting Orthostatic Pulse (bpm) 84 04/11 0738    Standing Orthostatic /97 04/11 0738    Standing Orthostatic Pulse (bpm) 96 04/11 0738            Appearance: awake, alert, adequately groomed and dressed in hospital scrubs  Attitude:  cooperative  Eye Contact:  good  Mood:  euthymic  Affect:  somewhat irritable  Speech:  clear, coherent and normal prosody  Language: fluent and intact in English  Psychomotor, Gait, Musculoskeletal:  resting tremor in bilateral hands. Gait WNL  Throught Process:  illogical  Associations:  no loose associations  Thought Content:  paranoid and grandiose delusions with hypersexuality  Insight:  poor  Judgement:  poor  Oriented to:  time, person, and place  Attention Span and Concentration:  fair  Recent and Remote Memory:  intact  Fund of Knowledge:  adequate    Clinical Global Impressions  First:  Considering your total clinical experience " with this particular patient population, how severe are the patient's symptoms at this time?: 7 (04/11/18 1220)  Compared to the patient's condition at the START of treatment, this patient's condition is:: 4 (04/11/18 1220)  Most recent:  Considering your total clinical experience with this particular patient population, how severe are the patient's symptoms at this time?: 7 (04/11/18 1220)  Compared to the patient's condition at the START of treatment, this patient's condition is:: 4 (04/11/18 1220)    # Pain Assessment:  Current Pain Score 4/9/2018   Patient currently in pain? yes   Pain score (0-10) -   Pain location Other (Comment)   Pain descriptors -   Austyn s pain level was assessed and he currently denies pain.             Precautions:     Behavioral Orders   Procedures     Code 1 - Restrict to Unit     Elopement precautions     Routine Programming     As clinically indicated     Sexual precautions     Single Room     Status 15     Every 15 minutes.          Diagnoses:   Psychosis, unspecified psychosis type         Assessment & Plan:   Assessment and hospital summary:  63-year-old man admitted due to inappropriate behaviors. He reportedly was conversing with children and inviting children over to his house. He has history of inappropriate contact with minors in the past (step-daughter) that resulted in a restraining order. He reportedly stopped his medications soon after his last hospital discharge. He had very poor insight into the inappropriateness of his behaviors at admission and believes that because he has no grandchildren, it is perfectly reasonable to want contact with other people's children without asking their parents for permission. He believes that his free speech rights are being suppressed.    Target psychiatric symptoms and interventions:  Continue with current dose of haloperidol. If patient demonstrates improvement in manic symptoms, will transition to long acting injection given  repeated non-compliance with treatment.    Medical Problems and Treatments:  None current    Behavioral/Psychological/Social:  Encourage unit programming    Precautions:  Behavioral Orders   Procedures     Code 1 - Restrict to Unit     Elopement precautions     Routine Programming     As clinically indicated     Sexual precautions     Single Room     Status 15     Every 15 minutes.         Legal:  Patient admitted on a 72-hour hold. Outpatient team filed for revokation of provisional discharge.    Disposition:  Ongoing nicole with inappropriate behaviors toward minors prior to admission. Requires stabilization before he could safely return to the community.

## 2018-04-11 NOTE — PROGRESS NOTES
Pt has not formally attended groups, though sits in the lounge, quietly working on puzzles.  Writer has not heard or observed anything inappropriate between pt an other peers in group.

## 2018-04-11 NOTE — PLAN OF CARE
"Problem: Patient Care Overview  Goal: Team Discussion  Team Plan:   BEHAVIORAL TEAM DISCUSSION    Participants: dr mcghee, zaria plasencia rn, niki mota [facility]  Progress:  None.    Pt just admitted.   He is psychotic - delusional and grandiose.  Continued Stay Criteria/Rationale: he's not stable and being re-started on medication  Medical/Physical: per internal medicine  Precautions:   Behavioral Orders   Procedures     Code 1 - Restrict to Unit     Elopement precautions     Routine Programming     As clinically indicated     Sexual precautions     Single Room     Status 15     Every 15 minutes.     Plan:  Meds per dr mcghee.    His PD is being revoked.    He will likely return home when stable.   Coordinate with his [facility] CM.    Assist as needed with appointments.    Rationale for change in precautions or plan: no change at this time    IMR:   Reason for admit:   \"Freedom of speech.\"  Goal for discharge:  \"Yesterday.\"                "

## 2018-04-11 NOTE — PROGRESS NOTES
[place] [facility] [place]'s Provisional Discharge.   Pt is aware of it but really does not seem to understand it.  He indicates he will not challenge the revocation.

## 2018-04-11 NOTE — PROGRESS NOTES
"Per OP CM request, CTC and writer gave pt paperwork for provisional discharge. As pt stated he could not read, writer read paperwork to him and explained process of contesting the revocation should he wish to do so. Pt denied desire to contest revocation at this time, \"That's fine, I'll just do what I did last time and stay here 3 weeks and go home, only this time I'll be good.\" Pt signed form, which was faxed back to OP CM, per her request.     Pt also requested that he would like to find a daily or weekly group to go to post discharge, \"Like AA, where you sit can talk about things.\"    1200 Pt signed MANDA and asked writer to speak to pt's wife regarding bringing in his glasses. Declined to speak to her himself, \"She's horrible, she throws her high heels at me!\" Wife described difficulties with pt at home around medications, saying he was appeared med complaint for the first 6 months, then she began noticing changes, \"He switched out the medication in his bottles. I thought the olanzapine looked different so I took it to a pharmacy they said it wasn't the right one. He sticks them in his cheek, his gums. I was told I can't force him, and he knows that. He fights with me about it all the time. I'm glad he's with you know, but I just want you to know he's sneaky like this. I know he's not like this, hopefully he will be cured again. I cannot take care of him when he has the right to fight with me. I would never hurt him, but I feel frustrated with him all the time; I'm always mad; I have no patience for him. He helped me come from the [facility], I feel like I owe him, like god sent me to take care of him. I don't know what I'm going to do.\" Reassured wife that the team would provide the same level of care and safety for pt that we did during his last admission. Encouraged her to take care of herself and if need be, speak to pt's OP CM with whom she is familiar. Have passed her concerns onto CTC. She will visit with " the glasses tomorrow.

## 2018-04-12 PROCEDURE — A9270 NON-COVERED ITEM OR SERVICE: HCPCS | Mod: GY | Performed by: EMERGENCY MEDICINE

## 2018-04-12 PROCEDURE — 25000132 ZZH RX MED GY IP 250 OP 250 PS 637: Mod: GY | Performed by: PSYCHIATRY & NEUROLOGY

## 2018-04-12 PROCEDURE — 25000132 ZZH RX MED GY IP 250 OP 250 PS 637: Mod: GY | Performed by: EMERGENCY MEDICINE

## 2018-04-12 PROCEDURE — 12400003 ZZH R&B MH CRITICAL UMMC

## 2018-04-12 PROCEDURE — H2032 ACTIVITY THERAPY, PER 15 MIN: HCPCS

## 2018-04-12 PROCEDURE — A9270 NON-COVERED ITEM OR SERVICE: HCPCS | Mod: GY | Performed by: PSYCHIATRY & NEUROLOGY

## 2018-04-12 RX ADMIN — HALOPERIDOL 5 MG: 5 TABLET ORAL at 21:30

## 2018-04-12 RX ADMIN — NICOTINE POLACRILEX 2 MG: 2 LOZENGE ORAL at 08:10

## 2018-04-12 RX ADMIN — NICOTINE POLACRILEX 2 MG: 2 LOZENGE ORAL at 13:16

## 2018-04-12 RX ADMIN — NICOTINE POLACRILEX 2 MG: 2 LOZENGE ORAL at 21:30

## 2018-04-12 ASSESSMENT — ACTIVITIES OF DAILY LIVING (ADL)
ORAL_HYGIENE: INDEPENDENT
GROOMING: INDEPENDENT
HYGIENE/GROOMING: INDEPENDENT
LAUNDRY: UNABLE TO COMPLETE
DRESS: SCRUBS (BEHAVIORAL HEALTH)

## 2018-04-12 NOTE — PROGRESS NOTES
Patient was calm, polite, and social most of the shift.  He eat meals, participated in movement therapy. His speech was clear and linear, however the validity of some of his statements seems suspect.

## 2018-04-12 NOTE — PROGRESS NOTES
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 He does not have any spiritual requests for devotional items or other spiritual support, but does like the opportunity to talk about his beliefs/process. We agreed that I would follow up next week. He expressed appreciation for the conversation, \"I like to be able to talk to someone about this.\"    PLAN: Will follow up with Kendall next week as appropriate.                                                                                                                            Betty Villegas  Staff Dolores Bright, [facility]  Pager [phone]    "

## 2018-04-12 NOTE — PLAN OF CARE
Problem: Cognitive Impairment (Psychotic Signs/Symptoms) (Adult)  Goal: Improved Thought Clarity/Organization (Psychotic Signs/Symptoms)  Outcome: No Change  48 Hr    Pt has been present in the milieu throughout the shift doing puzzles and socializing with others. Pleasant upon approach and cooperative. Pt goal was to complete a puzzle which he stated he did earlier. And to take his meds. Pt also trimmed his mustache a little. Pt stated  I want to leave here . Pt had his provisional discharge revoked today. Pt states he ll take his meds when he goes home. States he didn t take it because his heart was pounding. Pt did take his NOC Haldol.   PRN-Nicotine Lozenge

## 2018-04-13 PROCEDURE — A9270 NON-COVERED ITEM OR SERVICE: HCPCS | Mod: GY | Performed by: PSYCHIATRY & NEUROLOGY

## 2018-04-13 PROCEDURE — 12400003 ZZH R&B MH CRITICAL UMMC

## 2018-04-13 PROCEDURE — 25000132 ZZH RX MED GY IP 250 OP 250 PS 637: Mod: GY | Performed by: PSYCHIATRY & NEUROLOGY

## 2018-04-13 PROCEDURE — 99232 SBSQ HOSP IP/OBS MODERATE 35: CPT | Performed by: PSYCHIATRY & NEUROLOGY

## 2018-04-13 PROCEDURE — 25000132 ZZH RX MED GY IP 250 OP 250 PS 637: Mod: GY | Performed by: EMERGENCY MEDICINE

## 2018-04-13 PROCEDURE — A9270 NON-COVERED ITEM OR SERVICE: HCPCS | Mod: GY | Performed by: EMERGENCY MEDICINE

## 2018-04-13 RX ADMIN — HALOPERIDOL 5 MG: 5 TABLET ORAL at 20:12

## 2018-04-13 RX ADMIN — NICOTINE POLACRILEX 2 MG: 2 LOZENGE ORAL at 06:42

## 2018-04-13 RX ADMIN — NICOTINE POLACRILEX 2 MG: 2 LOZENGE ORAL at 18:50

## 2018-04-13 RX ADMIN — NICOTINE POLACRILEX 2 MG: 2 LOZENGE ORAL at 13:22

## 2018-04-13 ASSESSMENT — ACTIVITIES OF DAILY LIVING (ADL)
ORAL_HYGIENE: INDEPENDENT
LAUNDRY: UNABLE TO COMPLETE
DRESS: SCRUBS (BEHAVIORAL HEALTH);INDEPENDENT
ORAL_HYGIENE: INDEPENDENT
GROOMING: INDEPENDENT
DRESS: INDEPENDENT
HYGIENE/GROOMING: INDEPENDENT

## 2018-04-13 NOTE — PLAN OF CARE
"Problem: Sensory Perception Impairment (Psychotic Signs/Symptoms) (Adult)  Goal: Decrease Frequency/Intensity of Sensory Symptoms (Psychotic Signs/Symptoms)  Outcome: No Change  Patient presents as elated, grandiose, and delusional with pressured, rambling and tangential speech.  His affect and eye contact are notable for increased intensity.  He has been visible in the milieu, socializing with staff and peers.  While his behavior is hyperactive and intrusive at times, he has required minimal redirection today for demonstrating inappropriate social boundaries.  He has been informing various staff members of his intent to \"make sure you all get raises!- you're worth at least $2 more per hour!\".  He denies that he is experiencing any manic or psychotic symptoms.  He has been compliant with his HS dose of Haldol, and he denies that he is experiencing any adverse side effects from this medication.  He denies any acute physical concerns.  He reports, \"I feel like a million bucks!\".      "

## 2018-04-13 NOTE — PROGRESS NOTES
Social in the milieu much of the evening. Pleasant and polite.  Full range affect, calm mood. Ate meals.       04/12/18 2200   Behavioral Health   Hallucinations denies / not responding to hallucinations   Thinking poor concentration   Orientation person: oriented;place: oriented;date: oriented;time: oriented   Memory baseline memory   Insight poor   Judgement impaired   Eye Contact at examiner   Affect full range affect   Mood mood is calm   Physical Appearance/Attire disheveled   Hygiene neglected grooming - unclean body, hair, teeth   1. Wish to be Dead No   2. Non-Specific Active Suicidal Thoughts  No   Activities of Daily Living   Hygiene/Grooming independent   Oral Hygiene (Declined)

## 2018-04-13 NOTE — PROGRESS NOTES
"[facility], [place]   Psychiatric Progress Note  Hospital Day: 3        Interim History:   The patient's care was discussed with the treatment team during the daily team meeting and/or staff's chart notes were reviewed.  Staff report patient has been doing well. No acute issues.    Upon interview, the patient reports that he is ready to go. He still lacks any insight into his reason for admission.    I spoke with supervisor of the outpatient team. She expressed concerns that patient's behaviors indicated that he would be at future risk to be inappropriate with a child. She believes that neuropsych testing may reveal something that allows us to provide the as much support as we can in order to avoid this.    Psychiatric Symptoms: nicole as characterized by delusional thinking, hypersexuality, mood lability    Medication side effects reported: None    Other issues reported by patient: None         Medications:       haloperidol  5 mg Oral At Bedtime    Or     haloperidol lactate  5 mg Intramuscular At Bedtime     nicotine polacrilex  4 mg Buccal Once          Allergies:     Allergies   Allergen Reactions     Bee Venom      Propranolol           Labs:     No results found for this or any previous visit (from the past 48 hour(s)).       Psychiatric Examination:     /78  Pulse 77  Temp 98.6  F (37  C) (Tympanic)  Resp 16  Ht 1.88 m (6' 2\")  SpO2 95%  Weight is 0 lbs 0 oz  There is no height or weight on file to calculate BMI.        Appearance: awake, alert, adequately groomed and dressed in hospital scrubs  Attitude:  cooperative  Eye Contact:  good  Mood:  euthymic  Affect:  intensity is blunted  Speech:  clear, coherent and normal prosody  Language: fluent and intact in English  Psychomotor, Gait, Musculoskeletal:  no tremor today  Throught Process:  illogical  Associations:  no loose associations  Thought Content:  paranoid and grandiose delusions with hypersexuality  Insight:  " poor  Judgement:  poor  Oriented to:  time, person, and place  Attention Span and Concentration:  fair  Recent and Remote Memory:  intact  Fund of Knowledge:  adequate    Clinical Global Impressions  First:  Considering your total clinical experience with this particular patient population, how severe are the patient's symptoms at this time?: 7 (04/11/18 1220)  Compared to the patient's condition at the START of treatment, this patient's condition is:: 4 (04/11/18 1220)  Most recent:  Considering your total clinical experience with this particular patient population, how severe are the patient's symptoms at this time?: 7 (04/11/18 1220)  Compared to the patient's condition at the START of treatment, this patient's condition is:: 4 (04/11/18 1220)    # Pain Assessment:  Current Pain Score 4/13/2018   Patient currently in pain? no   Pain score (0-10) -   Pain location -   Pain descriptors -   Austyn s pain level was assessed and he currently denies pain.             Precautions:     Behavioral Orders   Procedures     Code 1 - Restrict to Unit     Elopement precautions     Neuropsych Testing     Yamilka Venkat     Routine Programming     As clinically indicated     Sexual precautions     Single Room     Status 15     Every 15 minutes.          Diagnoses:   Psychosis, unspecified psychosis type         Assessment & Plan:   Assessment and hospital summary:  63-year-old man admitted due to inappropriate behaviors. He reportedly was conversing with children and inviting children over to his house. He has history of inappropriate contact with minors in the past (step-daughter) that resulted in a restraining order. He reportedly stopped his medications soon after his last hospital discharge. He had very poor insight into the inappropriateness of his behaviors at admission and believes that because he has no grandchildren, it is perfectly reasonable to want contact with other people's children without asking their parents for  permission. He believes that his free speech rights are being suppressed.    Target psychiatric symptoms and interventions:  Continue with current dose of haloperidol. If patient demonstrates improvement in manic symptoms, will transition to long acting injection given repeated non-compliance with treatment.    Order neuropsych testing per outpatient team request    Medical Problems and Treatments:  None current    Behavioral/Psychological/Social:  Encourage unit programming    Precautions:  Behavioral Orders   Procedures     Code 1 - Restrict to Unit     Elopement precautions     Neuropsych Testing     Yamilka Venkat     Routine Programming     As clinically indicated     Sexual precautions     Single Room     Status 15     Every 15 minutes.         Legal:  Patient admitted on a 72-hour hold. Outpatient team filed for revokation of provisional discharge.    Disposition:  Ongoing nicole with inappropriate behaviors toward minors prior to admission. Requires stabilization before he could safely return to the community.

## 2018-04-14 PROCEDURE — 90853 GROUP PSYCHOTHERAPY: CPT

## 2018-04-14 PROCEDURE — A9270 NON-COVERED ITEM OR SERVICE: HCPCS | Mod: GY | Performed by: PSYCHIATRY & NEUROLOGY

## 2018-04-14 PROCEDURE — 12400003 ZZH R&B MH CRITICAL UMMC

## 2018-04-14 PROCEDURE — A9270 NON-COVERED ITEM OR SERVICE: HCPCS | Mod: GY | Performed by: EMERGENCY MEDICINE

## 2018-04-14 PROCEDURE — 25000132 ZZH RX MED GY IP 250 OP 250 PS 637: Mod: GY | Performed by: EMERGENCY MEDICINE

## 2018-04-14 PROCEDURE — 25000132 ZZH RX MED GY IP 250 OP 250 PS 637: Mod: GY | Performed by: PSYCHIATRY & NEUROLOGY

## 2018-04-14 RX ADMIN — HALOPERIDOL 5 MG: 5 TABLET ORAL at 21:17

## 2018-04-14 RX ADMIN — NICOTINE POLACRILEX 2 MG: 2 LOZENGE ORAL at 07:18

## 2018-04-14 RX ADMIN — NICOTINE POLACRILEX 2 MG: 2 LOZENGE ORAL at 18:07

## 2018-04-14 ASSESSMENT — ACTIVITIES OF DAILY LIVING (ADL)
GROOMING: INDEPENDENT
ORAL_HYGIENE: INDEPENDENT
DRESS: SCRUBS (BEHAVIORAL HEALTH)

## 2018-04-14 NOTE — PLAN OF CARE
Problem: Cognitive Impairment (Psychotic Signs/Symptoms) (Adult)  Goal: Improved Thought Clarity/Organization (Psychotic Signs/Symptoms)  Outcome: Improving  Pt is making progress in improving his thinking. Pt seems more organized, less manic and is making far fewer delusional statements this shift. Pt still needs redirection for inappropriate conversational topics, generally about women/dating/sexual topics. Pt is compliant with these redirections. Pt is not making delusional statements about these topics this shift. Pt's speech pattern is WDL. Pt denies SI/SIB/HI. Pt does not agree with his continued hospitalization but he is compliant with his treatment plan at this time. Pt did not report any physical health concerns or side effects from medications.

## 2018-04-14 NOTE — PLAN OF CARE
"Problem: Social/Occupational/Functional Impairment (Psychotic Signs/Symptoms) (Adult)  Goal: Improved Social/Occupational/Functional Skills (Psychotic Signs/Symptoms)    Initial OT Attendance    Pt actively participated in a structured occupational therapy group involving a self-reflecting, group leisure task. Pt was initially working on a puzzle, and calmly expressed frustration that someone had \"messed it up.\" Pt appeared comfortable sharing positive past experiences and personal information with peers, and was respectful in listening and responding to peers. Pt initially stated that he is \"color blind\" when asked to select a game piece. Later in the game, pt landed on a purple space, and stated \"purple,\" suggesting that he may not actually be color blind. Shared about his website titled \"tornadoes are,\" and asked writer to \"look it up\" after group. Pt was briefly pulled out of group for a phone call. Upon returning to group, pt stated \"the police should not have shown up in my yard with M 16s, now my neighbors are scared.\" Unclear if this statement was related to the phone call he had just received. Pt made intense eye contact with writer, as well as with a peer. Appropriately shared his sarcastic sense of humor throughout group. Will continue to assess.        "

## 2018-04-14 NOTE — PROGRESS NOTES
04/13/18 2228   Behavioral Health   Hallucinations denies / not responding to hallucinations   Thinking intact   Orientation person: oriented;place: oriented;date: oriented;time: oriented   Memory baseline memory   Insight insight appropriate to situation   Judgement impaired   Eye Contact at examiner   Affect full range affect   Mood mood is calm   Physical Appearance/Attire attire appropriate to age and situation   Hygiene well groomed   Suicidality other (see comments)  (nothing observed)   1. Wish to be Dead No   2. Non-Specific Active Suicidal Thoughts  No   Self Injury other (see comment)  (nothing observed)   Elopement (nothing observed)   Activity (pt was active in the milieu)   Speech clear;coherent   Medication Sensitivity no observed side effects   Psychomotor / Gait balanced;steady   Activities of Daily Living   Hygiene/Grooming independent   Oral Hygiene independent   Dress independent   Laundry unable to complete   Room Organization independent   pt was visible on the milieu. Social with peers and staff. Pt was clam and cooperative. He spent the majority of the day in the lounge watching TV and working on puzzle. Pt did not shower. Behavior was clam and controlled. No other concern was noted this shift.

## 2018-04-15 PROCEDURE — A9270 NON-COVERED ITEM OR SERVICE: HCPCS | Mod: GY | Performed by: PSYCHIATRY & NEUROLOGY

## 2018-04-15 PROCEDURE — 25000132 ZZH RX MED GY IP 250 OP 250 PS 637: Mod: GY | Performed by: PSYCHIATRY & NEUROLOGY

## 2018-04-15 PROCEDURE — 12400003 ZZH R&B MH CRITICAL UMMC

## 2018-04-15 RX ADMIN — HALOPERIDOL 5 MG: 5 TABLET ORAL at 20:18

## 2018-04-15 ASSESSMENT — ACTIVITIES OF DAILY LIVING (ADL)
LAUNDRY: UNABLE TO COMPLETE
DRESS: SCRUBS (BEHAVIORAL HEALTH);INDEPENDENT
GROOMING: INDEPENDENT;PROMPTS
ORAL_HYGIENE: PROMPTS;INDEPENDENT
ORAL_HYGIENE: INDEPENDENT
LAUNDRY: UNABLE TO COMPLETE
DRESS: SCRUBS (BEHAVIORAL HEALTH)
GROOMING: INDEPENDENT

## 2018-04-16 PROBLEM — F17.213 CIGARETTE NICOTINE DEPENDENCE WITH WITHDRAWAL: Status: ACTIVE | Noted: 2018-04-16

## 2018-04-16 PROCEDURE — 12400003 ZZH R&B MH CRITICAL UMMC

## 2018-04-16 PROCEDURE — 99232 SBSQ HOSP IP/OBS MODERATE 35: CPT | Performed by: PSYCHIATRY & NEUROLOGY

## 2018-04-16 PROCEDURE — A9270 NON-COVERED ITEM OR SERVICE: HCPCS | Mod: GY | Performed by: PSYCHIATRY & NEUROLOGY

## 2018-04-16 PROCEDURE — 25000132 ZZH RX MED GY IP 250 OP 250 PS 637: Mod: GY | Performed by: PSYCHIATRY & NEUROLOGY

## 2018-04-16 RX ADMIN — HALOPERIDOL 5 MG: 5 TABLET ORAL at 20:05

## 2018-04-16 ASSESSMENT — ACTIVITIES OF DAILY LIVING (ADL)
GROOMING: INDEPENDENT
ORAL_HYGIENE: INDEPENDENT
DRESS: SCRUBS (BEHAVIORAL HEALTH)
LAUNDRY: UNABLE TO COMPLETE
GROOMING: INDEPENDENT
ORAL_HYGIENE: INDEPENDENT
DRESS: SCRUBS (BEHAVIORAL HEALTH)

## 2018-04-16 NOTE — PROGRESS NOTES
"[facility], [place]   Psychiatric Progress Note  Hospital Day: 6        Interim History:   The patient's care was discussed with the treatment team during the daily team meeting and/or staff's chart notes were reviewed.  Staff report patient was loud and ranting over the weekend. He has been making inappropriate sexual comments toward staff. He has appeared grandiose and paranoid at times.    Upon interview, the patient indicates that he's ready to leave and that perhaps he needs a . I explain my concerns about his behaviors prior to admission as well as the issues being documented here. He continues to deny that his actions were at all inappropriate before admission. He denies that he has said or done anything wrong here. He repeatedly asks, \"why am I still here?\" and does not process my reasoning in any way.    Psychiatric Symptoms: paranoia, grandiosity, hypersexual, irritable, labile    Medication side effects reported: None currently    Other issues reported by patient: None         Medications:       haloperidol  5 mg Oral At Bedtime    Or     haloperidol lactate  5 mg Intramuscular At Bedtime     nicotine polacrilex  4 mg Buccal Once          Allergies:     Allergies   Allergen Reactions     Bee Venom      Propranolol           Labs:   No results found for this or any previous visit (from the past 48 hour(s)).       Psychiatric Examination:     /81  Pulse 74  Temp 98.8  F (37.1  C) (Tympanic)  Resp 16  Ht 1.88 m (6' 2\")  Wt 90.7 kg (200 lb)  SpO2 97%  BMI 25.68 kg/m2  Weight is 200 lbs 0 oz  Body mass index is 25.68 kg/(m^2).    Orthostatic Vitals       Most Recent      Sitting Orthostatic /81 04/16 0813    Sitting Orthostatic Pulse (bpm) 74 04/16 0813    Standing Orthostatic /90 04/16 0813    Standing Orthostatic Pulse (bpm) 90 04/16 0813            Appearance: awake, alert, dressed in hospital scrubs and unkempt  Attitude:  somewhat " cooperative  Eye Contact:  intense  Mood:  angry  Affect:  reactive  Speech:  clear, coherent and normal prosody  Language: fluent and intact in English  Psychomotor, Gait, Musculoskeletal:  no evidence of tardive dyskinesia, dystonia, or tics, intact station, gait and muscle tone and tremor observed   Throught Process:  goal oriented and illogical  Associations:  no loose associations  Thought Content:  paranoia and grandiosity noted. appears hypersexual  Insight:  limited  Judgement:  poor  Oriented to:  time, person, and place  Attention Span and Concentration:  intact  Recent and Remote Memory:  intact  Fund of Knowledge:  Adequate      Clinical Global Impressions  First:  Considering your total clinical experience with this particular patient population, how severe are the patient's symptoms at this time?: 7 (04/11/18 1220)  Compared to the patient's condition at the START of treatment, this patient's condition is:: 4 (04/11/18 1220)  Most recent:  Considering your total clinical experience with this particular patient population, how severe are the patient's symptoms at this time?: 7 (04/11/18 1220)  Compared to the patient's condition at the START of treatment, this patient's condition is:: 4 (04/11/18 1220)    # Pain Assessment:  Current Pain Score 4/16/2018   Patient currently in pain? no   Pain score (0-10) -   Pain location -   Pain descriptors -   Austyn s pain level was assessed and he currently denies pain.             Precautions:     Behavioral Orders   Procedures     Code 1 - Restrict to Unit     Elopement precautions     Neuropsych Testing     Yamilka Venkat     Routine Programming     As clinically indicated     Sexual precautions     Single Room     Status 15     Every 15 minutes.          Diagnoses:      Schizoaffective disorder, bipolar type   Cigarette nicotine dependence with withdrawal           Assessment & Plan:   Assessment and hospital summary:  63-year-old man with schizoaffective disorder  admitted due to inappropriate behaviors in the setting of noncompliance with treatment as ordered by commitment. Provisional discharge revoked on admission. Patient continues to make inappropriate comments and appears to lack insight. He also has some grandiosity. Interestingly, he is not demonstrating other overt signs of nicole such as decreased need for sleep or increased goal-directed activities. He appears perpetually disinhibited. This elicits some concerns for organic cause such as a demential process involving frontal lobe dysfunction.    Target psychiatric symptoms and interventions:  Ongoing grandiosity, irritability, and sexually inappropriate behaviors - continue with haloperidol. Plan to transition to long acting injection if he stabilizes    Requested neuropsych testing due to concerns for organic brain issue leading to his issues. This may help with resources post discharge.    Medical Problems and Treatments:  None    Behavioral/Psychological/Social:  Encourage group programming    Precautions:  Behavioral Orders   Procedures     Code 1 - Restrict to Unit     Elopement precautions     Neuropsych Testing     Yamilka Venkat     Routine Programming     As clinically indicated     Sexual precautions     Single Room     Status 15     Every 15 minutes.         Legal:  Patient admitted on a 72-hour hold. Outpatient team was revoking his provisional discharge. Patient has active commitment and Andersen.    Disposition:  Patient continues to display signs that he is unsafe for return to the community due to his mental illness.

## 2018-04-16 NOTE — PROGRESS NOTES
"Pt was in the milieu.  Was social with peers.  Asking female peer about her children, their ages + genders.  Pt denies SI, SIB.  He confirms high 10 of 10 anx/dep about being \"held against his will.\"  Pt continues to claim, \"all the things I'm accused of are just lies + are made up.\"l   "

## 2018-04-16 NOTE — PLAN OF CARE
Problem: Cognitive Impairment (Psychotic Signs/Symptoms) (Adult)  Goal: Improved Thought Clarity/Organization (Psychotic Signs/Symptoms)  Outcome: No Change  Pt continues to have intense paranoia and delusions about the legal system and his outpatient medical team. Pt is adamant that almost everyone around is lying to him and that his outpatient psychiatrist is trying to poison him to death. RN writer attempted to talk to pt about his legal status and the delusions. Pt continued to be argumentative and irritable. He was not accepting of RN's explanations. Pt was in good behavioral control, pt followed directions and completed ADLs. Pt was not saying innapropriate things in the lounge this shift. Pt reported no physical health concerns or side effects from medications this shift. Pt's VS were WDL except for elevated blood pressure which is congruent with recent readings.

## 2018-04-16 NOTE — PROGRESS NOTES
"Patient was awake and up in the lounge, loudly rambling to the psych associates, and almost leering at the female behavioral assistant at times. He was asking her if she had a boyfriend, and then began ranting loudly about \"I want a Beer! Someone get me a Beer! Make it Budweiser!\". Became more insistent when the charge nurse went by his lounge. When this RN asked him if he needed any medication to help him go back to sleep, he angrily began yelling at this writer about \"everything is a drug - give me a drug for this and a drug for that won't you!\". Extremely irritable, angry & making derogatory and sexual remarks to staff. Patient did then finally go into his room, after staff did not pay any further attention to him. But when in his room he began playing with the volume of his TV, frequently turning the volume way up, then down briefly, then back to a high volume. This appeared to be an attempt to get the female behavioral assistant to come into his room. The writer informed the female behavioral assistant not to go into this patient's room alone. Patient finally turned his TV off and went to sleep. Monitor patient for inappropriate sexual behavior with other females, and continue to give pt. Reassurance, enforce boundaries, and medicate prn as needed. Patient is Jarvised at this time.  "

## 2018-04-17 PROCEDURE — A9270 NON-COVERED ITEM OR SERVICE: HCPCS | Mod: GY | Performed by: PSYCHIATRY & NEUROLOGY

## 2018-04-17 PROCEDURE — 25000132 ZZH RX MED GY IP 250 OP 250 PS 637: Mod: GY | Performed by: PSYCHIATRY & NEUROLOGY

## 2018-04-17 PROCEDURE — 12400003 ZZH R&B MH CRITICAL UMMC

## 2018-04-17 PROCEDURE — 99233 SBSQ HOSP IP/OBS HIGH 50: CPT | Performed by: PSYCHIATRY & NEUROLOGY

## 2018-04-17 PROCEDURE — 90853 GROUP PSYCHOTHERAPY: CPT

## 2018-04-17 RX ORDER — ACETAMINOPHEN 325 MG/1
650 TABLET ORAL EVERY 4 HOURS PRN
Status: DISCONTINUED | OUTPATIENT
Start: 2018-04-17 | End: 2018-04-20 | Stop reason: HOSPADM

## 2018-04-17 RX ADMIN — HALOPERIDOL 5 MG: 5 TABLET ORAL at 20:25

## 2018-04-17 ASSESSMENT — ACTIVITIES OF DAILY LIVING (ADL)
LAUNDRY: UNABLE TO COMPLETE
DRESS: INDEPENDENT;SCRUBS (BEHAVIORAL HEALTH)
GROOMING: HANDWASHING;INDEPENDENT
ORAL_HYGIENE: INDEPENDENT

## 2018-04-17 NOTE — PROGRESS NOTES
Writer placed call to coverage  Julia Pickett. Julia reports that she is concerned for pt to return to the community as he has preoccupations with sex behaviors and  young children. States they have tried to be in contact with the family however they have not received a phone call back. States that she does not feel he would be appropriate for and IRTS as he most likely would not participate in programming. States she believes he would do best in 24 hour assisted living facility with ACT team involvement. Julia stated that while Betty is out, Poonam Mireles will now be covering pt's case management, [phone]. Writer asked for contact information for pt's son so we can assist with planning a family meeting. Julia will contact Poonam and have Poonam call the unit to provide phone numbers for additional family members.   LOLLY continues with planning meeting and assisting CCM with d/c plans as needed.

## 2018-04-17 NOTE — PROGRESS NOTES
"Patient stated to this writer: \"My doctor says I am not suppose to talk to any of you, he is a liar!\" Denies that he has made inappropriate sexual comments while a patient on this unit. Patient also mitigates any strange or potentially inappropriate actions around children prior to his admission.  Appears to have little insight about MH issues that brought him into the hospital.     04/16/18 2103   Behavioral Health   Hallucinations denies / not responding to hallucinations   Thinking delusional;paranoid   Orientation person: oriented;place: oriented;date: oriented;situation, disoriented   Memory confabulation   Insight poor   Judgement impaired   Eye Contact at examiner  (Intensity hightened)   Affect angry;tense;irritable   Mood grandiose;anxious;labile;irritable   Physical Appearance/Attire untidy;disheveled   Hygiene neglected grooming - unclean body, hair, teeth   Suicidality other (see comments)  (Denies)   1. Wish to be Dead No   2. Non-Specific Active Suicidal Thoughts  No   Self Injury other (see comment)  (No SIB observed)   Elopement Statements about wanting to leave   Activity other (see comment)  (Visible in milieu)   Speech clear   Medication Sensitivity no stated side effects;no observed side effects   Psychomotor / Gait balanced;steady   Substance Withdrawal Interventions   Social and Therapeutic Interventions (Substance Withdrawal) encourage effective boundaries with peers   Sleep/Rest/Relaxation   Day/Evening Time Hours up all shift   Psycho Education   Type of Intervention 1:1 intervention   Response participates with encouragement   Hours 0.5   Treatment Detail Triggers   Daily Care   Activity up ad evonne   Activities of Daily Living   Hygiene/Grooming independent   Oral Hygiene independent   Dress scrubs (behavioral health)   Laundry unable to complete   Room Organization independent   Activity   Activity Assistance Provided independent     "

## 2018-04-17 NOTE — PROGRESS NOTES
"[facility], [place]   Psychiatric Progress Note  Hospital Day: 7        Interim History:   The patient's care was discussed with the treatment team during the daily team meeting and/or staff's chart notes were reviewed.  Staff report patient was exhibiting intense paranoia and delusions about the legal system and about his outpatient team. Additionally, he expressed concerns that Dr. Marques was attempting to kill him.  He denies side effects to medications. He denies physical health concerns. He has participated in groups.     Upon interview, the patient was in the lounge and requested to speak with me. He said \"Before we go into my room I need a witness.\" Nursing staff were present during our interview per patient request. At that time, he stated that his reason for hospitalization was \"freedom of speech.\" He said \"I can't talk to women and I can't talk to children.\" He did not elaborate. He also shared that he wishes to divorce his wife of 22 years. He said he would like to \"get rid of her.\" When asked to clarify, he adamantly denied HI and stated that he just rather live with his son than with her when he returns home. He denies SI and SIB. He denies side effects to medications. He said that he is \"really bored and I want to know when I can get out of here.\" We discussed goals for hospitalization and need for further evaluation, possible medication adjustments, and safe dispo plan.     Psychiatric Symptoms: paranoia, grandiosity, hypersexual, irritable, labile    Medication side effects reported: None currently    Other issues reported by patient: None         Medications:       haloperidol  5 mg Oral At Bedtime    Or     haloperidol lactate  5 mg Intramuscular At Bedtime     nicotine polacrilex  4 mg Buccal Once          Allergies:     Allergies   Allergen Reactions     Bee Venom      Propranolol           Labs:   No results found for this or any previous visit (from the past 48 " "hour(s)).       Psychiatric Examination:     /78  Pulse 77  Temp 98.3  F (36.8  C) (Tympanic)  Resp 16  Ht 1.88 m (6' 2\")  Wt 90.9 kg (200 lb 8 oz)  SpO2 97%  BMI 25.74 kg/m2  Weight is 200 lbs 8 oz  Body mass index is 25.74 kg/(m^2).    Orthostatic Vitals       Most Recent      Sitting Orthostatic /81 04/16 0813    Sitting Orthostatic Pulse (bpm) 74 04/16 0813    Standing Orthostatic /90 04/16 0813    Standing Orthostatic Pulse (bpm) 90 04/16 0813            Appearance: awake, alert, dressed in hospital scrubs and unkempt  Attitude:  somewhat cooperative, guarded  Eye Contact:  intense  Mood:  angry  Affect:  reactive  Speech:  clear, coherent and normal prosody  Language: fluent and intact in English  Psychomotor, Gait, Musculoskeletal:  no evidence of tardive dyskinesia, dystonia, or tics, intact station, gait and muscle tone and tremor observed   Throught Process:  goal oriented and illogical  Associations:  no loose associations  Thought Content:  Significant paranoia noted  Insight:  limited  Judgement:  poor  Oriented to:  time, person, and place  Attention Span and Concentration:  intact  Recent and Remote Memory:  intact  Fund of Knowledge:  Adequate      Clinical Global Impressions  First:  Considering your total clinical experience with this particular patient population, how severe are the patient's symptoms at this time?: 7 (04/11/18 1220)  Compared to the patient's condition at the START of treatment, this patient's condition is:: 4 (04/11/18 1220)  Most recent:  Considering your total clinical experience with this particular patient population, how severe are the patient's symptoms at this time?: 7 (04/11/18 1220)  Compared to the patient's condition at the START of treatment, this patient's condition is:: 4 (04/11/18 1220)    # Pain Assessment:  Current Pain Score 4/17/2018   Patient currently in pain? no   Pain score (0-10) -   Pain location -   Pain descriptors - "   Austyn s pain level was assessed and he currently denies pain.             Precautions:     Behavioral Orders   Procedures     Code 1 - Restrict to Unit     Elopement precautions     Neuropsych Testing     Yamilka Venkat     Routine Programming     As clinically indicated     Sexual precautions     Single Room     Status 15     Every 15 minutes.          Diagnoses:      Schizoaffective disorder, bipolar type   Cigarette nicotine dependence with withdrawal           Assessment & Plan:   Assessment and hospital summary:  63-year-old man with schizoaffective disorder admitted due to inappropriate behaviors in the setting of noncompliance with treatment as ordered by commitment. Provisional discharge revoked on admission. Patient continues to make inappropriate comments and appears to lack insight. He also has some grandiosity. Interestingly, he is not demonstrating other overt signs of nicole such as decreased need for sleep or increased goal-directed activities. He appears perpetually disinhibited. This elicits some concerns for organic cause such as a demential process involving frontal lobe dysfunction.    Target psychiatric symptoms and interventions:  Ongoing grandiosity, irritability, and sexually inappropriate behaviors - continue with haloperidol. Plan to transition to long acting injection if he stabilizes    Requested neuropsych testing due to concerns for organic brain issue leading to his issues. This may help with resources post discharge.    Medical Problems and Treatments:  None    Behavioral/Psychological/Social:  Encourage group programming    Precautions:  Behavioral Orders   Procedures     Code 1 - Restrict to Unit     Elopement precautions     Neuropsych Testing     Yamilka Venkat     Routine Programming     As clinically indicated     Sexual precautions     Single Room     Status 15     Every 15 minutes.     Legal:  Patient admitted on a 72-hour hold. Outpatient team was revoking his provisional discharge.  Patient has active commitment and Andersen.    Disposition:  Patient continues to display signs that he is unsafe for return to the community due to his mental illness.    Sofiya Abraham MD  [facility]iat[facility]

## 2018-04-18 PROCEDURE — 25000132 ZZH RX MED GY IP 250 OP 250 PS 637: Mod: GY | Performed by: PSYCHIATRY & NEUROLOGY

## 2018-04-18 PROCEDURE — A9270 NON-COVERED ITEM OR SERVICE: HCPCS | Mod: GY | Performed by: PSYCHIATRY & NEUROLOGY

## 2018-04-18 PROCEDURE — A9270 NON-COVERED ITEM OR SERVICE: HCPCS | Mod: GY | Performed by: EMERGENCY MEDICINE

## 2018-04-18 PROCEDURE — 25000132 ZZH RX MED GY IP 250 OP 250 PS 637: Mod: GY | Performed by: EMERGENCY MEDICINE

## 2018-04-18 PROCEDURE — 99232 SBSQ HOSP IP/OBS MODERATE 35: CPT | Performed by: PSYCHIATRY & NEUROLOGY

## 2018-04-18 PROCEDURE — 12400003 ZZH R&B MH CRITICAL UMMC

## 2018-04-18 RX ORDER — HALOPERIDOL DECANOATE 50 MG/ML
50 INJECTION INTRAMUSCULAR
Status: DISCONTINUED | OUTPATIENT
Start: 2018-04-19 | End: 2018-04-20 | Stop reason: HOSPADM

## 2018-04-18 RX ADMIN — HALOPERIDOL 5 MG: 5 TABLET ORAL at 21:01

## 2018-04-18 RX ADMIN — NICOTINE POLACRILEX 2 MG: 2 LOZENGE ORAL at 21:37

## 2018-04-18 ASSESSMENT — ACTIVITIES OF DAILY LIVING (ADL)
DRESS: INDEPENDENT
GROOMING: INDEPENDENT;PROMPTS
LAUNDRY: UNABLE TO COMPLETE
ORAL_HYGIENE: PROMPTS;INDEPENDENT

## 2018-04-18 NOTE — PROGRESS NOTES
Patient was visible in milieu majority of the evening appearing blunted and isolative to staff and peers only socializing with staff during requests. He stated he had a headache all evening which he contributed to not having his eye glasses. Staff was able to help him arrange to have a friend drop them off during visiting hours. He was independent with meals but ADL's appeared to need work though he was not responsive to staff prompting.     04/17/18 2153   Behavioral Health   Hallucinations denies / not responding to hallucinations   Thinking poor concentration;distractable;paranoid   Orientation person: oriented;place: oriented;date: oriented;time: oriented   Memory baseline memory   Insight poor   Judgement impaired   Eye Contact at examiner   Affect blunted, flat;tense;irritable   Mood irritable   Physical Appearance/Attire disheveled;untidy   Suicidality other (see comments)  (none stated or observed)   1. Wish to be Dead No   2. Non-Specific Active Suicidal Thoughts  No   Elopement (none observed)   Activity isolative   Speech clear;coherent   Medication Sensitivity no stated side effects;no observed side effects   Psychomotor / Gait balanced;steady   Sleep/Rest/Relaxation   Day/Evening Time Hours up all shift   Coping/Psychosocial   Supportive Measures problem solving facilitated;relaxation techniques promoted;verbalization of feelings encouraged   Trust Relationship/Rapport choices provided;empathic listening provided;questions answered;questions encouraged;reassurance provided;thoughts/feelings acknowledged   Psycho Education   Type of Intervention 1:1 intervention   Response refuses   Daily Care   Activity up ad evonne   Activities of Daily Living   Hygiene/Grooming handwashing;independent   Oral Hygiene independent   Dress independent;scrubs (behavioral health)   Laundry unable to complete   Room Organization independent   Activity   Activity Assistance Provided independent

## 2018-04-18 NOTE — PROGRESS NOTES
NAME: Austyn Leach  MRN: [id number]  : 1955  SERRANO: 2018    Neuropsychology Laboratory  [facility]  [address], [address]  [place]  [address] [phone]    NEUROPSYCHOLOGICAL EVALUATION    RELEVANT HISTORY AND REASON FOR REFERRAL    This is a report of neuropsychological consultation regarding Austyn Leach, a 63-year-old, right-handed man with 14 years of formal education. He carries a diagnosis of schizoaffective disorder and is currently in an inpatient psychiatric unit at [facility], due to inappropriate behaviors in the setting of noncompliance with treatment as ordered by commitment. He was initially brought in on a 72-hour hold. Provisional discharge was revoked on admission. He was supposed to be taking olanzapine but refuses it. He is being treated with haloperidol per [facility] hearing. He has demonstrated grandiosity, persecutory paranoia, hypersexuality, irritability, and mood lability. He makes inappropriate comments, appears to lack insight, and is described as  perpetually disinhibited.  The treatment team has raised concern for organic cause such as a dementia process involving frontal lobe dysfunction. A neuropsychological evaluation was requested by Grant Marques MD.     With me, Mr. Leach describes issues precipitating this admission that are jumbled but generally consistent with information in the H&P note from 04/10/2018. 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 He says the  complained to the police and  no trespassing order  was issued. He says the police  don t want [him] talking to men, women, and children  in the community.     He also describes having an invention to remove radon from the atmosphere, which he says is based on knowledge about ions that only he and Jerardo Koo have ever figured out. He states that the planet is covered in nuclear fallout, and his  invention can remove it all. He became worried about people spying on him and trying to steal information from his computer, so he went to the [place] [employer] but was rebuked. He says he then went to the [facility], and they confirmed all of his concerns and gave him substantive advice and guidance on methods for protecting his information and patents from online theft. He says he went back to the [place] [employer] to tell them as much, and they then helped coordinate bringing him to the hospital.     He gives every indication that he believes these are true events and not a product of disordered or delusional thinking. He denies any hallucinatory experiences outside of direct consumption of hallucinogens (LSD, mushrooms, marijuana) in the 1970s.    Mr. Leach feels that his mood is stable and normal at this time. He reports having 3 or 4 prior psychiatric hospitalizations in his lifetime. He denies any suicidality, currently or historically.     Current notes from the treatment team remark that Mr. Leach is not showing signs of nicole, such as not needing sleep. He tells me that he is bored on the unit, which leads to napping excessively during the day and then staying awake all night.    His only cognitive concern is with reading capacity, which is a lifelong issue. He reports having remedial reading tutors from 2nd grade through college. He says he graduated from high school on time and studied for 2 years at [employer]. 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 He also notes that he was most recently a , and that he stopped working about 1-1/2 years ago.     The medical records indicate concerns about bilateral intention tremor since at least 2010. He has been prescribed propanolol. Mr. Leach tells me  that somebody mentioned a diagnosis of Parkinson s disease at some point, but he does not believe it. He says the tremor comes on because he drinks 12-24 cups of coffee every day. He says the tremor goes away after he drinks heavy cream.     He reports a history of problems with migraines after abruptly stopping propanolol, but none since he resumed it and then carried out a self-directed slow taper. He also thinks there was a contribution from not drinking enough water, so he now tries to increase his liquid consumption.     Mr. Leach denies any history of stroke, seizure, TBI, signs of Parkinsonism such as gait disturbance and postural instability, or changes to his senses of smell, taste, hearing, and vision.     The most recent neuro[facility] is a head CT from 05/24/2016, in a workup for apparent head injury. There were no acute intracranial abnormalities. The images were described as unchanged compared to prior CT on 07/04/2008. The report from the earlier scan states,  The ventricles are normal in size, shape and configuration. The brain parenchyma and subarachnoid spaces are normal.     He reports a history of alcohol abuse. He says he quit drinking about 13 years ago and occasionally goes to [employer] meetings. He says he had a brief relapse about 6 months ago and was found  flat on [his] face  in his driveway. He later notes that, about 6 months ago, he told a  he was drinking and smoking marijuana, but he says he was just making it up, trying to placate the  in order to be allowed to go home. He currently smokes a pack of cigarettes per day. He denies any illicit drug use since his high school and college years.    Mr. Leach lives at home with his wife of 22 years. He says it is not a happy marriage and he is thinking about divorce. He denies needs for assistance in ADLs but notes that his wife  forces  him to take his psychiatric medications. He denies any concerns about driving  capacity or safety.     Aside from a maternal aunt who was diagnosed with dementia, he knows of no other neurologic disease in the family. He says that his sister has copious psychiatric problems, which he takes the opportunity to contrast with his own functioning.       BEHAVIORAL OBSERVATIONS    Mr. Leach was polite and cooperative with the evaluation. Mood was euthymic to expansive. Affective display was contextually responsive and generally mood-congruent, but he had a blank/fixed stare at baseline. I would not describe his affect as wooden or masked. Hand tremors were apparent on all graphomotor tasks. He was partially edentulous, which affected articulation at times. Speech was fast, fluent, and prosodic. There were no indications of excessive word finding lapses or paraphasic errors. Speech was markedly pressured and perseverative. He did not respond to clear and repeated nonverbal cues to stop talking (e.g., holding up hands). He would eventually respond to multiple explicit verbal cues. He asked intrusive personal questions of the examiners, but he was not necessarily aggressive about it. He was not physically intimidating about it. With me, he repeatedly asked if I would allow him to leave the unit, despite repeated explanations that such decisions were not in my control. Content of speech was tangential and rambling. Grandiosity and elaborate paranoid delusional structures were apparent. A lack of insight was apparent. He was alert and not somnolent. Comprehension was full and immediate in conversation and in receiving test instructions. He tended to interrupt the examiners or talk over them. He was mildly impulsive and careless in task execution, but he worked to correct errors when they were noticed. He reported that he was color-blind but demonstrated correct identification and discrimination of colors used in the test stimuli. He appeared to provide his best effort and remained engaged with the test  procedures. Direct measures for cognitive performance validity were within normal expectations compared to individuals with psychotic disorders. The test results are seen as reasonable reflections of his cognitive status.     MEASURES ADMINISTERED    In addition to conducting a clinical interview, I directly administered the following measures:     Reitan-Indian Aphasia Screening Test; Clock Drawing; Dot Counting Test    The following measures were administered by a trained psychometrist, under my direct supervision:    Wide Range Achievement Test 4: Word Reading; Wechsler Adult Intelligence Scale-IV: Similarities, Block Design; Animal Naming Test; Corin Visual Acuity Screen; Trail Making Test; Stroop Test; [place] Card Sorting Test; March Verbal Learning Test, Revised; Brief Visuospatial Memory Test, Revised.    RESULTS AND INTERPRETATION    Aphasia screening yielded errors in oral spelling, written spelling, writing accuracy (but also clearly affected by tremor), reading accuracy, word repetition/complex articulation, and written math. Basic skills appeared to be intact for naming, letter/number recognition, simple word repetition, sentence repetition, oral comprehension, reading comprehension, mental math, ideomotor praxis, and right-left orientation. Basic word reading and pronunciation skills were in the borderline range for his age, consistent with his self-report of lifelong verbal learning disability. Abstract verbal analogical reasoning was borderline. Semantic verbal fluency was average. Practical near-point visual acuity (i.e., with both eyes together) was 20/25 on Corin screening. Copies of simple shapes and figures were oversized and affected by tremor but they were easily recognizable and did not suggest a fundamental perceptual defect. Clock drawing was generally normal in visuospatial aspects, but his representation of a specific time was off by about five minutes. Speeded word reading  was borderline, speeded color naming was borderline, and color naming under additional executive demands for inhibiting prepotent responses was commensurately borderline. Visual scanning and sequencing (trail making) under simple conditions was average for speed but had one error. Trail making under more complex executive demands for controlling divided attention was low average for speed and had one error. Conceptualization, inductive reasoning, and learning from errors were in many ways high average on an ambiguous card-sorting task. However, he also had two atypical set-loss errors, which suggests problems in sustained vigilance and inhibition. Learning a word list over repeated readings was in the low average range for his age. Delayed free recall of the list was average in terms of total words and above average in terms of percent retention (100%). Delayed list recognition was fully intact, which is a high average performance. Learning a display of simple shapes and figures was initially in the borderline range, but he had a high average learning slope over additional trials. Delayed free recall of the display was average in terms of total information and percent retention, and delayed recognition of individual figures from the display was fully intact.    IMPRESSIONS AND RECOMMENDATIONS    The neuropsychological results are abnormal. Consistent with his self-report, Mr. 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 There were indications of mild executive dysfunction. This is primarily in the form of impulsivity and disinhibition, and it is seen primarily in behavioral observations and secondarily in the psychometric data. When not reliant on language functions, he demonstrates normal capacity for conceptualization and problem-solving. Nonetheless, he tends to jump off task or lose track of what he is doing. His capacity for learning new information seems to be mildly affected by executive  interference, but his capacity for anterograde memory formation (i.e., retaining new information over a long delay) is normal.    If Mr. Leach s self-report of lifelong language-related weakness is accurate, the relevant test data most likely reflect neurodevelopmental factors affecting left hemisphere language systems. At this time, I am not overly suspicious for acquired language dysfunction such as primary progressive aphasia.     Executive difficulties seen in the test performances and in his behavioral presentation suggest weaknesses in frontal lobe functioning and associated subcortical systems. This is a common finding in schizophrenia, bipolar disorder, and related disorders. It is not necessarily indicative of acquired organic disease, such as frontotemporal lobar degeneration or synucleinopathy, and I do not think that degenerative disease needs to be invoked to explain his current presentation.     However, neuropsychological assessment alone cannot serve as a definitive rule in/out for frontotemporal degenerative diseases, and the strongest line of evidence would come from neuroimaging that does/does not demonstrate focal frontotemporal atrophy compared to prior studies. Physical evaluation by a neurologist would also be a reasonable next step.    The present exam was somewhat truncated, given his current psychiatric state and location. Consideration could be given to referral for reevaluation on an outpatient basis after a period of sustained psychiatric stability, if there are continued concerns about incipient degenerative disease that might be overshadowed by current psychiatric symptoms.     Dino Ruth, PhD,   Clinical Neuropsychologist

## 2018-04-18 NOTE — PROGRESS NOTES
Pt signed Medicare rights form today, but barely.   At first he refused based on paranoia.   He thought I was trying to trick him into signing something that was bad for him.

## 2018-04-18 NOTE — PROGRESS NOTES
"[facility], [place]   Psychiatric Progress Note  Hospital Day: 8        Interim History:   The patient's care was discussed with the treatment team during the daily team meeting and/or staff's chart notes were reviewed.  Staff report patient has been in good control. No behavioral issues. Sleeping well.    Upon interview, the patient indicates that he realizes he just needs to get along with everyone and that he should avoid children. He believes this will make people happy. He states that he plans to get a divorce and live with his son after discharge this time. He again talks about various hobby groups being a key to his success.    Psychiatric Symptoms: paranoia, improved behavioral control    Medication side effects reported: None currently    Other issues reported by patient: None         Medications:       haloperidol  5 mg Oral At Bedtime    Or     haloperidol lactate  5 mg Intramuscular At Bedtime     [START ON 4/19/2018] haloperidol decanoate  50 mg Intramuscular Q30 Days     nicotine polacrilex  4 mg Buccal Once          Allergies:     Allergies   Allergen Reactions     Bee Venom      Propranolol           Labs:   No results found for this or any previous visit (from the past 48 hour(s)).       Psychiatric Examination:     /69  Pulse 65  Temp 99  F (37.2  C) (Tympanic)  Resp 16  Ht 1.88 m (6' 2\")  Wt 90.9 kg (200 lb 8 oz)  SpO2 98%  BMI 25.74 kg/m2  Weight is 200 lbs 8 oz  Body mass index is 25.74 kg/(m^2).             Appearance: awake, alert, dressed in hospital scrubs and disheveled   Attitude:  cooperative  Eye Contact:  good  Mood:  better  Affect:  irritable  Speech:  clear, coherent and normal prosody  Language: fluent and intact in English  Psychomotor, Gait, Musculoskeletal:  no evidence of tardive dyskinesia, dystonia, or tics, intact station, gait and muscle tone and tremor observed   Throught Process:  goal oriented and illogical  Associations:  no " loose associations  Thought Content:  ongoing ideas of persecution  Insight:  limited  Judgement:  poor  Oriented to:  time, person, and place  Attention Span and Concentration:  intact  Recent and Remote Memory:  intact  Fund of Knowledge:  Adequate      Clinical Global Impressions  First:  Considering your total clinical experience with this particular patient population, how severe are the patient's symptoms at this time?: 7 (04/11/18 1220)  Compared to the patient's condition at the START of treatment, this patient's condition is:: 4 (04/11/18 1220)  Most recent:  Considering your total clinical experience with this particular patient population, how severe are the patient's symptoms at this time?: 7 (04/11/18 1220)  Compared to the patient's condition at the START of treatment, this patient's condition is:: 4 (04/11/18 1220)    # Pain Assessment:  Current Pain Score 4/18/2018   Patient currently in pain? no   Pain score (0-10) -   Pain location -   Pain descriptors -   Austyn s pain level was assessed and he currently denies pain.             Precautions:     Behavioral Orders   Procedures     Code 1 - Restrict to Unit     Elopement precautions     Neuropsych Testing     Yamilka Venkat     Routine Programming     As clinically indicated     Sexual precautions     Single Room     Status 15     Every 15 minutes.          Diagnoses:      Schizoaffective disorder, bipolar type   Cigarette nicotine dependence with withdrawal           Assessment & Plan:   Assessment and hospital summary:  63-year-old man with schizoaffective disorder admitted due to inappropriate behaviors in the setting of noncompliance with treatment as ordered by commitment. Provisional discharge revoked on admission. Patient continues to make inappropriate comments and appears to lack insight. He also has some grandiosity. Interestingly, he is not demonstrating other overt signs of nicole such as decreased need for sleep or increased goal-directed  activities. He appears perpetually disinhibited. This elicits some concerns for organic cause such as a demential process involving frontal lobe dysfunction.    Target psychiatric symptoms and interventions:  Ongoing grandiosity, irritability, and sexually inappropriate behaviors - continue with haloperidol. Order decanoate 50 mg to start 4/19.    Reviewed testing as documented by Dino Ruth and signed on 4/18/2018    Medical Problems and Treatments:  None    Behavioral/Psychological/Social:  Encourage group programming    Precautions:  Behavioral Orders   Procedures     Code 1 - Restrict to Unit     Elopement precautions     Neuropsych Testing     Yamilka Venkat     Routine Programming     As clinically indicated     Sexual precautions     Single Room     Status 15     Every 15 minutes.         Legal:  Patient admitted on a 72-hour hold. Outpatient team revoked his provisional discharge. Patient has active commitment and Andersen.    Disposition:  Patient continues to display signs that he is unsafe for return to the community due to his mental illness.

## 2018-04-18 NOTE — PROGRESS NOTES
spoke with Poonam Rangel CM.   She requested that I make the ACT team referral, so I did this today.   I called and spoke with Rochelle Vora of [facility].  Her phone is:   [phone]    Fax:  [phone].     I gave verbal information and also will have information faxed to her later today.

## 2018-04-19 PROCEDURE — A9270 NON-COVERED ITEM OR SERVICE: HCPCS | Mod: GY | Performed by: PSYCHIATRY & NEUROLOGY

## 2018-04-19 PROCEDURE — 99232 SBSQ HOSP IP/OBS MODERATE 35: CPT | Performed by: PSYCHIATRY & NEUROLOGY

## 2018-04-19 PROCEDURE — 90853 GROUP PSYCHOTHERAPY: CPT

## 2018-04-19 PROCEDURE — 25000128 H RX IP 250 OP 636: Performed by: PSYCHIATRY & NEUROLOGY

## 2018-04-19 PROCEDURE — H2032 ACTIVITY THERAPY, PER 15 MIN: HCPCS

## 2018-04-19 PROCEDURE — A9270 NON-COVERED ITEM OR SERVICE: HCPCS | Mod: GY | Performed by: EMERGENCY MEDICINE

## 2018-04-19 PROCEDURE — 25000132 ZZH RX MED GY IP 250 OP 250 PS 637: Mod: GY | Performed by: EMERGENCY MEDICINE

## 2018-04-19 PROCEDURE — 25000132 ZZH RX MED GY IP 250 OP 250 PS 637: Mod: GY | Performed by: PSYCHIATRY & NEUROLOGY

## 2018-04-19 PROCEDURE — 12400003 ZZH R&B MH CRITICAL UMMC

## 2018-04-19 RX ORDER — HALOPERIDOL DECANOATE 50 MG/ML
50 INJECTION INTRAMUSCULAR
Qty: 2.1 ML | Status: ON HOLD | DISCHARGE
Start: 2018-05-19 | End: 2019-06-18

## 2018-04-19 RX ORDER — HALOPERIDOL 5 MG/1
5 TABLET ORAL AT BEDTIME
Qty: 30 TABLET | Refills: 1 | Status: SHIPPED | OUTPATIENT
Start: 2018-04-19 | End: 2018-07-03

## 2018-04-19 RX ADMIN — HALOPERIDOL DECANOATE 50 MG: 50 INJECTION INTRAMUSCULAR at 09:22

## 2018-04-19 RX ADMIN — NICOTINE POLACRILEX 2 MG: 2 LOZENGE ORAL at 09:32

## 2018-04-19 RX ADMIN — NICOTINE POLACRILEX 2 MG: 2 LOZENGE ORAL at 11:52

## 2018-04-19 RX ADMIN — HALOPERIDOL 5 MG: 5 TABLET ORAL at 20:51

## 2018-04-19 ASSESSMENT — ACTIVITIES OF DAILY LIVING (ADL)
GROOMING: INDEPENDENT;PROMPTS
LAUNDRY: UNABLE TO COMPLETE
DRESS: SCRUBS (BEHAVIORAL HEALTH)
ORAL_HYGIENE: INDEPENDENT;PROMPTS

## 2018-04-19 NOTE — PROGRESS NOTES
Patient in [facility] all morning. He has been for the most part appropriate and did not need redirection. Patient may d/c today, if not, patient will leave tomorrow. Accepts that he may need to make some changes to his outside activities.     04/19/18 1234   Behavioral Health   Hallucinations denies / not responding to hallucinations   Thinking distractable;poor concentration;paranoid   Orientation person: oriented;place: oriented;date: oriented;time: oriented   Memory baseline memory   Insight poor   Judgement impaired   Eye Contact at examiner   Affect irritable   Mood irritable;other (see comments)  (controlled)   Physical Appearance/Attire flamboyant;disheveled;untidy   Hygiene neglected grooming - unclean body, hair, teeth   Suicidality other (see comments)  (denies)   1. Wish to be Dead No   2. Non-Specific Active Suicidal Thoughts  No   Self Injury other (see comment)  (No SIB observed)   Elopement (No behaviors)   Activity other (see comment)  (in milieu all shift)   Speech clear;coherent   Medication Sensitivity no stated side effects;no observed side effects   Psychomotor / Gait balanced;steady   Substance Withdrawal Interventions   Social and Therapeutic Interventions (Substance Withdrawal) encourage effective boundaries with peers   Sleep/Rest/Relaxation   Day/Evening Time Hours up all shift   Psycho Education   Type of Intervention 1:1 intervention   Response participates with encouragement   Hours 0.5   Treatment Detail Reflection  (Check In )   Daily Care   Activity up ad evonne   Activities of Daily Living   Hygiene/Grooming independent;prompts   Oral Hygiene independent;prompts   Dress scrubs (behavioral health)   Laundry unable to complete   Room Organization independent   Activity   Activity Assistance Provided independent

## 2018-04-19 NOTE — DISCHARGE INSTRUCTIONS
Behavioral Discharge Planning and Instructions      Summary:  You were admitted to [facility] due to psychosis.   You had also stopped taking your meds.  While on [facility] under the care of Dr Marques, your Provisional Discharge was revoked.    Medication adjustments were made by Dr. Marques.  Your symptoms improved.      Neuropsych testing was completed during this admission by Psychologist, Dino Ruth.    Date of testing was 4-17-18.    A copy of the report was sent to [facility].         Also while in the hospital, your [facility]  referred you to an ACT team.    Rochelle Vora of [facility].  Her phone is:   [phone]      You are being Provisionally Discharged today back to your home in [place].      Main Diagnosis: Schizoaffective disorder, bipolar type       Mental Health Follow-Up:    [facility]   **You first need to go to an appointment with a therapist.   After that first appointment (below) they will schedule you to see a medication provider.    Therapist:   Ghada Hirsch - Next appointment:  Tuesday May 1st - be there by 8:30am to fill out paperwork.          [address] [place] [address]        Phone: [phone]      FAX:   [phone].    -----------------------------------------------    Until an ACT team is in place, you have targeted Case Management through [facility].   Your worker (until Lenora Pepe is back from vacation) is Poonam Mccarty at [phone]    Attend all scheduled appointments with your outpatient providers. Call at least 24 hours in advance if you need to reschedule an appointment to ensure continued access to your outpatient providers.   Major Treatments, Procedures and Findings:  You were provided with: a psychiatric assessment, assessed for medical stability, medication evaluation and/or management and group therapy    Symptoms to Report: feeling more aggressive, increased confusion, losing more  sleep, mood getting worse or thoughts of suicide    Early warning signs can include: increased depression or anxiety sleep disturbances increased thoughts or behaviors of suicide or self-harm  increased unusual thinking, such as paranoia or hearing voices    Safety and Wellness:  Take all medicines as directed.  Make no changes unless your doctor suggests them.      Follow treatment recommendations.  Refrain from alcohol and non-prescribed drugs.  If there is a concern for safety, call 911.    Resources:   [facility] Crisis Response [phone]    Crisis Intervention: 721.470.3866 or 591-736-7324 (TTY: 760.573.5399).  Call anytime for help.  [employer] [place] [employer]ness (www.[place].nicole.org): 604.567.3743 or 395-059-8156.  Mental Health Consumer/Surviv[employer] of [place] (www.mhcsn.net): 420.732.1119 or 648-292-0378  [employer] [place] (www.mentalhealth.org): 626.827.4475 or 271-075-1857      The treatment team has appreciated the opportunity to work with you. If you have any questions or concerns our unit number is [phone].

## 2018-04-19 NOTE — PROGRESS NOTES
"[facility], [place]   Psychiatric Progress Note  Hospital Day: 9        Interim History:   The patient's care was discussed with the treatment team during the daily team meeting and/or staff's chart notes were reviewed.  Staff report patient continues to make some delusional statements, but it is unclear if he is attempting to joke. He told RN that we were trying to kill him with the shot.    Upon interview, the patient initially claimed that the haldol decanoate caused his eyebrows to turn brown and his hair on his head to grow back. He seemed quite serious about this. When I indicated that it is unlikely to cause any immediate effect like that, he smiled and said, \"Common, it has to do something good for me right.\" It appears as though he was attempting to joke but didn't have the judgement to realize that his statement sounds delusional.    I spoke to patient's son who wanted update about current medications and advice for helping patient adhere to treatment. He indicated that the patient has been saying for a while that people are trying to kill him with medications. He also refused to take medications in front of anyone, and instead would go in the other room then claim he took it. I advised his son to use the outpatient team to his advantage and tell his father that he will call the case manger if he is not letting them verify his medications.    Psychiatric Symptoms: ongoing poor judgement. Some paranoia, likely fixed.    Medication side effects reported: None currently    Other issues reported by patient: None         Medications:       haloperidol  5 mg Oral At Bedtime    Or     haloperidol lactate  5 mg Intramuscular At Bedtime     haloperidol decanoate  50 mg Intramuscular Q30 Days          Allergies:     Allergies   Allergen Reactions     Bee Venom      Propranolol           Labs:   No results found for this or any previous visit (from the past 48 hour(s)).       Psychiatric " "Examination:     /88  Pulse 76  Temp 98.5  F (36.9  C) (Tympanic)  Resp 16  Ht 1.88 m (6' 2\")  Wt 91.6 kg (202 lb)  SpO2 97%  BMI 25.94 kg/m2  Weight is 202 lbs 0 oz  Body mass index is 25.94 kg/(m^2).             Appearance: awake, alert, dressed in hospital scrubs and disheveled   Attitude:  cooperative  Eye Contact:  good  Mood:  euthymic  Affect:  intensity is heightened  Speech:  clear, coherent and normal prosody  Language: fluent and intact in English  Psychomotor, Gait, Musculoskeletal:  no evidence of tardive dyskinesia, dystonia, or tics, intact station, gait and muscle tone and tremor observed   Throught Process:  goal oriented and illogical  Associations:  no loose associations  Thought Content:  ongoing ideas of persecution  Insight:  limited  Judgement:  poor  Oriented to:  time, person, and place  Attention Span and Concentration:  intact  Recent and Remote Memory:  intact  Fund of Knowledge:  Adequate      Clinical Global Impressions  First:  Considering your total clinical experience with this particular patient population, how severe are the patient's symptoms at this time?: 7 (04/11/18 1220)  Compared to the patient's condition at the START of treatment, this patient's condition is:: 4 (04/11/18 1220)  Most recent:  Considering your total clinical experience with this particular patient population, how severe are the patient's symptoms at this time?: 7 (04/11/18 1220)  Compared to the patient's condition at the START of treatment, this patient's condition is:: 4 (04/11/18 1220)    # Pain Assessment:  Current Pain Score 4/19/2018   Patient currently in pain? no   Pain score (0-10) -   Pain location -   Pain descriptors -   Austyn s pain level was assessed and he currently denies pain.             Precautions:     Behavioral Orders   Procedures     Code 1 - Restrict to Unit     Elopement precautions     Neuropsych Testing     Yamilka Venkat     Routine Programming     As clinically " indicated     Sexual precautions     Single Room     Status 15     Every 15 minutes.          Diagnoses:      Schizoaffective disorder, bipolar type   Cigarette nicotine dependence with withdrawal           Assessment & Plan:   Assessment and hospital summary:  63-year-old man with schizoaffective disorder admitted due to inappropriate behaviors in the setting of noncompliance with treatment as ordered by commitment. Provisional discharge revoked on admission. Patient continues to make inappropriate comments and appears to lack insight. He also has some grandiosity. Interestingly, he was not demonstrating other overt signs of nicole such as decreased need for sleep or increased goal-directed activities. He appears perpetually disinhibited and somewhat childlike in his reasoning. This elicits some concerns for organic cause such as a demential process involving frontal lobe dysfunction, however given his axis I condition, this really cannot be verified. Further evaluation when more stable is needed. Overall, he has shown improvement and is able to say that he will work on getting along with people and will stay away from children. This is a level of insight and judgement that was not present on admission.    Target psychiatric symptoms and interventions:  Ongoing grandiosity, irritability, and sexually inappropriate behaviors - continue with haloperidol. Decanoate started 50 mg on 4/19.    Reviewed testing as documented by Dino Ruth and signed on 4/18/2018    Medical Problems and Treatments:  None    Behavioral/Psychological/Social:  Encourage group programming    Precautions:  Behavioral Orders   Procedures     Code 1 - Restrict to Unit     Elopement precautions     Neuropsych Testing     Yamilka Venkat     Routine Programming     As clinically indicated     Sexual precautions     Single Room     Status 15     Every 15 minutes.       Legal:  Patient admitted on a 72-hour hold. Outpatient team revoked his provisional  discharge. Patient has active commitment and [facility].    Disposition:  Working for discharge tomorrow.

## 2018-04-19 NOTE — PLAN OF CARE
Problem: Patient Care Overview  Goal: Team Discussion  Team Plan:   BEHAVIORAL TEAM DISCUSSION    Participants: dr marques, shavonne plasencia rn, johnson wong rn, niki morejon    Progress: Dr Marques feels pt has made progress.  He still makes bizarre, paranoid, and grandiose type remarks/statements.    He is cooperating with taking meds.   He participated in neuropsych testing this week.   He asks for discharge.       Continued Stay Criteria/Rationale: Dr Marques would like patient discharged tomorrow.  Medical/Physical: per internal medicine  Precautions:   Behavioral Orders   Procedures     Code 1 - Restrict to Unit     Elopement precautions     Neuropsych Testing     Yamilka Venkat     Routine Programming     As clinically indicated     Sexual precautions     Single Room     Status 15     Every 15 minutes.     Plan:  He will be PD'd back to independent living.   He lives with family in [place].   I have arranged outpatient mental health follow up.    I have referred him to an ACT team.   He had neuropsych testing.   Dr marques has him on a long acting injectable medication to help improve med compliance in the community.    Rationale for change in precautions or plan: no change at this time

## 2018-04-19 NOTE — PROGRESS NOTES
Pt attended morning OT activity groups today - sat at table with peers for nearly 2 hours (previously would sit in lounge chairs away from the group).  Chose to work on brain teaser puzzles.  Appropriate social interactions, nothing inappropriate, or even borderline inappropriate said by pt.

## 2018-04-19 NOTE — PLAN OF CARE
Problem: Cognitive Impairment (Psychotic Signs/Symptoms) (Adult)  Goal: Improved Thought Clarity/Organization (Psychotic Signs/Symptoms)  Outcome: Therapy, progress toward functional goals as expected  Pt continues to express frustration about being in the hospital. He does acknowledge that he needs to refrain from inappropriate activities when out of the hospital (like taking photos of children in public). Shows minimal insight. Continues to make some paranoid statements about his doctor trying to kill him with medication. Pt was in the milieu all evening. No behavioral issues. Compliant with scheduled medications. Hygiene remains poor. Will continue to monitor.

## 2018-04-20 VITALS
RESPIRATION RATE: 16 BRPM | OXYGEN SATURATION: 97 % | BODY MASS INDEX: 25.93 KG/M2 | WEIGHT: 202 LBS | SYSTOLIC BLOOD PRESSURE: 159 MMHG | HEIGHT: 74 IN | HEART RATE: 76 BPM | DIASTOLIC BLOOD PRESSURE: 88 MMHG | TEMPERATURE: 98.4 F

## 2018-04-20 PROCEDURE — A9270 NON-COVERED ITEM OR SERVICE: HCPCS | Mod: GY | Performed by: EMERGENCY MEDICINE

## 2018-04-20 PROCEDURE — 99239 HOSP IP/OBS DSCHRG MGMT >30: CPT | Performed by: PSYCHIATRY & NEUROLOGY

## 2018-04-20 PROCEDURE — 25000132 ZZH RX MED GY IP 250 OP 250 PS 637: Mod: GY | Performed by: EMERGENCY MEDICINE

## 2018-04-20 RX ADMIN — NICOTINE POLACRILEX 2 MG: 2 LOZENGE ORAL at 09:53

## 2018-04-20 NOTE — PROGRESS NOTES
Patient calmer, anxious for discharge but willing to wait until everything is put together. Otherwise he is ready for discharge after he finishes the paperwork.

## 2018-04-20 NOTE — PROGRESS NOTES
Pt d/c to home via cab. D/C papers gone over and signed. Pt contracts for safety and denies all psych sx. PA walked pt down to the cab. All belongings and Rx given to pt.
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
## 2018-04-24 ENCOUNTER — CARE COORDINATION (OUTPATIENT)
Dept: FAMILY MEDICINE | Facility: CLINIC | Age: 63
End: 2018-04-24

## 2018-04-24 NOTE — PROGRESS NOTES
Care Coordination Assessment    PCP: No Ref-Primary, Physician    Referral Source:  ED/IP List      Clinical Data: Patient discharged from inpatient at [facility] 04/20/2018 with Schizoaffective Disorder, Bipolar Type.  Patient discharged to home with instructions for follow up    Plan: I will forward to Anitra in Care Coordination since this is Mental Health

## 2018-04-25 ENCOUNTER — PATIENT OUTREACH (OUTPATIENT)
Dept: CARE COORDINATION | Facility: CLINIC | Age: 63
End: 2018-04-25

## 2018-04-25 NOTE — PROGRESS NOTES
Clinic Care Coordination Contact  [facility]/Voicemail    Referral Source: IP Report  Clinical Data: Care Coordinator Outreach  Outreach attempted x 1.  Left message on voicemail with call back information and requested return call.  Plan: . Care Coordinator will try to reach patient again in 3-5 business days.    Anitra Virk RN  Clinic Care Coordinator  Mobile: [phone]  [email or web address]
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
## 2018-07-03 ENCOUNTER — OFFICE VISIT (OUTPATIENT)
Dept: FAMILY MEDICINE | Facility: CLINIC | Age: 63
End: 2018-07-03

## 2018-07-03 VITALS
TEMPERATURE: 97.9 F | RESPIRATION RATE: 16 BRPM | HEIGHT: 74 IN | WEIGHT: 193.2 LBS | HEART RATE: 92 BPM | BODY MASS INDEX: 24.79 KG/M2 | SYSTOLIC BLOOD PRESSURE: 128 MMHG | DIASTOLIC BLOOD PRESSURE: 78 MMHG | OXYGEN SATURATION: 94 %

## 2018-07-03 DIAGNOSIS — G25.2 INTENTION TREMOR: ICD-10-CM

## 2018-07-03 DIAGNOSIS — T50.905D ADVERSE EFFECT OF DRUG, SUBSEQUENT ENCOUNTER: Primary | ICD-10-CM

## 2018-07-03 PROCEDURE — 99213 OFFICE O/P EST LOW 20 MIN: CPT | Performed by: FAMILY MEDICINE

## 2018-07-03 RX ORDER — BENZTROPINE MESYLATE 0.5 MG/1
TABLET ORAL
Refills: 1 | Status: ON HOLD | COMMUNITY
Start: 2018-06-26 | End: 2019-06-18

## 2018-07-03 NOTE — MR AVS SNAPSHOT
"              After Visit Summary   7/3/2018    Austyn Leach    MRN: [id number]           Patient Information     Date Of Birth          1955        Visit Information        Provider Department      7/3/2018 2:30 PM Kristin Zhong MD [facility]        Today's Diagnoses     Adverse effect of drug, subsequent encounter    -  1    Intention tremor          Care Instructions    Adverse effect of drug, subsequent encounter  (primary encounter diagnosis)  Comment: reviewed this issue  Plan: encouraged bid dosing of cogentin/ consider with specialists lowering haldol dosing              Follow-ups after your visit        Who to contact     If you have questions or need follow up information about today's clinic visit or your schedule please contact [facility] directly at [phone].  Normal or non-critical lab and imaging results will be communicated to you by MyChart, letter or phone within 4 business days after the clinic has received the results. If you do not hear from us within 7 days, please contact the clinic through MyChart or phone. If you have a critical or abnormal lab result, we will notify you by phone as soon as possible.  Submit refill requests through [employer] or call your pharmacy and they will forward the refill request to us. Please allow 3 business days for your refill to be completed.          Additional Information About Your Visit        Your Vitals Were     Pulse Temperature Respirations Height Pulse Oximetry BMI (Body Mass Index)    92 97.9  F (36.6  C) (Oral) 16 1.88 m (6' 2\") 94% 24.81 kg/m2       Blood Pressure from Last 3 Encounters:   07/03/18 128/78   04/19/18 159/88   09/26/17 120/73    Weight from Last 3 Encounters:   07/03/18 87.6 kg (193 lb 3.2 oz)   04/19/18 91.6 kg (202 lb)   09/28/17 89.8 kg (198 lb)              Today, you had the following     No orders found for display       Primary Care Provider Office Phone # Fax #    " Kristin Zhong [phone]       [address] [address]        Equal Access to Services     CATHERINE MONIQUE : Hadii aad ku hadsarahailyn Milebienvenido, wamackenzieda luqadaha, qajohnta kaalmada kourtney, janet doniswilly garibaydick woody lakavitawilly moreno. So [facility] [phone].    ATENCIÓN: Si habla español, tiene a gunderson disposición servicios gratuitos de asistencia lingüística. Llame al [phone].    We comply with applicable federal civil rights laws and [place] laws. We do not discriminate on the basis of race, color, national origin, age, disability, sex, sexual orientation, or gender identity.            Thank you!     Thank you for choosing [facility]  for your care. Our goal is always to provide you with excellent care. Hearing back from our patients is one way we can continue to improve our services. Please take a few minutes to complete the written survey that you may receive in the mail after your visit with us. Thank you!             Your Updated Medication List - Protect others around you: Learn how to safely use, store and throw away your medicines at www.disposemymeds.org.          This list is accurate as of 7/3/18  4:14 PM.  Always use your most recent med list.                   Brand Name Dispense Instructions for use Diagnosis    benztropine 0.5 MG tablet    COGENTIN     TK 1 T PO BID        haloperidol decanoate 50 MG/ML Soln injection    HALDOL DECANOATE    2.1 mL    Inject 50 mg into the muscle every 30 days Next dose is due 5/19/2018    Schizoaffective disorder, bipolar type (H)       sertraline 50 MG tablet    ZOLOFT     TK 1 T PO QAM

## 2018-07-03 NOTE — PATIENT INSTRUCTIONS
Adverse effect of drug, subsequent encounter  (primary encounter diagnosis)  Comment: reviewed this issue  Plan: encouraged bid dosing of cogentin/ consider with specialists lowering haldol dosing

## 2018-07-03 NOTE — PROGRESS NOTES
SUBJECTIVE: 63 year old male complaining of intentional tremor for 3 week(s). Also reports fatigue, spasms and stiffness.  The patient describes has been given injectable haldol due to his psychiatric condition every 30 days/ Has had 3 injections. Followed by Dr Richard Clayton, Psychiatry in [place].   His case worked noted these changes and had him see specialists on 6/26/2018/ started on Cogentin and sertraline/ not taking cogentin twice daily as per pharmacy instructions.   The patient denies a history of headache, GI issues or rash.     Denies    breast pain or swelling or unusual production of breast milk    confusion    difficulty breathing    difficulty in speaking or swallowing    difficulty passing urine, or sudden loss of bladder control    dizziness or light headedness    fast or irregular heartbeat    fever, chills, or sore throat    hot, dry skin or lack of sweating    loss of balance or difficulty walking    seizures    skin rash    uncontrollable tongue or chewing movements, smacking lips or puffing cheeks    unusually weak or tired    Smoking history: Yes: 1 ppd.   Relevant past medical history: positive for schizoaffective disorder with delusions and nicole/ haldol has been helpful.    Patient Active Problem List   Diagnosis     Personal history of tobacco use, presenting hazards to health     Erectile dysfunction     Intention tremor     Living will, counseling/discussion     Health Care Home     ACP (advance care planning)     Delusions (H)     Nicole (H)     Schizoaffective disorder, bipolar type (H)     Cigarette nicotine dependence with withdrawal     Current Outpatient Prescriptions   Medication     haloperidol decanoate (HALDOL DECANOATE) 50 MG/ML SOLN injection     benztropine (COGENTIN) 0.5 MG tablet     sertraline (ZOLOFT) 50 MG tablet     No current facility-administered medications for this visit.      Past Surgical History:   Procedure Laterality Date     NO HISTORY OF SURGERY              OBJECTIVE: The patient appears healthy, alert, no distress, cooperative, smiling and missing front teeth/ not shaved or groomed very well.   EARS: negative  NOSE/SINUS: Nares normal. Septum midline. Mucosa normal. No drainage or sinus tenderness.   THROAT: normal   NECK:Neck supple. No adenopathy. Thyroid symmetric, normal size,, Carotids without bruits.   CHEST: Regular rate and  rhythm. S1 and S2 normal, no murmurs, clicks, gallops or rubs. No edema or JVD. Chest is clear; no wheezes or rales.  CNS: No resting tremor. Upon movement of hands and feet shaking tremor noted.    BMI : Body mass index is 24.81 kg/(m^2).      ASSESSMENT: (T88.7XXD) Adverse effect of drug, subsequent encounter  (primary encounter diagnosis)  Comment: reviewed this issue  Plan: encouraged bid dosing of cogentin/ consider with specialists lowering haldol dosing    (G25.2) Intention tremor  Plan: as above.

## 2018-07-03 NOTE — NURSING NOTE
Austyn is here for tremers in hands that pt has had his whole life but within two weeks tremers became suddenly a lot worse, hard to eat worried about driving, cant write or type    Pre-Visit Screening :  Immunizations : up to date  Colon Screening : is up to date  Asthma Action Test/Plan : tena  PHQ9/GAD7 :  Tena    Pulse - regular  My Chart - declines    CLASSIFICATION OF OVERWEIGHT AND OBESITY BY BMI                         Obesity Class           BMI(kg/m2)  Underweight                                    < 18.5  Normal                                         18.5-24.9  Overweight                                     25.0-29.9  OBESITY                     I                  30.0-34.9                              II                 35.0-39.9  EXTREME OBESITY             III                >40                             Patient's  BMI Body mass index is 22.15 kg/(m^2).  http://hin.nhlbi.nih.gov/menuplanner/menu.cgi  Questioned patient about current smoking habits.  Pt. Currently smokes  The patient has verbalized that it is ok to leave a detailed voice message on the patient's cell phone with results/recommendations from this visit.       Verified [phone] phone number:

## 2019-03-22 ENCOUNTER — TRANSFERRED RECORDS (OUTPATIENT)
Dept: FAMILY MEDICINE | Facility: CLINIC | Age: 64
End: 2019-03-22

## 2019-03-22 ENCOUNTER — HOSPITAL ENCOUNTER (EMERGENCY)
Facility: CLINIC | Age: 64
Discharge: OTHER INSTITUTION | End: 2019-03-23
Attending: EMERGENCY MEDICINE | Admitting: EMERGENCY MEDICINE
Payer: COMMERCIAL

## 2019-03-22 DIAGNOSIS — F25.9 SCHIZOAFFECTIVE DISORDER, UNSPECIFIED TYPE (H): ICD-10-CM

## 2019-03-22 PROCEDURE — 99285 EMERGENCY DEPT VISIT HI MDM: CPT | Mod: 25 | Performed by: EMERGENCY MEDICINE

## 2019-03-22 PROCEDURE — 99285 EMERGENCY DEPT VISIT HI MDM: CPT | Mod: Z6 | Performed by: EMERGENCY MEDICINE

## 2019-03-22 PROCEDURE — 90791 PSYCH DIAGNOSTIC EVALUATION: CPT

## 2019-03-22 PROCEDURE — 96372 THER/PROPH/DIAG INJ SC/IM: CPT | Performed by: EMERGENCY MEDICINE

## 2019-03-22 RX ORDER — OLANZAPINE 10 MG/1
10 TABLET, ORALLY DISINTEGRATING ORAL
Status: DISCONTINUED | OUTPATIENT
Start: 2019-03-22 | End: 2019-03-23 | Stop reason: HOSPADM

## 2019-03-22 ASSESSMENT — ENCOUNTER SYMPTOMS
AGITATION: 1
HYPERACTIVE: 1

## 2019-03-22 NOTE — ED NOTES
Bed: ED14  Expected date:   Expected time:   Means of arrival:   Comments:  Assessment Room/Search

## 2019-03-23 VITALS
SYSTOLIC BLOOD PRESSURE: 143 MMHG | DIASTOLIC BLOOD PRESSURE: 65 MMHG | HEART RATE: 96 BPM | OXYGEN SATURATION: 94 % | RESPIRATION RATE: 16 BRPM | TEMPERATURE: 97.4 F

## 2019-03-23 PROCEDURE — 96372 THER/PROPH/DIAG INJ SC/IM: CPT | Performed by: EMERGENCY MEDICINE

## 2019-03-23 PROCEDURE — 25000128 H RX IP 250 OP 636: Performed by: EMERGENCY MEDICINE

## 2019-03-23 RX ORDER — OLANZAPINE 10 MG/2ML
10 INJECTION, POWDER, FOR SOLUTION INTRAMUSCULAR ONCE
Status: COMPLETED | OUTPATIENT
Start: 2019-03-23 | End: 2019-03-23

## 2019-03-23 RX ORDER — OLANZAPINE 10 MG/2ML
INJECTION, POWDER, FOR SOLUTION INTRAMUSCULAR
Status: DISCONTINUED
Start: 2019-03-23 | End: 2019-03-23 | Stop reason: HOSPADM

## 2019-03-23 RX ADMIN — OLANZAPINE 10 MG: 10 INJECTION, POWDER, FOR SOLUTION INTRAMUSCULAR at 00:50
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
## 2019-03-23 NOTE — ED NOTES
"Continues to have delusional, disorganized and paranoid thoughts  Talks loudly at times, no physical or verbal aggression, but argumentative.  Continues to refuse to go into a room, remains seated in hallway.   Refuses to give urine sample, states \"I'll just give you water\"  Requesting sudafed, MD informed.  Offered PRN Zyprexa which patient declined stating  \"That's just poison\"  "

## 2019-03-23 NOTE — ED NOTES
Within an hour after restraint an in person face to face assessment was completed at 0114, including an evaluation of the patient's immediate reaction to the intervention, behavioral assessment and review/assessment of history, drugs and medications,2 recent labs, etc., and behavioral condition.  The patient experienced: No adverse physical outcome from seclusion/restraint initiation.  The intervention of restraint or seclusion needs to continue.     Irasema Can MD  03/23/19 0114

## 2019-03-23 NOTE — ED NOTES
Patient was moved to room 11 from [facility]. Discussed with patient that he needs to stay in his room if there is a room available. Patient began pounding on door. Patients nurse stated he needs to stop pounding. PO zyprexa was offered and patient refused. IM zyprexa ordered and patient layed down on bed with pants down with intent to give IM in buttocks. Pt hollering and swearing, calling names and accusations at staff members. Pt was given IM injection with help of code green team. Primary RN was accidentally poked with needle after injection as patient was aggressive and moved abruptly. Pt asked if he could go to the bathroom. Access was granted, pt began getting aggressive and pounding on bathroom door so code team was reassembled and primary RN decided it was time to go into seclusion. Pt willingly sat on mat in seclusion room but as door was closed he began loudly kicking door, fear of injury to self. Psych associate told pt he needed to stop kicking the door or he would be put into restraints. He continued kicking door. Psych associates appropriately restrained patient. This RN is doing 15 minute checks and he is on continuous monitoring. Currently calm and cooperative in 5 point restraints in room 14.
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
## 2019-03-23 NOTE — ED NOTES
"Remains hyper verbal, delusional and paranoid.  disorganized thinking with flight of ideas.  States \"the hospital make a lot of money from cutting people up and selling their body parts\"   Refuses to go inside of room, but will sit in doorway.   "

## 2019-03-25 LAB
HBV CORE AB SERPL QL IA: NONREACTIVE
HBV SURFACE AB SERPL IA-ACNC: 0 M[IU]/ML
HCV AB SERPL QL IA: NONREACTIVE

## 2019-03-26 ENCOUNTER — RESULTS ONLY (OUTPATIENT)
Dept: LAB | Age: 64
End: 2019-03-26

## 2019-03-26 LAB — HIV 1+2 AB+HIV1 P24 AG SERPL QL IA: NONREACTIVE

## 2019-06-18 ENCOUNTER — HOSPITAL ENCOUNTER (INPATIENT)
Facility: CLINIC | Age: 64
LOS: 24 days | Discharge: HOME | End: 2019-07-12
Attending: EMERGENCY MEDICINE | Admitting: PSYCHIATRY & NEUROLOGY
Payer: COMMERCIAL

## 2019-06-18 ENCOUNTER — TRANSFERRED RECORDS (OUTPATIENT)
Dept: HEALTH INFORMATION MANAGEMENT | Facility: CLINIC | Age: 64
End: 2019-06-18

## 2019-06-18 DIAGNOSIS — R46.89 DISORGANIZED BEHAVIOR: ICD-10-CM

## 2019-06-18 DIAGNOSIS — F25.0 SCHIZOAFFECTIVE DISORDER, BIPOLAR TYPE (H): ICD-10-CM

## 2019-06-18 DIAGNOSIS — S82.142A CLOSED FRACTURE OF LEFT TIBIAL PLATEAU, INITIAL ENCOUNTER: Primary | ICD-10-CM

## 2019-06-18 PROBLEM — R45.1 AGITATION: Status: ACTIVE | Noted: 2019-06-18

## 2019-06-18 PROCEDURE — 99291 CRITICAL CARE FIRST HOUR: CPT | Mod: Z6 | Performed by: EMERGENCY MEDICINE

## 2019-06-18 PROCEDURE — 96372 THER/PROPH/DIAG INJ SC/IM: CPT | Performed by: EMERGENCY MEDICINE

## 2019-06-18 PROCEDURE — 99285 EMERGENCY DEPT VISIT HI MDM: CPT | Performed by: EMERGENCY MEDICINE

## 2019-06-18 PROCEDURE — 12400001 ZZH R&B MH UMMC

## 2019-06-18 PROCEDURE — 25000132 ZZH RX MED GY IP 250 OP 250 PS 637: Performed by: NURSE PRACTITIONER

## 2019-06-18 PROCEDURE — 25000128 H RX IP 250 OP 636: Performed by: EMERGENCY MEDICINE

## 2019-06-18 PROCEDURE — A9270 NON-COVERED ITEM OR SERVICE: HCPCS | Performed by: NURSE PRACTITIONER

## 2019-06-18 RX ORDER — HALOPERIDOL 5 MG/1
5-10 TABLET ORAL EVERY 4 HOURS PRN
Status: DISCONTINUED | OUTPATIENT
Start: 2019-06-18 | End: 2019-07-12 | Stop reason: HOSPADM

## 2019-06-18 RX ORDER — ACETAMINOPHEN 325 MG/1
650 TABLET ORAL EVERY 4 HOURS PRN
Status: DISCONTINUED | OUTPATIENT
Start: 2019-06-18 | End: 2019-07-12 | Stop reason: HOSPADM

## 2019-06-18 RX ORDER — HALOPERIDOL 5 MG/ML
5-10 INJECTION INTRAMUSCULAR EVERY 4 HOURS PRN
Status: DISCONTINUED | OUTPATIENT
Start: 2019-06-18 | End: 2019-07-12 | Stop reason: HOSPADM

## 2019-06-18 RX ORDER — QUETIAPINE FUMARATE 300 MG/1
600 TABLET, FILM COATED ORAL AT BEDTIME
Status: ON HOLD | COMMUNITY
End: 2019-07-12

## 2019-06-18 RX ORDER — TRAZODONE HYDROCHLORIDE 50 MG/1
50 TABLET, FILM COATED ORAL
Status: DISCONTINUED | OUTPATIENT
Start: 2019-06-18 | End: 2019-07-12 | Stop reason: HOSPADM

## 2019-06-18 RX ORDER — ALUMINA, MAGNESIA, AND SIMETHICONE 2400; 2400; 240 MG/30ML; MG/30ML; MG/30ML
30 SUSPENSION ORAL EVERY 4 HOURS PRN
Status: DISCONTINUED | OUTPATIENT
Start: 2019-06-18 | End: 2019-07-12 | Stop reason: HOSPADM

## 2019-06-18 RX ORDER — LORAZEPAM 1 MG/1
1-2 TABLET ORAL EVERY 4 HOURS PRN
Status: DISCONTINUED | OUTPATIENT
Start: 2019-06-18 | End: 2019-07-12 | Stop reason: HOSPADM

## 2019-06-18 RX ORDER — HYDROXYZINE HYDROCHLORIDE 25 MG/1
25 TABLET, FILM COATED ORAL EVERY 4 HOURS PRN
Status: DISCONTINUED | OUTPATIENT
Start: 2019-06-18 | End: 2019-07-12 | Stop reason: HOSPADM

## 2019-06-18 RX ORDER — LORAZEPAM 2 MG/ML
1-2 INJECTION INTRAMUSCULAR EVERY 4 HOURS PRN
Status: DISCONTINUED | OUTPATIENT
Start: 2019-06-18 | End: 2019-07-12 | Stop reason: HOSPADM

## 2019-06-18 RX ORDER — BISACODYL 10 MG
10 SUPPOSITORY, RECTAL RECTAL DAILY PRN
Status: DISCONTINUED | OUTPATIENT
Start: 2019-06-18 | End: 2019-07-12 | Stop reason: HOSPADM

## 2019-06-18 RX ORDER — DIPHENHYDRAMINE HCL 50 MG
50 CAPSULE ORAL EVERY 4 HOURS PRN
Status: DISCONTINUED | OUTPATIENT
Start: 2019-06-18 | End: 2019-07-12 | Stop reason: HOSPADM

## 2019-06-18 RX ORDER — HALOPERIDOL 5 MG/1
5 TABLET ORAL 2 TIMES DAILY
Status: ON HOLD | COMMUNITY
End: 2019-07-12

## 2019-06-18 RX ORDER — DIPHENHYDRAMINE HYDROCHLORIDE 50 MG/ML
50 INJECTION INTRAMUSCULAR; INTRAVENOUS EVERY 4 HOURS PRN
Status: DISCONTINUED | OUTPATIENT
Start: 2019-06-18 | End: 2019-07-12 | Stop reason: HOSPADM

## 2019-06-18 RX ORDER — HALOPERIDOL 5 MG/ML
5 INJECTION INTRAMUSCULAR ONCE
Status: COMPLETED | OUTPATIENT
Start: 2019-06-18 | End: 2019-06-18

## 2019-06-18 RX ORDER — QUETIAPINE FUMARATE 200 MG/1
200 TABLET, FILM COATED ORAL AT BEDTIME
Status: DISCONTINUED | OUTPATIENT
Start: 2019-06-18 | End: 2019-07-12 | Stop reason: HOSPADM

## 2019-06-18 RX ORDER — HALOPERIDOL 5 MG/1
5 TABLET ORAL 2 TIMES DAILY
Status: DISCONTINUED | OUTPATIENT
Start: 2019-06-18 | End: 2019-06-19

## 2019-06-18 RX ADMIN — HALOPERIDOL 5 MG: 5 TABLET ORAL at 22:41

## 2019-06-18 RX ADMIN — QUETIAPINE FUMARATE 200 MG: 200 TABLET ORAL at 22:41

## 2019-06-18 RX ADMIN — HALOPERIDOL LACTATE 5 MG: 5 INJECTION INTRAMUSCULAR at 16:49

## 2019-06-18 ASSESSMENT — ACTIVITIES OF DAILY LIVING (ADL)
RETIRED_EATING: 0-->INDEPENDENT
FALL_HISTORY_WITHIN_LAST_SIX_MONTHS: YES
TOILETING: 0-->INDEPENDENT
BATHING: 0-->INDEPENDENT
HYGIENE/GROOMING: PROMPTS;INDEPENDENT
DRESS: 0-->INDEPENDENT
AMBULATION: 0-->INDEPENDENT
SWALLOWING: 0-->SWALLOWS FOODS/LIQUIDS WITHOUT DIFFICULTY
RETIRED_COMMUNICATION: 0-->UNDERSTANDS/COMMUNICATES WITHOUT DIFFICULTY
LAUNDRY: UNABLE TO COMPLETE
ORAL_HYGIENE: INDEPENDENT;PROMPTS
COGNITION: 2 - DIFFICULTY WITH ORGANIZING THOUGHTS
DRESS: SCRUBS (BEHAVIORAL HEALTH)
TRANSFERRING: 0-->INDEPENDENT

## 2019-06-18 NOTE — ED NOTES
Pt arrived in restraints and has spit camarena on, agitated, loud and swearing. Per EMS had received 2.5mg versed and 25 mg benadryl about 1420. MD made aware of pt's behavior and code 21 called. Pt physically rolled off the gurney onto a mattress, Haldol IM received and placed in seclusion.

## 2019-06-18 NOTE — ED NOTES
Pt still agitated, refused vitals-saying it is going to cost him money, sandwich and apple juice given per request.

## 2019-06-18 NOTE — ED NOTES
Pt was banging and kicking the door, saying he wants a chair and TV, informed pt that if he continues with this behavior he will continues to be in seclusion

## 2019-06-18 NOTE — ED PROVIDER NOTES
"    [facility] [place])    6/18/19     ED 14   History     Chief Complaint   Patient presents with     Agitation     HPI  Austyn Leach is a 64 year old male with a history of schizoaffective disorder, bipolar type who presents to the emergency department with agitation and disorganized behavior.  Upon arrival to the emergency department he is screaming \" help me\" and yelling expletives at staff.  He does have a spit camarena in place as he has been combative and spitting at EMS staff.  The patient was unable to provide any significant history.  I discussed with the patient's  Awilda at [facility].  The patient's wife recently told him that she wanted a divorce.  A few days ago he left the home and is believed to have been living in his car.  On Sunday he went to the [facility] and was trying to see the governor to show him all of the viruses on his computer.  He was escorted out by [place] police.  Today he was supposed to follow-up with his psychiatrist in the morning (Dr. Clayton at [facility]) however did not show up to his appointment.  He did come later in the afternoon but was very disorganized during the interaction.  He refused any medication injections and told his provider that people were trying to poison him.  He left his cell phone at the desk and left the clinic.  [place] along the highway by police.  EMS was called as he became very agitated upon their arrival.  Patient was given 2.5 mg of IM Versed and 25 mg IM Benadryl prior to arrival.  He is on a commitment with a [employer] order and is not believed to have been taking his medication recently given his significant disorganized behavior and refusal of medications in clinic today.  Upon arrival to the emergency department a code 21 was called.  Patient was given IM Haldol for ongoing violent behavior.    I have reviewed the Medications, Allergies, Past Medical and Surgical History, and Social " History in the Epic system.    Review of Systems   Unable to perform ROS: Psychiatric disorder       Physical Exam          Physical Exam  General: patient is awake, alert, yelling expletives at staff   Head: atraumatic and normocephalic   EENT: moist mucus membranes, sclera anicteric   Neck: supple   Cardiovascular: extremities warm and well perfused, no lower extremity edema  Pulmonary: no respiratory distress  Abdomen: non-distended  Musculoskeletal: normal range of motion of extremities  Neurological: alert, moving all extremities symmetrically, gait normal   Skin: warm, dry     ED Course        Procedures             Critical Care time:  was 30 minutes for this patient excluding procedures.               Labs Ordered and Resulted from Time of ED Arrival Up to the Time of Departure from the ED - No data to display         Assessments & Plan (with Medical Decision Making)   Mr. Leach is a 64 year old male with a history of schizoaffective disorder, bipolar type who presents to the emergency department with agitation and disorganized behavior.  He appears to be noncompliant with his medications in the setting of a prior commitment and [employer] order.  The patient did require additional sedation with IM Haldol and was placed in seclusion for safety.  He does not have any obvious traumatic injury on exam.  On re-evaluation he is more calm and cooperative.  Reports he feels his scalp is on fire after haldol.  No rashes or erythema noted on the scalp.  He will require admission for safety and stabilization and reinitiation of medication management.  Patient was placed on a 72-hour hold in the ED.    I have reviewed the nursing notes.    I have reviewed the findings, diagnosis, plan and need for follow up with the patient.       Medication List      There are no discharge medications for this visit.         Final diagnoses:   Schizoaffective disorder, bipolar type (H)   Disorganized behavior       6/18/2019   [facility]     Elsie Murphy MD  06/18/19 0802       Elsie Murphy MD  06/18/19 0819

## 2019-06-18 NOTE — ED NOTES
Within an hour after restraint an in person face to face assessment was completed at 1900, including an evaluation of the patient's immediate reaction to the intervention, behavioral assessment and review/assessment of history, drugs and medications, recent labs, etc., and behavioral condition.  The patient experienced: No adverse physical outcome from seclusion/restraint initiation.  The intervention of restraint or seclusion needs to terminate.     Elsie Murphy MD  06/18/19 2311

## 2019-06-18 NOTE — ED NOTES
Bed: ED14  Expected date:   Expected time:   Means of arrival:   Comments:  [place] [employer], 65 yo M, agitated and uncooperative; requesting security meet

## 2019-06-19 ENCOUNTER — APPOINTMENT (OUTPATIENT)
Dept: GENERAL RADIOLOGY | Facility: CLINIC | Age: 64
End: 2019-06-19
Attending: PHYSICIAN ASSISTANT
Payer: COMMERCIAL

## 2019-06-19 ENCOUNTER — APPOINTMENT (OUTPATIENT)
Dept: CT IMAGING | Facility: CLINIC | Age: 64
End: 2019-06-19
Attending: PHYSICIAN ASSISTANT
Payer: COMMERCIAL

## 2019-06-19 LAB — RADIOLOGIST FLAGS: NORMAL

## 2019-06-19 PROCEDURE — 25000132 ZZH RX MED GY IP 250 OP 250 PS 637: Performed by: NURSE PRACTITIONER

## 2019-06-19 PROCEDURE — 99221 1ST HOSP IP/OBS SF/LOW 40: CPT | Mod: AI | Performed by: PSYCHIATRY & NEUROLOGY

## 2019-06-19 PROCEDURE — 73700 CT LOWER EXTREMITY W/O DYE: CPT | Mod: LT

## 2019-06-19 PROCEDURE — 73562 X-RAY EXAM OF KNEE 3: CPT | Mod: LT

## 2019-06-19 PROCEDURE — 99221 1ST HOSP IP/OBS SF/LOW 40: CPT | Performed by: PHYSICIAN ASSISTANT

## 2019-06-19 PROCEDURE — 25000132 ZZH RX MED GY IP 250 OP 250 PS 637: Mod: GY | Performed by: PSYCHIATRY & NEUROLOGY

## 2019-06-19 PROCEDURE — A9270 NON-COVERED ITEM OR SERVICE: HCPCS | Mod: GY | Performed by: NURSE PRACTITIONER

## 2019-06-19 PROCEDURE — A9270 NON-COVERED ITEM OR SERVICE: HCPCS | Mod: GY | Performed by: PHYSICIAN ASSISTANT

## 2019-06-19 PROCEDURE — A9270 NON-COVERED ITEM OR SERVICE: HCPCS | Mod: GY | Performed by: PSYCHIATRY & NEUROLOGY

## 2019-06-19 PROCEDURE — 12400001 ZZH R&B MH UMMC

## 2019-06-19 PROCEDURE — 73110 X-RAY EXAM OF WRIST: CPT | Mod: RT

## 2019-06-19 PROCEDURE — 99207 ZZC DOWN CODE DUE TO INITIAL EXAM: CPT | Performed by: PSYCHIATRY & NEUROLOGY

## 2019-06-19 PROCEDURE — 99207 ZZC CONSULT E&M CHANGED TO INITIAL LEVEL: CPT | Performed by: PHYSICIAN ASSISTANT

## 2019-06-19 PROCEDURE — 25000132 ZZH RX MED GY IP 250 OP 250 PS 637: Mod: GY | Performed by: PHYSICIAN ASSISTANT

## 2019-06-19 RX ORDER — OLANZAPINE 10 MG/2ML
5 INJECTION, POWDER, FOR SOLUTION INTRAMUSCULAR AT BEDTIME
Status: DISCONTINUED | OUTPATIENT
Start: 2019-06-19 | End: 2019-06-20

## 2019-06-19 RX ORDER — IBUPROFEN 200 MG
400-600 TABLET ORAL EVERY 6 HOURS PRN
Status: DISCONTINUED | OUTPATIENT
Start: 2019-06-19 | End: 2019-07-12 | Stop reason: HOSPADM

## 2019-06-19 RX ORDER — LIDOCAINE 50 MG/G
OINTMENT TOPICAL EVERY 4 HOURS PRN
Status: DISCONTINUED | OUTPATIENT
Start: 2019-06-19 | End: 2019-07-12 | Stop reason: HOSPADM

## 2019-06-19 RX ORDER — RISPERIDONE 1 MG/1
1 TABLET, ORALLY DISINTEGRATING ORAL AT BEDTIME
Status: DISCONTINUED | OUTPATIENT
Start: 2019-06-19 | End: 2019-06-20

## 2019-06-19 RX ADMIN — QUETIAPINE FUMARATE 200 MG: 200 TABLET ORAL at 20:01

## 2019-06-19 RX ADMIN — RISPERIDONE 1 MG: 1 TABLET, ORALLY DISINTEGRATING ORAL at 20:01

## 2019-06-19 RX ADMIN — ACETAMINOPHEN 650 MG: 325 TABLET, FILM COATED ORAL at 20:00

## 2019-06-19 RX ADMIN — IBUPROFEN 600 MG: 200 TABLET, FILM COATED ORAL at 23:03

## 2019-06-19 RX ADMIN — HALOPERIDOL 5 MG: 5 TABLET ORAL at 08:22

## 2019-06-19 ASSESSMENT — ACTIVITIES OF DAILY LIVING (ADL)
DRESS: SCRUBS (BEHAVIORAL HEALTH)
ORAL_HYGIENE: INDEPENDENT
ORAL_HYGIENE: INDEPENDENT
LAUNDRY: UNABLE TO COMPLETE
HYGIENE/GROOMING: INDEPENDENT
DRESS: SCRUBS (BEHAVIORAL HEALTH)
LAUNDRY: UNABLE TO COMPLETE
HYGIENE/GROOMING: INDEPENDENT

## 2019-06-19 NOTE — PLAN OF CARE
BEHAVIORAL TEAM DISCUSSION    Participants: dr cabello, denia august rn, simone hall rn, niki morejon  Progress: None.   New admit.   Psychotic symptoms and disruptive behavior in the community.  Continued Stay Criteria/Rationale: due to the above  Medical/Physical: Left knee pain.   COPD  Precautions:   Behavioral Orders   Procedures    Assault precautions    Code 1 - Restrict to Unit    Fall precautions    Routine Programming     As clinically indicated    Sexual precautions    Status 15     Every 15 minutes.     Plan:  Meds per dr cabello.    Revoke PD.    Coordinate with Denys Dubon CM, Lenora Pepe.  Determine whether he will return home at time of discharge.   Ask him to sign Medicare Rights form when he becomes less irritable and more cooperative.   Assist with aftercare psychiatry appt.  Rationale for change in precautions or plan: no change at this time.
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
## 2019-06-19 NOTE — PROGRESS NOTES
Initial Psychosocial Assessment     I have reviewed the chart, met with the patient, and developed Care Plan.  Information for assessment was obtained from: records  (historical information is obtained from my previous assessment of 4-10-18.)     Presenting Problem: Admitted to [facility] 12 due to psychotic symptoms, including paranoia, delusions, and disorganization.   He was also agitated.   As he has done in the past, he ralf attention to himself for being inappropriate and nuisance in public.       Circumstances surrounding his April 2018 admission:    going off his meds for several weeks and becoming psychotic - paranoid delusions and grandiose delusions.  Additionally, pt was behaving inappropriately in public setting.   For example, approaching strangers in the library in a manner that made the strangers uncomfortable.  Also, he went to [employer] and was throwing paper airplanes around and snapping pictures during the [employer] service.        History of Mental Health and Chemical Dependency:    Most recent admission: 4-10-18 to 4-20-18.  Before that, 9-5-17 to 9-29-17, [facility].  Pt was here at [facility] from 6/7/17 - 6/23/17 - on Station 20.    Pt admitted at [facility] in the past.     Hx of commitments.      Unknown hx of drugs or alcohol abuse.      Family Description:  Pt is  and has 2 adult children.  P's' wife is originally from the [facility].   Pt's wife apparently told him recently she wants a divorce.     Significant Life Events (Illness, Abuse, Trauma, Death):  *When this patient gets symptomatic it is reported that he becomes very sexually preoccupied and racially preoccupied.  When he was hospitalized in June 2017 he reportedly exposed himself to a child while he was in the ED     Living Situation:  Pt resides with his wife in [place]     Educational Background:  Graduated HS      Occupational History:  Has worked as a ,  in his past      Financial Status:  [employer] benefits.    Medicare and BCBS, Out of State     Legal Issues:  Commitment -   MI commitment to [facility] and [facility] on April 4, 2019 through [facility] [address] [facility].   Andersen:  Haldol, Risperdal, Invega, Zyprexa, Seroquel, and Abilify.    Pt has hx of 2 orders for protection against him, one filed by his step daughter and the other filed by the electric company.       Ethnic/Cultural Issues:       Spiritual Orientation:  none      Service History:  none     Social Functioning (organization, interests):  None known     Current Treatment Providers:  Psychiatry:   [facility] - Dr Richard Clayton  Phone: [phone]     Fax: [phone] [address], [address]  [address] :  Betty Pepe  Phone: [phone]    Fax: [phone]     Social Service Assessment/Plan:  -Pt will need to sign Medicare Rights form.  -Pt is under commitment and the Provisional Discharge is being revoked by [facility].  -Coordinate with his [place] AMBROCIO.  -Assist with aftercare appointments.  -Determine whether pt will return home at time of discharge.  -When pt is discharged, he will need a Provisional Discharge Agreement completed.

## 2019-06-19 NOTE — PROGRESS NOTES
Court orders were faxed to us today which include:    -pt is to be held at [facility]  -PD revocation is final on 6-27-19 unless pt/atty has filed to contest it.  -[facility] has permission from the court to PD the pt at time of discharge.

## 2019-06-19 NOTE — PLAN OF CARE
"Pt is a 64 yr old male admitted from the ED on a 72 hr hold due to agitation. Pt was brought in by police today after he attempted to gain access to the Governor's office; reports to writer that he wanted to \"say hi\" and show the Governor his computer viruses \"planted by my wife.\" Per report, he was replicating [place] police business cards and adding his website information, which contains pornographic content. He has a history of sexually inappropriate behavior; prior to this admission he was handing out pamphlets to children and telling them to visit his home. Pt was significantly agitated in the ED, requiring IM medication and seclusion.  Upon arrival to the unit, pt is elated and grandiose. Tells male PAs completing check-in process \"I'm not wearing any underwear!\" and he insisted on disrobing in front of staff instead of using privacy of bathroom. He reports that he is unsure why he was admitted, but that the police and his family are interfering in his life. States his son took down pt's website. Denies AH, SI/SIB and HI. Speech rambling, tangential, and pressured. Reports that he has been taking his medications, and that his son is handling them. Pt was unsteady on his feet at times, but able to ambulate independently. He is refusing vital signs. Reports that his knee hurts \"from the !\" PTA medications restarted per Shantelle Crenshaw NP. Will continue to monitor closely.  "

## 2019-06-19 NOTE — PROGRESS NOTES
17:11- Pt changed to code 2 for purpose of Xray, per IM. Patient sent to Xray, with ticket to ride, staff and . Xray completed without issue, patient returned to his room, and is now resting. Will continue to monitor.     17:45- Update: Per IM, Xray indicates possible fracture of his left tibial plateau. Pt informed he will need to remain non weight bearing on his left leg, and will be seen orthology for follow up. Educated on pain management options available to him, per his MAR, including tylenol and ibuprofen. Additionally, discussed fall prevention strategies, provided urinal, and encouraged pt to request assistance from staff when ambulating or when he has a need. Encouraged pt to remain in bed this evening, per recommendation from IM, until patient can be further assessed by ortho and a knee brace obtained. Patient accepting of education and denies further questions or concerns. Declined pain management except ice packs which he has used intermittently. Will continue to monitor.     18:05- Update: CT completed without issue. Results pending.

## 2019-06-19 NOTE — PHARMACY-ADMISSION MEDICATION HISTORY
Admission medication history for the June 18, 2019 admission is complete.     Interview sources:  Pharmacy ([facility]), [facility]    Reliability of source: Moderate    Medication compliance: Unknown    Current Outpatient Pharmacy: [facility])    Changes made to PTA medication list (reason)  Added: haloperidol  quetiapine  Deleted: Benztropine 0.5mg tablet; Take 1 tablet by mouth twice daily (last filled in 2018)  Sertraline 50mg tablet; Take 1 tablet by mouth in the morning (last filled in 2018)  Haloperidol decanoate 50mg/mL injection; Inject 50mg into the muscle every 30 days (unable to verify any injections past July 2018).  Changed: None    Additional medication history information:   Could not assess patient due to agitation and psychosis. Per [facility], patient has been consistently filling his quetiapine prescription every 30 days for many months and consistently filling his haloperidol prescription for 2 months. Unclear if he has been taking these medications recently.  Per [facility], they received a new prescription for an increased quetiapine dose of two 400mg tablets by mouth at bedtime. Patient has not picked up this new prescription.    Prior to Admission Medication List:  Prior to Admission medications    Medication Sig Last Dose Taking? Auth Provider   haloperidol (HALDOL) 5 MG tablet Take 5 mg by mouth 2 times daily Unknown at Unknown time  Unknown, Entered By History   QUEtiapine (SEROQUEL) 300 MG tablet Take 600 mg by mouth At Bedtime Unknown at Unknown time  Unknown, Entered By History       Time spent: 30 minutes    Medication history completed by:   Sergio Pepe, Pharmacy Intern

## 2019-06-19 NOTE — PROGRESS NOTES
06/18/19 2006   Patient Belongings   Did you bring any home meds/supplements to the hospital?  No   Patient Belongings locker;sent to security per site process   Patient Belongings Put in Hospital Secure Location (Security or Locker, etc.) clothing;cash/credit card;wallet   Belongings Search Yes   Clothing Search Yes   Second Staff Louis Campos Compliant     Items placed in locker:  Brown Sandals  White Socks  White T-Shirt  Blue Jeans  Brown Belt  Blue and White Checkered Button up shirt  Wallet with random scraps of paper and recept  [place] 's License  [place] Pass  Cell Phone in [employer] Otterbox case  Two BIC Lighters and a box of matches    Items sent to Security in Envelope #[id number]:  $[phone]  Visa Debit Card ending in 4516    A               Admission:  I am responsible for any personal items that are not sent to the safe or pharmacy.  [facility] is not responsible for loss, theft or damage of any property in my possession.    Signature:  _________________________________ Date: _______  Time: _____                                              Staff Signature:  ____________________________ Date: ________  Time: _____      2nd Staff person, if patient is unable/unwilling to sign:    Signature: ________________________________ Date: ________  Time: _____     Discharge:  [facility] has returned all of my personal belongings:    Signature: _________________________________ Date: ________  Time: _____                                          Staff Signature:  ____________________________ Date: ________  Time: _____

## 2019-06-19 NOTE — PROGRESS NOTES
"I met with pt - introduced myself and served him \"Letter of Intent to Revoke the Provisional Discharge\" papers.   He was very irritable.   He said, \"you can just throw that right in the garbage!   No, better yet, give it to me.   Let me show you how this works.   Give it here.\"   I handed him the papers and he predictably tore it up.   \"There you go.\"      (Pt had been committed at [facility] and then they PD'ed him.   So the PD is now being revoked.)      "

## 2019-06-19 NOTE — PROGRESS NOTES
Patient has been complaining of left knee pain. Left knee is swollen compared to right knee. He has been using ice packs. Will update doctor.
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
## 2019-06-20 PROCEDURE — 25000132 ZZH RX MED GY IP 250 OP 250 PS 637: Performed by: NURSE PRACTITIONER

## 2019-06-20 PROCEDURE — L1830 KO IMMOB CANVAS LONG PRE OTS: HCPCS

## 2019-06-20 PROCEDURE — G0177 OPPS/PHP; TRAIN & EDUC SERV: HCPCS

## 2019-06-20 PROCEDURE — 25000132 ZZH RX MED GY IP 250 OP 250 PS 637: Performed by: PSYCHIATRY & NEUROLOGY

## 2019-06-20 PROCEDURE — 99232 SBSQ HOSP IP/OBS MODERATE 35: CPT | Performed by: PSYCHIATRY & NEUROLOGY

## 2019-06-20 PROCEDURE — 12400002 ZZH R&B MH SENIOR/ADOLESCENT

## 2019-06-20 PROCEDURE — A9270 NON-COVERED ITEM OR SERVICE: HCPCS | Performed by: NURSE PRACTITIONER

## 2019-06-20 RX ORDER — OLANZAPINE 10 MG/2ML
5 INJECTION, POWDER, FOR SOLUTION INTRAMUSCULAR AT BEDTIME
Status: DISCONTINUED | OUTPATIENT
Start: 2019-06-20 | End: 2019-06-20

## 2019-06-20 RX ORDER — RISPERIDONE 1 MG/1
2 TABLET, ORALLY DISINTEGRATING ORAL AT BEDTIME
Status: DISCONTINUED | OUTPATIENT
Start: 2019-06-20 | End: 2019-06-20

## 2019-06-20 RX ORDER — RISPERIDONE 2 MG/1
2 TABLET, ORALLY DISINTEGRATING ORAL AT BEDTIME
Status: DISCONTINUED | OUTPATIENT
Start: 2019-06-21 | End: 2019-06-24

## 2019-06-20 RX ORDER — RISPERIDONE 1 MG/1
1 TABLET, ORALLY DISINTEGRATING ORAL AT BEDTIME
Status: COMPLETED | OUTPATIENT
Start: 2019-06-20 | End: 2019-06-20

## 2019-06-20 RX ORDER — RISPERIDONE 0.5 MG/1
0.5 TABLET, ORALLY DISINTEGRATING ORAL 2 TIMES DAILY
Status: DISCONTINUED | OUTPATIENT
Start: 2019-06-20 | End: 2019-06-20

## 2019-06-20 RX ORDER — OLANZAPINE 10 MG/2ML
5 INJECTION, POWDER, FOR SOLUTION INTRAMUSCULAR AT BEDTIME
Status: COMPLETED | OUTPATIENT
Start: 2019-06-20 | End: 2019-06-20

## 2019-06-20 RX ORDER — RISPERIDONE 1 MG/1
1 TABLET, ORALLY DISINTEGRATING ORAL AT BEDTIME
Status: DISCONTINUED | OUTPATIENT
Start: 2019-06-22 | End: 2019-06-20

## 2019-06-20 RX ORDER — OLANZAPINE 10 MG/2ML
5 INJECTION, POWDER, FOR SOLUTION INTRAMUSCULAR AT BEDTIME
Status: DISCONTINUED | OUTPATIENT
Start: 2019-06-21 | End: 2019-06-24

## 2019-06-20 RX ORDER — OLANZAPINE 10 MG/2ML
5 INJECTION, POWDER, FOR SOLUTION INTRAMUSCULAR AT BEDTIME
Status: DISCONTINUED | OUTPATIENT
Start: 2019-06-22 | End: 2019-06-20

## 2019-06-20 RX ADMIN — RISPERIDONE 1 MG: 1 TABLET, ORALLY DISINTEGRATING ORAL at 20:32

## 2019-06-20 RX ADMIN — ACETAMINOPHEN 650 MG: 325 TABLET, FILM COATED ORAL at 06:14

## 2019-06-20 RX ADMIN — QUETIAPINE FUMARATE 200 MG: 200 TABLET ORAL at 20:33

## 2019-06-20 ASSESSMENT — ACTIVITIES OF DAILY LIVING (ADL)
HYGIENE/GROOMING: INDEPENDENT;HANDWASHING;SHOWER
ORAL_HYGIENE: INDEPENDENT
LAUNDRY: UNABLE TO COMPLETE
HYGIENE/GROOMING: INDEPENDENT
ORAL_HYGIENE: INDEPENDENT
LAUNDRY: UNABLE TO COMPLETE
DRESS: SCRUBS (BEHAVIORAL HEALTH)
DRESS: SCRUBS (BEHAVIORAL HEALTH)

## 2019-06-20 NOTE — PROGRESS NOTES
Called and asked to review imaging CT/Xrays:  L knee  Minimally depressed/widened lateral tibial plateau fracture.   Recommend NWB, hinged knee brace, unlocked to allow ROM. Please encourage to wear brace and follow weight bearing restrictions. Given fracture pattern, likely stable but would recommend new xrays of the knee in 2 weeks.    Recommend, ice, elevation.  Follow up with ortho in 1-2 weeks      Rosemary Albarran MD   PGY-4 Orthopaedic Surgery   [phone]
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
## 2019-06-20 NOTE — PROGRESS NOTES
Brief Medicine Progress Note    Medicine following for left tibial plateau fracture.    CT of the knee revealed mildly depressed lateral tibial plateau comminuted intra-articular fracture with maximal area of depression measuring ~2 mm. Re-discussed case with orthopedics who recommend continuing current plan of NWB to LLE x 6 weeks, orthotics evaluation for knee brace with ROM of 0-90 degrees, and OP follow up with orthopedics. Would agree with plan to transfer patient to a unit where assistive device can be used, such as 3B.    Plan:  - Orthotics consult for left knee brace  - NWB to LLE x 6 weeks  - Follow up with orthopedics as OP (referral placed)  - Ice and elevation  - PRN tylenol, ibuprofen    Medicine will sign off at this time. Please page the internal medicine pager with any intercurrent medical concerns that arise.    Tara Perez PA-C  Hospitalist Service  [phone]

## 2019-06-20 NOTE — PROGRESS NOTES
S: order received to see patient at [place] [id number] for a post-op style ROM knee brace as ordered by zhanna Barnes PA-C  O/G: support and stabilize the left knee and prevent unwanted motion  A: patient seen for fitting/delivery of post-op hinged style ROM knee brace.  Joint was set to allow 0-90.  Straps were trimmed to correct circumferences and upright lengths were adjusted to customize the fit of the knee brace to this patient. Patient instructed on donning, doffing, use and care.  Patient provided with instruction booklet that came with the brace.   P: contact orthotics if any issues arise  AUGUSTA Rodgers.    Kirill T-Scope Knee Brace Directions

## 2019-06-20 NOTE — PROGRESS NOTES
"After speaking with MD, patient removed his brace. He stated he does not believe he has a broken bone and \"all the doctors\" are lying. He requested to get in the shower. Patient was encouraged to put the brace back on and not bear weight on his leg. Patient continued to decline and again requested a shower.Pt is quite malodorous and has not showered in several days. Patient declined PRNs at this time. Patient  Was allowed to shower with SIO stand by for safety and use of shower chair.  Writer called ortho to inquire about brace and pt non compliance with brace. Ortho stated brace and non weight bearing is recommended, but fracture is stable and will not worsen with weight bearing. Patient will however have increased pain.   Patient out of the shower and handed writer his brace, stating he \"will not wear it, I dont believe I need it\". Patient encouraged to wear it and ortho recommendations reiterated to patient. Patient continues to refuse at this time, he is however calm and attending OT group.    MD and unit manager notified. Transfer order in place for pt to transfer to  for additional assistive support. Continue to encourage pt to not bear weight on leg and to wear his brace. Wheelchair provided for patient. Patient only on SIO 1:1 due to safety risk of wheelchair being on station 12.   "

## 2019-06-20 NOTE — PROGRESS NOTES
Pt reports 10/10 back and knee pain. Reports both started following admission yesterday. Report given to oncoming RN, who will notify on call provider of patient's concerns. Provided pt with PRN ibuprofen per pt request.
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
## 2019-06-20 NOTE — PROGRESS NOTES
Patient has been fitted for knee brace and brace in place on patient's leg.  Order obtained from MD for knee brace  Patient non weight bearing and will need a wheelchair for mobility.  Order for wheelchair obtained  As wheelchair is a restricted access item, a ligature risk, and possible, weapon, patient will be placed on SIO for right now, and order obtained.  MD and unit manager aware.

## 2019-06-20 NOTE — PLAN OF CARE
"Pt presents with irritable, labile mood and blunted tense affect. He believes he does not need to be in the hospital and feels his medication is \"poison that kills me.\" Despite this, he was compliant with scheduled HS medications. He appears disheveled, and refused to complete ADLs. He is guarded and minimizes MH symptoms, and as a result delusional and psychotic symptoms are difficult to assess. Physically, CT and Xray revealed a fracture to his lower left extremity. He reports pain is minimal when not ambulating and has been educated on non-weight bearing restrictions. He has required moderate prompting and reminders to remain compliant with this, but has been receptive in general to prompts. He is able to toilet independently, with minimal staff assist if at all. Per IM, to utilize urinal if unable to use toilet (with staff assist or other means). No assistive devices provided at this time, although was discussed. He received tylenol 1x for pain rated 5/10 in his left knee. He also reports back pain intermittently. He has also utilized PRN ice packs for 20 mins at a time intermittently this shift with improved pain control. He denies additional physical health concerns and denies questions at this time.   "

## 2019-06-20 NOTE — PROGRESS NOTES
"  Pt attended the afternoon OT leisure group.  Remained appropriate with group members with minimal cueing, and stayed on task during structured activity.  Apparently pt and a peer recognize each other from what sounds like one afternoon outside of the hospital.  The peer commented to pt \"you were so crazy with the spray paint\" and something about pt yelling at different women walking by.  As peer talked about not knowing where to live, pt commented \"I told you, you can live in my shed!\", and peer replied \"no way, you're too crazy, I'd party with you though!\" and laughed.   Both pts were easily redirected and reminded on appropriate boundaries.  "

## 2019-06-20 NOTE — PROGRESS NOTES
Patient is up in the lounge calm and cooperative this morning. Elevating leg in the lounge. Patient approached medication window using a chair as an assistive device. Patient redirected back to sit down and to alert staff for assistance prior to ambulating.  Awaiting ortho to see patient today, per order placed yesterday evening.  On call MD paged for assistance to facilitate patient moving to a unit were an assistive device and call light system is available, as these are things that would benefit patient's care and condition in regards to his LLE fracture.   Intake aware of situation and awaiting response.  Patient currently in lounge, pleasant and social with staff and peers. Additional monitoring by staff to assist with ambulation and pt needs.

## 2019-06-21 PROCEDURE — 25000132 ZZH RX MED GY IP 250 OP 250 PS 637: Performed by: NURSE PRACTITIONER

## 2019-06-21 PROCEDURE — H2032 ACTIVITY THERAPY, PER 15 MIN: HCPCS

## 2019-06-21 PROCEDURE — 25000132 ZZH RX MED GY IP 250 OP 250 PS 637: Performed by: PSYCHIATRY & NEUROLOGY

## 2019-06-21 PROCEDURE — 12400002 ZZH R&B MH SENIOR/ADOLESCENT

## 2019-06-21 PROCEDURE — 99207 ZZC NO CHARGE VISIT/PATIENT NOT SEEN: CPT | Performed by: PHYSICIAN ASSISTANT

## 2019-06-21 RX ADMIN — QUETIAPINE FUMARATE 200 MG: 200 TABLET ORAL at 20:38

## 2019-06-21 RX ADMIN — RISPERIDONE 2 MG: 2 TABLET, ORALLY DISINTEGRATING ORAL at 20:38

## 2019-06-21 RX ADMIN — ACETAMINOPHEN 650 MG: 325 TABLET, FILM COATED ORAL at 19:43

## 2019-06-21 ASSESSMENT — ACTIVITIES OF DAILY LIVING (ADL)
ORAL_HYGIENE: INDEPENDENT
LAUNDRY: UNABLE TO COMPLETE
LAUNDRY: UNABLE TO COMPLETE
DRESS: INDEPENDENT
ORAL_HYGIENE: INDEPENDENT
DRESS: SCRUBS (BEHAVIORAL HEALTH)
HYGIENE/GROOMING: INDEPENDENT
HYGIENE/GROOMING: INDEPENDENT
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
## 2019-06-21 NOTE — PROGRESS NOTES
One opened the door during the safety check, pt stood up and got upset why the door was opened, he then left the door open all the way.  At 0400, this writer did the rounds/check, closed the door half way. Pt then got up and was angry at [facility] staff for closing the door, this writer intervened stating that it was this writer who closed the door, he was then yelling and cursing stating that he will lay in the lounge if staff closes his door.

## 2019-06-21 NOTE — PROGRESS NOTES
"Writer was on SIO at bedroom door of pt. Another staff walked by to do rounding and stayed outside the door of the patient across the king. Pt came out to doorway and stared down both this writer and the other staff stating \"I figured I 'd come out here and stare at both of you since you both are here to watch me.\" Pt reassured that the other staff was for the safety of another patient, but pt did not appear convinced. Pt then walked back to his bed and laid down. Pt abruptly got up again and walked down the king stating \"I want to go back downstairs. I can't even watch TV because there are always people watching me.\"  "

## 2019-06-21 NOTE — PROGRESS NOTES
"Transfer from [facility], pt has fractured L knee with an order for a brace and non-weight bearing. Pt declining to use brace and wheel chair. Pt ambulating with slight limp.     Pt pleasant and cooperative when arrived on unit. Needed redirection to maintain appropriate boundaries, pt asking inappropriate questions of other pts and staff: \"can I go back to your room with you\" \"will you kiss my knee\". Pt delusional: \"I am here because I discovered a new type of science and the public didn't like it. If you see a round cloud it is because it is filled with hydrogen and lightning will pop it\". Pt attempted to open med room door, pt redirected.     Pt labile, increased agitation: Pt requested tylenol, when tylenol brought to pt he threw the pills on the floor stating \"I am not going to take anymore of that shift\" \"leave me alone\" \"don't talk to me and tell the rest of the staff not to talk to me\" \"I am leaving tomorrow after the 72 hour hold expires\" \"I don't want that wheelchair in my room, I am not an invalide.\" Pt went into his room.     Pt continues on SIO for ambulation assistance, pt declining assistance.       "

## 2019-06-21 NOTE — PROGRESS NOTES
Pt refused 1:1 check-in. Pt visible in milieu but withdrawn with peers. Pt has spent most of the day pacing the hallway. Pt stated his foot injury is fine, and that he is pacing the king to show the nurses and doctors that his foot is okay. Observed irritable behavior towards staff, and while staff member was explaining legal matters with Pt, Pt repeated same questions and was mildly agitated. No outbursts observed.

## 2019-06-21 NOTE — PLAN OF CARE
"Patient currently on 1:1 SIO related to unstable gait r/t fractured left leg. Patient refusing assistive brace or wheelchair with no falls reported by SIO staff. Transfer orders placed by provider Dr. Chairez for patient to go to [address] [place] [address]. Patient informed of transfer initially refusing to transfer but with continued reassurance was accepting. Patient calm and social upon approach, slightly concrete in thinking with poor insight into hospitalization. Patient denying any psych symptoms and dismissive of injured left leg stating \"I just sprained it, it just needs exercise\". Patient educated on his non weight bearing status of left leg, patient refused not accepting of education. Patient is accepting of using ice packs on left knee. Patient denied SI/SIB, AH/VH, dep, anx, or thoughts of harming others. Patient transferred with 1:1 SIO staff and security to [address] [place] [address] and was accepting of going by wheelchair. Patient belongings sent with SIO staff.  "

## 2019-06-21 NOTE — PROGRESS NOTES
"Brief Medicine Note    Contacted by nursing regarding leg fracture.     Patient has been denying that he has any sort of leg fracture because no staff had discussed this with him. He was seen by my colleague yesterday, who discussed the fracture with him, and he was also seen by Orthotics for brace fitting. He has been refusing to wear the brace and follow NWB restrictions. He has been very agitated throughout the day today.     I met with the patient, he was found pacing the hallways. He was agitated and telling me his leg his fine. Denies any pain. Told me that no one ever said he had a fracture. Reviewed his R wrist and L knee x-ray images with him, he stated he never had x-rays taken of his wrist, tells me he has a \"bone bruise.\" Reassured that wrist x-rays were negative for fracture. Patient visualized his left tibial fracture on the x-ray images in EMR. Stressed importance of non-weight bearing and wearing his brace, tells me he doesn't need it.       Plan:   - Encourage patient to wear knee brace as able, hopefully he will be more agreeable to the brace and NWB-restrictions once he has improved from a psychiatric standpoint   - Ice, elevate as able   - He will need to follow-up with Orthopedics in 2 weeks for repeat x-rays, could obtain here if he is still admitted     Please do not hesitate to contact Medicine if new questions or concerns arise.     Libby Arevalo PA-C  Hospitalist Service  Pager: [phone]        "

## 2019-06-21 NOTE — PROGRESS NOTES
Number for Ivory Benedict CM phone number in chart is private cell phone number. Do not give out to patient again.   Received call from [facility].

## 2019-06-21 NOTE — PROGRESS NOTES
"   06/20/19 2100   General Information   Art Directive other (see comments)   Pt joined group for about the first five minutes. Pt picked up two oil pastels and began drawing a Tribe around his face and said \"is this what these are for?\" Author told pt that he must use the drawing materials on the paper only and pt continued drawing on his face. At this time pt was not redirectable and was asked to leave group by author and 1:1 staff. Pt left group at this time.  "

## 2019-06-22 PROCEDURE — 25000132 ZZH RX MED GY IP 250 OP 250 PS 637: Performed by: PSYCHIATRY & NEUROLOGY

## 2019-06-22 PROCEDURE — H2032 ACTIVITY THERAPY, PER 15 MIN: HCPCS

## 2019-06-22 PROCEDURE — 12400002 ZZH R&B MH SENIOR/ADOLESCENT

## 2019-06-22 PROCEDURE — 25000132 ZZH RX MED GY IP 250 OP 250 PS 637: Performed by: NURSE PRACTITIONER

## 2019-06-22 RX ADMIN — ACETAMINOPHEN 650 MG: 325 TABLET, FILM COATED ORAL at 12:49

## 2019-06-22 RX ADMIN — ACETAMINOPHEN 650 MG: 325 TABLET, FILM COATED ORAL at 19:48

## 2019-06-22 RX ADMIN — QUETIAPINE FUMARATE 200 MG: 200 TABLET ORAL at 20:45

## 2019-06-22 RX ADMIN — RISPERIDONE 2 MG: 2 TABLET, ORALLY DISINTEGRATING ORAL at 20:45

## 2019-06-22 ASSESSMENT — ACTIVITIES OF DAILY LIVING (ADL)
ORAL_HYGIENE: INDEPENDENT
DRESS: INDEPENDENT
HYGIENE/GROOMING: INDEPENDENT
LAUNDRY: UNABLE TO COMPLETE
ORAL_HYGIENE: INDEPENDENT
LAUNDRY: UNABLE TO COMPLETE
DRESS: INDEPENDENT
HYGIENE/GROOMING: INDEPENDENT

## 2019-06-22 NOTE — PROGRESS NOTES
Participated in Music Therapy group with focus on mood elevation, validation and decreasing anxiety and improved group cohesiveness. Engaged and cooperative in music listening interventions.    Patient did comment on writer's appearance upon first meeting, which writer redirected to group task (music listening).  Writer was also aware patient's gaze was on writer almost constantly even when writer was not speaking.

## 2019-06-22 NOTE — PLAN OF CARE
Austyn approached staff early am with remarks that they looked nice. His demeanor has been less tense. He has been less irritable and more cooperative. He is quiet and doesn't want to participate in 1:1. He was approached several times by writer to question if he had any needs, which he declined.     Another patient commented that he had foot fungus and to get away from him. He did not lash back and moved away from the patient. Later, they were observed having an appropriate conversation.     He continues to wear his leg brace, but remains walking.   1249 PRN tylenol 650 mg for knee pain- reports effective
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
## 2019-06-22 NOTE — PLAN OF CARE
Patient continue to be on SIO, needed redirection for poor boundaries.  Flat affect, denies, SI/SIB, depression/anxiety.  Loud, socialized and watched T.V with peers.  Patient wear his left knee brace tonight.  Refused to take it out, told staff he's going to wear it to bed.  Patient currently in bed wearing the knee brace.    Will continue to monitor closely.

## 2019-06-23 PROCEDURE — H2032 ACTIVITY THERAPY, PER 15 MIN: HCPCS

## 2019-06-23 PROCEDURE — 12400002 ZZH R&B MH SENIOR/ADOLESCENT

## 2019-06-23 PROCEDURE — 25000132 ZZH RX MED GY IP 250 OP 250 PS 637: Performed by: PSYCHIATRY & NEUROLOGY

## 2019-06-23 PROCEDURE — 25000132 ZZH RX MED GY IP 250 OP 250 PS 637: Performed by: NURSE PRACTITIONER

## 2019-06-23 PROCEDURE — 25000132 ZZH RX MED GY IP 250 OP 250 PS 637: Performed by: PHYSICIAN ASSISTANT

## 2019-06-23 RX ADMIN — ACETAMINOPHEN 650 MG: 325 TABLET, FILM COATED ORAL at 20:30

## 2019-06-23 RX ADMIN — QUETIAPINE FUMARATE 200 MG: 200 TABLET ORAL at 20:30

## 2019-06-23 RX ADMIN — RISPERIDONE 2 MG: 2 TABLET, ORALLY DISINTEGRATING ORAL at 20:30

## 2019-06-23 RX ADMIN — IBUPROFEN 400 MG: 200 TABLET, FILM COATED ORAL at 07:56

## 2019-06-23 ASSESSMENT — ACTIVITIES OF DAILY LIVING (ADL)
HYGIENE/GROOMING: INDEPENDENT
ORAL_HYGIENE: INDEPENDENT
DRESS: SCRUBS (BEHAVIORAL HEALTH)

## 2019-06-23 NOTE — PROGRESS NOTES
06/22/19 2224   Behavioral Health   Hallucinations   (unable to obtain )   Thinking distractable   Orientation person: oriented;place: oriented;date: oriented;time: oriented   Memory   (unable to obtain )   Insight   (unable to obtain )   Judgement   (unable to obtain )   Eye Contact at examiner   Affect full range affect   Mood mood is calm   Physical Appearance/Attire attire appropriate to age and situation   Hygiene well groomed   Suicidality   (unable to obtain )   1. Wish to be Dead   (unable to obtain )   2. Non-Specific Active Suicidal Thoughts    (unable to obtain )   Activities of Daily Living   Hygiene/Grooming independent   Oral Hygiene independent   Dress independent   Laundry unable to complete   Room Organization independent     Pt had a fair day on the unit this evening. He was visible on the unit and spent majority of the time in the lounge area, and watching TV.   He was somewhat socially engaging with the other Pt's as well as with staff. There was no issues with him this evening. I was unable to properly check in with the Pt, due to him being asleep early.

## 2019-06-23 NOTE — PLAN OF CARE
"Shift started with Kendall trying to push boundaries with staff and another male manic patient, around a conversation about patients venting.     He has now decided this patient is to \"arogant\" to socialize with.     Kendall reports poor sleep from worrying at night about why he hasn't won the lottery. He did not say he was agitated as reported.     0758 PRN ibuprofen 200 mg for leg pain. He only wanted 200 mg, because he claims any higher dose makes him sleepy. States pain is gone.    "

## 2019-06-23 NOTE — PROGRESS NOTES
"0005 Came out of his room demanding to leave stating that his 72 hour hold is up, wanted the on call doctor to discharge him. Explanation given , then stated \" I will call 911 , what's your name, you will be fired!\" then went back to his room.  0015 during rounds, pt got upset seeing the flashlight shine on the floor, encourage to have the door slightly closed so he cant see the light during rounds, refused to have it closed, \" I will call the police and you will be arrested\".  "

## 2019-06-24 PROCEDURE — 99232 SBSQ HOSP IP/OBS MODERATE 35: CPT | Performed by: PSYCHIATRY & NEUROLOGY

## 2019-06-24 PROCEDURE — 12400001 ZZH R&B MH UMMC

## 2019-06-24 PROCEDURE — 25000132 ZZH RX MED GY IP 250 OP 250 PS 637: Performed by: NURSE PRACTITIONER

## 2019-06-24 PROCEDURE — 25000132 ZZH RX MED GY IP 250 OP 250 PS 637: Performed by: PSYCHIATRY & NEUROLOGY

## 2019-06-24 RX ORDER — RISPERIDONE 1 MG/1
1 TABLET ORAL DAILY
Status: DISCONTINUED | OUTPATIENT
Start: 2019-06-24 | End: 2019-07-02

## 2019-06-24 RX ORDER — RISPERIDONE 3 MG/1
3 TABLET, ORALLY DISINTEGRATING ORAL AT BEDTIME
Status: DISCONTINUED | OUTPATIENT
Start: 2019-06-24 | End: 2019-07-02

## 2019-06-24 RX ORDER — OLANZAPINE 10 MG/2ML
5 INJECTION, POWDER, FOR SOLUTION INTRAMUSCULAR DAILY
Status: DISCONTINUED | OUTPATIENT
Start: 2019-06-24 | End: 2019-07-02

## 2019-06-24 RX ORDER — OLANZAPINE 10 MG/2ML
5 INJECTION, POWDER, FOR SOLUTION INTRAMUSCULAR AT BEDTIME
Status: DISCONTINUED | OUTPATIENT
Start: 2019-06-24 | End: 2019-07-02

## 2019-06-24 RX ADMIN — ACETAMINOPHEN 650 MG: 325 TABLET, FILM COATED ORAL at 17:40

## 2019-06-24 RX ADMIN — QUETIAPINE FUMARATE 200 MG: 200 TABLET ORAL at 19:38

## 2019-06-24 RX ADMIN — HALOPERIDOL 10 MG: 5 TABLET ORAL at 10:16

## 2019-06-24 RX ADMIN — RISPERIDONE 3 MG: 1 TABLET, ORALLY DISINTEGRATING ORAL at 21:20

## 2019-06-24 RX ADMIN — DIPHENHYDRAMINE HYDROCHLORIDE 50 MG: 25 CAPSULE ORAL at 10:16

## 2019-06-24 RX ADMIN — LORAZEPAM 2 MG: 1 TABLET ORAL at 10:16

## 2019-06-24 ASSESSMENT — ACTIVITIES OF DAILY LIVING (ADL)
DRESS: INDEPENDENT
ORAL_HYGIENE: INDEPENDENT
LAUNDRY: WITH SUPERVISION
HYGIENE/GROOMING: INDEPENDENT
LAUNDRY: WITH SUPERVISION
ORAL_HYGIENE: INDEPENDENT
HYGIENE/GROOMING: INDEPENDENT
DRESS: INDEPENDENT

## 2019-06-24 NOTE — PROGRESS NOTES
06/23/19 2000   Therapeutic Recreation   Type of Intervention structured groups   Activity game   Response Refuses   Hours 1     Pt attended the Therapeutic Recreation group with focus on leisure participation, social engagement, and strategic cognitive reasoning.  Pt chose not to participate in the group recreational intervention via a group game.  Pt often tried pushing boundaries with this writer and needed several redirection to focus on the activity. Pt made several comments that were grandiose and inappropriate. With extra encouragement to be included, pt stated he did not want to participate, but didn't want to leave group. Pt often would intently stare at this writer throughout the group, often trying to bring up topics unrelated to the therapeutic nature of the group.

## 2019-06-24 NOTE — PROGRESS NOTES
"[facility] and went toward table staggering. SIO staff was MARIAM x1. This writer approached and helped stabilize chair for pt to sit in. Pt resting head on table. Pt offered marivel chair to lay in for comfort and ability to put his feet/knee up with ice pack and pt replied \"I want seclusion! Shoot me in the ass. Put me in that room, I want seclusion!\" Pt yelled those remarks.   "

## 2019-06-24 NOTE — PROGRESS NOTES
"Patient argumentative with staff and irritable saying\"I want to get out of here\". \"Everyone here is on meth\". Patient was hostile in recreational therapy and did not follow redirection and left. Denies SI and SIB saying \"no stop asking those questions let me go smoke\". Visible on the unit and continues with the SIO 5 feet.   "

## 2019-06-24 NOTE — PLAN OF CARE
Patient irritable, agitated and hostile toward staff and other patients. Pt requires frequent redirections for inappropriate language and inappropriate sexual remarks toward female staff. Pt frequently makes jokes about other pt's, calling them names etc. Pt becomes argumentative with staff when behavior is redirected. Pt given PRN to help with agitation.

## 2019-06-24 NOTE — PROGRESS NOTES
"At 0715 Pt was in lounge and asked this staff member for a blood sample or a hair sample to \"check to make sure you are not on meth.\" Pt stated that the staff here \"are all on meth\" and upon getting his vitals checked by another staff meember stated \"this machine isn't even calibrated correctly. Its giving you false information and you are believing it. I can't believe they pay you guys. Last night there was a music therapist and all she did was ask what we want to listen to and they pay her for that. You're fired. You're fired.\"   Pt was asked to leave the lounge if he was going to be inappropriate. Pt needed redirection x3 and then stated \"I'll stay out here, but I will stop.\"     Pt speech rambling and tangential. Pt appeared paranoid about staff and drug testing for meth.  "

## 2019-06-24 NOTE — PROGRESS NOTES
"Pt was active in milieu today and was often quite disruptive. When approached this morning by this writer, pt asked if writer was \"high on meth\"--despite redirection pt accused writer and other staff of using meth. He was quite agitated throughout the day, cursing at other patients and saying to staff at one point \"I'm going to make waves here\", and said to another pt \"You should be executed\". Pt needed redirection on many occasions for his verbal abuse, but would not refrain from causing further disruptions. While writer sat with him during a 1:1, his nurse asked him to take PRN medication due to his agitation--pt initially declined but eventually was persuaded to take the meds. After taking his medication, he returned to his room and was visibly shaking; when writer asked how pt was doing his said \"fuck you\". Writer followed pt to his room where he grabbed the door and mimed slamming it, but closed it gently instead. Writer observed the pt some time later having shaved only half his beard, and was belligerent with staff when asking when he would be discharged from the unit. While taking pt's vitals around 0300, pt perseverated on his  daughter and [employer] bank; his speech was pressured and tangential, he appeared angry, and made statements such as \"I'm going to piss so many people off... They'e going to keep me here forever\".     Due to agitation and bad rapport with pt, this writer was unable to complete a check-in with the pt today.   "
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
## 2019-06-24 NOTE — PROGRESS NOTES
[facility], [place]   Psychiatric Progress Note        Interim History:   The patient's care was discussed with the treatment team during the daily team meeting and/or staff's chart notes were reviewed.  Staff report patient is hostile and threatening toward staff and peers. Required frequent redirections for inappropriate language and inappropriate sexual remarks toward female staff. threatening toward a selected female peer this AM but agreed to PRN with prompting. Attempts to instigate conflicts with others, argumentative and disruptive to the milieu.  Compliant with medications but needs prompting. Rambling at times, slept over 7hrs last night. Refusing to use brace and wheelchair but ambulating without any restriction.     Met patient after given PRN due to agitation. He was calm during this encounter but guarded and delusional. He denied all symptoms, including dep, anx and SI. Noted tolerating medications well, but PRN given was sedating. Believes that he slept well. Appetite is intact. He has a poor insight and unable to comprehend legal status. He demanded discharge and believes that he will be put in handcuffs and taken away. Informed that he will be transferred to [facility]. Informed of my intent to increase medications.     Discussed medications and care plan.        Medications:       Risperidone  3 mg Sublingual At Bedtime    Or     OLANZapine  5 mg Intramuscular At Bedtime     risperiDONE  1 mg Oral Daily    Or     OLANZapine  5 mg Intramuscular Daily     QUEtiapine  200 mg Oral At Bedtime          Allergies:     Allergies   Allergen Reactions     Bee Venom      Propranolol           Labs:     No results found for this or any previous visit (from the past 24 hour(s)).       Psychiatric Examination:     Vitals:    06/22/19 1654 06/23/19 0800 06/23/19 2017 06/24/19 0830   BP: 159/78 (!) 149/96 153/85 181/87   Pulse: 68 85 71 78   Resp: 18 16 16 17   Temp: 98.6  F (37  C) 97.8  F  "(36.6  C) 98.4  F (36.9  C) 97.5  F (36.4  C)   TempSrc: Tympanic Tympanic Tympanic Tympanic   SpO2: 94% 97% 97% 97%   Weight:  90.2 kg (198 lb 14.4 oz)                                                    Weight is 198 lbs 14.4 oz  Body mass index is 25.54 kg/m .    Appearance: awake, alert, appeared older than stated age, poorly groomed and mild distress  Attitude:  somewhat cooperative  Eye Contact:  intense  Mood:  'fine\"  Affect:  intensity is heightened and guarded  Speech:  clear, coherent and normal prosody  Psychomotor Behavior:  no evidence of tardive dyskinesia, dystonia, or tics  Throught Process:  illogical and perseverative  Associations:  no loose associations  Thought Content:  no evidence of suicidal ideation or homicidal ideation, no auditory hallucinations present, no visual hallucinations present and suspecious, guarded and internal stimuli suspected.   Insight:  limited  Judgement:  limited  Oriented to:  partial  Attention Span and Concentration:  limited  Recent and Remote Memory:  limited         Precautions:     Behavioral Orders   Procedures     Assault precautions     Code 1 - Restrict to Unit     Code 2     For Xray and CT only     Code 2     Code 2 to facilitate transfer to [facility]     Fall precautions     Routine Programming     As clinically indicated     Sexual precautions     Status 15     Every 15 minutes.     Status Individual Observation     SIO for agitated bx with high assault and sexual precautions risk  SIO for ambulation assistance and while patient is using wheelchair for mobility as patient is non weight bearing on L leg X 6 weeks     Order Specific Question:   CONTINUOUS 24 hours / day     Answer:   5 feet     Order Specific Question:   Indications for SIO     Answer:   Medical equipment / ligature risk          Diagnoses:     Schizoaffective disorder bipolar type  Tobacco use disorder  Rule out pornography or sexual addiction outside of the context of manic episode         " Plan:     -- LF tibial plateau Fx, addressed by IM and Orthotic following.  NWB to LLE for 6wks. Need wheelchair for ambulation. The patient is refusing assistant devices. Continued on 1:1.     Medications:  -- Seroquel was restarted at 200 mg qhs. Reportedly he was on 600 mg qhs at home, but not clear if he was compliant with medications.   --  Haldol was discontinued and and switched to Risperdal.   -- Risperdal started and titrated to 2 mg qhs (IM Zyprexa if refused) with plan to transition to long acting injectable.   -- PRN Haldol, Benadryl and ativan for agitation and combative behavior.     Legal Status and Disposition:  -- PD revoked and currently under full commitment with [facility] (risperidone, paliperidone, olanzapine, quetiapine, aripiprazole, and haloperidol.)  -- disposition to be determined pending clinical response.    -- transfer to 3B when bed and staffing available.   -- transfer to 20N when bend and staffing available. Case discussed with Dr Lopez.

## 2019-06-24 NOTE — PROGRESS NOTES
06/24/19 1400   Behavioral Health   Hallucinations denies / not responding to hallucinations   Thinking paranoid;delusional   Orientation person: oriented;date: oriented;place: oriented;time: oriented   Memory confabulation   Insight poor   Judgement impaired   Eye Contact at examiner   Affect irritable;tense;sad   Mood depressed;anxious;labile   Physical Appearance/Attire attire appropriate to age and situation   Hygiene other (see comment)  (pt shaved facial hair but no other hygene )   1. Wish to be Dead No   2. Non-Specific Active Suicidal Thoughts  No     Pt had to be redirected a few times about appropriate conversations and speaking too loudly on the phone. Pt had some issues with another pt and the two were . Denies SI/SIB

## 2019-06-25 PROCEDURE — 25000132 ZZH RX MED GY IP 250 OP 250 PS 637: Performed by: NURSE PRACTITIONER

## 2019-06-25 PROCEDURE — 25000132 ZZH RX MED GY IP 250 OP 250 PS 637: Performed by: PSYCHIATRY & NEUROLOGY

## 2019-06-25 PROCEDURE — 12400001 ZZH R&B MH UMMC

## 2019-06-25 RX ADMIN — RISPERIDONE 1 MG: 1 TABLET ORAL at 08:10

## 2019-06-25 RX ADMIN — ACETAMINOPHEN 650 MG: 325 TABLET, FILM COATED ORAL at 16:25

## 2019-06-25 RX ADMIN — RISPERIDONE 3 MG: 1 TABLET, ORALLY DISINTEGRATING ORAL at 20:54

## 2019-06-25 RX ADMIN — QUETIAPINE FUMARATE 200 MG: 200 TABLET ORAL at 20:55

## 2019-06-25 ASSESSMENT — ACTIVITIES OF DAILY LIVING (ADL)
ORAL_HYGIENE: INDEPENDENT
ORAL_HYGIENE: INDEPENDENT
LAUNDRY: WITH SUPERVISION
DRESS: SCRUBS (BEHAVIORAL HEALTH);INDEPENDENT
HYGIENE/GROOMING: INDEPENDENT
HYGIENE/GROOMING: HANDWASHING;SHOWER;INDEPENDENT
DRESS: SCRUBS (BEHAVIORAL HEALTH)
LAUNDRY: WITH SUPERVISION

## 2019-06-25 NOTE — PROGRESS NOTES
06/24/19 2026   Patient Belongings   Did you bring any home meds/supplements to the hospital?  No   Patient Belongings locker   Patient Belongings Put in Hospital Secure Location (Security or Locker, etc.) wallet   Belongings Search Yes   Clothing Search Yes   Second Staff [facility] 3B     Pt locker-Shirt, 2 lighter, belt, jeans, sock, sandals, phone, 's license, gold pass [place] [employer].      Security-nothing    A               Admission:  I am responsible for any personal items that are not sent to the safe or pharmacy.  [facility] is not responsible for loss, theft or damage of any property in my possession.    Signature:  _________________________________ Date: _______  Time: _____                                              Staff Signature:  ____________________________ Date: ________  Time: _____      2nd Staff person, if patient is unable/unwilling to sign:    Signature: ________________________________ Date: ________  Time: _____     Discharge:  [facility] has returned all of my personal belongings:    Signature: _________________________________ Date: ________  Time: _____                                          Staff Signature:  ____________________________ Date: ________  Time: _____

## 2019-06-25 NOTE — PROGRESS NOTES
Writer called the patient's , Lenora Afshin ([phone]) to inform her about the patient's transfer to , inquire about discharge disposition, and provide her contact information. Lenora stated that the plan is for the patient to return home following stabilization. She is wanting for the patient to be put back on an IM. Lenora stated that she typically visits with the patient on Tuesdays and will be here today to meet with the patient. Writer stated that she will inform the attending tomorrow about her request to put the patient on an IM.

## 2019-06-25 NOTE — PLAN OF CARE
Patient was transferred from station 3B with a code green, was searched per unit protocol, vitals were little elevated due to anxiety he was exhibiting with the transfer, writer offered relaxation techniques along with fluids and he refused stating he was fine, appeared tensed and agitated was hyper-verbal complementing female staff, writer gave his scheduled risperidone 3 mg at bedtime, immediately patient got up went to his room into the bathroom where when staff tried to intervene to see if he was intending to spit his medication flushed the toilet, he repeatedly said he swallowed the medications, writer called station 3B to verify whether he has tendencies of spitting medications and per nurse's report said no noted data to support,  Patient is aware that if he refused or spits his medications he is jarvised to get them via injection route, no other concerns or behaviors noted, denied any mental health symptoms, patient has a poor insight.

## 2019-06-25 NOTE — PROGRESS NOTES
"Pt is on SIO 1:1 due to safety concern; awoke from bed close to 0300 to use bathroom and started using vulgar language, which is abusive to staff;  calm down shortly ; accepts and follow redirection at times; reported having minor pain on his Left knee and requested ice pack, writer provided two ice pack and offered PRN tylenol to manage his pain. Pt declined writer's offer stating , \" I don't need medication anymore, I already get enough of it\"  Placed  the ice pack on the top and bottom of his knee and lied on his bed comfortably.when writer asked pt for further comfort measures that could be provided pt replied, \"Pussy, Pussy\" with brightly smiling. Pt is on non weight bearing status on LLE;     At 0400, pt started C/O nursing staff offering and administering many medication including injection. Threatened to call governor office in the morning if staff continued administering and offering medication.     Pt requested staff to provide him with paper and pencil; staff gave to him. He is noted taking note; asked staff why people are keep looking after him and why he is on SIO. Staff made attempt to explain to him even though he is dismissive to staff statement.    Pt appeared slept for 3.25 hours this shift.        "
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
## 2019-06-25 NOTE — PROGRESS NOTES
Writer spoke with the patient's , Lenora Pepe, after she visited with the patient. Lenora stated that she spoke with her supervisor about the patient and stated that they would like him put back on an IM. Lenora stated that the patient did better when he was on an IM. Lenora also stated that they would like a neuro psych eval while the patient is here. Lenora stated that the patient's outpatient provider, Richard deluna ACP, is requesting that one be completed. Writer stated that she will inform the attending when he returns tomorrow and will follow up with Lenora.
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
## 2019-06-26 PROCEDURE — 12400001 ZZH R&B MH UMMC

## 2019-06-26 PROCEDURE — 25000132 ZZH RX MED GY IP 250 OP 250 PS 637: Performed by: PSYCHIATRY & NEUROLOGY

## 2019-06-26 PROCEDURE — H2032 ACTIVITY THERAPY, PER 15 MIN: HCPCS

## 2019-06-26 PROCEDURE — 25000132 ZZH RX MED GY IP 250 OP 250 PS 637: Performed by: NURSE PRACTITIONER

## 2019-06-26 PROCEDURE — 99232 SBSQ HOSP IP/OBS MODERATE 35: CPT | Performed by: PSYCHIATRY & NEUROLOGY

## 2019-06-26 RX ADMIN — ACETAMINOPHEN 650 MG: 325 TABLET, FILM COATED ORAL at 05:32

## 2019-06-26 RX ADMIN — ACETAMINOPHEN 650 MG: 325 TABLET, FILM COATED ORAL at 17:20

## 2019-06-26 RX ADMIN — QUETIAPINE FUMARATE 200 MG: 200 TABLET ORAL at 20:15

## 2019-06-26 RX ADMIN — RISPERIDONE 3 MG: 1 TABLET, ORALLY DISINTEGRATING ORAL at 20:15

## 2019-06-26 RX ADMIN — RISPERIDONE 1 MG: 1 TABLET ORAL at 08:33

## 2019-06-26 ASSESSMENT — ACTIVITIES OF DAILY LIVING (ADL)
HYGIENE/GROOMING: INDEPENDENT
LAUNDRY: WITH SUPERVISION
DRESS: SCRUBS (BEHAVIORAL HEALTH)
ORAL_HYGIENE: INDEPENDENT
HYGIENE/GROOMING: INDEPENDENT
DRESS: INDEPENDENT
LAUNDRY: WITH SUPERVISION
ORAL_HYGIENE: INDEPENDENT

## 2019-06-26 NOTE — PROGRESS NOTES
"[facility], [place]   Psychiatric Progress Note        Interim History:   The patient's care was discussed with the treatment team during the daily team meeting and/or staff's chart notes were reviewed.  Staff report patient is volatile. He claimed that staff was offering him too many medications, was upset even he was offered Tylenol for left knee pain. He went to groups, but per OT's note made inappropriate statements, was highly disruptive and she deemed that for now he could not participate in groups.     Met with the patient at the patients' lounge. He was initially very pleasant, asked me repeatedly when he could be discharged. I asked him about reasons for his hospitalization. He told me that from his point of view he should not have gone to [facility] and he went. He also talked about some computer viruses in [facility] [employer], although, could not explain what connection it had with him. 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 He became very irritable when I asked him about quite pronounced hands tremor: \"why all you doctors keep asking me about this tremor?\". \"It doesn't bother me, it should not bother you.\" I reminded him that it was my responsibility as his provider to be concerned about his health. He was minimally receptive.    Per [facility] who talked to CM, outpatient team wants him to be on an injectable, long acting neuroleptic, also wants to do neuropsych testing. Per Christian Cheatham MD's admission note: \"[facility] currently states of risperidone, paliperidone, olanzapine, quetiapine, aripiprazole, haloperidol.\"           Medications:       Risperidone  3 mg Sublingual At Bedtime    Or     OLANZapine  5 mg Intramuscular At Bedtime     risperiDONE  1 mg Oral Daily    Or     OLANZapine  5 mg Intramuscular Daily     QUEtiapine  200 mg Oral At " "Bedtime          Allergies:     Allergies   Allergen Reactions     Bee Venom      Propranolol           Labs:     No results found for this or any previous visit (from the past 24 hour(s)).       Psychiatric Examination:     Vitals:    06/23/19 0800 06/23/19 2017 06/24/19 0830 06/24/19 2019   BP: (!) 149/96 153/85 181/87 (!) 166/102   Pulse: 85 71 78 88   Resp: 16 16 17 16   Temp:   97.5  F (36.4  C) 98.3  F (36.8  C)   TempSrc: Tympanic Tympanic Tympanic Oral   SpO2: 97% 97% 97%    Weight: 90.2 kg (198 lb 14.4 oz)          Orthostatic Vitals       Most Recent      Sitting Orthostatic BP --  Comment: pt refused 06/25 1643    Sitting Orthostatic Pulse (bpm) --  Comment: pt refused 06/25 1643         Weight is 198 lbs 14.4 oz  Body mass index is 25.54 kg/m .    Appearance: awake, alert, appeared older than stated age, poorly groomed and mild distress, poor dentition  Attitude:  somewhat cooperative  Eye Contact:  intense  Mood:  'fine\"  Affect:  intensity is heightened and guarded  Speech:  clear, coherent and normal prosody  Psychomotor Behavior:  no evidence of tardive dyskinesia, dystonia, or tics, upper extremities tremor is present.  Throught Process:  illogical and perseverative  Associations:  no loose associations  Thought Content:  no evidence of suicidal ideation or homicidal ideation, no auditory hallucinations present, no visual hallucinations present and suspicious, guarded and internal stimuli suspected. Delusions are present.   Insight:  limited  Judgement:  limited  Oriented to:  partial  Attention Span and Concentration:  limited  Recent and Remote Memory:  limited         Precautions:     Behavioral Orders   Procedures     Assault precautions     Code 1 - Restrict to Unit     Code 2     For Xray and CT only     Code 2     Code 2 to facilitate transfer to 3bw     Fall precautions     Routine Programming     As clinically indicated     Sexual precautions     Status 15     Every 15 minutes.          " Diagnoses:     Schizoaffective disorder bipolar type  Tobacco use disorder  Rule out pornography or sexual addiction outside of the context of manic episode         Plan:     Medications:  -- Seroquel was restarted at 200 mg qhs. Reportedly he was on 600 mg qhs at home, but not clear if he was compliant with medications.   --  Haldol was discontinued and and switched to Risperdal.   -- Risperdal started and titrated to 3 mg qhs and 1 mg qam (IM Zyprexa if refused) with plan to transition to long acting injectable, likely, Risperdal Consta.   -- PRN Haldol, Benadryl and ativan for agitation and combative behavior.     Legal Status and Disposition:  -- PD revoked and currently under full commitment with [facility] (risperidone, paliperidone, olanzapine, quetiapine, aripiprazole, and haloperidol.)  -- disposition to be determined pending clinical response.  -- will order neuropsychological testing.

## 2019-06-26 NOTE — PLAN OF CARE
BEHAVIORAL TEAM DISCUSSION    Participants: Dr. Lopez, Mara Sun (CTC), Mary Sanders (OT), Milo (RN)  Progress: No Progress; staff report that the patient continues to be tense and irritable on the unit.   Continued Stay Criteria/Rationale: Continued stabilization. The patient's PD has been revoked.   Medical/Physical: Left Knee pain and COPD.   Precautions:   Behavioral Orders   Procedures     Assault precautions     Code 1 - Restrict to Unit     Code 2     For Xray and CT only     Code 2     Code 2 to facilitate transfer to [facility]     Fall precautions     Routine Programming     As clinically indicated     Sexual precautions     Status 15     Every 15 minutes.     Plan: At this time, the patient's , Lenora Pepe, is asking for the patient to be put on an IM med as well as have a neuropsych work up. [facility] will continue to coordinate  with the patient's . Upon stabilization, the patient will be assessed for discharge.     Rationale for change in precautions or plan: None.

## 2019-06-26 NOTE — PROGRESS NOTES
Pt was mostly withdrawn from unit activities this evening. Pt affect was angry and tense. Pt mood was irritable. Pt reported that he should not be here. Pt denies SI and SIB.          06/25/19 2247   Behavioral Health   Hallucinations denies / not responding to hallucinations   Thinking distractable   Orientation person: oriented;place: oriented;date: oriented;time: oriented   Memory baseline memory   Insight poor   Judgement impaired   Eye Contact at examiner   Affect angry;tense   Mood irritable   Physical Appearance/Attire attire appropriate to age and situation   Hygiene neglected grooming - unclean body, hair, teeth   Suicidality other (see comments)  (Denies)   1. Wish to be Dead No   2. Non-Specific Active Suicidal Thoughts  No   3. Active Sucidal Ideation with any Methods (Not Plan) Without Intent to Act  No   4. Active Suicidal Ideation with Some Intent to Act, Without Specific Plan  No   5. Active Suicidal Ideation with Specific Plan and Intent  No   Self Injury other (see comment)  (None Observed)   Activity hyperactive (agitated, impulsive)   Speech clear;coherent   Medication Sensitivity no stated side effects;no observed side effects   Psychomotor / Gait balanced;steady   Overt Aggression Scale   Verbal Aggression 1   Aggression against Property 0   Auto-Aggression 0   Physical Aggression 0   Overt Aggression Total Score 1   Coping/Psychosocial   Verbalized Emotional State frustration   Activities of Daily Living   Hygiene/Grooming handwashing;shower;independent   Oral Hygiene independent   Dress scrubs (behavioral health);independent   Laundry with supervision   Room Organization independent

## 2019-06-26 NOTE — PROGRESS NOTES
06/26/19 1422   Behavioral Health   Hallucinations denies / not responding to hallucinations   Thinking poor concentration;distractable   Orientation person: oriented;place: oriented;date: oriented;time: oriented   Memory baseline memory   Insight poor   Judgement impaired   Eye Contact at examiner   Affect tense   Mood irritable   Physical Appearance/Attire attire appropriate to age and situation   Hygiene well groomed   Suicidality other (see comments)  (Denies)   1. Wish to be Dead No   2. Non-Specific Active Suicidal Thoughts  No   Change in Protective Factors? No   Enviromental Risk Factors None   Self Injury other (see comment)  (Denies)   Activity hyperactive (agitated, impulsive)   Speech clear;coherent   Medication Sensitivity no stated side effects;no observed side effects   Psychomotor / Gait balanced;steady   Activities of Daily Living   Hygiene/Grooming independent   Oral Hygiene independent   Dress scrubs (behavioral health)   Laundry with supervision   Room Organization independent     Pt. Was withdrawn from groups throughout the day, but was visible in the milieu. He would often deny things when asked by staff such as for vitals. He would also state his name was different things to different staff throughout the day and would get angry when that specific person wouldn't address him as such. He denies any SISIB and hallucinations.

## 2019-06-26 NOTE — PROGRESS NOTES
06/26/19 0905   General Information   Has Not Attended OT as of: 06/26/19     Per staff report and writer observation - Pt is inappropriate for groups at this time d/t irritability and disruptive verbal comments. Continue to assess.     Audra Sanders, OT on 6/26/2019 at 9:06 AM

## 2019-06-27 PROCEDURE — 25000132 ZZH RX MED GY IP 250 OP 250 PS 637: Performed by: NURSE PRACTITIONER

## 2019-06-27 PROCEDURE — 99232 SBSQ HOSP IP/OBS MODERATE 35: CPT | Performed by: PSYCHIATRY & NEUROLOGY

## 2019-06-27 PROCEDURE — 25000128 H RX IP 250 OP 636: Performed by: PSYCHIATRY & NEUROLOGY

## 2019-06-27 PROCEDURE — 25000132 ZZH RX MED GY IP 250 OP 250 PS 637: Performed by: PSYCHIATRY & NEUROLOGY

## 2019-06-27 PROCEDURE — 12400001 ZZH R&B MH UMMC

## 2019-06-27 RX ADMIN — ACETAMINOPHEN 650 MG: 325 TABLET, FILM COATED ORAL at 03:08

## 2019-06-27 RX ADMIN — RISPERIDONE 1 MG: 1 TABLET ORAL at 07:51

## 2019-06-27 RX ADMIN — QUETIAPINE FUMARATE 200 MG: 200 TABLET ORAL at 20:11

## 2019-06-27 RX ADMIN — ACETAMINOPHEN 650 MG: 325 TABLET, FILM COATED ORAL at 10:42

## 2019-06-27 RX ADMIN — ACETAMINOPHEN 650 MG: 325 TABLET, FILM COATED ORAL at 22:47

## 2019-06-27 RX ADMIN — RISPERIDONE 25 MG: KIT at 21:29

## 2019-06-27 RX ADMIN — RISPERIDONE 3 MG: 1 TABLET, ORALLY DISINTEGRATING ORAL at 20:11

## 2019-06-27 ASSESSMENT — ACTIVITIES OF DAILY LIVING (ADL)
DRESS: INDEPENDENT
HYGIENE/GROOMING: INDEPENDENT
LAUNDRY: WITH SUPERVISION
DRESS: INDEPENDENT
LAUNDRY: WITH SUPERVISION
ORAL_HYGIENE: INDEPENDENT
HYGIENE/GROOMING: INDEPENDENT
ORAL_HYGIENE: INDEPENDENT

## 2019-06-27 NOTE — PROGRESS NOTES
Pt came into Art Therapy group for a short time and asked if he could observe only, which he did, but left the group room soon after.

## 2019-06-27 NOTE — PLAN OF CARE
"48 hours charting:  Pt was visible in the milieu and social with others. Pt has a full range affect. Denies SI/SIB. Denies AH/VH. Denies anxiety and depression or any psychotic symptoms. Pt states \"I am supposed to be in a medical unit and not a medical unit\". Complained of left knee pain. Pt able to ambulate without use of any device. Pt states he had talked to his  and the  will come to see pt tomorrow. Pt has been calm this shift. Took medications.  "

## 2019-06-27 NOTE — PROGRESS NOTES
"Pt boundaries needed strong therapist direction, but he followed it.  He introduced himself as \"Peter.\"       06/26/19 1015   Dance Movement Therapy   Type of Intervention structured groups   Response participates with cues/redirection   Hours 0.5     "

## 2019-06-27 NOTE — PROGRESS NOTES
06/27/19 1008   General Information   Special Considerations Per recent behaviors Pt is approved for OT groups as he has demonstrated fair verbal and physical boundaries wih peers and staff. Requires min redirection and is receptive when redirected.

## 2019-06-27 NOTE — PROGRESS NOTES
Pt was up at around 3am c/o left knee pain stating the pain at 4/10.  Denies SI/SIB/AV/VH. Prn Tylenol given as ordered - was helpful. Currently sleeping; will continue to monitor and offer support.

## 2019-06-28 ENCOUNTER — TELEPHONE (OUTPATIENT)
Dept: BEHAVIORAL HEALTH | Facility: CLINIC | Age: 64
End: 2019-06-28

## 2019-06-28 PROCEDURE — 25000132 ZZH RX MED GY IP 250 OP 250 PS 637: Performed by: PSYCHIATRY & NEUROLOGY

## 2019-06-28 PROCEDURE — 99233 SBSQ HOSP IP/OBS HIGH 50: CPT | Performed by: PSYCHIATRY & NEUROLOGY

## 2019-06-28 PROCEDURE — 25000132 ZZH RX MED GY IP 250 OP 250 PS 637: Performed by: NURSE PRACTITIONER

## 2019-06-28 PROCEDURE — 12400001 ZZH R&B MH UMMC

## 2019-06-28 RX ADMIN — QUETIAPINE FUMARATE 200 MG: 200 TABLET ORAL at 20:54

## 2019-06-28 RX ADMIN — RISPERIDONE 1 MG: 1 TABLET ORAL at 07:57

## 2019-06-28 RX ADMIN — ACETAMINOPHEN 650 MG: 325 TABLET, FILM COATED ORAL at 09:40

## 2019-06-28 RX ADMIN — ACETAMINOPHEN 650 MG: 325 TABLET, FILM COATED ORAL at 21:39

## 2019-06-28 RX ADMIN — RISPERIDONE 3 MG: 1 TABLET, ORALLY DISINTEGRATING ORAL at 20:54

## 2019-06-28 ASSESSMENT — ACTIVITIES OF DAILY LIVING (ADL)
LAUNDRY: UNABLE TO COMPLETE
DRESS: INDEPENDENT
HYGIENE/GROOMING: INDEPENDENT
ORAL_HYGIENE: INDEPENDENT
HYGIENE/GROOMING: INDEPENDENT
ORAL_HYGIENE: INDEPENDENT
DRESS: SCRUBS (BEHAVIORAL HEALTH)
LAUNDRY: UNABLE TO COMPLETE

## 2019-06-28 NOTE — PROGRESS NOTES
06/27/19 2000   Group Therapy Session   Group Attendance attended group session   Total Time (minutes) 30   Group Type psychotherapeutic   Group Topic Covered   (coping with depression)   Patient Participation/Contribution discussed personal experience with topic   Kendall arrived late to session so he did not do the written task about problems solving. Once he arrived he was actively engaged verbally as the group discussed depression's effect on self-esteem, and coping strategies. Mood was bright, affect congruent.

## 2019-06-28 NOTE — PROGRESS NOTES
"Pt became agitated and was yelling profanities towards staff when he was told about a medication that he had to take. Code green was called but pt agreed to take the injectable medication without being restrained. Pt continued to call \"faggots\", \"Marsh\" and other derogatory comments. Pt calmed down after awhile.   "
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
## 2019-06-28 NOTE — TELEPHONE ENCOUNTER
S: Dr. Lopez on station 20 called requesting this pt be transferred to station 12 for acuity concerns.    R: Dr. Lopez spoke with Dr. Cheatham and they agreed the transfer is appropriate. Transfer order entered and unit nurses notified. Station 12 will not be able to take pt until evening shift.

## 2019-06-28 NOTE — PROGRESS NOTES
[facility] to be [facility].  Report given to the nurse.  MD removed from chart Ok to use wheel chair for mobility per request.  MD still ok for patient to have his left knee brace as needed.  Patient calm and cooperative at this time.  Aware he would be going to st.  and looking forward to it.  No aggressive behavior noted so far.  Will continue to monitor closely.
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
## 2019-06-28 NOTE — PROGRESS NOTES
"Patient denies SI/SIB and hallucinations. Patient was labile this evening. He had multiple outburst toward staff, which he called \"liars\" and \"faggots\" and other derogatory comments. At times he was polite and social in the milieu.        06/27/19 2000   Behavioral Health   Hallucinations denies / not responding to hallucinations   Thinking paranoid   Orientation person: oriented;place: oriented;date: oriented;time: oriented   Memory baseline memory   Insight poor   Judgement impaired   Eye Contact at examiner   Affect tense   Mood mood is calm   Physical Appearance/Attire attire appropriate to age and situation   Hygiene well groomed   Suicidality other (see comments)  (Denie)   1. Wish to be Dead No   2. Non-Specific Active Suicidal Thoughts  No   Group Therapy Session   Group Attendance attended group session   Total Time (minutes) 30   Group Type psychotherapeutic   Group Topic Covered   (coping with depression)   Patient Participation/Contribution discussed personal experience with topic   Activities of Daily Living   Hygiene/Grooming independent   Oral Hygiene independent   Dress independent   Laundry with supervision   Room Organization independent     "

## 2019-06-29 PROCEDURE — 25000132 ZZH RX MED GY IP 250 OP 250 PS 637: Performed by: PSYCHIATRY & NEUROLOGY

## 2019-06-29 PROCEDURE — 25000132 ZZH RX MED GY IP 250 OP 250 PS 637: Performed by: PHYSICIAN ASSISTANT

## 2019-06-29 PROCEDURE — 25000132 ZZH RX MED GY IP 250 OP 250 PS 637: Performed by: NURSE PRACTITIONER

## 2019-06-29 PROCEDURE — 12400001 ZZH R&B MH UMMC

## 2019-06-29 RX ADMIN — IBUPROFEN 600 MG: 200 TABLET, FILM COATED ORAL at 19:26

## 2019-06-29 RX ADMIN — RISPERIDONE 1 MG: 1 TABLET ORAL at 08:23

## 2019-06-29 RX ADMIN — QUETIAPINE FUMARATE 200 MG: 200 TABLET ORAL at 20:36

## 2019-06-29 RX ADMIN — RISPERIDONE 3 MG: 1 TABLET, ORALLY DISINTEGRATING ORAL at 20:37

## 2019-06-29 RX ADMIN — IBUPROFEN 600 MG: 200 TABLET, FILM COATED ORAL at 08:44

## 2019-06-29 ASSESSMENT — ACTIVITIES OF DAILY LIVING (ADL)
HYGIENE/GROOMING: INDEPENDENT
LAUNDRY: UNABLE TO COMPLETE
ORAL_HYGIENE: INDEPENDENT
ORAL_HYGIENE: INDEPENDENT
LAUNDRY: UNABLE TO COMPLETE
HYGIENE/GROOMING: INDEPENDENT
DRESS: SCRUBS (BEHAVIORAL HEALTH)
DRESS: INDEPENDENT

## 2019-06-29 NOTE — PROGRESS NOTES
Patient isolated almost entire shift. He came out to eat meals and near the end of the shift.  His affect was flat, and he appeared either tired, sad, or both. He spoke little.

## 2019-06-29 NOTE — PLAN OF CARE
"  Patient presents with blunted affect, irritable mood. Poor hygiene, appears untidy, disheveled.Visible on unit within milieu. Minimal interaction with peers. No aggressive behavior seen by writer or reported from staff. Pt reported his goal for evening was to \"keep my mouth shut and don't talk to anyone no matter what, that's what the doctor told me to do.\" Poor insight and judgement. Denies anxiety and depression. Denies SI/SIB or homicidal urges or intent. Denies hallucinations. Medication compliant. Denies SE's from medications. Reports fair appetite and denies any issues with output.     Denied pain in L Knee X2 during shift until about 2139 when he reported pain 10/10. Tylenol given. Pt was assisted in elevating his leg by staff for sleep. Will continue to monitor for safety.   "

## 2019-06-30 PROCEDURE — 25000132 ZZH RX MED GY IP 250 OP 250 PS 637: Performed by: NURSE PRACTITIONER

## 2019-06-30 PROCEDURE — 12400001 ZZH R&B MH UMMC

## 2019-06-30 PROCEDURE — H2032 ACTIVITY THERAPY, PER 15 MIN: HCPCS

## 2019-06-30 PROCEDURE — 25000132 ZZH RX MED GY IP 250 OP 250 PS 637: Performed by: PHYSICIAN ASSISTANT

## 2019-06-30 PROCEDURE — 25000132 ZZH RX MED GY IP 250 OP 250 PS 637: Performed by: PSYCHIATRY & NEUROLOGY

## 2019-06-30 RX ADMIN — RISPERIDONE 1 MG: 1 TABLET ORAL at 08:33

## 2019-06-30 RX ADMIN — IBUPROFEN 600 MG: 200 TABLET, FILM COATED ORAL at 10:42

## 2019-06-30 RX ADMIN — QUETIAPINE FUMARATE 200 MG: 200 TABLET ORAL at 20:33

## 2019-06-30 RX ADMIN — RISPERIDONE 3 MG: 1 TABLET, ORALLY DISINTEGRATING ORAL at 20:33

## 2019-06-30 RX ADMIN — IBUPROFEN 600 MG: 200 TABLET, FILM COATED ORAL at 21:25

## 2019-06-30 ASSESSMENT — ACTIVITIES OF DAILY LIVING (ADL)
DRESS: SCRUBS (BEHAVIORAL HEALTH)
DRESS: SCRUBS (BEHAVIORAL HEALTH)
HYGIENE/GROOMING: PROMPTS
ORAL_HYGIENE: PROMPTS
HYGIENE/GROOMING: PROMPTS
ORAL_HYGIENE: PROMPTS

## 2019-06-30 NOTE — PLAN OF CARE
Patient presents with tense affect, irritable mood. Appears untidy, poor hygiene. Visible on unit within milieu yet isolated to self. No reports of aggressive behavior this evening. Poor insight and judgement. Denies SI/SIB or homicidal urges or intent. Denies hallucinations. Medication compliant. Denies SE's from medications.      Patient elevated leg frequently throughout shift. Ibuprofen given for pain X1. Will continue to monitor for safety.

## 2019-06-30 NOTE — PROGRESS NOTES
"Pt does have a tense affect and is irritable . He thought he might be the only one in group and said he didn't want to do art. Writer suggested he could teach her solitaire. He started to do that. When others arrived to group and writer was giving them art options, he wasn't patient for that and he said \" i'll just leave\", even hen writer encouraged him to stay and observe or play cards to his comfort level, but others wanted to make art.He was there too short of a time so was not billed for group.  "

## 2019-06-30 NOTE — PLAN OF CARE
"Pt continues to have symptoms of psychosis. Pt is very irritable and tense during interactions with RN writer. He remains very upset about being in the hospital and generally unwilling to discuss his care. Repeatedly tells RN writer, \"You are not a real doctor,\" despite repeated attempts to explain that writer is not a doctor at all, pt becomes increasingly upset during interactions and is very fixated on this point. Pt took his medications without issue. Pt continues to present with an odd affect and makes bizarre \"jokes\" that aren't congruent with normal conversation. Pt laughs and smiles while also deriding and being upset with staff.    Pt remains generally uncooperative with treatment plan related to his patellar fracture. Pt refuses to wear the brace stating, \"It's a waste of money, you don't know what you are talking about.\" Pt also refuses to have his vital signs assessed stating, \"I won't pay for that, I know you charge me every time you do it.\" Pt became upset when writer did not palpate his knee while inspecting it, RN explained that he was hesitant to do so since the patient was reporting that the knee pain was 10/10. Pt became upset with this and waved his hand at the nurse saying, \"bye bye\" and demanding that he stop talking to him. Pt is not caring for himself or his injury at this time repeatedly demands to have an orthopedic surgeon see him and declines any other sort of intervention. Pt refuses offered pain medication despite reporting the pain as 10/10. Pt is walking relatively normally, there is some mild unsteadiness and slowness to his gait.     Pt denies concerns about medications but does appear to have a significant tremor especially easily seen during intentional acts such as bringing a water cup to his mouth.   "

## 2019-06-30 NOTE — PROGRESS NOTES
"Spoke with patient today about the importance of wearing his left knee brace and NWB on LLE.  Pt states, \"it won't do any good because there is a piece of bone floating around in my knee and I just need surgery.\"  Patient also believes that \"the police are out to get me and I am going to alexandra them when my contract is up.\"  Patient did state that he was willing to wear his brace on his right knee, the non-injured knee. Patient is complaining of pain in his left knee but refuses to take medication when offered, stating that \"the doctors won't give me anything.\"    Patient continues to have psychotic symptoms with  inappropriate sexual comments saying,  you have beautiful eyes and body and I wish you were my girlfriend.\"    Will continue to monitor patient for pain, offer pain medication as needed  and encourage wearing left  knee brace.  "

## 2019-06-30 NOTE — PROGRESS NOTES
"PA and writer both asked patient for vital signs, patient reported, \"No, they charge you extra for that. \" Refused again later in evening. Will continue to monitor for safety.   "

## 2019-07-01 PROCEDURE — 25000132 ZZH RX MED GY IP 250 OP 250 PS 637: Performed by: NURSE PRACTITIONER

## 2019-07-01 PROCEDURE — 99232 SBSQ HOSP IP/OBS MODERATE 35: CPT | Mod: GC | Performed by: PSYCHIATRY & NEUROLOGY

## 2019-07-01 PROCEDURE — 12400001 ZZH R&B MH UMMC

## 2019-07-01 PROCEDURE — 25000132 ZZH RX MED GY IP 250 OP 250 PS 637: Performed by: PSYCHIATRY & NEUROLOGY

## 2019-07-01 PROCEDURE — G0177 OPPS/PHP; TRAIN & EDUC SERV: HCPCS

## 2019-07-01 RX ADMIN — RISPERIDONE 3 MG: 1 TABLET, ORALLY DISINTEGRATING ORAL at 20:59

## 2019-07-01 RX ADMIN — QUETIAPINE FUMARATE 200 MG: 200 TABLET ORAL at 20:59

## 2019-07-01 RX ADMIN — RISPERIDONE 1 MG: 1 TABLET ORAL at 08:15

## 2019-07-01 ASSESSMENT — ACTIVITIES OF DAILY LIVING (ADL)
DRESS: SCRUBS (BEHAVIORAL HEALTH)
HYGIENE/GROOMING: INDEPENDENT
ORAL_HYGIENE: INDEPENDENT
LAUNDRY: UNABLE TO COMPLETE
HYGIENE/GROOMING: INDEPENDENT
LAUNDRY: UNABLE TO COMPLETE
ORAL_HYGIENE: INDEPENDENT
DRESS: SCRUBS (BEHAVIORAL HEALTH)

## 2019-07-01 NOTE — PROGRESS NOTES
"[facility], [place]   Psychiatric Progress Note  Austyn Leach  [id number]  07/01/19    Chief Complaint: Continued medical care        Interim History:   The patient's care was discussed with the treatment team during the daily team meeting and/or staff's chart notes were reviewed.  Staff report patient has been tense/irritable at times, isolative at other times. He has made some inappropriate comments including making comment to female staff \"you have beautiful eyes I wish you were my girlfriend\".     Upon interview patient stated he was angry about the [employer] order. He thinks it is illegal due to not being signed by the proper individuals. He said he called the governor's office about this issue. He said he has been in contact with the governor in the past and the governor has implemented changes to the hospital policies accordingly. He said there is a book he authored that makes people upset, then clarified \"it makes men upset but women like it\". He said he is going to surrender the book to the [employer], who will then give it to the governor. He also had a website with similar themes to the book, and that someone \"put viruses on my computer and destroyed the website\". With regard to his knee, he said it was fine, causes no pain, and declined offer to go to x ray despite being informed that his orthopedic surgeons strongly recommended this. He denied feeling as though other people were out to get him or conspiring against him in some way. He denied AH/VH.     He endorsed medication side fx of \"blackouts in my brain\" from the Risperidone -- he feels it impairs his short term memory. He said he intends to report Dr. Obrien and this writer to the governor for adhering to the [employer] order which is an illegal document in his opinion.        Medications:       Risperidone  3 mg Sublingual At Bedtime    Or     OLANZapine  5 mg Intramuscular At Bedtime     risperiDONE  1 mg Oral Daily    Or "     OLANZapine  5 mg Intramuscular Daily     QUEtiapine  200 mg Oral At Bedtime     risperiDONE microspheres  25 mg Intramuscular Q14 Days          Allergies:     Allergies   Allergen Reactions     Bee Venom      Propranolol           Labs:   No results found for this or any previous visit (from the past 24 hour(s)).       Psychiatric Examination:     BP (!) 166/102   Pulse 88   Temp 98.3  F (36.8  C) (Oral)   Resp 16   Wt 90.2 kg (198 lb 14.4 oz)   SpO2 97%   BMI 25.54 kg/m    Weight is 198 lbs 14.4 oz  Body mass index is 25.54 kg/m .                Sitting Orthostatic BP: (Pt declined)      Sitting Orthostatic Pulse: (Pt declined)      Standing Orthostatic BP: 170/101      Standing Orthostatic Pulse: 88 bpm       Weight over time:  Vitals:    06/23/19 0800   Weight: 90.2 kg (198 lb 14.4 oz)       Orthostatic Vitals     None            Cardiometabolic risk assessment. 07/01/19      Reviewed patient profile for cardiometabolic risk factors    Date taken /Value  REFERENCE RANGE   Abdominal Obesity  (Waist Circumference)   See nursing flowsheet Women ?35 in (88 cm)   Men ?40 in (102 cm)      Triglycerides  Triglycerides   Date Value Ref Range Status   07/18/2008 138 <150 mg/dL Final       ?150 mg/dL (1.7 mmol/L) or current treatment for elevated triglycerides   HDL cholesterol  HDL Cholesterol   Date Value Ref Range Status   07/18/2008 45 > OR = 40 mg/dL Final   ]   Women <50 mg/dL (1.3 mmol/L) in women or current treatment for low HDL cholesterol  Men <40 mg/dL (1 mmol/L) in men or current treatment for low HDL cholesterol     Fasting plasma glucose (FPG) Lab Results   Component Value Date     06/07/2017        FPG ?100 mg/dL (5.6 mmol/L) or treatment for elevated blood glucose   Blood pressure  BP Readings from Last 3 Encounters:   03/23/19 143/65   07/03/18 128/78   04/19/18 159/88    Blood pressure ?130/85 mmHg or treatment for elevated blood pressure   Family History  See family history  "    Appearance: Awake, alert, tense.  Attitude: Argumentative  Eye Contact: Good  Mood: \"Frustrated\"  Affect: Tense, reactive, irritable, heightened, dramatic.   Speech: Rate and volume are elevated. Clear and coherent otherwise.   Psychomotor Behavior: No evidence of psychomotor retardation or agitation.   Thought Process: Perseverative, linear but illogical. Circumstantiality.   Associations: No loose associations present.    Thought Content: +Delusions of grandeur (connection to governor), denies AH/VH. No overt evidence of HI/SI.   Insight: Poor  Judgment: Poor  Oriented to: Not formally assessed  Attention Span and Concentration: Intact to conversation  Recent and Remote Memory: Somewhat impaired; describes \"blackouts\" resulting from medications.  Language: Fluent and conversant in English.    Fund of Knowledge: Mildly impaired secondary to mental status.    Muscle Strength and Tone: Normal  Gait and Station: Normal         Precautions:     Behavioral Orders   Procedures     Assault precautions     Code 1 - Restrict to Unit     Code 2     For Xray and CT only     Code 2     Code 2 to facilitate transfer to 3bw     Fall precautions     Neuropsych Testing     Routine Programming     As clinically indicated     Sexual precautions     Status 15     Every 15 minutes.          DIagnoses:     Schizoaffective disorder bipolar type  Tobacco use disorder  Rule out pornography or sexual addiction outside of the context of manic episode         Assessment & Plan:   Mr. Strange was very tense/irritable during our interview today, continuing to endorse grandiose ideas about communication with governor and denying legitimacy of [employer] order and threatening to report providers to the governor for following the terms of the [employer]. He has minimal insight into his symptoms. Of note he does have some delusional ideas and irritability at baseline however not quite at baseline at this time. Despite his agitation with current plan he " does appear to be compliant with PO meds at this time (also getting dissolvable Risperdal tabs). Will need to set up a family meeting with  Neema Pepe prior to discharge.     Plan:  - Continue IM Risperdal Consta 25mg q2w  - Continue Risperidone 3mg at bedtime and 1mg/day (overlap with initiation of Consta)  - Continue Seroquel 200mg at bedtime   - Continue to offer repeat x ray as recommended by ortho  - Set up family meeting with wife, adult son, and  Neema Pepe prior to discharge.     The risks, benefits, alternatives and side effects have been discussed and are understood by the patient and other caregivers.    Armani Stallworth  PGY-2 Psychiatry Resident  [facility]      The following document has been created with voice recognition software and may contain unintentional word substitutions.    Non clinically relevant CMS requirements:  Clinical Global Impressions  First:  Considering your total clinical experience with this particular patient population, how severe are the patient's symptoms at this time?: 7 (06/19/19 1920)  Compared to the patient's condition at the START of treatment, this patient's condition is:: 4 (06/19/19 1920)  Most recent:  Considering your total clinical experience with this particular patient population, how severe are the patient's symptoms at this time?: 7 (06/27/19 1451)  Compared to the patient's condition at the START of treatment, this patient's condition is:: 4 (06/27/19 1451)

## 2019-07-01 NOTE — PROGRESS NOTES
Patient was up early this morning (7:00) sitting in milieu. He ate meals and showered. Affect was full, and his mood calm.  He socialized with peers and staff. No emotional outbreaks or incidences.

## 2019-07-01 NOTE — PROGRESS NOTES
"   06/30/19 7957   Group Therapy Session   Group Attendance attended group session   Total Time (minutes) 45   Group Type psychotherapeutic   Group Topic Covered coping skills/lifestyle management  (group cohesion)   Patient Participation/Contribution   (minimally participated)   Focus was communication and group cohesion. Pt did not want to participate in the coping skills portion of the group. This writer provided various labyrinths to try in order to practice slow breathing and mindfulness.  When asked if he would like to try one, he stated, \"I'm not a child.\" During the last portion of the session, the group played GERHARD. Kendall was actively engaged in the game and with group.  "

## 2019-07-01 NOTE — PROGRESS NOTES
Pt declined, more than once, to sign Medicare Rights form.     His [place] CM, Neema Pepe, was here to visit pt today.    Neema says there will need to be a meeting before pt is discharged, to include his wife and adult son, Neema from [facility] and us.

## 2019-07-01 NOTE — PROGRESS NOTES
"Continues to refuse VS. Reported he has had no problems with his L and that his leg \"feels fantastic,\" today then later requested and was given Ibuprofen for leg pain. Tremulous and pt reports it's \"normal for me and I like it that way.\" Medication compliant.  "

## 2019-07-01 NOTE — PROGRESS NOTES
Social in the milieu. Full range affect, laughing and joking freq with staff and peers. Ate meals.        06/30/19 2100   Behavioral Health   Hallucinations denies / not responding to hallucinations   Thinking distractable   Orientation person: oriented;place: oriented   Memory baseline memory   Insight poor   Judgement impaired   Eye Contact at examiner   Affect full range affect   Mood mood is calm   Physical Appearance/Attire attire appropriate to age and situation   Hygiene other (see comment)  (adequate)   1. Wish to be Dead No   2. Non-Specific Active Suicidal Thoughts  No   Activity other (see comment)  (Social in the milieu)   Speech clear;coherent   Psychomotor / Gait balanced;steady   Activities of Daily Living   Hygiene/Grooming prompts   Oral Hygiene prompts   Dress scrubs (behavioral health)   Room Organization prompts

## 2019-07-02 PROCEDURE — 25000132 ZZH RX MED GY IP 250 OP 250 PS 637: Performed by: NURSE PRACTITIONER

## 2019-07-02 PROCEDURE — H2032 ACTIVITY THERAPY, PER 15 MIN: HCPCS

## 2019-07-02 PROCEDURE — 25000132 ZZH RX MED GY IP 250 OP 250 PS 637: Performed by: PSYCHIATRY & NEUROLOGY

## 2019-07-02 PROCEDURE — 25000132 ZZH RX MED GY IP 250 OP 250 PS 637: Performed by: STUDENT IN AN ORGANIZED HEALTH CARE EDUCATION/TRAINING PROGRAM

## 2019-07-02 PROCEDURE — 99232 SBSQ HOSP IP/OBS MODERATE 35: CPT | Mod: GC | Performed by: PSYCHIATRY & NEUROLOGY

## 2019-07-02 PROCEDURE — 12400001 ZZH R&B MH UMMC

## 2019-07-02 PROCEDURE — 25000132 ZZH RX MED GY IP 250 OP 250 PS 637: Performed by: PHYSICIAN ASSISTANT

## 2019-07-02 RX ORDER — RISPERIDONE 2 MG/1
2 TABLET ORAL DAILY
Status: DISCONTINUED | OUTPATIENT
Start: 2019-07-03 | End: 2019-07-12 | Stop reason: HOSPADM

## 2019-07-02 RX ORDER — OLANZAPINE 10 MG/2ML
5 INJECTION, POWDER, FOR SOLUTION INTRAMUSCULAR AT BEDTIME
Status: DISCONTINUED | OUTPATIENT
Start: 2019-07-02 | End: 2019-07-12 | Stop reason: HOSPADM

## 2019-07-02 RX ORDER — OLANZAPINE 10 MG/2ML
5 INJECTION, POWDER, FOR SOLUTION INTRAMUSCULAR DAILY
Status: DISCONTINUED | OUTPATIENT
Start: 2019-07-03 | End: 2019-07-12 | Stop reason: HOSPADM

## 2019-07-02 RX ADMIN — RISPERIDONE 1 MG: 1 TABLET ORAL at 08:30

## 2019-07-02 RX ADMIN — QUETIAPINE FUMARATE 200 MG: 200 TABLET ORAL at 21:03

## 2019-07-02 RX ADMIN — DIPHENHYDRAMINE HYDROCHLORIDE 50 MG: 25 CAPSULE ORAL at 03:29

## 2019-07-02 RX ADMIN — IBUPROFEN 600 MG: 200 TABLET, FILM COATED ORAL at 10:59

## 2019-07-02 RX ADMIN — RISPERIDONE 4 MG: 3 TABLET, ORALLY DISINTEGRATING ORAL at 21:03

## 2019-07-02 RX ADMIN — IBUPROFEN 600 MG: 200 TABLET, FILM COATED ORAL at 21:36

## 2019-07-02 ASSESSMENT — ACTIVITIES OF DAILY LIVING (ADL)
HYGIENE/GROOMING: INDEPENDENT
ORAL_HYGIENE: INDEPENDENT
DRESS: SCRUBS (BEHAVIORAL HEALTH)
HYGIENE/GROOMING: INDEPENDENT
DRESS: INDEPENDENT
ORAL_HYGIENE: INDEPENDENT
LAUNDRY: UNABLE TO COMPLETE
LAUNDRY: UNABLE TO COMPLETE

## 2019-07-02 NOTE — PROGRESS NOTES
"[facility], [place]   Psychiatric Progress Note  Austyn Leach  [id number]  07/02/19    Chief Complaint: Continued medical care        Interim History:   The patient's care was discussed with the treatment team during the daily team meeting and/or staff's chart notes were reviewed.  Staff report patient has been irritable, agitated at times although somewhat improved. Appears to be taking medications appropriately (approaches window and requests, doesn't appear to run to room or bathroom shortly after). Socialized with select peers.     Met with patient in milieu. He insisted on having a \"female\" nurse present as a \"witness\". He questioned this writer's qualifications, stating that he is not the real doctor and rather Dr. Obrien is the only one who is the real doctor. He did not appear to understand when we attempted to educate him on the difference between a resident and an attending physician. He spilled coffee as were starting our visit and despite RN's attempts to redirect patient away from the spill he insisted on cleaning it up with paper towels. He said that he was interested in getting an x ray of his knee \"if it will get me out of here faster\". Informed patient that it would not have an impact on his discharge date and he then decided that he didn't want to get an x ray because he didn't want to expose himself to more radiation for no reason. We did reinforce that Ortho thinks it will be necessary to monitor his healing but he declined to get the x-ray. He said the leg/knee hurts \"a little bit but it's fine\". He said his mood was good and he is sleeping fine. He estimated he slept 8 hours however per staff report he slept ~3.75h. He said his wife is coming on Friday and he believes she wants him back home. However he then rambled about fighting with his wife recently and was very difficult to interrupt/redirect but this writer attempted to inform him that his wife wants him " "to get better before returning home and he seemed to ignore this statement. He then rambled about a fountain for his fish that he keeps in his backyard that he built out of concrete and how it somehow caused fights with his neighbors. The remainder of his tangent was difficult to track.     He said the medications are going fine, he did state that he thinks the medications cause lapses in memory \"like blackouts\". He denied any other side effects or physical problems. Informed patient of plan to increase his medications and he did not seem to react to this information despite being adamantly against his medication regimen yesterday.        Medications:       Risperidone  4 mg Sublingual At Bedtime    Or     OLANZapine  5 mg Intramuscular At Bedtime     [START ON 7/3/2019] risperiDONE  2 mg Oral Daily    Or     [START ON 7/3/2019] OLANZapine  5 mg Intramuscular Daily     QUEtiapine  200 mg Oral At Bedtime     risperiDONE microspheres  25 mg Intramuscular Q14 Days          Allergies:     Allergies   Allergen Reactions     Bee Venom      Propranolol           Labs:   No results found for this or any previous visit (from the past 24 hour(s)).       Psychiatric Examination:     BP (!) 166/102   Pulse 88   Temp 98.3  F (36.8  C) (Oral)   Resp 16   Wt 90.2 kg (198 lb 14.4 oz)   SpO2 97%   BMI 25.54 kg/m    Weight is 198 lbs 14.4 oz  Body mass index is 25.54 kg/m .                Sitting Orthostatic BP: (Pt declined)      Sitting Orthostatic Pulse: (Pt declined)      Standing Orthostatic BP: 170/101      Standing Orthostatic Pulse: 88 bpm       Weight over time:  Vitals:    06/23/19 0800   Weight: 90.2 kg (198 lb 14.4 oz)       Orthostatic Vitals     None            Cardiometabolic risk assessment. 07/01/19      Reviewed patient profile for cardiometabolic risk factors    Date taken /Value  REFERENCE RANGE   Abdominal Obesity  (Waist Circumference)   See nursing flowsheet Women ?35 in (88 cm)   Men ?40 in (102 cm)    " "  Triglycerides  Triglycerides   Date Value Ref Range Status   07/18/2008 138 <150 mg/dL Final       ?150 mg/dL (1.7 mmol/L) or current treatment for elevated triglycerides   HDL cholesterol  HDL Cholesterol   Date Value Ref Range Status   07/18/2008 45 > OR = 40 mg/dL Final   ]   Women <50 mg/dL (1.3 mmol/L) in women or current treatment for low HDL cholesterol  Men <40 mg/dL (1 mmol/L) in men or current treatment for low HDL cholesterol     Fasting plasma glucose (FPG) Lab Results   Component Value Date     06/07/2017        FPG ?100 mg/dL (5.6 mmol/L) or treatment for elevated blood glucose   Blood pressure  BP Readings from Last 3 Encounters:   03/23/19 143/65   07/03/18 128/78   04/19/18 159/88    Blood pressure ?130/85 mmHg or treatment for elevated blood pressure   Family History  See family history     Appearance: Awake, alert, tense.  Attitude: Somewhat uncooperative.   Eye Contact: Good, intense at times   Mood: \"Good\"  Affect: Elevated, tense, reactive, intensity is heightened. Less irritable than yesterday.   Speech: Hyperverbal escalating to pressured speech at times. Volume are elevated. Clear and coherent otherwise.   Psychomotor Behavior: Mild-moderate psychomotor agitation.   Thought Process: Tangential, rambling, illogical.   Associations: Mild loosening of associations is present.    Thought Content: +Delusions of grandeur (connection to governor). No overt evidence of AH, VH, HI/SI.   Insight: Poor  Judgment: Poor  Oriented to: Not formally assessed  Attention Span and Concentration: Intact to conversation  Recent and Remote Memory: Per patient it is somewhat impaired; describes \"blackouts\" resulting from medications.   Language: Fluent and conversant in English.    Fund of Knowledge: Mildly impaired secondary to mental status.    Muscle Strength and Tone: Normal  Gait and Station: Normal         Precautions:     Behavioral Orders   Procedures     Assault precautions     Code 1 - Restrict " to Unit     Code 2     For Xray and CT only     Code 2     Code 2 to facilitate transfer to 3bw     Fall precautions     Neuropsych Testing     Routine Programming     As clinically indicated     Sexual precautions     Status 15     Every 15 minutes.          DIagnoses:     Schizoaffective disorder bipolar type, currently manic.   Tobacco use disorder  Rule out pornography or sexual addiction outside of the context of manic episode         Assessment & Plan:   Mr. Leach remained elevated today. He was hyperverbal, pressured at times, rambling, tangential. He continues to endorse grandiose delusions about being in communication with the governor. He did appear less irritable today however as yesterday he was very argumentative with us about the [employer] and reason for his hospitalization. He appears to lack insight into his illness at the moment and lacks insight that his wife would prefer he stabilizes prior to returning home. He was difficult to have a two-way discussion with today due to his pressured speech and not being very responsive to redirection but he was informed of the plan for medication changes. He didn't seem to react to this news despite being adamantly against continuing Risperidone on the previous day. Further stabilization will be required prior to family meeting which will occur prior to discharge. We will trial increasing the Risperidone today.      Plan:  - Continue IM Risperdal Consta 25mg q2w (may need dose increase on next dose).   - Increase Risperidone to 4mg at bedtime and 2mg/day (overlap with initiation of Consta)  - Continue Seroquel 200mg at bedtime   - Continue to offer repeat x ray as recommended by ortho  - Set up family meeting with wife, adult son, and  Neema Pepe prior to discharge.     The risks, benefits, alternatives and side effects have been discussed and are understood by the patient and other caregivers.    Armani Stallworth  PGY-2 Psychiatry  [facility] Psychiatry      The following document has been created with voice recognition software and may contain unintentional word substitutions.    Non clinically relevant CMS requirements:  Clinical Global Impressions  First:  Considering your total clinical experience with this particular patient population, how severe are the patient's symptoms at this time?: 7 (06/19/19 1920)  Compared to the patient's condition at the START of treatment, this patient's condition is:: 4 (06/19/19 1920)  Most recent:  Considering your total clinical experience with this particular patient population, how severe are the patient's symptoms at this time?: 7 (06/27/19 1451)  Compared to the patient's condition at the START of treatment, this patient's condition is:: 4 (06/27/19 1451)

## 2019-07-02 NOTE — PLAN OF CARE
"Patient presents with tense affect, irritable mood. Untidy, poor hygiene. Hyper-verbal, rambling speech. Illogical thought process. Reports his mood is \"fine,\" and his goal is \"to go home.\" Poor insight and judgement. Denies anxiety and depression, denies SI/HI/SIB and hallucinations. Reports sometimes the medications make his head feel \"fuzzy,\" and he can't remember things, otherwise denies SE's. Reports good appetite and no issues with output.     Continues to refuse vital signs, denies pain in left leg. Continues to deny to follow IM recommendations for elevating leg and using brace. Will continue to monitor for safety.  "

## 2019-07-02 NOTE — PROGRESS NOTES
As I said in my 7-1 note, the [facility] wants a meeting with family (wife and son) so we will be meeting them before the patient is discharged.       Also, as the [facility] documented, the plan is for patient to return home.   The [address] [facility] checked this out with the [facility] LO[facility].        Baseline is that pt often engages in nuisance behavior in the community.   When it rises to the point of police involvement/intervention, the police bring him to the hospital.    I investigated whether the neuropsych testing was ever completed.   It was not.   It was ordered when pt was on [address] but never got done for unknown reasons.   I asked Dr Cheatham about ordering it now.   He said, no, the patient should be more stable from a psychiatric perspective or the test would not be very valid.    So there will be no testing at this time.         We are in regular contact/communication with pt's [facility] LOLLY, who has worked with this patient for a couple years.   Not only do we talk with her on the phone, she comes to see patient at least once per week.   She says pt has refused an ACT team in the past and that is why this patient is on her caseload.    The above info hopefully answers the insurance company's questions.

## 2019-07-02 NOTE — PROGRESS NOTES
Pt has fewer episodes of agitation today, and only appeared significantly irritable towards the treatment team and MD. He was visible in the lounge and during some groups, though he socialized with select peers only. Pt approached the Nursing window and requested his scheduled medications both this morning and at HS. No observed side effects. He is refusing to comply with Internal Medicine and Ortho orders regarding his knee, though he is denying any pain or discomfort. Will continue to monitor closely.

## 2019-07-03 PROCEDURE — 12400001 ZZH R&B MH UMMC

## 2019-07-03 PROCEDURE — H2032 ACTIVITY THERAPY, PER 15 MIN: HCPCS

## 2019-07-03 PROCEDURE — 25000132 ZZH RX MED GY IP 250 OP 250 PS 637: Performed by: NURSE PRACTITIONER

## 2019-07-03 PROCEDURE — 25000132 ZZH RX MED GY IP 250 OP 250 PS 637: Performed by: STUDENT IN AN ORGANIZED HEALTH CARE EDUCATION/TRAINING PROGRAM

## 2019-07-03 PROCEDURE — 25000132 ZZH RX MED GY IP 250 OP 250 PS 637: Performed by: PHYSICIAN ASSISTANT

## 2019-07-03 PROCEDURE — 99232 SBSQ HOSP IP/OBS MODERATE 35: CPT | Mod: GC | Performed by: PSYCHIATRY & NEUROLOGY

## 2019-07-03 RX ADMIN — IBUPROFEN 600 MG: 200 TABLET, FILM COATED ORAL at 08:07

## 2019-07-03 RX ADMIN — RISPERIDONE 4 MG: 3 TABLET, ORALLY DISINTEGRATING ORAL at 20:33

## 2019-07-03 RX ADMIN — RISPERIDONE 2 MG: 2 TABLET ORAL at 08:07

## 2019-07-03 RX ADMIN — IBUPROFEN 600 MG: 200 TABLET, FILM COATED ORAL at 15:02

## 2019-07-03 RX ADMIN — QUETIAPINE FUMARATE 200 MG: 200 TABLET ORAL at 20:33

## 2019-07-03 ASSESSMENT — ACTIVITIES OF DAILY LIVING (ADL)
HYGIENE/GROOMING: INDEPENDENT
LAUNDRY: UNABLE TO COMPLETE
DRESS: INDEPENDENT
DRESS: INDEPENDENT
LAUNDRY: UNABLE TO COMPLETE
ORAL_HYGIENE: INDEPENDENT
ORAL_HYGIENE: INDEPENDENT
HYGIENE/GROOMING: INDEPENDENT

## 2019-07-03 NOTE — PROGRESS NOTES
RN flyer up to unit. Patient noted to be in lounge without brace on his knee. Per notes, he is noncompliant with NWB and using brace and continues to be so. Staff to contact med flyer if we can be of assistance.

## 2019-07-03 NOTE — PROGRESS NOTES
Pt was present in the lounge for most of the evening. He watched television and ate dinner with peers. He was pleasant with staff and peers, and was compliant with all staff requests. There are no behavioral issues to report.

## 2019-07-03 NOTE — PROGRESS NOTES
"Pt has been visible in milieu throughout the shift. He was calm, and was seen engaging his peers in a card game. Pt does not appear to be responding to hallucinations, but seems to present with paranoid thinking. He states that he wants a female doctor, because the males are \"lying to him\". He also stated that he felt like people were poising him with medications, and now the medications are only \"partly bad\". Pt made a phone call to wife, went to groups, ate meals, and did not take a shower. He denied SI/SIB. No restraints, seclusion, or significant redirection needed to manage behaviors. No concerns for staff.       07/03/19 1400   Behavioral Health   Hallucinations denies / not responding to hallucinations   Thinking paranoid;poor concentration   Orientation place: oriented;date: oriented;person: oriented;time: oriented   Memory baseline memory;confabulation   Insight poor   Judgement impaired   Eye Contact at examiner   Affect full range affect   Mood mood is calm   Physical Appearance/Attire attire appropriate to age and situation   Hygiene other (see comment)  (adequate )   Suicidality other (see comments)  (Denies)   1. Wish to be Dead No   2. Non-Specific Active Suicidal Thoughts  No   Enviromental Risk Factors None   Self Injury other (see comment)  (Denies)   Activity other (see comment)  (Visible in milieu)   Speech clear;coherent   Medication Sensitivity no stated side effects;no observed side effects   Psychomotor / Gait balanced;steady   Activities of Daily Living   Hygiene/Grooming independent   Oral Hygiene independent   Dress independent   Laundry unable to complete   Room Organization independent     "

## 2019-07-03 NOTE — PROGRESS NOTES
Pt remains noncompliant with fracture care, and declined to wear leg brace this shift, and has been weight bearing. Denies pain, discomfort or limitations to mobility however.

## 2019-07-03 NOTE — PROGRESS NOTES
"[facility], [place]   Psychiatric Progress Note  Austyn Leach  [id number]  07/03/19    Chief Complaint: Continued medical care        Interim History:   The patient's care was discussed with the treatment team during the daily team meeting and/or staff's chart notes were reviewed. Staff report patient has been irritable, tense, hyperverbal, rambling. Did have some bizarre behavior as well including throwing food on floor so he could \"eat it with [my] missing teeth]\".    Met with patient in milieu. He again insisted on having his \"female\" nurse present so that she could act as a \"witness\". He said that his wife and son were coming to visit on Friday. He is looking forward to their visit. He last spoke to his wife this morning and he said it went well. He said he wanted to have a family meeting sometime next week and he anticipates discharge sometime soon thereafter. Informed patient that we have not discussed discharge date yet. He said his mood was \"fine\" and his appetite and sleep were both good. He denied SI/HI. He said his leg does hurt some days, but other days it does not. Informed patient that due to his stated concern that he has bone shards in his legs, the x ray could help determine the most appropriate treatment course. He again refused to go to x ray because he did not think it was necessary and he continues to refuse to wear the brace. He requested and received PRN Ibuprofen which he said was helpful. He again reiterated his desire to go home and said \"the pond's gotta go\" and again began to ramble about the cement pond in his backyard which has upset the police. He said that it has a rainbow bridge, and when they originally built it, it carried no sexual connotations however nowadays people are taking it to represent homosexuality, and that is why his neighbors are upset about it. The patient was asked about his hand tremor and he said that he has had it his whole life " "and there have been no recent changes. He was dismissive of further questions about the tremor. He said the staff are treating him fine and he feels safe here on the unit. He then stared intensely at this writer and said \"I want a \". He had no further comments/questions.     After our team met with another patient, Austyn approached us again and started yelling that we were liars, yelled \"fuck you!\", that we are homosexuals and he made loud and inappropriate comments that we are performing sexual acts on each other. He demanded to have a female doctor instead of us. He did not respond to redirection and the team walked away.          Medications:       Risperidone  4 mg Sublingual At Bedtime    Or     OLANZapine  5 mg Intramuscular At Bedtime     risperiDONE  2 mg Oral Daily    Or     OLANZapine  5 mg Intramuscular Daily     QUEtiapine  200 mg Oral At Bedtime     risperiDONE microspheres  25 mg Intramuscular Q14 Days          Allergies:     Allergies   Allergen Reactions     Bee Venom      Propranolol           Labs:   No results found for this or any previous visit (from the past 24 hour(s)).       Psychiatric Examination:     BP (!) 166/102   Pulse 88   Temp 98.3  F (36.8  C) (Oral)   Resp 16   Wt 90.2 kg (198 lb 14.4 oz)   SpO2 97%   BMI 25.54 kg/m    Weight is 198 lbs 14.4 oz  Body mass index is 25.54 kg/m .                Sitting Orthostatic BP: (Pt declined)      Sitting Orthostatic Pulse: (Pt declined)      Standing Orthostatic BP: 170/101      Standing Orthostatic Pulse: 88 bpm       Weight over time:  Vitals:    06/23/19 0800   Weight: 90.2 kg (198 lb 14.4 oz)       Orthostatic Vitals     None            Cardiometabolic risk assessment. 07/01/19      Reviewed patient profile for cardiometabolic risk factors    Date taken /Value  REFERENCE RANGE   Abdominal Obesity  (Waist Circumference)   See nursing flowsheet Women ?35 in (88 cm)   Men ?40 in (102 cm)      Triglycerides  Triglycerides   Date " "Value Ref Range Status   07/18/2008 138 <150 mg/dL Final       ?150 mg/dL (1.7 mmol/L) or current treatment for elevated triglycerides   HDL cholesterol  HDL Cholesterol   Date Value Ref Range Status   07/18/2008 45 > OR = 40 mg/dL Final   ]   Women <50 mg/dL (1.3 mmol/L) in women or current treatment for low HDL cholesterol  Men <40 mg/dL (1 mmol/L) in men or current treatment for low HDL cholesterol     Fasting plasma glucose (FPG) Lab Results   Component Value Date     06/07/2017        FPG ?100 mg/dL (5.6 mmol/L) or treatment for elevated blood glucose   Blood pressure  BP Readings from Last 3 Encounters:   03/23/19 143/65   07/03/18 128/78   04/19/18 159/88    Blood pressure ?130/85 mmHg or treatment for elevated blood pressure   Family History  See family history     Appearance: Awake, alert, dressed in hospital garb  Attitude: Cooperative at first, later was very argumentative and agitated.   Eye Contact: Good at first, progressed to intense when became agitated.   Mood: \"Fine\"  Affect: At first was calm, appropriate, controlled. Later was irritable, tense, agitated, reactive, with heightened intensity.   Speech: Hyperverbal but less pressured than yesterday. Clear and coherent otherwise.   Psychomotor Behavior: Mild-moderate psychomotor agitation. Moderately tremulous, this tremor appears during action rather than at rest.   Thought Process: Somewhat rambling/tangential but improved from yesterday. Redirectable for majority of conversation. Did become agitated later, was not redirectable while agitated.   Associations: No apparent loosening of associations  Thought Content: Did not specifically endorse delusional connections to people of importance (e.g., governor) as he had done on prior days, although this was not directly asked. No overt evidence of AH, VH, HI/SI.   Insight: Poor  Judgment: Poor  Oriented to: Not formally assessed  Attention Span and Concentration: Intact to conversation  Recent " and Remote Memory: Appears mostly intact, may have some mild impairment in the context of his nicole.   Language: Fluent and conversant in English.    Fund of Knowledge: Mildly impaired secondary to mental status.    Muscle Strength and Tone: Normal  Gait and Station: Mostly normal, but limping somewhat on affected leg at times.          Precautions:     Behavioral Orders   Procedures     Assault precautions     Code 1 - Restrict to Unit     Code 2     For Xray and CT only     Code 2     Code 2 to facilitate transfer to 3bw     Fall precautions     Neuropsych Testing     Routine Programming     As clinically indicated     Sexual precautions     Status 15     Every 15 minutes.          DIagnoses:     Schizoaffective disorder bipolar type, currently manic.   Tobacco use disorder  Rule out pornography or sexual addiction outside of the context of manic episode         Assessment & Plan:   Mr. Leach showed some improvement in manic symptoms today, as his thought processes were less tangential/rambling, speech less pressured, and at first during the interview he was calm, appropriate, and redirectable. Our interview was much more conversational than previous days due to reduction in overtly manic speech. However later he became quite agitated with us, yelled/swore at us and made very inappropriate and loud comments -- he was not redirectable while agitated. Despite his outburst there is some evidence that he is responding at least partially to the increase in Risperidone dose and he appears to have tolerated the dose increase well. We will continue to monitor clinically and adjust dose again in the future if necessary.     To clarify, the patient's symptoms are consistent with nicole which occurs understandably in the context of his bipolar type 1 diagnosis. His presentation on this admission is similar to his previous presentations of nicole. There is no concern for delirium secondary to infectious etiology due to lack of  localizing symptoms, lack of waxing/waning attention, and patient is not disoriented despite disorganized thought processes. There is no clinical indication for infectious work up at this time. If he spikes a fever in the future or develops localizing infectious symptoms we will order tests as clinically indicated.      Plan:  - Previously received IM Risperdal Consta 25mg q2w -- will likely plan for 50mg on next scheduled injection.   - Continue Risperidone at 4mg at bedtime and 2mg/day (overlap with initiation of Consta). This represents a dose increase starting 7/2/19.   - Continue Seroquel 200mg at bedtime   - Continue to offer repeat x ray as recommended by ortho  - Set up family meeting with wife, adult son, and  Neema Pepe prior to discharge.     The risks, benefits, alternatives and side effects have been discussed and are understood by the patient and other caregivers.    Armani Stallworth  PGY-2 Psychiatry Resident  [facility] Psychiatry    The following document has been created with voice recognition software and may contain unintentional word substitutions.    Non clinically relevant CMS requirements:  Clinical Global Impressions  First:  Considering your total clinical experience with this particular patient population, how severe are the patient's symptoms at this time?: 7 (06/19/19 1920)  Compared to the patient's condition at the START of treatment, this patient's condition is:: 4 (06/19/19 1920)  Most recent:  Considering your total clinical experience with this particular patient population, how severe are the patient's symptoms at this time?: 7 (06/27/19 1451)  Compared to the patient's condition at the START of treatment, this patient's condition is:: 4 (06/27/19 1451)

## 2019-07-04 PROCEDURE — 99233 SBSQ HOSP IP/OBS HIGH 50: CPT | Performed by: PSYCHIATRY & NEUROLOGY

## 2019-07-04 PROCEDURE — 25000132 ZZH RX MED GY IP 250 OP 250 PS 637: Performed by: STUDENT IN AN ORGANIZED HEALTH CARE EDUCATION/TRAINING PROGRAM

## 2019-07-04 PROCEDURE — 25000132 ZZH RX MED GY IP 250 OP 250 PS 637: Performed by: NURSE PRACTITIONER

## 2019-07-04 PROCEDURE — 12400001 ZZH R&B MH UMMC

## 2019-07-04 PROCEDURE — 25000132 ZZH RX MED GY IP 250 OP 250 PS 637: Performed by: PHYSICIAN ASSISTANT

## 2019-07-04 RX ADMIN — RISPERIDONE 2 MG: 2 TABLET ORAL at 08:24

## 2019-07-04 RX ADMIN — IBUPROFEN 600 MG: 200 TABLET, FILM COATED ORAL at 11:06

## 2019-07-04 RX ADMIN — RISPERIDONE 4 MG: 3 TABLET, ORALLY DISINTEGRATING ORAL at 19:26

## 2019-07-04 RX ADMIN — IBUPROFEN 600 MG: 200 TABLET, FILM COATED ORAL at 01:22

## 2019-07-04 RX ADMIN — IBUPROFEN 600 MG: 200 TABLET, FILM COATED ORAL at 22:25

## 2019-07-04 RX ADMIN — QUETIAPINE FUMARATE 200 MG: 200 TABLET ORAL at 19:26

## 2019-07-04 ASSESSMENT — ACTIVITIES OF DAILY LIVING (ADL)
LAUNDRY: WITH SUPERVISION
ORAL_HYGIENE: INDEPENDENT
DRESS: INDEPENDENT
HYGIENE/GROOMING: INDEPENDENT

## 2019-07-04 NOTE — PROGRESS NOTES
07/03/19 1941   Behavioral Health   Hallucinations denies / not responding to hallucinations   Thinking paranoid;poor concentration   Orientation person: oriented;place: oriented;date: oriented;time: oriented   Memory baseline memory   Insight poor   Judgement impaired   Eye Contact at examiner   Affect full range affect   Physical Appearance/Attire disheveled;attire appropriate to age and situation   Suicidality   (Pt denies SI thoughts)   1. Wish to be Dead No   2. Non-Specific Active Suicidal Thoughts  No   Self Injury   (Pt denies SIB thoughtrs)   Elopement   (No elopement risk observed)   Speech clear;coherent   Medication Sensitivity no stated side effects;no observed side effects   Psychomotor / Gait balanced;steady   Psycho Education   Type of Intervention 1:1 intervention   Response participates with encouragement   Activities of Daily Living   Hygiene/Grooming independent   Oral Hygiene independent   Dress independent   Laundry unable to complete   Pt denies Visual or auditory hallucination  Pt denies  SI/SIB thought  Pt socialized and was visible in the milieu  Pt indicated good appetite and good sleep  Pt was calm and relax

## 2019-07-04 NOTE — PLAN OF CARE
"Patient reported \"feeling better since he's been here\", said he really does not need to be here. He said he wants to go home and be with his wife. Denied all psych symptoms when asked. However, patient verbalized delusional thoughts about: the TV in his room as no good, as it was given to the hospital for free, and someone just \"pocketed the money\".     Patient's goal for today is: \"to do good and go home soon\".     Patient's thoughts were delusional with poor insights of his MI and current situation. Speech was spontaneous and clear. Mood was tense, but calm.     Patient denied pain and all other physical problems. Patient said he is taking medications as prescribed, denied side effects. Will continue to monitor.     "

## 2019-07-04 NOTE — PROGRESS NOTES
7/03/19 1300   Art Therapy   Type of Intervention structured groups   Response participates with encouragement   Hours 1.5   Treatment Detail Art Therapy- safe place/ landscape of emotions   Problem-Schizoaffective disorder bipolar type  Tobacco use disorder  Rule out pornography or sexual addiction outside of the context of manic episode     Goal- cope, express, regulate emotions     Outcome- pt attended Art Therapy group, observing only and some conversation. He went from inappropriately commenting how nice every woman staff looked today. He didn't understand redirection from multiple staff, he said he was getting in trouble for saying nice things. He also kept saying he wanted a female doctor because the male doctors lie to him about when he can go home.  He di have one nice interaction with peers with a conversation about pets and about addiction and alcoholism.  He talked about being sober 30 years and then finding himself all scraped up and then in the hospital.

## 2019-07-04 NOTE — PLAN OF CARE
BEHAVIORAL TEAM DISCUSSION    Participants: dr cabello, jaki wong rn, niki mota ctc  Progress: Pt is med compliant.    No behavior problems but he continues with inappropriate sexual themes regarding remarks he makes.     Continued Stay Criteria/Rationale: due to the above and med changes  Medical/Physical:  post broken knee.   A scan is ordered but pt is refusing it.  Precautions:   Behavioral Orders   Procedures    Assault precautions    Code 1 - Restrict to Unit    Code 2     For Xray and CT only    Code 2     Code 2 to facilitate transfer to [facility]    Fall precautions    Neuropsych Testing    Routine Programming     As clinically indicated    Sexual precautions    Status 15     Every 15 minutes.     Plan:  Plan is to discharge home.  We are trying to arrange for a meeting before pt discharges.   The meeting will hopefully include pt's wife and his son and the [facility] CM.  Rationale for change in precautions or plan  no change at this time.

## 2019-07-04 NOTE — PLAN OF CARE
"Pt has been active in the milieu, though he is social with select peers only. Pt has been mostly pleasant with staff today, though he becomes more irritable when talking with the MD or discussing his desire to discharge. Making delusional statements about the bank having \"sex workers\" pretending to be tellers. Continues to make sexually suggestive comments towards female staff at times, and is overall receptive to redirection. Denies SI/SIB and HI. He has been compliant with scheduled medications. Continues to be uncooperative with using knee brace. Denies any knee pain today, though he endorses HA, which which he received ibuprofen. Refusing vital signs.  "

## 2019-07-04 NOTE — PROGRESS NOTES
[facility], [place]   Psychiatric Progress Note  Austyn Leach  [id number]  07/04/19    Chief Complaint: Continued medical care  Time: 38 minutes on encounter, >50% of which was spent in counseling and/or coordination of care consisting of: communication and education with the patient/family, lab/image/study evaluation, support staff communication, and other sources pertinent to excellent patient care.          Interim History:   The patient's care was discussed with the treatment team during the daily team meeting and/or staff's chart notes were reviewed.  Staff report patient has been saying that we lined him is trying to go to the code [employer] and that he would bring a weapon slept about 5 hours.    Upon meeting with him he says that he is okay he is paranoid about his interactions with me and requests that the nurse come join us.  He says that we are lying to him and trying to keep him in the hospital instead of help him at discharge him.  Informed him that we are working towards his discharge together.  He denies any thoughts of harming himself or others seem to be paranoid and delusional about certain things including the government and mentions strange ideas about [employer] having fake female employees.  Is not hopeless or helpless and is not sad or depressed.  Is hoping to discharge home denies any attention comes or guilty feelings.  Is not having any anhedonia or sleep problems.     Left a voicemail for his son and also called his wife.  Phone numbers are in the demographics section.  Upon speaking with his wife she informs me that generally he does not take medications when he goes home and that she will try to force him but he will just spit them out.  She does mention he is better when he is on his medications and that when he leaves the hospital he is much more functional and less psychotic and disorganized.  It seems as though they would be interested in long-acting injectable  medication.  She is not able to come to the hospital for a family meeting but could visit with her  during the weekend and evaluate if he seems to be at his baseline or not.         Medications:       Risperidone  4 mg Sublingual At Bedtime    Or     OLANZapine  5 mg Intramuscular At Bedtime     risperiDONE  2 mg Oral Daily    Or     OLANZapine  5 mg Intramuscular Daily     QUEtiapine  200 mg Oral At Bedtime     risperiDONE microspheres  25 mg Intramuscular Q14 Days          Allergies:     Allergies   Allergen Reactions     Bee Venom      Propranolol           Labs:   No results found for this or any previous visit (from the past 24 hour(s)).       Psychiatric Examination:     BP (!) 166/102   Pulse 88   Temp 98.3  F (36.8  C) (Oral)   Resp 16   Wt 90.2 kg (198 lb 14.4 oz)   SpO2 97%   BMI 25.54 kg/m    Weight is 198 lbs 14.4 oz  Body mass index is 25.54 kg/m .                Sitting Orthostatic BP: (Pt declined)      Sitting Orthostatic Pulse: (Pt declined)      Standing Orthostatic BP: 170/101      Standing Orthostatic Pulse: 88 bpm       Weight over time:  Vitals:    06/23/19 0800   Weight: 90.2 kg (198 lb 14.4 oz)       Orthostatic Vitals     None            Cardiometabolic risk assessment. 07/04/19      Reviewed patient profile for cardiometabolic risk factors    Date taken /Value  REFERENCE RANGE   Abdominal Obesity  (Waist Circumference)   See nursing flowsheet Women ?35 in (88 cm)   Men ?40 in (102 cm)      Triglycerides  Triglycerides   Date Value Ref Range Status   07/18/2008 138 <150 mg/dL Final       ?150 mg/dL (1.7 mmol/L) or current treatment for elevated triglycerides   HDL cholesterol  HDL Cholesterol   Date Value Ref Range Status   07/18/2008 45 > OR = 40 mg/dL Final   ]   Women <50 mg/dL (1.3 mmol/L) in women or current treatment for low HDL cholesterol  Men <40 mg/dL (1 mmol/L) in men or current treatment for low HDL cholesterol     Fasting plasma glucose (FPG) Lab Results   Component  "Value Date     06/07/2017        FPG ?100 mg/dL (5.6 mmol/L) or treatment for elevated blood glucose   Blood pressure  BP Readings from Last 3 Encounters:   03/23/19 143/65   07/03/18 128/78   04/19/18 159/88    Blood pressure ?130/85 mmHg or treatment for elevated blood pressure   Family History  See family history         Appearance: Awake, alert, dressed in hospital garb  Attitude: Cooperative at first, later was very argumentative and agitated.   Eye Contact: Good at first, progressed to intense when became agitated.   Mood: \"Fine\"  Affect: At first was calm, appropriate, controlled. Later was irritable, tense, agitated, reactive, with heightened intensity.   Speech: Hyperverbal but less pressured than yesterday. Clear and coherent otherwise.   Psychomotor Behavior: Mild-moderate psychomotor agitation. Moderately tremulous, this tremor appears during action rather than at rest.   Thought Process: Somewhat rambling/tangential but improved from yesterday. Redirectable for majority of conversation. Did become agitated later, was not redirectable while agitated.   Associations: No apparent loosening of associations  Thought Content: Did not specifically endorse delusional connections to people of importance (e.g., governor) as he had done on prior days, although this was not directly asked. No overt evidence of AH, VH, HI/SI.   Insight: Poor  Judgment: Poor  Oriented to: Not formally assessed  Attention Span and Concentration: Intact to conversation  Recent and Remote Memory: Appears mostly intact, may have some mild impairment in the context of his nicole.   Language: Fluent and conversant in English.    Fund of Knowledge: Mildly impaired secondary to mental status.    Muscle Strength and Tone: Normal  Gait and Station: Mostly normal, but limping somewhat on affected leg at times.          Precautions:     Behavioral Orders   Procedures     Assault precautions     Code 1 - Restrict to Unit     Code 2     For " Xray and CT only     Code 2     Code 2 to facilitate transfer to w     Fall precautions     Neuropsych Testing     Routine Programming     As clinically indicated     Sexual precautions     Status 15     Every 15 minutes.          DIagnoses:     Schizoaffective disorder bipolar type, currently manic.   Tobacco use disorder  Rule out pornography or sexual addiction outside of the context of manic episode         Assessment & Plan:     Austyn is currently intermittently irritable and has delusional content as above.  Unclear his baseline as we are not completely sure if his delusions completely resolved.  He does seem to be tolerating the current medication dosage without issue and it is still early in his treatment with it.  Communicated with his family to evaluate him over the weekend and see if they feel he is near his baseline of function.  Continue plan on giving him a long-acting injectable medication.    Currently committed with [facility] of risperidone, paliperidone, olanzapine, quetiapine, aripiprazole  Family is going to evaluate current baseline  Would benefit from an ACT team    The risks, benefits, alternatives and side effects have been discussed and are understood by the patient and other caregivers.      Christian Obrien  [facility] Psychiatry      The following document has been created with voice recognition software and may contain unintentional word substitutions.    Non clinically relevant CMS requirements:  Clinical Global Impressions  First:  Considering your total clinical experience with this particular patient population, how severe are the patient's symptoms at this time?: 7 (06/19/19 1920)  Compared to the patient's condition at the START of treatment, this patient's condition is:: 4 (06/19/19 1920)  Most recent:  Considering your total clinical experience with this particular patient population, how severe are the patient's symptoms at this time?: 7 (06/27/19  1637)  Compared to the patient's condition at the START of treatment, this patient's condition is:: 4 (06/27/19 1529)

## 2019-07-05 PROCEDURE — 25000132 ZZH RX MED GY IP 250 OP 250 PS 637: Performed by: STUDENT IN AN ORGANIZED HEALTH CARE EDUCATION/TRAINING PROGRAM

## 2019-07-05 PROCEDURE — 12400001 ZZH R&B MH UMMC

## 2019-07-05 PROCEDURE — H2032 ACTIVITY THERAPY, PER 15 MIN: HCPCS

## 2019-07-05 PROCEDURE — 25000132 ZZH RX MED GY IP 250 OP 250 PS 637: Performed by: NURSE PRACTITIONER

## 2019-07-05 RX ADMIN — RISPERIDONE 4 MG: 3 TABLET, ORALLY DISINTEGRATING ORAL at 19:55

## 2019-07-05 RX ADMIN — RISPERIDONE 2 MG: 2 TABLET ORAL at 07:49

## 2019-07-05 RX ADMIN — QUETIAPINE FUMARATE 200 MG: 200 TABLET ORAL at 19:55

## 2019-07-05 ASSESSMENT — ACTIVITIES OF DAILY LIVING (ADL)
HYGIENE/GROOMING: INDEPENDENT
ORAL_HYGIENE: INDEPENDENT
LAUNDRY: UNABLE TO COMPLETE
DRESS: SCRUBS (BEHAVIORAL HEALTH)
ORAL_HYGIENE: INDEPENDENT
HYGIENE/GROOMING: INDEPENDENT
DRESS: SCRUBS (BEHAVIORAL HEALTH)

## 2019-07-05 NOTE — PLAN OF CARE
"Pt's overall status remains about the same the last 48 hours. Pt is irritable and tense at times but is calm and generally pleasant at others. Pt is resistant to assessment and check-in from RN writer. When attempting to ask him how he was doing pt responded, \"I need a doctor not a retard.\" Pt refused to speak to RN writer on multiple occassions this shift because he believes that the writer is \"keeping me here and only works on heads.\" RN writer has tried to explain multiple times across multiple shifts his role in the treatment team but pt remains unresponsive to education. Pt was otherwise generally appropriate. Pt took medications and denies side effects. Pt refuses vital signs and declines to talk about his healing patellar fracture. Pt appears to have an intention tremor but he is resistant to talking about it.   "

## 2019-07-05 NOTE — PROGRESS NOTES
I spoke with pt's [facility] CM, Lenora Pepe, today.    I explained the doctor spoke with pt's wife who said she can't come for a meeting.     Lenora will start looking into which [facility] clinic can continue giving the Risperdal Consta injection.    It appears the next one is due on July 11th and we're hoping pt will still be in the hospital for that one.    Then the one after that, would be July 25th, at a community clinic.    Lenora further stated she has offered him an ACT team, Day treatment, and a variety of other support services and he declines everything.    Pt always insists on going back to live at home.

## 2019-07-06 PROCEDURE — 25000132 ZZH RX MED GY IP 250 OP 250 PS 637: Performed by: STUDENT IN AN ORGANIZED HEALTH CARE EDUCATION/TRAINING PROGRAM

## 2019-07-06 PROCEDURE — 25000132 ZZH RX MED GY IP 250 OP 250 PS 637: Performed by: PHYSICIAN ASSISTANT

## 2019-07-06 PROCEDURE — 12400001 ZZH R&B MH UMMC

## 2019-07-06 PROCEDURE — 25000132 ZZH RX MED GY IP 250 OP 250 PS 637: Performed by: NURSE PRACTITIONER

## 2019-07-06 RX ADMIN — RISPERIDONE 2 MG: 2 TABLET ORAL at 08:49

## 2019-07-06 RX ADMIN — QUETIAPINE FUMARATE 200 MG: 200 TABLET ORAL at 19:26

## 2019-07-06 RX ADMIN — IBUPROFEN 600 MG: 200 TABLET, FILM COATED ORAL at 12:41

## 2019-07-06 RX ADMIN — RISPERIDONE 4 MG: 3 TABLET, ORALLY DISINTEGRATING ORAL at 19:26

## 2019-07-06 RX ADMIN — ACETAMINOPHEN 650 MG: 325 TABLET, FILM COATED ORAL at 19:26

## 2019-07-06 ASSESSMENT — ACTIVITIES OF DAILY LIVING (ADL)
ORAL_HYGIENE: INDEPENDENT
LAUNDRY: UNABLE TO COMPLETE
LAUNDRY: UNABLE TO COMPLETE
HYGIENE/GROOMING: INDEPENDENT
ORAL_HYGIENE: INDEPENDENT
DRESS: SCRUBS (BEHAVIORAL HEALTH);INDEPENDENT
DRESS: SCRUBS (BEHAVIORAL HEALTH)
HYGIENE/GROOMING: INDEPENDENT

## 2019-07-06 NOTE — PROGRESS NOTES
07/05/19 2100   Therapeutic Recreation   Type of Intervention structured groups   Activity game   Response Observes from a distance   Hours 1     Pt attended the Therapeutic Recreation group with focus on leisure participation, social engagement, and strategic cognitive reasoning.  Pt chose not to participate in the group recreational intervention via a group game.  Pt observed from the side, but after about half of the group, he joined the table with the rest of the group. Pt still chose to not take a turn, but was engaged in the activity. Pt maintained appropriate interactions and was calm during the group.

## 2019-07-06 NOTE — PROGRESS NOTES
07/06/19 1500   Behavioral Health   Hallucinations denies / not responding to hallucinations   Thinking poor concentration   Orientation person: oriented;place: oriented   Insight poor   Judgement impaired   Eye Contact at examiner   Affect irritable   Mood irritable   Hygiene neglected grooming - unclean body, hair, teeth   Activities of Daily Living   Hygiene/Grooming independent   Oral Hygiene independent   Dress scrubs (behavioral health)   Laundry unable to complete   Room Organization independent     Pt. Appeared to be isolative and irritable at times. Pt. Ate all meals and Pt.spent most of the time in his room. NO signs of SI or SIB.

## 2019-07-06 NOTE — PROGRESS NOTES
"   07/05/19 2000   Behavioral Health   Hallucinations denies / not responding to hallucinations   Thinking distractable;poor concentration   Orientation person: oriented;place: oriented;date: oriented;time: oriented   Memory baseline memory   Insight poor   Judgement impaired   Eye Contact at examiner   Affect irritable   Mood irritable   Physical Appearance/Attire disheveled   Hygiene neglected grooming - unclean body, hair, teeth   Suicidality other (see comments)  (denies)   1. Wish to be Dead No   2. Non-Specific Active Suicidal Thoughts  No   Activities of Daily Living   Hygiene/Grooming independent   Oral Hygiene independent   Dress scrubs (behavioral health)   Laundry unable to complete   Room Organization independent     Patient had a generally good shift. He was irritable for most of the shift, stating \"I want to go home\" as a response to most questions during check in. He made a few phone calls and became escalated during them and referred to other patients and staff with slurs while on the phone. When staff told him he had to use more respectful language on the phone he became more agitated and said \"I warned you.\" Reported being in a good mood, denied SI/SIB/HI or hallucinations, and is preoccupied with his discharge and changing doctors.   "

## 2019-07-07 PROCEDURE — 25000132 ZZH RX MED GY IP 250 OP 250 PS 637: Performed by: STUDENT IN AN ORGANIZED HEALTH CARE EDUCATION/TRAINING PROGRAM

## 2019-07-07 PROCEDURE — H2032 ACTIVITY THERAPY, PER 15 MIN: HCPCS

## 2019-07-07 PROCEDURE — 25000132 ZZH RX MED GY IP 250 OP 250 PS 637: Performed by: NURSE PRACTITIONER

## 2019-07-07 PROCEDURE — 12400001 ZZH R&B MH UMMC

## 2019-07-07 RX ADMIN — RISPERIDONE 4 MG: 3 TABLET, ORALLY DISINTEGRATING ORAL at 19:33

## 2019-07-07 RX ADMIN — QUETIAPINE FUMARATE 200 MG: 200 TABLET ORAL at 19:32

## 2019-07-07 RX ADMIN — RISPERIDONE 2 MG: 2 TABLET ORAL at 07:44

## 2019-07-07 ASSESSMENT — ACTIVITIES OF DAILY LIVING (ADL)
ORAL_HYGIENE: INDEPENDENT
HYGIENE/GROOMING: INDEPENDENT
DRESS: INDEPENDENT
LAUNDRY: UNABLE TO COMPLETE

## 2019-07-07 NOTE — PROGRESS NOTES
Pt spent most of the day withdrawn from peers, playing cards with himself or watching TV. Pt made frequent  statements today about police and guns, and needed to be redirected multiple times for inappropriate statements (hearing a pediatric code blue overhead and stating 'Let me off the unit and I'll go finish it myself with a gun') and swearing at staff.        07/07/19 8218   Behavioral Health   Hallucinations denies / not responding to hallucinations   Thinking delusional;paranoid   Orientation person: oriented;date: oriented;place: oriented;time: oriented   Memory baseline memory   Insight poor   Judgement impaired   Eye Contact at examiner   Affect tense   Mood irritable   Physical Appearance/Attire disheveled   Hygiene neglected grooming - unclean body, hair, teeth   Suicidality other (see comments)  (none stated)   1. Wish to be Dead No   2. Non-Specific Active Suicidal Thoughts  No   Self Injury other (see comment)  (none)   Elopement Statements about wanting to leave   Activity withdrawn   Speech clear;coherent   Psychomotor / Gait balanced;steady   Psycho Education   Type of Intervention 1:1 intervention   Response participates with cues/redirection   Hours 0.5   Treatment Detail check in   Activities of Daily Living   Hygiene/Grooming independent   Oral Hygiene independent   Dress independent   Laundry unable to complete   Room Organization independent

## 2019-07-07 NOTE — PROGRESS NOTES
07/06/19 2057   Behavioral Health   Hallucinations denies / not responding to hallucinations   Thinking delusional   Orientation person: oriented;place: oriented   Memory baseline memory   Insight poor   Judgement impaired   Eye Contact at examiner   Affect full range affect;irritable   Mood mood is calm;irritable   Physical Appearance/Attire appears stated age;attire appropriate to age and situation;neat   Hygiene well groomed   Suicidality other (see comments)  (denies)   1. Wish to be Dead No   2. Non-Specific Active Suicidal Thoughts  No   Self Injury other (see comment)  (denies)   Elopement Statements about wanting to leave   Activity restless   Speech clear;coherent   Medication Sensitivity no stated side effects;no observed side effects   Psychomotor / Gait balanced;steady   Overt Aggression Scale   Verbal Aggression 0   Aggression against Property 0   Auto-Aggression 0   Physical Aggression 0   Overt Aggression Total Score 0   Activities of Daily Living   Hygiene/Grooming independent   Oral Hygiene independent   Dress scrubs (behavioral health);independent   Laundry unable to complete   Room Organization independent     Pt was present in the lounge playing [employer] for most of the shift. Pt denied SI, SIB, HI. States he wants to go home. Pt was mildly irritable but could also be pleasant. No behavioral concerns.

## 2019-07-08 PROCEDURE — 25000132 ZZH RX MED GY IP 250 OP 250 PS 637: Performed by: STUDENT IN AN ORGANIZED HEALTH CARE EDUCATION/TRAINING PROGRAM

## 2019-07-08 PROCEDURE — 99233 SBSQ HOSP IP/OBS HIGH 50: CPT | Mod: GC | Performed by: PSYCHIATRY & NEUROLOGY

## 2019-07-08 PROCEDURE — 12400001 ZZH R&B MH UMMC

## 2019-07-08 PROCEDURE — H2032 ACTIVITY THERAPY, PER 15 MIN: HCPCS

## 2019-07-08 PROCEDURE — 25000132 ZZH RX MED GY IP 250 OP 250 PS 637: Performed by: NURSE PRACTITIONER

## 2019-07-08 RX ADMIN — RISPERIDONE 2 MG: 2 TABLET ORAL at 07:50

## 2019-07-08 RX ADMIN — QUETIAPINE FUMARATE 200 MG: 200 TABLET ORAL at 19:42

## 2019-07-08 RX ADMIN — RISPERIDONE 4 MG: 3 TABLET, ORALLY DISINTEGRATING ORAL at 19:42

## 2019-07-08 ASSESSMENT — ACTIVITIES OF DAILY LIVING (ADL)
LAUNDRY: UNABLE TO COMPLETE
LAUNDRY: UNABLE TO COMPLETE
DRESS: SCRUBS (BEHAVIORAL HEALTH)
ORAL_HYGIENE: INDEPENDENT
ORAL_HYGIENE: INDEPENDENT
HYGIENE/GROOMING: INDEPENDENT
HYGIENE/GROOMING: INDEPENDENT
DRESS: SCRUBS (BEHAVIORAL HEALTH)

## 2019-07-08 NOTE — PROGRESS NOTES
"[facility], [place]   Psychiatric Progress Note  Austyn Leach  [id number]  07/08/2019  Chief Complaint: Continued medical care        Interim History:   The patient's care was discussed with the treatment team during the daily team meeting and/or staff's chart notes were reviewed.  Staff report patient continues to display agitation towards staff. Patient's wife stated he is not this irritable at baseline, however during discussion during staff meeting this AM, other staff members seem to remember his presentation being similar to this when discharged from his last hospitalization.     Met with patient in milieu. He said \"I thought I was going to have a female doctor\". Informed patient that we would be his treatment team today. He was agreeable to speak with us. The first thing he mentioned was \"I wanna go\". Discussed with patient plan to discharge later this week, probably Friday after the QUINTANILLA Consta injection. He discussed at length his plan to tear down his fish pond/bridge which he built ~23 years ago and continued to ramble at this for some time. He again discussed having issues with police due to the pond, stating that neighbors would call the  when their children went over to his house to feed the fish and the  told him it was a \"public nuisance\". He said his mood was \"fine\", he slept \"fine\", and denied SI/HI and feels safe on the unit. At one point during interview he said he no longer needed to switch to a female doctor, \"as long as you're honest with me\", and he shook our hands.     Dr. Obrien spoke with patient's son via phone: son was willing to take patient home on Friday after QUINTANILLA Consta given on Thursday.            Medications:       Risperidone  4 mg Sublingual At Bedtime    Or     OLANZapine  5 mg Intramuscular At Bedtime     risperiDONE  2 mg Oral Daily    Or     OLANZapine  5 mg Intramuscular Daily     QUEtiapine  200 mg Oral At Bedtime     [START ON " 7/11/2019] risperiDONE microspheres  50 mg Intramuscular Q14 Days          Allergies:     Allergies   Allergen Reactions     Bee Venom      Propranolol           Labs:   No results found for this or any previous visit (from the past 24 hour(s)).       Psychiatric Examination:     BP (!) 166/102   Pulse 88   Temp 98.3  F (36.8  C) (Oral)   Resp 16   Wt 90.2 kg (198 lb 14.4 oz)   SpO2 97%   BMI 25.54 kg/m    Weight is 198 lbs 14.4 oz  Body mass index is 25.54 kg/m .                Sitting Orthostatic BP: (Pt declined)      Sitting Orthostatic Pulse: (Pt declined)      Standing Orthostatic BP: 170/101      Standing Orthostatic Pulse: 88 bpm       Weight over time:  Vitals:    06/23/19 0800   Weight: 90.2 kg (198 lb 14.4 oz)       Orthostatic Vitals     None            Cardiometabolic risk assessment. 07/04/19      Reviewed patient profile for cardiometabolic risk factors    Date taken /Value  REFERENCE RANGE   Abdominal Obesity  (Waist Circumference)   See nursing flowsheet Women ?35 in (88 cm)   Men ?40 in (102 cm)      Triglycerides  Triglycerides   Date Value Ref Range Status   07/18/2008 138 <150 mg/dL Final       ?150 mg/dL (1.7 mmol/L) or current treatment for elevated triglycerides   HDL cholesterol  HDL Cholesterol   Date Value Ref Range Status   07/18/2008 45 > OR = 40 mg/dL Final   ]   Women <50 mg/dL (1.3 mmol/L) in women or current treatment for low HDL cholesterol  Men <40 mg/dL (1 mmol/L) in men or current treatment for low HDL cholesterol     Fasting plasma glucose (FPG) Lab Results   Component Value Date     06/07/2017        FPG ?100 mg/dL (5.6 mmol/L) or treatment for elevated blood glucose   Blood pressure  BP Readings from Last 3 Encounters:   03/23/19 143/65   07/03/18 128/78   04/19/18 159/88    Blood pressure ?130/85 mmHg or treatment for elevated blood pressure   Family History  See family history   MSE:  Appearance: Awake, alert, dressed in hospital garb  Attitude: Argumentative at  "first, later more cooperative.   Eye Contact: Good   Mood: \"Fine\"  Affect: Somewhat tense and elevated, although less so than previous interviews. More regulated and appropriate affect overall, but still heightened intensity.   Speech: Hyperverbal, progressing to pressured at times. Clear and coherent otherwise.   Psychomotor Behavior: No evidence of psychomotor agitation or retardation. Intention tremor appears less evident today than prior interviews.   Thought Process: Rambling, tangential, somewhat redirectable.   Associations: No apparent loosening of associations  Thought Content: No overt evidence of AH, VH, HI/SI. Did not discuss delusions today.   Insight: Poor  Judgment: Poor  Oriented to: Not formally assessed  Attention Span and Concentration: Intact to conversation  Recent and Remote Memory: Appears mostly intact, may have some mild impairment in the context of his nicole.   Language: Fluent and conversant in English.    Fund of Knowledge: Mildly impaired secondary to mental status.    Muscle Strength and Tone: Normal  Gait and Station: Mostly normal, but limping somewhat on affected leg at times.          Precautions:     Behavioral Orders   Procedures     Assault precautions     Code 1 - Restrict to Unit     Code 2     For Xray and CT only     Code 2     Code 2 to facilitate transfer to [facility]     Fall precautions     Neuropsych Testing     For dementia evaluation; anyone available is fine     Routine Programming     As clinically indicated     Sexual precautions     Status 15     Every 15 minutes.          DIagnoses:     Schizoaffective disorder bipolar type, currently manic.   Tobacco use disorder  Rule out pornography or sexual addiction outside of the context of manic episode         Assessment & Plan:   Austyn remains elevated, hyperverbal, irritable with staff at times. After discussion with wife and son, he is less irritable at baseline but wife's recommendation to offer a proposed discharge date " did seem to reduce his agitation and improve his therapeutic alliance with his team. Son stated he would be willing to take him home on Friday after the second dose of QUINTANILLA Risperidone Consta given, scheduled for Thursday. This appears to be the best medication plan at this time due to collateral report of consistent and predictable noncompliance with PO medications at home after discharge. The dose of Consta will need to be increased from 25mg to 50mg due to increase in PO Risperidone dose last week.     Plan:  - Risperidone Consta: increase dose from 25mg q2w to 50mg q2w. Due Thursday.   - Continue PO Risperidone 4mg at bedtime + 2mg qAM   - Tentative discharge plan to discharge home on Friday pending clinical stability.     Currently committed with [facility] of risperidone, paliperidone, olanzapine, quetiapine, aripiprazole  Would benefit from ACT team.     The risks, benefits, alternatives and side effects have been discussed and are understood by the patient and other caregivers.    Armani Stallworth MD  PGY-2 Psychiatry Resident    Christian Obrien  [facility] Psychiatry    The following document has been created with voice recognition software and may contain unintentional word substitutions.    Non clinically relevant CMS requirements:  Clinical Global Impressions  First:  Considering your total clinical experience with this particular patient population, how severe are the patient's symptoms at this time?: 7 (06/19/19 1920)  Compared to the patient's condition at the START of treatment, this patient's condition is:: 4 (06/19/19 1920)  Most recent:  Considering your total clinical experience with this particular patient population, how severe are the patient's symptoms at this time?: 7 (06/27/19 1451)  Compared to the patient's condition at the START of treatment, this patient's condition is:: 4 (06/27/19 1451)

## 2019-07-08 NOTE — PROGRESS NOTES
Pt was out in the milieu for the majority of the shift. Quite irritable at times, but pleasant at other times. He attended some groups, and sometimes for only part of group. Pt sat at the table and played cards alone; did not interact with peers often. Pt reported that he wanted to go home, and told his doctor that he would be willing to take his shot (med) early if he could go home tomorrow. No stated thoughts of SI, SIB, or HI.       07/08/19 1318   Behavioral Health   Hallucinations denies / not responding to hallucinations   Thinking distractable;poor concentration   Orientation person: oriented;place: oriented   Memory baseline memory   Insight poor   Judgement impaired   Eye Contact at examiner   Affect tense;full range affect;irritable   Mood irritable   Physical Appearance/Attire attire appropriate to age and situation   Hygiene well groomed  (showered)   Suicidality other (see comments)  (nothing stated)   1. Wish to be Dead No   2. Non-Specific Active Suicidal Thoughts  No   Self Injury other (see comment)  (nothing stated)   Elopement Statements about wanting to leave   Activity withdrawn;other (see comment)  (present)   Speech pressured;tangential;clear   Medication Sensitivity no stated side effects   Psychomotor / Gait slow;balanced;steady   Coping/Psychosocial   Verbalized Emotional State frustration   Plan of Care Reviewed With patient   Psycho Education   Type of Intervention 1:1 intervention   Response participates with cues/redirection   Hours 0.5   Treatment Detail check in   Activities of Daily Living   Hygiene/Grooming independent   Oral Hygiene independent   Dress scrubs (behavioral health)   Laundry unable to complete   Room Organization independent   Groups   Details attended

## 2019-07-08 NOTE — PLAN OF CARE
No change in his presentation. He continues to be irritable and rude to nursing staff and peers. Pt refused to check-in with staff. Pt took scheduled medication without any issue. No aggressive behaviors towards staff or peers. No SI/SIB.

## 2019-07-09 PROCEDURE — 99232 SBSQ HOSP IP/OBS MODERATE 35: CPT | Mod: GC | Performed by: PSYCHIATRY & NEUROLOGY

## 2019-07-09 PROCEDURE — 12400001 ZZH R&B MH UMMC

## 2019-07-09 PROCEDURE — 25000132 ZZH RX MED GY IP 250 OP 250 PS 637: Performed by: STUDENT IN AN ORGANIZED HEALTH CARE EDUCATION/TRAINING PROGRAM

## 2019-07-09 PROCEDURE — 25000132 ZZH RX MED GY IP 250 OP 250 PS 637: Performed by: NURSE PRACTITIONER

## 2019-07-09 RX ADMIN — QUETIAPINE FUMARATE 200 MG: 200 TABLET ORAL at 19:59

## 2019-07-09 RX ADMIN — RISPERIDONE 4 MG: 3 TABLET, ORALLY DISINTEGRATING ORAL at 19:59

## 2019-07-09 RX ADMIN — RISPERIDONE 2 MG: 2 TABLET ORAL at 08:13

## 2019-07-09 ASSESSMENT — ACTIVITIES OF DAILY LIVING (ADL)
ORAL_HYGIENE: INDEPENDENT
ORAL_HYGIENE: INDEPENDENT
DRESS: INDEPENDENT
HYGIENE/GROOMING: HANDWASHING;INDEPENDENT
DRESS: SCRUBS (BEHAVIORAL HEALTH)
LAUNDRY: UNABLE TO COMPLETE
LAUNDRY: WITH SUPERVISION
ORAL_HYGIENE: INDEPENDENT
ORAL_HYGIENE: INDEPENDENT
DRESS: SCRUBS (BEHAVIORAL HEALTH)
LAUNDRY: WITH SUPERVISION
HYGIENE/GROOMING: INDEPENDENT
HYGIENE/GROOMING: INDEPENDENT
DRESS: SCRUBS (BEHAVIORAL HEALTH);INDEPENDENT
HYGIENE/GROOMING: INDEPENDENT

## 2019-07-09 NOTE — PLAN OF CARE
"RN ASSESSMENT   Patient presents visible in the milieu, social with staff and peers. Played card with other and interacted appropriately. Denies auditory and visual hallucinations. Denied suicidal and homicidal thoughts. His mood is notably calm and her reports feeling \"all right.\"  His insight into hospitalization is poor.He states that he is here because of misunderstanding between self and his neighbors. States, \"They are either jealous that I have kids coming over my house to feed the fish, or just worried that their kids will come over to my house. Maybe they just don't want them crossing the street.\" Remains medication compliant.   Refer to case manger notes for discharge planing and recommendations. Continue with current treatment plan and recommendations. Continue to monitor and reassess symptoms. Monitor response to medications. Monitor progress towards treatment goals. Encourage groups and participation.   "

## 2019-07-09 NOTE — PROGRESS NOTES
Pt denied thoughts of SI/SIB. Pt denied having any hallucinations. Pt active in milieu, attended and participated in groups with peers. Pt made multiple statements about unfair treatment from staff towards himself and peers. Pt feels like staff are bullying his peers as well as himself. Pt ate 100% of his meals.     07/09/19 1445   Behavioral Health   Hallucinations denies / not responding to hallucinations   Thinking distractable;paranoid   Orientation person: oriented;place: oriented;time: oriented   Memory baseline memory   Insight poor   Judgement impaired   Affect irritable   Mood elated;irritable   Physical Appearance/Attire untidy;attire appropriate to age and situation   Hygiene neglected grooming - unclean body, hair, teeth   1. Wish to be Dead No   2. Non-Specific Active Suicidal Thoughts  No   Self Injury other (see comment)  (denies)   Activity other (see comment)  (actvie and involved in groups)   Activities of Daily Living   Hygiene/Grooming independent   Oral Hygiene independent   Dress scrubs (behavioral health)   Laundry with supervision   Room Organization independent

## 2019-07-09 NOTE — PLAN OF CARE
Patient calm, cooperative and in the milieu attending groups occasionally social with staff and peers. Patient upon approach calm and cooperative with check in. Patient states overall mood as good reporting that he is looking forward to going back home this Friday 7/12. Patient agreeable and accepting of current medications with no stated or observed side effects. Patient denied SI/SIB, AH/VH, anx/dep, or agitation. Patient appeared stable in gait and reported no pain in his left leg. Patient refused any nursing interventions while in the hospital and stated he does not need the leg brace while hospitalized.

## 2019-07-09 NOTE — PROGRESS NOTES
[facility], [place]   Psychiatric Progress Note  Austyn Leach  [id number]  07/09/2019  Chief Complaint: Continued medical care        Interim History:   The patient's care was discussed with the treatment team during the daily team meeting and/or staff's chart notes were reviewed.  Staff report patient had no further behavioral concerns yesterday. Still denying knee/leg pain.     Met with patient in milieu. Upon our arrival he said he was on board with the plan to get the Consta injection and discharge on Friday. He said his mood was fine, his appetite is fine, he is sleeping OK. He denied SI/HI. He said staff are treating him well and he is getting along well with the other patients. He said the medications are going fine, he denies side effects at this time. We offered to schedule follow up with Ortho clinic upon discharge on Friday to which he declined.          Medications:       Risperidone  4 mg Sublingual At Bedtime    Or     OLANZapine  5 mg Intramuscular At Bedtime     risperiDONE  2 mg Oral Daily    Or     OLANZapine  5 mg Intramuscular Daily     QUEtiapine  200 mg Oral At Bedtime     [START ON 7/11/2019] risperiDONE microspheres  50 mg Intramuscular Q14 Days          Allergies:     Allergies   Allergen Reactions     Bee Venom      Propranolol           Labs:   No results found for this or any previous visit (from the past 24 hour(s)).       Psychiatric Examination:     BP (!) 166/102   Pulse 88   Temp 98.3  F (36.8  C) (Oral)   Resp 16   Wt 90.2 kg (198 lb 14.4 oz)   SpO2 97%   BMI 25.54 kg/m    Weight is 198 lbs 14.4 oz  Body mass index is 25.54 kg/m .                Sitting Orthostatic BP: (Pt declined)      Sitting Orthostatic Pulse: (Pt declined)      Standing Orthostatic BP: 170/101      Standing Orthostatic Pulse: 88 bpm       Weight over time:  Vitals:    06/23/19 0800   Weight: 90.2 kg (198 lb 14.4 oz)       Orthostatic Vitals     None        Cardiometabolic  "risk assessment. 07/04/19    Reviewed patient profile for cardiometabolic risk factors    Date taken /Value  REFERENCE RANGE   Abdominal Obesity  (Waist Circumference)   See nursing flowsheet Women ?35 in (88 cm)   Men ?40 in (102 cm)      Triglycerides  Triglycerides   Date Value Ref Range Status   07/18/2008 138 <150 mg/dL Final       ?150 mg/dL (1.7 mmol/L) or current treatment for elevated triglycerides   HDL cholesterol  HDL Cholesterol   Date Value Ref Range Status   07/18/2008 45 > OR = 40 mg/dL Final   ]   Women <50 mg/dL (1.3 mmol/L) in women or current treatment for low HDL cholesterol  Men <40 mg/dL (1 mmol/L) in men or current treatment for low HDL cholesterol     Fasting plasma glucose (FPG) Lab Results   Component Value Date     06/07/2017        FPG ?100 mg/dL (5.6 mmol/L) or treatment for elevated blood glucose   Blood pressure  BP Readings from Last 3 Encounters:   03/23/19 143/65   07/03/18 128/78   04/19/18 159/88    Blood pressure ?130/85 mmHg or treatment for elevated blood pressure   Family History  See family history   MSE:  Appearance: Awake, alert, dressed in hospital garb  Attitude: Cooperative   Eye Contact: Good   Mood: \"Fine\"  Affect: Calm, controlled, appropriate, mood congruent. This is significantly improved from prior interviews.   Speech: Normal volume and normal rate (significantly improved from prior interviews).   Psychomotor Behavior: No evidence of psychomotor agitation or retardation. Intention tremor again appears much less evident today than on previous days.   Thought Process: Mostly only responded to direct questions, for the most part did not offer his own thoughts spontaneously. Logical, linear, goal oriented when answering questions directly. This is significantly improved from prior interviews.   Associations: No apparent loosening of associations  Thought Content: No overt evidence of AH, VH, HI/SI. Did not discuss delusions today.   Insight: Poor  Judgment: " Fair   Oriented to: Not formally assessed  Attention Span and Concentration: Intact to conversation  Recent and Remote Memory: Appears mostly intact, may have some mild impairment in the context of his nicole.   Language: Fluent and conversant in English.    Fund of Knowledge: Mildly impaired secondary to mental status.    Muscle Strength and Tone: Normal  Gait and Station: Mostly normal, but limping somewhat on affected leg at times.          Precautions:     Behavioral Orders   Procedures     Assault precautions     Code 1 - Restrict to Unit     Code 2     For Xray and CT only     Code 2     Code 2 to facilitate transfer to 3bw     Fall precautions     Neuropsych Testing     For dementia evaluation; anyone available is fine     Routine Programming     As clinically indicated     Sexual precautions     Status 15     Every 15 minutes.          DIagnoses:     Schizoaffective disorder bipolar type, currently manic.   Tobacco use disorder  Rule out pornography or sexual addiction outside of the context of manic episode         Assessment & Plan:   Austyn appears significantly improved today from the standpoint of hyperverbal/pressured speech, irritability, and organization of thoughts. Today he is calm, cooperative, and thoughts are logical and linear. He did appear to calm down yesterday after he was given a proposed discharge date. It is possible his improvement is due to feeling less irritated or worried about his length of stay. It's also possible that he is beginning to respond better to the medications.     Plan:  - Risperidone Consta: increase dose from 25mg q2w to 50mg q2w. Due Thursday.   - Continue PO Risperidone 4mg at bedtime + 2mg qAM -- will continue this PO overlap for 1 week after next Consta injection. Additional consideration will be to plan to Rx PRN's for the patient to take if he misses an appointment for Consta injections.   - Tentative discharge plan to discharge home on Friday pending clinical  stability.     Currently committed with [facility] of risperidone, paliperidone, olanzapine, quetiapine, aripiprazole  Would benefit from ACT team.     The risks, benefits, alternatives and side effects have been discussed and are understood by the patient and other caregivers.    Armani Stallworth MD  PGY-2 Psychiatry Resident    Non clinically relevant CMS requirements:  Clinical Global Impressions  First:  Considering your total clinical experience with this particular patient population, how severe are the patient's symptoms at this time?: 7 (06/19/19 1920)  Compared to the patient's condition at the START of treatment, this patient's condition is:: 4 (06/19/19 1920)  Most recent:  Considering your total clinical experience with this particular patient population, how severe are the patient's symptoms at this time?: 7 (06/27/19 1451)  Compared to the patient's condition at the START of treatment, this patient's condition is:: 4 (06/27/19 1451)

## 2019-07-09 NOTE — PROGRESS NOTES
Pt noncompliant with non-weight bearing status this shift. Denies knee or leg pain, appears to be ambulating without apparent difficulty. Will continue to monitor closely.

## 2019-07-09 NOTE — PROGRESS NOTES
Pt was present in the milieu for most of the evening. He watched television, played cards with peers, and ate dinner in the lounge. He was compliant with all staff directives. There are no behavioral problems to report.

## 2019-07-10 VITALS
SYSTOLIC BLOOD PRESSURE: 166 MMHG | TEMPERATURE: 98.3 F | BODY MASS INDEX: 25.54 KG/M2 | OXYGEN SATURATION: 97 % | WEIGHT: 198.9 LBS | RESPIRATION RATE: 16 BRPM | HEART RATE: 88 BPM | DIASTOLIC BLOOD PRESSURE: 102 MMHG

## 2019-07-10 PROCEDURE — G0177 OPPS/PHP; TRAIN & EDUC SERV: HCPCS

## 2019-07-10 PROCEDURE — 12400001 ZZH R&B MH UMMC

## 2019-07-10 PROCEDURE — 25000132 ZZH RX MED GY IP 250 OP 250 PS 637: Performed by: NURSE PRACTITIONER

## 2019-07-10 PROCEDURE — 25000132 ZZH RX MED GY IP 250 OP 250 PS 637: Performed by: STUDENT IN AN ORGANIZED HEALTH CARE EDUCATION/TRAINING PROGRAM

## 2019-07-10 PROCEDURE — 99232 SBSQ HOSP IP/OBS MODERATE 35: CPT | Mod: GC | Performed by: PSYCHIATRY & NEUROLOGY

## 2019-07-10 RX ADMIN — QUETIAPINE FUMARATE 200 MG: 200 TABLET ORAL at 20:16

## 2019-07-10 RX ADMIN — RISPERIDONE 2 MG: 2 TABLET ORAL at 08:15

## 2019-07-10 RX ADMIN — RISPERIDONE 4 MG: 3 TABLET, ORALLY DISINTEGRATING ORAL at 20:15

## 2019-07-10 ASSESSMENT — ACTIVITIES OF DAILY LIVING (ADL)
DRESS: SCRUBS (BEHAVIORAL HEALTH)
ORAL_HYGIENE: INDEPENDENT;PROMPTS
LAUNDRY: UNABLE TO COMPLETE
HYGIENE/GROOMING: INDEPENDENT

## 2019-07-10 NOTE — PLAN OF CARE
"  Pt actively participated in a structured occupational therapy group (with encouragement from peers to join) with a focus on a visual-spatial leisure task. Pt was able to follow 2-step directions of the novel task, and demonstrated strategic planning and problem solving throughout task. Pt remained focused for full duration of group, and indicated to writer \"we should do more things like this around here\". Pt contributed one idea to a discussion regarding the importance of engaging in leisure activities for mental health and overall wellbeing. He was pleasant/calm with a flat affect during group.     Plan: More information needed to complete OT Initial Assessment; OT staff will meet with pt to review the role of occupational therapy and explain the value of having them involved in their treatment plan including options to meet current needs/self-identified goals. As group attendance is established, Pt will be given self-assessment to inform OT initial assessment.     "

## 2019-07-10 NOTE — PLAN OF CARE
Problem: Adult Behavioral Health Plan of Care  Goal: Team Discussion  Description  Team Plan:  Outcome: Improving  Note:   BEHAVIORAL TEAM DISCUSSION    Participants: Doctor Cheatham; Edith MCMAHON; Ashley CARBAJALSW; Kain Kim RN  Progress: Patient agreeable and accepting of current medications with no stated or observed side effects. Patient denied SI/SIB, AH/VH, anx/dep, or agitation.  Continued Stay Criteria/Rationale: Provisional discharge revoked.  Continues to display some irritability but denies delusional thinking.    Medical/Physical: Recovering from broken leg.  Refusing leg brace at this time.    Precautions:   Behavioral Orders   Procedures    Assault precautions    Code 1 - Restrict to Unit    Code 2     For Xray and CT only    Code 2     Code 2 to facilitate transfer to 3bw    Fall precautions    Neuropsych Testing     For dementia evaluation; anyone available is fine    Routine Programming     As clinically indicated    Sexual precautions    Status 15     Every 15 minutes.     Plan: Tentative discharge home planned for Friday.  Risperdal Consta injection planned for post discharge.   Rationale for change in precautions or plan: No change.

## 2019-07-10 NOTE — PROGRESS NOTES
Pt was present in the milieu. He had a calm shift. He ate dinner in the lounge and watched TV throughout the evening. He denies all mental health symptoms to include psychotic & mood related symptoms, SI & SIB. He presents as blunt & flat.      07/09/19 2236   Behavioral Health   Hallucinations denies / not responding to hallucinations   Thinking distractable   Orientation person: oriented;place: oriented;date: oriented;time: oriented   Memory baseline memory   Insight poor   Judgement impaired   Eye Contact at examiner   Affect blunted, flat   Mood mood is calm   Physical Appearance/Attire attire appropriate to age and situation   Hygiene well groomed   Suicidality other (see comments)  (denies)   1. Wish to be Dead No   2. Non-Specific Active Suicidal Thoughts  No   Self Injury   (denies)   Elopement Distress about legal situation (holds for mental health or criminal)   Activity other (see comment)  (present in miliue )   Speech clear;coherent   Psychomotor / Gait balanced;steady   Activities of Daily Living   Hygiene/Grooming independent   Oral Hygiene independent   Dress independent   Room Organization independent

## 2019-07-10 NOTE — CONSULTS
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 Information was obtained via interview with the patient and review of his medical records. Records indicate that Mr. Leach presented to the emergency department on 6/18/2019. He has a history of schizoaffective disorder, bipolar type and presented with agitation and disorganized behavior. He had apparently been attempting to communicate with the governor about computer viruses that his wife was sending. He had recently apparently been living in his car after his wife told him that she was planning on  him. He appeared to be noncompliant with his medications in the setting of a prior commitment and [employer] order. He required additional sedation with IM Haldol and was placed in seclusion for safety. He was more calm and cooperative on re-examination, and was initially placed on 72 hour hold. He was admitted to station 12 due to psychotic symptoms including paranoia, delusions, and disorganization. He has prior psychiatric hospitalizations, most recently in April 2018, usually after going off of his medications, and becoming psychotic with paranoid and grandiose delusions. During the course of his current hospitalization, he has been intermittently irritable and has delusional thoughts regarding the government and strange ideas about [employer] having fake female employees. His family has indicated that he generally does not take his medications when he goes home, and when they try to force him he spits them out. There have been concerns regarding cognition over the course of his hospitalization, and the question of  dementia has been raised. He was referred for neuropsychological evaluation by Christian Obrien M.D., for further characterization of any cognitive difficulties.    Review of available records in Epic dating back to 1999 (likely not an exhaustive record) suggests that he was seen for seemingly routine medical concerns, although there is a record of a neurology consultation on 11/18/2015 with Bibi Montejo M.D., where his wife was reporting concerns about unusual behaviors and memory problems. He was noted to have a long history of anxiety issues. At some point (the timing is unclear), he left for [place] unannounced, and his wife tracked him though his credit card; when she called the hotel, he was described as being too scared to go up to his room. Family had to go to retrieve him, and he had travelled without his phone or watch. Dr. Montejo described a rather florid psychiatric presentation, consistent with psychotic features of delusions and paranoid ideations; she noted that it was unclear to her whether his episodes of bizarre behavior were anything more than his psychiatric disease or whether they represented an organic confusional issues. MRI of the brain and EEG were suggested. No further neurology records are in [facility], from what I can see. He had an ED visit on 5/24/2016, when he presented to the ED with acute alcohol intoxication. During his ED course, he had repeated episodes of agitation which required ongoing restraints and further sedatives with haloperidol. He was noted not to have concerning features for alcohol abuse and prior to that, had not had any alcohol for over 20 years. He was discharged home from the ED. His next recorded visit to the ED was on 6/7/2017, where he presented with agitation, bizarre behavior, paranoia, and predatory behavior toward children. His family reported that he had delusional disorder; as far as I can see, this was the first mention in his records. The family at  that time noted that he had never hurt anyone but that they were concerned about his progressive worsening, complete lack of insight into his disorder, and reduced boundaries. There is a note in his discharge summary dated 6/23/2017 that he had been chronically somewhat grandiose, and that he had come to the attention of the police for comments years earlier. He was discharged home. He again presented to the ED on 8/22/2017 with manic behavior, and was noted to be refusing to take his medications. He was making inappropriate sexual comments to his stepdaughter and posting things on her Facebook page; he also made inappropriate comments to the paramedics. He was thought to be at baseline regarding his paranoia and was discharged that day. On 9/5/2017, he presented again to the ED with agitation, as he was standing in traffic and yelling at people for speeding. He was noted to have not been taking his medications, and was admitted to stabilization. He next presented to the ED on 4/9/2018 with agitation, after he stopped taking his medications at home, and had  scary behaviors seen by neighbors/public,  and mental abuse to his wife. During that hospitalization, he underwent a neuropsychological evaluation under the direction of my colleague, Dino Ruth, Ph.D. Please refer to the report dated 4/17/2018 for full details regarding his history and the findings of the evaluation. Briefly, results were considered to be abnormal. There was a suggestion of a verbal learning disability, consistent with self-report, and there was also mild executive dysfunction. Memory was described as normal. Dr. Ruth noted that his performance was common in schizophrenia, bipolar disorder, and related disorders, although frontotemporal degenerative diseases could not be ruled out, and consultation with neurology was recommended. On 3/22/2019, he was seen in the ED for manic behavior, and demonstrated paranoia and delusions. He was  transferred to [facility].     CLINICAL INTERVIEW FINDINGS    Mr. Leach was quite guarded and suspicious during his interview. For instance, he accurately stated that he came into the hospital on Tuesday, 18 June, but when asked why he came in, he stated that he does not know why he is here and because somebody lied. He further stated that that information was between him and his doctor. He expects to go home on Friday.    Mr. Leach has not noticed any difficulty with cognition, including memory, word finding, comprehension, attention or concentration, decision-making, or organization. He stated that he lives with his wife, and that they share the management of their finances. He denied any difficulty in this regard. He stated that his son helps him with his medications, and that his wife makes him take too much and does not believe him when he says he has already taken his medications. He stated that he drives without difficulty. He handles his personal cares independently.    Mr. Leach stated that he has been diagnosed with bipolar affective disorder. When asked about the symptoms associated with this diagnosis, he stated  freedom of speech.  He stated that he has been hospitalized four times, the first time about two years ago. He denied any prior history of psychiatric illness, stating that he thinks that society has changed which is why he has been hospitalized a few times in the last two years, but that he has not changed. He described his mood as normal. He stated that he does not feel depressed and that he wants to see his wife, his cat, and cut the grass. By his report, he is no more irritable than normal and has not been crying any more than normal. He sleeps well, eight hours a night, and stated that he naps  if I feel like it.  He could not state how frequently he is napping or dozing off. His appetite has been fine, and when asked about weight changes, he stated  no comment.  He stated that his  energy level, interest level, and motivation are good, and that he likes to work in the yard. He denied suicidal ideation or any history of attempts to commit suicide. He denied visual or auditory hallucinations.    Mr. Leach stated that he stopped drinking alcohol about 30 years ago, and stopped using marijuana around the same time. He stated that before that he had been in chemical dependency treatment programs three times. He stated that he continues to smoke one pack of cigarettes a day.    Mr. Leach indicated that he graduated from [employer], where he studied  how to make packaging.  He worked in packaging for 30 years, stating that he retired one or two years ago because of problems with his knees, and that he has received disability. He has been  for about 42 years and has two children and no grandchildren.    Mr. Leach denied any history of seizure, stroke, or head injury resulting loss of consciousness. His balance has been good (he stated,  just as good as yours, ) and he stated that he has not been falling. He denied unilateral weakness or numbness. He stated that he has had tremors since birth primarily in his fingers. When asked if he had a diagnosis such as essential tremor, he stated  I don t know and I don t care,  and he was unwilling to state whether any of his family members have had tremor. He has had headaches in the past which he attributed to sugar and not drinking enough water. He denied other aches or pains.    PAST MEDICAL HISTORY: Medical records indicate a history of schizoaffective disorder, bipolar type, nicole, intention tremor.    CURRENT MEDICATIONS:  Include acetaminophen, Mylanta, bisacodyl, diphenhydramine, haloperidol, hydroxyzine, ibuprofen, lidocaine, Lorazepam, olanzapine, quetiapine, risperidone, trazodone.    FAMILY MEDICAL HISTORY: Neuropsychologically noncontributory, by his report, but he was guarded when asked questions about family history.    BEHAVIORAL  OBSERVATIONS    During the interview, Mr. Leach was pleasant but resistant. As noted, he appeared quite guarded and suspicious, and at some points refused to answer seemingly innocuous questions. Nonetheless, he agreed to participate in testing without difficulty. During the testing itself, which occurred shortly after the interview, he was careless in his approach. Tremors were observed clinically in both hands. He told the psychiatrist that he is dyslexic. He was alert and oriented to person, place, and time. Mood was euthymic although he appeared tense. He seemed unconcerned about his performance. Speech was fluent, with normal articulation, volume, and rate. Internal performance validity measures fell below expected, and the results are likely to underestimate his true level of functioning.    MEASURES ADMINISTERED    The following measures were administered by a trained psychometrist, under the direct supervision of a licensed psychologist.    Subtests of the Wechsler Abbreviated Scale of Intelligence - 2 (WASI-2); Reading subtest of the Wide Range Achievement Test-4; Repeatable Battery for the Assessment of Neuropsychological Status (RBANS); [place] Naming Test; Clock Drawing; Trail Making Test.     RESULTS AND INTERPRETATION    Overall intellectual functioning was estimated to fall in the borderline range, above premorbid estimates of mildly impaired based on single word reading abilities.    Performance on a global measure of cognitive functioning was mildly impaired (RBANS Total Scale Index Score = 68). Specifically, performance on an index of attention was moderately impaired. Performance on an index of delayed memory was mildly impaired. Performance on indices of immediate memory, visual-spatial/constructional abilities, and language were low average.    Confrontation naming was average for his age. Expressive vocabulary was mildly impaired. Generative naming to category was mildly impaired.    Attention  span was mildly impaired. Divided attention was mildly impaired and was notable for some difficulty shifting cognitive set. Psychomotor processing speed was moderately slowed.    Basic visual perception, including matching lines and angles, was moderately impaired. Construction of a clock was notable for tremor and difficulty with planning. In his first attempt he essentially inverted the numbers, recording them in a counterclockwise fashion instead of clockwise. He tried again and was able to accurately draw the numbers, although on both attempts he ralf the number five upside down. Copy of a clock was messy but fell within normal limits, and he accurately reproduced the number five. Construction of a complex design was average. Nonverbal deductive reasoning was low average.    Immediate recall of verbal narrative material was low average, with mildly impaired recall following a brief delay. On a multiple trial list learning task, immediate recall was mildly impaired, with mildly impaired recall following a brief delay. Recognition memory on this task, however, was moderately impaired. Brief delayed recall of visual material was average.    IMPRESSIONS AND RECOMMENDATIONS    Results are interpreted with caution as internal performance validity measures fell below expected, and there was also significant variability within cognitive domains. He was guarded and suspicious and marginally cooperative throughout. The results are likely to underestimate his true level of functioning.    Results indicate overall intellectual functioning estimated to fall in the borderline range, which is above premorbid estimates of mildly impaired based on single word reading abilities, which fall at a third grade equivalent. He does reportedly have a history of a verbal learning disability, and the more accurate estimate of intellectual functioning may be in the borderline range. Learning and memory were variable, ranging from average  to mildly impaired. Performance on recognition tasks fell below free recall tasks. Attention was mildly impaired. Language abilities ranged from mildly impaired to average. Visual processing ranged from moderately impaired to average. There was at least mild executive dysfunction including difficulty with set shifting, along with impulsive behavior.    The pattern of performance on this evaluation is most notable for significant variability. This may reflect a lack of engagement on Mr. Leach s part, but could also reflect disorganized and variable functioning associated with his underlying psychiatric illness. Given that some of his performance on tasks of memory, language, and visual processing falls within normal limits, and even above estimates of intellectual functioning, it seems less likely that these findings reflect dementia. Nonetheless, the overall profile is not considered to be valid, so dementia cannot be ruled out conclusively, particularly a dementia with features of executive dysfunction and attentional difficulties. It is notable that the pattern of performance would be atypical for Alzheimer s disease, particularly given his strong performance on a naming task. He underwent a neuropsychological evaluation last year, and performance was thought to be a valid reflection of his functioning at that time. Executive dysfunction was noted, thought likely to be associated with underlying psychiatric disease, but frontotemporal degeneration could not be ruled out, and consultation with neurology was suggested. I would concur with this recommendation. It is notable that medical records between 1999 and 2015 that were available for review make no mention of delusions, paranoia, or other significant psychiatric symptoms, but that there is a marked change in 2015; there is a neurology note at that time which suggests a florid psychiatric presentation, and it was noted that he was being followed by psychiatry,  but Dr. Montejo was unable to rule out an organic confusional issue. At the time, his wife was reporting recent changes in memory. Between 2016 and now, he has had several psychiatric hospitalizations. Given the apparent significant and ongoing exacerbation that seems to have been occurring since around 2015, further neurologic examination certainly seems warranted, if it has not already occurred. Neuroimaging may be particularly helpful, given his difficulty cooperating on this evaluation.    Mr. Leach s approach to this evaluation was guarded and not entirely cooperative, and a similar approach to his treatment plan may also be expected. He does have fairly consistent attentional difficulties, which may be associated with underlying psychiatric disease as well as the effects of medications. When working on a task he may do best in environments that are relatively free from distractions, such as noises or other interruptions. He may benefit from being given frequent, brief breaks when working on tasks. Should he become more cooperative in the future, it may be reasonable to repeat this evaluation on an outpatient basis for further characterization of any cognitive difficulties.    Samia White, Ph.D., ABPP  Licensed Psychologist, [place] [id number]  Board Certified in Clinical Neuropsychology      Time spent: 60 minutes neurobehavioral status exam including interview, clinical assessment by licensed and board-certified neuropsychologist (CPT 74344). 60 minutes (1 unit) neuropsychological testing evaluation by licensed and board-certified neuropsychologist, including integration of patient data, interpretation of standardized test results and clinical data, clinical decision-making, treatment planning, report, and interactive feedback to the patient, first hour (CPT 33392). 95 minutes (2 units) of neuropsychological testing evaluation by licensed and board-certified neuropsychologist, including integration of patient data,  interpretation of standardized test results and clinical data, clinical decision-making, treatment planning, report, and interactive feedback to the patient, subsequent hours (CPT 82969). 30 minutes of neuropsychological test administration and scoring by technician, first 30 minutes (CPT 02910). 63 additional minutes (2 units) neuropsychological test administration and scoring by technician, subsequent 30 minutes (CPT 59912). ICD-10 Diagnoses: R41.3

## 2019-07-10 NOTE — PROGRESS NOTES
07/10/19 1400   Behavioral Health   Hallucinations denies / not responding to hallucinations   Thinking intact   Orientation person: oriented;place: oriented;date: oriented   Memory baseline memory   Insight insight appropriate to situation;insight appropriate to events   Judgement impaired   Eye Contact at examiner   Affect incongruent   Mood mood is calm   Physical Appearance/Attire appears stated age;attire appropriate to age and situation   Hygiene well groomed   Suicidality   (denies)   Self Injury   (denies)   Elopement   (denies)     Pt was active during the shift,had all his meals and talked to his  but refused to get his vitals done or attend groups. No SI & SIB Observed.

## 2019-07-10 NOTE — PROGRESS NOTES
"[facility], [place]   Psychiatric Progress Note  Austyn Leach  [id number]  07/10/2019  Chief Complaint: Continued medical care        Interim History:   The patient's care was discussed with the treatment team during the daily team meeting and/or staff's chart notes were reviewed.  Staff report patient has been calm, cooperative, appropriate. No behavioral concerns.     Met with patient in milieu. He said he is \"killing time\" by playing solitaire, doing puzzles, and involves other patients in these activities. He said his mood is fine, he is sleeping well, and appetite is fine. HE denied SI/HI and denied side effects from the medications. He is looking forward to discharge on Friday. At one point during interview he very randomly interjected that his cat killed a rabbit. He said that cats eventually learn which parts of the rabbit they like to eat, and that they consider rabbit brains to be a delicacy.          Medications:       Risperidone  4 mg Sublingual At Bedtime    Or     OLANZapine  5 mg Intramuscular At Bedtime     risperiDONE  2 mg Oral Daily    Or     OLANZapine  5 mg Intramuscular Daily     QUEtiapine  200 mg Oral At Bedtime     [START ON 7/11/2019] risperiDONE microspheres  50 mg Intramuscular Q14 Days          Allergies:     Allergies   Allergen Reactions     Bee Venom      Propranolol           Labs:   No results found for this or any previous visit (from the past 24 hour(s)).       Psychiatric Examination:     BP (!) 166/102   Pulse 88   Temp 98.3  F (36.8  C) (Oral)   Resp 16   Wt 90.2 kg (198 lb 14.4 oz)   SpO2 97%   BMI 25.54 kg/m    Weight is 198 lbs 14.4 oz  Body mass index is 25.54 kg/m .                Sitting Orthostatic BP: (Pt declined)      Sitting Orthostatic Pulse: (Pt declined)      Standing Orthostatic BP: 170/101      Standing Orthostatic Pulse: 88 bpm       Weight over time:  Vitals:    06/23/19 0800   Weight: 90.2 kg (198 lb 14.4 oz) " "      Orthostatic Vitals     None        Cardiometabolic risk assessment. 07/04/19    Reviewed patient profile for cardiometabolic risk factors    Date taken /Value  REFERENCE RANGE   Abdominal Obesity  (Waist Circumference)   See nursing flowsheet Women ?35 in (88 cm)   Men ?40 in (102 cm)      Triglycerides  Triglycerides   Date Value Ref Range Status   07/18/2008 138 <150 mg/dL Final       ?150 mg/dL (1.7 mmol/L) or current treatment for elevated triglycerides   HDL cholesterol  HDL Cholesterol   Date Value Ref Range Status   07/18/2008 45 > OR = 40 mg/dL Final   ]   Women <50 mg/dL (1.3 mmol/L) in women or current treatment for low HDL cholesterol  Men <40 mg/dL (1 mmol/L) in men or current treatment for low HDL cholesterol     Fasting plasma glucose (FPG) Lab Results   Component Value Date     06/07/2017        FPG ?100 mg/dL (5.6 mmol/L) or treatment for elevated blood glucose   Blood pressure  BP Readings from Last 3 Encounters:   03/23/19 143/65   07/03/18 128/78   04/19/18 159/88    Blood pressure ?130/85 mmHg or treatment for elevated blood pressure   Family History  See family history   MSE:  Appearance: Awake, alert, dressed in hospital garb  Attitude: Cooperative   Eye Contact: Good   Mood: \"Fine\"  Affect: Calm, controlled, appropriate, mood congruent. This remains significantly improved from prior interviews.   Speech: Normal volume and normal rate (this remains significantly improved from prior interviews).   Psychomotor Behavior: No evidence of psychomotor agitation or retardation. Intention tremor again appears much less evident today than on previous days.   Thought Process: Mostly only responded to direct questions, for the most part did not offer his own thoughts spontaneously. Logical, linear, goal oriented when answering questions directly. This is significantly improved from prior interviews.   Associations: No apparent loosening of associations  Thought Content: No overt evidence of " AH, VH, HI/SI. Did not discuss delusions today.   Insight: Poor  Judgment: Fair   Orientation: appears grossly intact   Attention Span and Concentration: Intact to conversation  Recent and Remote Memory: Appears mostly intact, may have some mild impairment in the context of his nicole.   Language: Fluent and conversant in English.    Fund of Knowledge: Mildly impaired secondary to mental status.    Muscle Strength and Tone: Normal  Gait and Station: Mostly normal, but limping somewhat on affected leg at times.          Precautions:     Behavioral Orders   Procedures     Assault precautions     Code 1 - Restrict to Unit     Code 2     For Xray and CT only     Code 2     Code 2 to facilitate transfer to 3bw     Fall precautions     Neuropsych Testing     For dementia evaluation; anyone available is fine     Routine Programming     As clinically indicated     Sexual precautions     Status 15     Every 15 minutes.          DIagnoses:     Schizoaffective disorder bipolar type, currently manic.   Tobacco use disorder  Rule out pornography or sexual addiction outside of the context of manic episode         Assessment & Plan:   Austyn remains significantly improved from the standpoint of hyperverbal/pressured speech, irritable affect, and organization of thoughts. This may be attributable to eliminating the uncertainty of his discharge date by telling him he would likely discharge Friday, or may be attributable to medication effects (or combination of both). He will receive the Consta dose tomorrow and will plan to discharge Friday.     Plan:  - Risperidone Consta: increase dose from 25mg q2w to 50mg q2w. Due tomorrow.   - Continue PO Risperidone 4mg at bedtime + 2mg qAM -- will continue this PO overlap for 1 week after next Consta injection. Additional consideration will be to plan to Rx PRN's for the patient to take if he misses an appointment for Consta injections.   - Tentative discharge plan to discharge home on Friday  pending clinical stability.     Currently committed with [facility] of risperidone, paliperidone, olanzapine, quetiapine, aripiprazole  Would benefit from ACT team.     The risks, benefits, alternatives and side effects have been discussed and are understood by the patient and other caregivers.    Armani Stallworth MD  PGY-2 Psychiatry Resident    Non clinically relevant CMS requirements:  Clinical Global Impressions  First:  Considering your total clinical experience with this particular patient population, how severe are the patient's symptoms at this time?: 7 (06/19/19 1920)  Compared to the patient's condition at the START of treatment, this patient's condition is:: 4 (06/19/19 1920)  Most recent:  Considering your total clinical experience with this particular patient population, how severe are the patient's symptoms at this time?: 7 (06/27/19 1451)  Compared to the patient's condition at the START of treatment, this patient's condition is:: 4 (06/27/19 1451)

## 2019-07-10 NOTE — PROGRESS NOTES
7/09/19 [facility]   Art Therapy   Type of Intervention structured groups   Response Did not attend today   Hours 0   Treatment Detail Art Therapy-    Problem-Schizoaffective disorder bipolar type  Tobacco use disorder  Rule out pornography or sexual addiction outside of the context of manic episode     Goal- cope, express, regulate emotions     Outcome- pt did not attend Art Therapy or topic  groups today.

## 2019-07-10 NOTE — PROGRESS NOTES
7/08/19 [facility]   Art Therapy   Type of Intervention structured groups   Response participates with encouragement   Hours 1.5   Treatment Detail Art Therapy-    Problem-Schizoaffective disorder bipolar type  Tobacco use disorder  Rule out pornography or sexual addiction outside of the context of manic episode     Goal- cope, express, regulate emotions     Outcome- pt attended some Art Therapy groups, observing most of the time. He continues to be sarcastic about art and refers to it being childish. He does like to engage in the activity with the picture cards of seeing what is different in the pictures and he enjoys socializing with peers. His comments are less intrusive and inappropriate today.

## 2019-07-11 PROCEDURE — 25000132 ZZH RX MED GY IP 250 OP 250 PS 637: Performed by: STUDENT IN AN ORGANIZED HEALTH CARE EDUCATION/TRAINING PROGRAM

## 2019-07-11 PROCEDURE — 12400001 ZZH R&B MH UMMC

## 2019-07-11 PROCEDURE — 25000128 H RX IP 250 OP 636: Performed by: STUDENT IN AN ORGANIZED HEALTH CARE EDUCATION/TRAINING PROGRAM

## 2019-07-11 PROCEDURE — 25000132 ZZH RX MED GY IP 250 OP 250 PS 637: Performed by: NURSE PRACTITIONER

## 2019-07-11 RX ADMIN — QUETIAPINE FUMARATE 200 MG: 200 TABLET ORAL at 19:12

## 2019-07-11 RX ADMIN — RISPERIDONE 50 MG: KIT at 09:52

## 2019-07-11 RX ADMIN — RISPERIDONE 2 MG: 2 TABLET ORAL at 08:30

## 2019-07-11 RX ADMIN — RISPERIDONE 4 MG: 3 TABLET, ORALLY DISINTEGRATING ORAL at 19:12

## 2019-07-11 ASSESSMENT — ACTIVITIES OF DAILY LIVING (ADL)
ORAL_HYGIENE: INDEPENDENT
HYGIENE/GROOMING: HANDWASHING;INDEPENDENT
DRESS: SCRUBS (BEHAVIORAL HEALTH)
ORAL_HYGIENE: INDEPENDENT
DRESS: SCRUBS (BEHAVIORAL HEALTH);INDEPENDENT
HYGIENE/GROOMING: HANDWASHING;SHOWER;INDEPENDENT

## 2019-07-11 NOTE — PROGRESS NOTES
"BRIEF UPDATE:  Patient has been overall calm/controlled. No behavioral issues. Neuropsych testing yesterday was equivocal; dementia not ruled out at this time. Performance profile is not consistent with classical Alzheimer's disease. There was some concern for inadequate effort on certain parts of the test. On other parts of the test he may have been mildly impaired secondary to mental illness.     Met with patient in milieu. He was calmly playing [employer]. He said his mood was \"OK\", sleep and appetite both good. Denies SI/HI. Feels safe on the unit. MSE revealed calm, cooperative patient, pleasant affect, logical and linear thought processes (but tended to only answer direct questions; did not offer his own thoughts spontaneously), no overt evidence of psychotic phenomena. Denies physical problems. Denies medication side effects. Understands plan to get [facility] Call today then discharge tomorrow. Looking forward to discharge, said that when he gets home he will give his wife a big hug, and \"I miss her\".     Plan:  - QUINTANILLA Gwendolyna due today  - Continue PO Risperidone for one more week.   - Anticipate patient will discharge tomorrow with son pending continued clinical stability.     "

## 2019-07-11 NOTE — PROGRESS NOTES
7/09/19 1300   Art Therapy   Type of Intervention structured groups   Response Did not attend today   Hours 0   Treatment Detail Art Therapy-    Problem-Schizoaffective disorder bipolar type  Tobacco use disorder  Rule out pornography or sexual addiction outside of the context of manic episode     Goal- cope, express, regulate emotions     Outcome- pt arrived to group and sat down to attend but he seemed overwhelmed by the energy of the group and left before the group started.

## 2019-07-11 NOTE — PLAN OF CARE
"Pt is calm and pleasant with FR affect; is a/o-off on date by 2 days. He states his mood is \"excellent.\" He said he is excited to be going home, tomorrow. Pt denies all MH sx. He said he would likely attend all groups, and would try not to nap, today. Pt had his Risperdal consta injection, this am. His goal for the day is \"to be good\"-referring to having appropriate behavior.  "

## 2019-07-11 NOTE — PROGRESS NOTES
Pt was visible in the lounge and appeared quiet and withdrawn, but presented as neutral in affect and interactive upon approach. Pt denied SI/SIB/HI, paranoia, hallucinations, anxiety and depression. Pt stated that he is excited about leaving this Friday. Pt ate dinner and had snacks and no behavioral issue with this evening. Nothing else to add.         07/10/19 9481   Behavioral Health   Hallucinations denies / not responding to hallucinations   Thinking poor concentration   Orientation person: oriented;place: oriented;date: oriented;time: oriented   Memory baseline memory   Insight insight appropriate to situation;insight appropriate to events   Eye Contact at examiner   Affect full range affect   Mood mood is calm   Physical Appearance/Attire disheveled   Hygiene neglected grooming - unclean body, hair, teeth   Suicidality other (see comments)  (denies)   1. Wish to be Dead No   2. Non-Specific Active Suicidal Thoughts  No   Self Injury other (see comment)  (denies)   Activity other (see comment)  (neutral)   Speech clear;coherent   Medication Sensitivity no stated side effects;no observed side effects   Psychomotor / Gait balanced;steady   Activities of Daily Living   Hygiene/Grooming independent   Oral Hygiene independent;prompts   Dress scrubs (behavioral health)   Laundry unable to complete   Room Organization independent   Activity   Activity Assistance Provided independent

## 2019-07-11 NOTE — DISCHARGE INSTRUCTIONS
Behavioral Discharge Planning and Instructions      Summary:  You were admitted to [facility] due to agitation, psychotic thinking (delusions), and disruptive behavior in the community.    From [facility], you were transferred to [facility] , then to [facility], and then back to [facility].    Most of your time in the hospital was under the care of Dr Obrien.   Your Provisional Discharge was revoked.   You were not cooperative with various things, such as allow vital signs to be taken, doing a scan of your knee, wearing the knee brace, signing the Medicare Rights form, etc.   However, your symptoms improved with treatment.    You are being Provisionally Discharged home today.      Main Diagnoses:   Schizoaffective disorder bipolar type, currently manic.   Rule out pornography or sexual addiction outside of the context of manic episode    Mental Health Follow-Up:   [facility]  Dr. Clayton - Delfina appointment:  See below  Phone: [phone]     Fax: [phone] [address]  [place] [address]    Next Consta injection:  Wednesday July 25th at 12:30pm  With Dr Clayton.  [facility]  [address] [place] [address] [address]  [facility]  [phone]   Fax: [phone]      ________________________    [facility] :  Lenora Pepe at [phone]    _______________________    A [facility] pharmacist will call you Tuesday July 16th at 2pm, to see if you have any medication questions.        Attend all scheduled appointments with your outpatient providers. Call at least 24 hours in advance if you need to reschedule an appointment to ensure continued access to your outpatient providers.   Major Treatments, Procedures and Findings:  You were provided with: a psychiatric assessment, assessed for medical stability, medication evaluation and/or management and group therapy    Symptoms to Report: feeling more aggressive, increased confusion, losing more  sleep, mood getting worse or thoughts of suicide    Early warning signs can include: increased depression or anxiety sleep disturbances increased thoughts or behaviors of suicide or self-harm  increased unusual thinking, such as paranoia or hearing voices    Safety and Wellness:  Take all medicines as directed.  Make no changes unless your doctor suggests them.      Follow treatment recommendations.  Refrain from alcohol and non-prescribed drugs.  If there is a concern for safety, call 911.    Resources:   Crisis Intervention: 440.549.4101 or 723-815-5071 (TTY: 866.579.8048).  Call anytime for help.  [employer] [place] [employer] Mental Illness (www.[place].nicole.org): 341.580.9283 or 930-247-8396.  Mental Health Consumer/Survivor Network of [place] (www.mhcsn.net): 180.415.3272 or 090-578-9260  Mental [employer] Association [place] (www.mentalhealth.org): 797.728.5516 or 182-342-0765  [facility] Crisis Response [phone]    The treatment team has appreciated the opportunity to work with you. If you have any questions or concerns our unit number is [phone].

## 2019-07-12 PROCEDURE — 99239 HOSP IP/OBS DSCHRG MGMT >30: CPT | Performed by: PSYCHIATRY & NEUROLOGY

## 2019-07-12 PROCEDURE — 25000132 ZZH RX MED GY IP 250 OP 250 PS 637: Performed by: STUDENT IN AN ORGANIZED HEALTH CARE EDUCATION/TRAINING PROGRAM

## 2019-07-12 RX ORDER — RISPERIDONE 2 MG/1
2 TABLET ORAL DAILY
Qty: 30 TABLET | Refills: 0 | Status: ON HOLD | OUTPATIENT
Start: 2019-07-12 | End: 2020-04-10

## 2019-07-12 RX ORDER — RISPERIDONE 4 MG/1
4 TABLET ORAL AT BEDTIME
Qty: 30 TABLET | Refills: 0 | Status: ON HOLD | OUTPATIENT
Start: 2019-07-12 | End: 2020-04-30

## 2019-07-12 RX ORDER — QUETIAPINE FUMARATE 200 MG/1
200 TABLET, FILM COATED ORAL AT BEDTIME
Status: ON HOLD | COMMUNITY
Start: 2019-07-12 | End: 2020-04-30

## 2019-07-12 RX ADMIN — RISPERIDONE 2 MG: 2 TABLET ORAL at 07:51

## 2019-07-12 ASSESSMENT — ACTIVITIES OF DAILY LIVING (ADL)
DRESS: SCRUBS (BEHAVIORAL HEALTH)
HYGIENE/GROOMING: HANDWASHING
LAUNDRY: UNABLE TO COMPLETE
ORAL_HYGIENE: INDEPENDENT

## 2019-07-12 NOTE — PROGRESS NOTES
07/11/19 2200   Behavioral Health   Hallucinations denies / not responding to hallucinations   Thinking poor concentration   Orientation person: oriented;place: oriented;date: oriented;time: oriented   Memory baseline memory   Insight admits / accepts   Judgement impaired   Eye Contact at examiner   Affect irritable   Mood mood is calm;irritable   Physical Appearance/Attire disheveled   Hygiene neglected grooming - unclean body, hair, teeth   Suicidality other (see comments)  (denies)   1. Wish to be Dead No   2. Non-Specific Active Suicidal Thoughts  No   Activities of Daily Living   Hygiene/Grooming handwashing;independent   Oral Hygiene independent   Dress scrubs (behavioral health)   Room Organization independent     Patient had a good shift. Spent most of the evening doing a puzzle in the lounge and watching television. He was slightly irritable at times but mostly calm. He denies SI/SIB/HI and hallucinations. He stated that his goal for the day was to leave tomorrow. He is excited to discharge, was compliant with staff directions, attended group, and was visible in the milieu.
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
## 2019-07-12 NOTE — PROGRESS NOTES
Pt had an okay shift. No behavioral issue this shift. He remains medication compliant and denies SE s. Pt is looking forwards for discharge scheduled for tomorrow.

## 2019-07-12 NOTE — PROGRESS NOTES
Pt is discharging this evening. Pt s son Minh will be picking him up around 6 pm. Discharge medication in med the room.

## 2019-07-12 NOTE — PROGRESS NOTES
Patient discharging 7/12/2019 accompanied by son and destination is home.    Discharge paperwork and medications reviewed with patient who reports understanding.     Copies provided: AVS copy provided     Discharge medications gone through and given to patient,  Security belongings gone through and given to patient, belongings in locker gone through and given to patient. Denies SI/SIB, denies hallucinations and denies homicidal ideations.      Patient completed Personal Plan of Care, identifying reasons for hospitalization and goals for discharge.     Survey provided.

## 2019-07-12 NOTE — PROGRESS NOTES
Pt is being Provisionally Discharged today to home.   PD Agreement was reviewed with patient.   I faxed a copy of the PD Agreement to [facility] [address] and [facility] Lenora AMBROCIO.    Pt received a copy.   A copy was also placed in the chart for Epic scanning.

## 2019-12-31 NOTE — PROGRESS NOTES
met with pt.   We reviewed PD Agreement.   He signed and received a copy.   I also sent a copy to [facility] Poonam AMBROCIO and a copy to [facility] [address].    A copy was placed in the chart for scanning.    He was PD'ed today to his home in [place].   See DC Instructions for details regarding aftercare plans.       Infectious Disease

## 2020-03-13 NOTE — PROGRESS NOTES
"Patient was calm and quite humorous this morning, making irritated statements that were a bit tongue-in-cheek.  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    Later Patient called his wife and with a raised voice said,\"You're the crazy one, not me!\" He made other blaming comments.  In my opinion, virtually everything he discusses is some one else's fault, not his. His hospitalization, the paperwork explaining his commitment process.    " no

## 2020-04-10 ENCOUNTER — HOSPITAL ENCOUNTER (INPATIENT)
Facility: CLINIC | Age: 65
LOS: 2 days | Discharge: HOME | DRG: 698 | End: 2020-04-12
Attending: EMERGENCY MEDICINE | Admitting: STUDENT IN AN ORGANIZED HEALTH CARE EDUCATION/TRAINING PROGRAM
Payer: MEDICARE

## 2020-04-10 ENCOUNTER — APPOINTMENT (OUTPATIENT)
Dept: CT IMAGING | Facility: CLINIC | Age: 65
DRG: 698 | End: 2020-04-10
Attending: EMERGENCY MEDICINE
Payer: MEDICARE

## 2020-04-10 DIAGNOSIS — N32.0 BLADDER OUTLET OBSTRUCTION: ICD-10-CM

## 2020-04-10 DIAGNOSIS — J18.9 PNEUMONIA OF BOTH LUNGS DUE TO INFECTIOUS ORGANISM, UNSPECIFIED PART OF LUNG: ICD-10-CM

## 2020-04-10 DIAGNOSIS — K76.9 LIVER LESION: ICD-10-CM

## 2020-04-10 DIAGNOSIS — T50.902A OVERDOSE, INTENTIONAL SELF-HARM, INITIAL ENCOUNTER (H): ICD-10-CM

## 2020-04-10 DIAGNOSIS — R33.9 URINARY RETENTION: ICD-10-CM

## 2020-04-10 DIAGNOSIS — J18.9 COMMUNITY ACQUIRED PNEUMONIA OF RIGHT LOWER LOBE OF LUNG: ICD-10-CM

## 2020-04-10 DIAGNOSIS — K21.9 GASTROESOPHAGEAL REFLUX DISEASE, ESOPHAGITIS PRESENCE NOT SPECIFIED: Primary | ICD-10-CM

## 2020-04-10 DIAGNOSIS — N28.9 RENAL LESION: ICD-10-CM

## 2020-04-10 DIAGNOSIS — N17.9 ACUTE KIDNEY INJURY (H): ICD-10-CM

## 2020-04-10 PROBLEM — R10.9 ABDOMINAL PAIN: Status: ACTIVE | Noted: 2020-04-10

## 2020-04-10 PROBLEM — R10.84 ABDOMINAL PAIN, GENERALIZED: Status: ACTIVE | Noted: 2020-04-10

## 2020-04-10 PROBLEM — T14.91XA SUICIDE ATTEMPT (H): Status: ACTIVE | Noted: 2020-04-10

## 2020-04-10 PROBLEM — F30.9 MANIA (H): Status: ACTIVE | Noted: 2017-09-05

## 2020-04-10 PROBLEM — F25.0 SCHIZOAFFECTIVE DISORDER, BIPOLAR TYPE (H): Status: ACTIVE | Noted: 2018-04-10

## 2020-04-10 LAB
ALBUMIN SERPL-MCNC: 3 G/DL (ref 3.4–5)
ALBUMIN UR-MCNC: 10 MG/DL
ALP SERPL-CCNC: 64 U/L (ref 40–150)
ALT SERPL W P-5'-P-CCNC: 41 U/L (ref 0–70)
ANION GAP SERPL CALCULATED.3IONS-SCNC: 6 MMOL/L (ref 3–14)
APPEARANCE UR: CLEAR
AST SERPL W P-5'-P-CCNC: 18 U/L (ref 0–45)
BACTERIA #/AREA URNS HPF: ABNORMAL /HPF
BASOPHILS # BLD AUTO: 0 10E9/L (ref 0–0.2)
BASOPHILS NFR BLD AUTO: 0.2 %
BILIRUB SERPL-MCNC: 0.3 MG/DL (ref 0.2–1.3)
BILIRUB UR QL STRIP: NEGATIVE
BUN SERPL-MCNC: 55 MG/DL (ref 7–30)
CALCIUM SERPL-MCNC: 8.7 MG/DL (ref 8.5–10.1)
CHLORIDE SERPL-SCNC: 108 MMOL/L (ref 94–109)
CO2 SERPL-SCNC: 23 MMOL/L (ref 20–32)
COLOR UR AUTO: ABNORMAL
CREAT BLD-MCNC: 2.8 MG/DL (ref 0.66–1.25)
CREAT SERPL-MCNC: 2.62 MG/DL (ref 0.66–1.25)
DIFFERENTIAL METHOD BLD: NORMAL
EOSINOPHIL # BLD AUTO: 0.2 10E9/L (ref 0–0.7)
EOSINOPHIL NFR BLD AUTO: 1.6 %
ERYTHROCYTE [DISTWIDTH] IN BLOOD BY AUTOMATED COUNT: 13.9 % (ref 10–15)
GFR SERPL CREATININE-BSD FRML MDRD: 23 ML/MIN/{1.73_M2}
GFR SERPL CREATININE-BSD FRML MDRD: 25 ML/MIN/{1.73_M2}
GLUCOSE SERPL-MCNC: 145 MG/DL (ref 70–99)
GLUCOSE UR STRIP-MCNC: NEGATIVE MG/DL
HCT VFR BLD AUTO: 42.4 % (ref 40–53)
HGB BLD-MCNC: 13.8 G/DL (ref 13.3–17.7)
HGB UR QL STRIP: ABNORMAL
HYALINE CASTS #/AREA URNS LPF: 1 /LPF (ref 0–2)
IMM GRANULOCYTES # BLD: 0 10E9/L (ref 0–0.4)
IMM GRANULOCYTES NFR BLD: 0.4 %
KETONES UR STRIP-MCNC: NEGATIVE MG/DL
LEUKOCYTE ESTERASE UR QL STRIP: ABNORMAL
LIPASE SERPL-CCNC: 609 U/L (ref 73–393)
LYMPHOCYTES # BLD AUTO: 0.8 10E9/L (ref 0.8–5.3)
LYMPHOCYTES NFR BLD AUTO: 8 %
MCH RBC QN AUTO: 30.3 PG (ref 26.5–33)
MCHC RBC AUTO-ENTMCNC: 32.5 G/DL (ref 31.5–36.5)
MCV RBC AUTO: 93 FL (ref 78–100)
MONOCYTES # BLD AUTO: 1.2 10E9/L (ref 0–1.3)
MONOCYTES NFR BLD AUTO: 12.3 %
MUCOUS THREADS #/AREA URNS LPF: PRESENT /LPF
NEUTROPHILS # BLD AUTO: 7.6 10E9/L (ref 1.6–8.3)
NEUTROPHILS NFR BLD AUTO: 77.5 %
NITRATE UR QL: NEGATIVE
NRBC # BLD AUTO: 0 10*3/UL
NRBC BLD AUTO-RTO: 0 /100
PH UR STRIP: 5 PH (ref 5–7)
PLATELET # BLD AUTO: 214 10E9/L (ref 150–450)
POTASSIUM SERPL-SCNC: 3.6 MMOL/L (ref 3.4–5.3)
PROT SERPL-MCNC: 8.1 G/DL (ref 6.8–8.8)
RBC # BLD AUTO: 4.56 10E12/L (ref 4.4–5.9)
RBC #/AREA URNS AUTO: 4 /HPF (ref 0–2)
SODIUM SERPL-SCNC: 137 MMOL/L (ref 133–144)
SOURCE: ABNORMAL
SP GR UR STRIP: 1.01 (ref 1–1.03)
TRANS CELLS #/AREA URNS HPF: <1 /HPF (ref 0–1)
UROBILINOGEN UR STRIP-MCNC: NORMAL MG/DL (ref 0–2)
WBC # BLD AUTO: 9.8 10E9/L (ref 4–11)
WBC #/AREA URNS AUTO: 5 /HPF (ref 0–5)

## 2020-04-10 PROCEDURE — 80053 COMPREHEN METABOLIC PANEL: CPT | Performed by: EMERGENCY MEDICINE

## 2020-04-10 PROCEDURE — 25000132 ZZH RX MED GY IP 250 OP 250 PS 637: Mod: GY | Performed by: STUDENT IN AN ORGANIZED HEALTH CARE EDUCATION/TRAINING PROGRAM

## 2020-04-10 PROCEDURE — 93005 ELECTROCARDIOGRAM TRACING: CPT

## 2020-04-10 PROCEDURE — 25800030 ZZH RX IP 258 OP 636: Performed by: STUDENT IN AN ORGANIZED HEALTH CARE EDUCATION/TRAINING PROGRAM

## 2020-04-10 PROCEDURE — 25000128 H RX IP 250 OP 636: Performed by: EMERGENCY MEDICINE

## 2020-04-10 PROCEDURE — 96375 TX/PRO/DX INJ NEW DRUG ADDON: CPT

## 2020-04-10 PROCEDURE — 99223 1ST HOSP IP/OBS HIGH 75: CPT | Mod: AI | Performed by: STUDENT IN AN ORGANIZED HEALTH CARE EDUCATION/TRAINING PROGRAM

## 2020-04-10 PROCEDURE — 74176 CT ABD & PELVIS W/O CONTRAST: CPT

## 2020-04-10 PROCEDURE — 99291 CRITICAL CARE FIRST HOUR: CPT | Mod: 25

## 2020-04-10 PROCEDURE — 96361 HYDRATE IV INFUSION ADD-ON: CPT

## 2020-04-10 PROCEDURE — 12000000 ZZH R&B MED SURG/OB

## 2020-04-10 PROCEDURE — 81001 URINALYSIS AUTO W/SCOPE: CPT | Performed by: EMERGENCY MEDICINE

## 2020-04-10 PROCEDURE — 83690 ASSAY OF LIPASE: CPT | Performed by: EMERGENCY MEDICINE

## 2020-04-10 PROCEDURE — 25800030 ZZH RX IP 258 OP 636: Performed by: EMERGENCY MEDICINE

## 2020-04-10 PROCEDURE — 82565 ASSAY OF CREATININE: CPT

## 2020-04-10 PROCEDURE — 85025 COMPLETE CBC W/AUTO DIFF WBC: CPT | Performed by: EMERGENCY MEDICINE

## 2020-04-10 PROCEDURE — 96365 THER/PROPH/DIAG IV INF INIT: CPT

## 2020-04-10 PROCEDURE — 87635 SARS-COV-2 COVID-19 AMP PRB: CPT | Performed by: EMERGENCY MEDICINE

## 2020-04-10 PROCEDURE — 96368 THER/DIAG CONCURRENT INF: CPT

## 2020-04-10 RX ORDER — ACETAMINOPHEN 325 MG/1
650 TABLET ORAL EVERY 4 HOURS PRN
Status: DISCONTINUED | OUTPATIENT
Start: 2020-04-10 | End: 2020-04-12 | Stop reason: HOSPADM

## 2020-04-10 RX ORDER — CEFTRIAXONE 2 G/1
2 INJECTION, POWDER, FOR SOLUTION INTRAMUSCULAR; INTRAVENOUS ONCE
Status: COMPLETED | OUTPATIENT
Start: 2020-04-10 | End: 2020-04-10

## 2020-04-10 RX ORDER — NALOXONE HYDROCHLORIDE 0.4 MG/ML
.1-.4 INJECTION, SOLUTION INTRAMUSCULAR; INTRAVENOUS; SUBCUTANEOUS
Status: DISCONTINUED | OUTPATIENT
Start: 2020-04-10 | End: 2020-04-12 | Stop reason: HOSPADM

## 2020-04-10 RX ORDER — ONDANSETRON 4 MG/1
4 TABLET, ORALLY DISINTEGRATING ORAL EVERY 6 HOURS PRN
Status: DISCONTINUED | OUTPATIENT
Start: 2020-04-10 | End: 2020-04-12 | Stop reason: HOSPADM

## 2020-04-10 RX ORDER — ONDANSETRON 2 MG/ML
4 INJECTION INTRAMUSCULAR; INTRAVENOUS EVERY 6 HOURS PRN
Status: DISCONTINUED | OUTPATIENT
Start: 2020-04-10 | End: 2020-04-12 | Stop reason: HOSPADM

## 2020-04-10 RX ORDER — SODIUM CHLORIDE 9 MG/ML
INJECTION, SOLUTION INTRAVENOUS CONTINUOUS
Status: DISCONTINUED | OUTPATIENT
Start: 2020-04-10 | End: 2020-04-11

## 2020-04-10 RX ORDER — PROCHLORPERAZINE MALEATE 5 MG
5 TABLET ORAL EVERY 6 HOURS PRN
Status: DISCONTINUED | OUTPATIENT
Start: 2020-04-10 | End: 2020-04-12 | Stop reason: HOSPADM

## 2020-04-10 RX ORDER — PROCHLORPERAZINE 25 MG
12.5 SUPPOSITORY, RECTAL RECTAL EVERY 12 HOURS PRN
Status: DISCONTINUED | OUTPATIENT
Start: 2020-04-10 | End: 2020-04-12 | Stop reason: HOSPADM

## 2020-04-10 RX ORDER — CALCIUM CARBONATE 500 MG/1
1000 TABLET, CHEWABLE ORAL EVERY 4 HOURS PRN
Status: DISCONTINUED | OUTPATIENT
Start: 2020-04-10 | End: 2020-04-12 | Stop reason: HOSPADM

## 2020-04-10 RX ORDER — LIDOCAINE 40 MG/G
CREAM TOPICAL
Status: DISCONTINUED | OUTPATIENT
Start: 2020-04-10 | End: 2020-04-12 | Stop reason: HOSPADM

## 2020-04-10 RX ORDER — LIDOCAINE HYDROCHLORIDE 20 MG/ML
6 JELLY TOPICAL ONCE
Status: DISCONTINUED | OUTPATIENT
Start: 2020-04-10 | End: 2020-04-12 | Stop reason: HOSPADM

## 2020-04-10 RX ORDER — RISPERIDONE 50 MG/2 ML
50 KIT INTRAMUSCULAR
Status: ON HOLD | COMMUNITY
End: 2020-04-10

## 2020-04-10 RX ORDER — RISPERIDONE 50 MG/2 ML
50 KIT INTRAMUSCULAR
Status: ON HOLD | COMMUNITY
End: 2020-04-27

## 2020-04-10 RX ORDER — CEFTRIAXONE 1 G/1
1 INJECTION, POWDER, FOR SOLUTION INTRAMUSCULAR; INTRAVENOUS EVERY 24 HOURS
Status: DISCONTINUED | OUTPATIENT
Start: 2020-04-11 | End: 2020-04-12 | Stop reason: HOSPADM

## 2020-04-10 RX ORDER — ONDANSETRON 2 MG/ML
8 INJECTION INTRAMUSCULAR; INTRAVENOUS ONCE
Status: COMPLETED | OUTPATIENT
Start: 2020-04-10 | End: 2020-04-10

## 2020-04-10 RX ADMIN — ACETAMINOPHEN 650 MG: 325 TABLET, FILM COATED ORAL at 21:49

## 2020-04-10 RX ADMIN — SODIUM CHLORIDE: 9 INJECTION, SOLUTION INTRAVENOUS at 23:34

## 2020-04-10 RX ADMIN — AZITHROMYCIN MONOHYDRATE 500 MG: 500 INJECTION, POWDER, LYOPHILIZED, FOR SOLUTION INTRAVENOUS at 20:37

## 2020-04-10 RX ADMIN — CEFTRIAXONE 2 G: 2 INJECTION, POWDER, FOR SOLUTION INTRAMUSCULAR; INTRAVENOUS at 20:30

## 2020-04-10 RX ADMIN — SODIUM CHLORIDE 500 ML: 9 INJECTION, SOLUTION INTRAVENOUS at 17:23

## 2020-04-10 RX ADMIN — CALCIUM CARBONATE (ANTACID) CHEW TAB 500 MG 1000 MG: 500 CHEW TAB at 23:48

## 2020-04-10 RX ADMIN — ONDANSETRON 8 MG: 2 INJECTION INTRAMUSCULAR; INTRAVENOUS at 17:23

## 2020-04-10 ASSESSMENT — ENCOUNTER SYMPTOMS
FEVER: 0
NAUSEA: 1
ABDOMINAL PAIN: 1
SHORTNESS OF BREATH: 0
VOMITING: 0

## 2020-04-10 NOTE — LETTER
Transition Communication Hand-off for Care Transitions to Next Level of Care Provider    Name: Austyn Leach  : 1955  MRN #: [id number]    Key Recommendations:  Patient quit smoking on day of admission. He has had pneumonia prior to this occurrence. CM discussed ongoing smoking cessation to allow his lungs to heal. He has a history of mental health issues and is now refusing to take the medications his psychiatrist prescribed. CM asked if he would take an oral antibiotic to complete his treatment for pneumonia. He answered he would take an antibiotic for PNA. He does not use home oxygen. He has no issues obtaining his medications from the pharmacy.     Kati Arceo RN, BSN, CPHN, CM    AVS/Discharge Summary is the source of truth; this is a helpful guide for improved communication of patient story

## 2020-04-10 NOTE — LETTER
Transition Communication Hand-off for Care Transitions to Next Level of Care Provider    Name: Austyn Leach  : 1955  MRN #: [id number]  Primary Care Provider: Grant Smith  Primary Care MD Name: Dr. Kristin Zhong  Primary Clinic: [address]  [facility] [address]  Primary Care Clinic Name: [facility]  Reason for Hospitalization:  Urinary retention [R33.9]  Liver lesion [K76.9]  Acute kidney injury (H) [N17.9]  Renal lesion [N28.9]  Overdose, intentional self-harm, initial encounter (H) [T50.902A]  Community acquired pneumonia of right lower lobe of lung (H) [J18.1]  Admit Date/Time: 4/10/2020  4:58 PM  Discharge Date: 20  Payor Source: Payor: MEDICARE / Plan: MEDICARE / Product Type: Medicare /     Readmission Assessment Measure (CLARK) Risk Score/category: Low         Reason for Communication Hand-off Referral: Fragility    Discharge Plan     Concern for non-adherence with plan of care:   Yes  Discharge Needs Assessment:  Needs      Most Recent Value   # of Referrals Placed by CTS  External Care Coordination, Communication hand-offs to next level of Care Providers   Other Resources  Other (see comment) [PNA Action Plan discussed with patient at bedside. ]          Already enrolled in Tele-monitoring program and name of program:  NA  Follow-up specialty is recommended: Yes    Follow-up plan:  No future appointments.    Any outstanding tests or procedures:        Referrals     Future Labs/Procedures    Urology Adult Referral     Comments:    Bladder outlet obstruction.  Patient discharged with a wu catheter (new) and needs outpatient urology follow up urodynamic studies/wu catheter management/voiding trial        Key Recommendations:  Patient quit smoking on day of admission. He has had pneumonia prior to this occurrence. CM discussed ongoing smoking cessation to allow his lungs to heal. He has a history of mental health issues and is now refusing to take the  medications his psychiatrist prescribed. CM asked if he would take an oral antibiotic to complete his treatment for pneumonia. He answered he would take an antibiotic for PNA. He does not use home oxygen. He has no issues obtaining his medications from the pharmacy.     Pt reportedly has a [employer] contract and a [facility] Lenora Pepe [phone], SW called and LM w/ CM informing that pt was admitted and discharged back to home with family. He could benefit from clinic CC follow-up.     Kati Arceo RN, BSN, CPHN, CM/ ALVINO Laguerre RN CM     AVS/Discharge Summary is the source of truth; this is a helpful guide for improved communication of patient story

## 2020-04-10 NOTE — ED PROVIDER NOTES
History     Chief Complaint:  Nausea and Abdominal Pain     HPI   Austyn Leach is a 65 year old male who presents to the emergency department for evaluation of abdominal pain and nausea.  Patient reports that on April 3 he had a suicide attempt in which he took 45 tablets of his Seroquel.  He took this as a suicide attempt.  He believes the inciting event was longstanding depression and the global coronavirus pandemic.  Since taking the medication, he has nausea and diffuse abdominal discomfort.  This resulted in subjective bloating and abdominal swelling.  His pain is constant with no obvious alleviating or aggravating factors.  He has been nauseated without vomiting.  He has not had a bowel movement in greater than 1 week but is passing some gas.  He denies any fevers or new urinary symptoms.  He states that he is not actively suicidal and has no plan..     Allergies:  Bee Venom  Propranolol     Medications:    Seroquel  Risperdal    Past Medical History:    Asthma  Delusions  Rosa  Schizoaffective disorder, bipolar type  Erectile dysfunction     Past Surgical History:    Knee surgery, right    Family History:    CAD  HTN    Social History:  Presents self.  Current every day smoker, 1 ppd, 58 pack years.  Negative for alcohol use.  Marital Status:   [2]     Review of Systems   Constitutional: Negative for fever.   Respiratory: Negative for shortness of breath.    Cardiovascular: Negative for chest pain.   Gastrointestinal: Positive for abdominal pain and nausea. Negative for vomiting.   Skin: Negative for rash.   All other systems reviewed and are negative.      Physical Exam     Patient Vitals for the past 24 hrs:   BP Temp Temp src Pulse Heart Rate Resp SpO2   04/10/20 1930 (!) 162/98 -- -- -- -- -- 93 %   04/10/20 1903 -- -- -- -- 98 -- 96 %   04/10/20 1706 (!) 155/93 98.3  F (36.8  C) Oral 107 107 16 98 %     Physical Exam    General:   Pleasant, age appropriate.      Resting comfortably in the  bed.  HEENT:    Oropharynx is moist.  Eyes:    Conjunctiva florencio  Neck:    Supple, no meningismus.     CV:     Regular rate and rhythm.      No murmurs, rubs or gallops.       2+ radial pulses bilateral.   PULM:    Clear to auscultation bilateral.       No respiratory distress.      Good air exchange.  ABD:    Soft, mildly distended     Hypoactive bowel sounds     Mild-moderate diffuse abdominal tenderness     No rebound, guarding or rigidity.  MSK:     No gross deformity to all four extremities.   LYMPH:   No cervical lymphadenopathy.  NEURO:   Alert and oriented x 3.      Speech is clear with no aphasia.     Normal muscular tone, no tremor.  Skin:    Warm, dry and intact.    Psych:    Mood is depressed, affect is appropriate and congruent.     No active suicidal ideation.     No delusions, hallucinations.     Memory intact.      Emergency Department Course   ECG:  Time: 1952  Vent. Rate 89 bpm. HI interval 166. QRS duration 106. QT/QTc 366/445. P-R-T axis 68 52 61.  Normal sinus rhythm.  Normal ECG.  Read time: 2000    Imaging:  Radiology findings were communicated with the patient who voiced understanding of the findings.    CT Abdomen/Pelvis without contrast:  1.  Right lower lobe atelectasis, consolidation, and airway thickening likely infectious or inflammatory. Underlying emphysema. Trace pleural effusion.  2.  Innumerable indeterminate liver masses. Prior would be most helpful, if none recommend dedicated multiphasic enhanced study when able. Ultrasound might be of value if an enhanced study could not be performed. Metastases would be in the differential.  3.  Indeterminate mid right renal lesion. This could be evaluated with dedicated enhanced study as well and if not possible, ultrasound. Suggestion of right renal edema with differential including pyelonephritis though nonspecific.  4.  Evidence for bladder outlet obstruction with persistent distention of the urinary bladder with Wilson catheter in  place.  5.  Nonobstructive left nephrolithiasis.  6.  Evidence for constipation without bowel obstruction.  7.  Small left and tiny right fat-containing inguinal hernias with small fat-containing paraumbilical hernia. No bowel obstruction.  8.  Left adrenal gland adenoma.  As per radiology.    Laboratory:  Laboratory findings were communicated with the patient who voiced understanding of the findings.    CBC: WBC: 9.8, HGB: 13.8, PLT: 214  CMP: Glucose 145 (H), Urea Nitrogen 55 (H), Creatinine 2.62 (H), GFR Estimate 25 (L), Albumin 3.0 (L), o/w WNL   Lipase: 609 (H)    1728 Creatinine POCT: 2.8 (H), GFR Estimate 23 (L)    UA with micro: Blood Trace, Albumin 10, Leukocyte Esterase Trace, RBC 4 (H), Bacteria Few, Mucous Present, o/w negative    COVID-19 Virus by PCR NP swab pending.    Interventions:  1723  mL IV Bolus   Zofran 8 mg IV  2030 Rocephin 2 g IV  2037 Zithromax 500 mg IV    Emergency Department Course:  Past medical records, nursing notes, and vitals reviewed.    1700 I performed an exam of the patient as documented above.     EKG obtained in the ED, see results above.     IV was inserted and blood was drawn for laboratory testing, results above.    The patient provided a urine sample here in the emergency department. This was sent for laboratory testing, findings above.    The patient was sent for a CT Abd/Pelvis while in the emergency department, results above.     1930 I rechecked the patient and discussed the results of his workup thus far.     2006 I spoke with Dr. Lincoln, hospitalist, who agreed to admit the patient.    Findings and plan explained to the Patient who consents to admission. Discussed the patient with Dr. Lincoln, who will admit the patient to a med/surg bed for further monitoring, evaluation, and treatment.    I personally reviewed the laboratory results with the Patient and answered all related questions prior to admission.     Impression & Plan     Covid-19  Austyn Leach was  evaluated during a global COVID-19 pandemic, which necessitated consideration that the patient might be at risk for infection with the SARS-CoV-2 virus that causes COVID-19.   Applicable protocols for evaluation were followed during the patient's care.    Medical Decision Making:  Austyn Leach is a 65 year old male who presented to the ED after intentional overdose of Seroquel 1 week prior.  This was a suicide attempt.  Despite this suicidal gesture, patient states that he is no longer suicidal and has no active plan.  He is primarily concerned about generalized abdominal discomfort and nausea.  He was found to have acute kidney injury secondary to urinary retention.  Wilson catheter was placed without difficulty.  No evidence of associated urinary tract infection.  CT scan of the abdomen was undertaken for to evaluate for alternative causes for his abdominal pain.  It should be noted that the urinary system was still dilated on CT as Wilson catheter was clamped after 1 L had been drained.  The catheter was then unclamped after CT and an additional 800 ml's of urine returned.    CT scan of the abdomen revealed a right kidney lesion, multiple liver lesions of undetermined significance.  There was also a right lower lobe infiltrate concerning for infectious pathology.  Patient later stated that he had a fever 1 week ago and a persistent cough.  He was given ceftriaxone and azithromycin.  Covid 19 test sent.    Given his constellation of symptoms, patient will require admission the hospital.  I did speak with poison control who states that Seroquel can cause urinary retention but generally is isolated to 72 hours post-ingestion.  Patient safe for transfer the floor.    Diagnosis:    ICD-10-CM    1. Acute kidney injury (H)  N17.9 COVID-19 Virus (Coronavirus) by PCR Nasopharyngeal swab   2. Urinary retention  R33.9    3. Community acquired pneumonia of right lower lobe of lung (H)  J18.1    4. Overdose, intentional  self-harm, initial encounter (H)  T50.902A    5. Renal lesion  N28.9    6. Liver lesion  K76.9        Disposition:  Admitted to Dr. Lincoln.    Scribe Disclosure:  I, Lenard Guthrie, am serving as a scribe at 5:11 PM on 4/10/2020 to document services personally performed by Adelso Kathleen MD based on my observations and the provider's statements to me.     [facility] EMERGENCY DEPARTMENT     Adelso Kathleen MD  04/10/20 4501

## 2020-04-10 NOTE — ED TRIAGE NOTES
"Took 45 seroquel with alcohol on Friday the 3rd in attempt to end his life. States he is here today because he can't eat anything \"and my stomach is bumming out.\"   "

## 2020-04-11 ENCOUNTER — APPOINTMENT (OUTPATIENT)
Dept: MRI IMAGING | Facility: CLINIC | Age: 65
DRG: 698 | End: 2020-04-11
Attending: INTERNAL MEDICINE
Payer: MEDICARE

## 2020-04-11 LAB
ALBUMIN SERPL-MCNC: 2.4 G/DL (ref 3.4–5)
ALP SERPL-CCNC: 52 U/L (ref 40–150)
ALT SERPL W P-5'-P-CCNC: 33 U/L (ref 0–70)
ANION GAP SERPL CALCULATED.3IONS-SCNC: 4 MMOL/L (ref 3–14)
AST SERPL W P-5'-P-CCNC: 17 U/L (ref 0–45)
BILIRUB DIRECT SERPL-MCNC: <0.1 MG/DL (ref 0–0.2)
BILIRUB SERPL-MCNC: 0.4 MG/DL (ref 0.2–1.3)
BUN SERPL-MCNC: 35 MG/DL (ref 7–30)
CALCIUM SERPL-MCNC: 8.3 MG/DL (ref 8.5–10.1)
CHLORIDE SERPL-SCNC: 112 MMOL/L (ref 94–109)
CO2 SERPL-SCNC: 25 MMOL/L (ref 20–32)
CREAT SERPL-MCNC: 1.44 MG/DL (ref 0.66–1.25)
ERYTHROCYTE [DISTWIDTH] IN BLOOD BY AUTOMATED COUNT: 14.1 % (ref 10–15)
GFR SERPL CREATININE-BSD FRML MDRD: 51 ML/MIN/{1.73_M2}
GLUCOSE SERPL-MCNC: 110 MG/DL (ref 70–99)
HCT VFR BLD AUTO: 38 % (ref 40–53)
HGB BLD-MCNC: 12.3 G/DL (ref 13.3–17.7)
INTERPRETATION ECG - MUSE: NORMAL
MCH RBC QN AUTO: 30.5 PG (ref 26.5–33)
MCHC RBC AUTO-ENTMCNC: 32.4 G/DL (ref 31.5–36.5)
MCV RBC AUTO: 94 FL (ref 78–100)
PLATELET # BLD AUTO: 204 10E9/L (ref 150–450)
POTASSIUM SERPL-SCNC: 3.4 MMOL/L (ref 3.4–5.3)
PROCALCITONIN SERPL-MCNC: 0.46 NG/ML
PROT SERPL-MCNC: 6.5 G/DL (ref 6.8–8.8)
RBC # BLD AUTO: 4.03 10E12/L (ref 4.4–5.9)
SARS-COV-2 PCR COMMENT: NORMAL
SARS-COV-2 RNA SPEC QL NAA+PROBE: NEGATIVE
SARS-COV-2 RNA SPEC QL NAA+PROBE: NORMAL
SODIUM SERPL-SCNC: 141 MMOL/L (ref 133–144)
SPECIMEN SOURCE: NORMAL
SPECIMEN SOURCE: NORMAL
WBC # BLD AUTO: 6.8 10E9/L (ref 4–11)

## 2020-04-11 PROCEDURE — 25800030 ZZH RX IP 258 OP 636: Performed by: STUDENT IN AN ORGANIZED HEALTH CARE EDUCATION/TRAINING PROGRAM

## 2020-04-11 PROCEDURE — 80076 HEPATIC FUNCTION PANEL: CPT | Performed by: STUDENT IN AN ORGANIZED HEALTH CARE EDUCATION/TRAINING PROGRAM

## 2020-04-11 PROCEDURE — 25500064 ZZH RX 255 OP 636: Performed by: STUDENT IN AN ORGANIZED HEALTH CARE EDUCATION/TRAINING PROGRAM

## 2020-04-11 PROCEDURE — 84145 PROCALCITONIN (PCT): CPT | Performed by: INTERNAL MEDICINE

## 2020-04-11 PROCEDURE — 12000000 ZZH R&B MED SURG/OB

## 2020-04-11 PROCEDURE — 99221 1ST HOSP IP/OBS SF/LOW 40: CPT | Performed by: PSYCHIATRY & NEUROLOGY

## 2020-04-11 PROCEDURE — 36415 COLL VENOUS BLD VENIPUNCTURE: CPT | Performed by: INTERNAL MEDICINE

## 2020-04-11 PROCEDURE — 25800030 ZZH RX IP 258 OP 636: Performed by: INTERNAL MEDICINE

## 2020-04-11 PROCEDURE — 25000132 ZZH RX MED GY IP 250 OP 250 PS 637: Mod: GY | Performed by: STUDENT IN AN ORGANIZED HEALTH CARE EDUCATION/TRAINING PROGRAM

## 2020-04-11 PROCEDURE — 80048 BASIC METABOLIC PNL TOTAL CA: CPT | Performed by: STUDENT IN AN ORGANIZED HEALTH CARE EDUCATION/TRAINING PROGRAM

## 2020-04-11 PROCEDURE — 25000132 ZZH RX MED GY IP 250 OP 250 PS 637: Mod: GY | Performed by: INTERNAL MEDICINE

## 2020-04-11 PROCEDURE — A9585 GADOBUTROL INJECTION: HCPCS | Performed by: STUDENT IN AN ORGANIZED HEALTH CARE EDUCATION/TRAINING PROGRAM

## 2020-04-11 PROCEDURE — 99207 ZZC CDG-MDM COMPONENT: MEETS LOW - DOWN CODED: CPT | Performed by: INTERNAL MEDICINE

## 2020-04-11 PROCEDURE — 99232 SBSQ HOSP IP/OBS MODERATE 35: CPT | Performed by: INTERNAL MEDICINE

## 2020-04-11 PROCEDURE — 74183 MRI ABD W/O CNTR FLWD CNTR: CPT

## 2020-04-11 PROCEDURE — 25000128 H RX IP 250 OP 636: Performed by: STUDENT IN AN ORGANIZED HEALTH CARE EDUCATION/TRAINING PROGRAM

## 2020-04-11 PROCEDURE — 85027 COMPLETE CBC AUTOMATED: CPT | Performed by: STUDENT IN AN ORGANIZED HEALTH CARE EDUCATION/TRAINING PROGRAM

## 2020-04-11 RX ORDER — GADOBUTROL 604.72 MG/ML
10 INJECTION INTRAVENOUS ONCE
Status: COMPLETED | OUTPATIENT
Start: 2020-04-11 | End: 2020-04-11

## 2020-04-11 RX ORDER — SODIUM CHLORIDE 9 MG/ML
INJECTION, SOLUTION INTRAVENOUS CONTINUOUS
Status: ACTIVE | OUTPATIENT
Start: 2020-04-11 | End: 2020-04-12

## 2020-04-11 RX ORDER — TAMSULOSIN HYDROCHLORIDE 0.4 MG/1
0.4 CAPSULE ORAL DAILY
Status: DISCONTINUED | OUTPATIENT
Start: 2020-04-11 | End: 2020-04-12 | Stop reason: HOSPADM

## 2020-04-11 RX ADMIN — OMEPRAZOLE 40 MG: 20 CAPSULE, DELAYED RELEASE ORAL at 14:12

## 2020-04-11 RX ADMIN — SODIUM CHLORIDE: 9 INJECTION, SOLUTION INTRAVENOUS at 17:00

## 2020-04-11 RX ADMIN — CEFTRIAXONE 1 G: 1 INJECTION, POWDER, FOR SOLUTION INTRAMUSCULAR; INTRAVENOUS at 19:25

## 2020-04-11 RX ADMIN — GADOBUTROL 10 ML: 604.72 INJECTION INTRAVENOUS at 15:24

## 2020-04-11 RX ADMIN — SODIUM CHLORIDE: 9 INJECTION, SOLUTION INTRAVENOUS at 13:07

## 2020-04-11 RX ADMIN — TAMSULOSIN HYDROCHLORIDE 0.4 MG: 0.4 CAPSULE ORAL at 13:05

## 2020-04-11 RX ADMIN — AZITHROMYCIN MONOHYDRATE 500 MG: 500 INJECTION, POWDER, LYOPHILIZED, FOR SOLUTION INTRAVENOUS at 20:10

## 2020-04-11 RX ADMIN — SODIUM CHLORIDE: 9 INJECTION, SOLUTION INTRAVENOUS at 06:55

## 2020-04-11 RX ADMIN — CALCIUM CARBONATE (ANTACID) CHEW TAB 500 MG 1000 MG: 500 CHEW TAB at 13:05

## 2020-04-11 ASSESSMENT — ACTIVITIES OF DAILY LIVING (ADL)
ADLS_ACUITY_SCORE: 12

## 2020-04-11 NOTE — PROGRESS NOTES
[facility]  Hospitalist Progress Note  Jus Webster MD 04/11/2020    Reason for Stay (Diagnosis):          Assessment and Plan:      Summary of Stay: Austyn Leach is a 65 year old male admitted on 4/10/2020. He presents with abdominal pain and found to have ARF.     Work up notable for multiple hepatic lesions (cysts vs mass) and indeterminate renal lesion on CT imaging.  GI has evaluated pt and recommended MRI to further evaluate findings.      Overall patient feeling better since admission with improving renal failure    Plans today:  MRI to eval hepatic and renal lesions  Start flomax  Continue IVF today  BMP in AM  PPI  Check procal level.  If neg stop antibiotics for ? PNA  Voiding trial tomorrow      Abdominal pain, generalized - possibly due to bladder outlet obstruction vs liver masses vs gastritis    Liver Masses - cysts vs metastatic dz from unknown primary  - appreciate GI input       Schizoaffective disorder, bipolar type (H)    Suicide attempt (recent on 4/3)    - appreciate psych input  - denies current suicidality to me    Acute Renal Failure  Assessment: Cr on admission of 2.8, suspect pre-renal from poor PO intake and significant urinary retention.  Creatinine improving with wu placement and IVF  Plan:  - start Flomax  - voiding trial tomorrow       CAP (community acquired pneumonia)    Assessment: minimal cough, no fever, normal WBC. CXR shows Right lower lobe atelectasis, consolidation, and airway thickening likely infectious or inflammatory. Underlying emphysema. Trace pleural effusion.    Plan:   - Ceftriaxone and Azithromycin   - check procal level.  If neg stop antibiotics        Diet: ADAT  DVT Prophylaxis: Pneumatic Compression Devices  Wu Catheter: in place, indication:    Code Status: FULL CODE  Dispo:  1-2 days likely        Interval History (Subjective):      Feels better.  No complaints.  Declines any medication to treat psychiatric issues.  Seen by  psych earlier today.  Denies any current suicidality to me                  Physical Exam:      Last Vital Signs:  BP (!) 159/82 (BP Location: Right arm)   Pulse 87   Temp 98.8  F (37.1  C) (Oral)   Resp 20   Wt 87.5 kg (193 lb)   SpO2 92%   BMI 24.78 kg/m        Intake/Output Summary (Last 24 hours) at 4/11/2020 1444  Last data filed at 4/11/2020 1046  Gross per 24 hour   Intake 2187 ml   Output 3000 ml   Net -813 ml       Constitutional: Awake, alert, cooperative, no apparent distress   Respiratory: Clear to auscultation bilaterally, no crackles or wheezing   Cardiovascular: Regular rate and rhythm, normal S1 and S2, and no murmur noted   Abdomen: Normal bowel sounds, soft, non-distended, non-tender   Skin: No rashes, no cyanosis, dry to touch   Neuro: Alert and oriented x3, no weakness, numbness, memory loss   Extremities: No edema, normal range of motion   Other(s):        All other systems: Negative          Medications:      All current medications were reviewed with changes reflected in problem list.         Data:      All new lab and imaging data was reviewed.   Labs:  Recent Labs   Lab 04/11/20  0630   WBC 6.8   HGB 12.3*   HCT 38.0*   MCV 94         Imaging:   Recent Results (from the past 24 hour(s))   CT Abdomen Pelvis w/o Contrast    Narrative    EXAM: CT ABDOMEN PELVIS W/O CONTRAST  LOCATION: [facility]  DATE/TIME: 4/10/2020 6:14 PM    INDICATION: Abdominal pain, nausea, acute kidney infection (ANATOLY).  COMPARISON: None.  TECHNIQUE: CT scan of the abdomen and pelvis was performed without IV contrast. Multiplanar reformats were obtained. Dose reduction techniques were used.  CONTRAST: None.    FINDINGS:   LOWER CHEST: Right lower lobe atelectasis and consolidation with mild airway thickening. Foci of lung destruction likely from emphysema. Mild scattered fibroatelectasis. Trace right pleural effusion. Trace pericardial effusion.    HEPATOBILIARY: Innumerable indeterminate liver  masses incompletely evaluated without IV contrast. Largest 1.1 x 1.7 cm. Gallbladder unremarkable.    PANCREAS: Normal.    SPLEEN: Normal.    ADRENAL GLANDS: 2.2 x 1.7 cm left adrenal gland adenoma. Right adrenal gland normal.    KIDNEYS/BLADDER: Wilson catheter within the urinary bladder which remains distended. Mild fullness of bilateral renal collecting systems likely due to the distended urinary bladder. Nonspecific bilateral perirenal stranding. Suggestion of right renal   edema incompletely evaluated without IV contrast. Indeterminate approximately 2 cm lesion mid right kidney. Nonobstructing 2 mm stone mid left kidney.    BOWEL: Evidence for constipation without bowel obstruction. Normal appendix.    LYMPH NODES: Normal.    VASCULATURE: Diffuse atherosclerotic plaque. No significant abdominal aortic aneurysm.    PELVIC ORGANS: Prostate mildly prominent measuring 6.3 x 5.2 cm.    MUSCULOSKELETAL: Small left and tiny right fat-containing inguinal hernias. Small fat-containing paraumbilical hernia. Degenerative disease. Potential nerve root impingement. Old rib fractures.      Impression    IMPRESSION:   1.  Right lower lobe atelectasis, consolidation, and airway thickening likely infectious or inflammatory. Underlying emphysema. Trace pleural effusion.  2.  Innumerable indeterminate liver masses. Prior would be most helpful, if none recommend dedicated multiphasic enhanced study when able. Ultrasound might be of value if an enhanced study could not be performed. Metastases would be in the differential.  3.  Indeterminate mid right renal lesion. This could be evaluated with dedicated enhanced study as well and if not possible, ultrasound. Suggestion of right renal edema with differential including pyelonephritis though nonspecific.  4.  Evidence for bladder outlet obstruction with persistent distention of the urinary bladder with Wilson catheter in place.  5.  Nonobstructive left nephrolithiasis.  6.  Evidence for  constipation without bowel obstruction.  7.  Small left and tiny right fat-containing inguinal hernias with small fat-containing paraumbilical hernia. No bowel obstruction.  8.  Left adrenal gland adenoma.

## 2020-04-11 NOTE — ED NOTES
[facility]  ED Nurse Handoff Report    Austyn Leach is a 65 year old male   ED Chief complaint: Nausea and Abdominal Pain  . ED Diagnosis:   Final diagnoses:   Acute kidney injury (H)   Urinary retention   Community acquired pneumonia of right lower lobe of lung (H)   Overdose, intentional self-harm, initial encounter (H)   Renal lesion   Liver lesion     Allergies:   Allergies   Allergen Reactions     Bee Venom Shortness Of Breath and Swelling     Propranolol        Code Status: Full Code  Activity level - Baseline/Home:  Independent. Activity Level - Current:   Independent. Lift room needed: No. Bariatric: No   Needed: No   Isolation: Yes. Infection: COVID test pending, no aerosolizing procedures performed    Vital Signs:   Vitals:    04/10/20 1903 04/10/20 1930 04/10/20 2000 04/10/20 2030   BP:  (!) 162/98 (!) 150/95 (!) 159/99   Pulse:   90 93   Resp:       Temp:       TempSrc:       SpO2: 96% 93% 93% 92%       Cardiac Rhythm:  ,      Pain level:    Patient confused: No. Patient Falls Risk: No.   Elimination Status: Urethral catheter (wu) in place; refer to orders to discontinue as per protocol    Patient Report - Initial Complaint: abdominal pain. Focused Assessment: Austyn Leach is a 65 year old male who presents to the emergency department for evaluation of abdominal pain and nausea.  Patient reports that on April 3 he had a suicide attempt in which he took 45 tablets of his Seroquel.  He took this as a suicide attempt.  He believes the inciting event was longstanding depression and the global coronavirus pandemic.  Since taking the medication, he has nausea and diffuse abdominal discomfort.  This resulted in subjective bloating and abdominal swelling.  His pain is constant with no obvious alleviating or aggravating factors.  He has been nauseated without vomiting.  He has not had a bowel movement in greater than 1 week but is passing some gas.  He denies any fevers or new  urinary symptoms.  He states that he is not actively suicidal and has no plan..    Tests Performed:   Labs Ordered and Resulted from Time of ED Arrival Up to the Time of Departure from the ED   COMPREHENSIVE METABOLIC PANEL - Abnormal; Notable for the following components:       Result Value    Glucose 145 (*)     Urea Nitrogen 55 (*)     Creatinine 2.62 (*)     GFR Estimate 25 (*)     GFR Estimate If Black 28 (*)     Albumin 3.0 (*)     All other components within normal limits   ROUTINE UA WITH MICROSCOPIC - Abnormal; Notable for the following components:    Blood Urine Trace (*)     Protein Albumin Urine 10 (*)     Leukocyte Esterase Urine Trace (*)     RBC Urine 4 (*)     Bacteria Urine Few (*)     Mucous Urine Present (*)     All other components within normal limits   CREATININE POCT - Abnormal; Notable for the following components:    Creatinine 2.8 (*)     GFR Estimate 23 (*)     GFR Estimate If Black 28 (*)     All other components within normal limits   LIPASE - Abnormal; Notable for the following components:    Lipase 609 (*)     All other components within normal limits   CBC WITH PLATELETS DIFFERENTIAL   COVID-19 VIRUS (CORONAVIRUS) BY PCR   ISTAT CREATININE NURSING POCT   PERIPHERAL IV CATHETER     CT Abdomen Pelvis w/o Contrast   Final Result   IMPRESSION:    1.  Right lower lobe atelectasis, consolidation, and airway thickening likely infectious or inflammatory. Underlying emphysema. Trace pleural effusion.   2.  Innumerable indeterminate liver masses. Prior would be most helpful, if none recommend dedicated multiphasic enhanced study when able. Ultrasound might be of value if an enhanced study could not be performed. Metastases would be in the differential.   3.  Indeterminate mid right renal lesion. This could be evaluated with dedicated enhanced study as well and if not possible, ultrasound. Suggestion of right renal edema with differential including pyelonephritis though nonspecific.   4.   Evidence for bladder outlet obstruction with persistent distention of the urinary bladder with Wilson catheter in place.   5.  Nonobstructive left nephrolithiasis.   6.  Evidence for constipation without bowel obstruction.   7.  Small left and tiny right fat-containing inguinal hernias with small fat-containing paraumbilical hernia. No bowel obstruction.   8.  Left adrenal gland adenoma.           . Abnormal Results: see above.   Treatments provided: see MAR  Family Comments: none present  OBS brochure/video discussed/provided to patient:  Yes  ED Medications:   Medications   lidocaine (XYLOCAINE) 2 % external gel 6 mL ( Urethral Canceled Entry 4/10/20 2030)   cefTRIAXone (ROCEPHIN) 2 g vial to attach to  ml bag for ADULTS or NS 50 ml bag for PEDS (2 g Intravenous New Bag 4/10/20 2030)   azithromycin (ZITHROMAX) 500 mg in sodium chloride 0.9 % 250 mL intermittent infusion (500 mg Intravenous New Bag 4/10/20 2037)   0.9% sodium chloride BOLUS (0 mLs Intravenous Stopped 4/10/20 2041)   ondansetron (ZOFRAN) injection 8 mg (8 mg Intravenous Given 4/10/20 1723)     Drips infusing:  Yes-IV abx  For the majority of the shift, the patient's behavior Green. Interventions performed were none.    Sepsis treatment initiated: No       ED Nurse Name/Phone Number: Wil Davis RN,   8:45 PM    RECEIVING UNIT ED HANDOFF REVIEW    Above ED Nurse Handoff Report was reviewed: Yes  Reviewed by: Jessenia Serna RN on April 10, 2020 at 8:52 PM

## 2020-04-11 NOTE — PROVIDER NOTIFICATION
Paged to hospitalist:    FYI- Patients COVID-19 test is NEGATIVE. Please advise if we can move him to other king and if he needs precautions. Thank you.     Update:  See next note from Dr. Schroeder.

## 2020-04-11 NOTE — PROGRESS NOTES
Arrived to room 507 from ER at 2130 via cart, alert and oriented x 4, oriented to room and call system, IV patent and infusing, wu patent, rates pain 5/10, reviewed welcome folder and pain/medication information packet with patient. Admission profile started. Patient updated on current plan of care.   BP (!) 154/95   Pulse 97   Temp 98.4  F (36.9  C) (Oral)   Resp 18   SpO2 94%

## 2020-04-11 NOTE — PHARMACY-ADMISSION MEDICATION HISTORY
Admission medication history interview status for this patient is complete. See [facility] admission navigator for allergy information, prior to admission medications and immunization status.     Medication history interview source(s):Patient  Medication history resources (including written lists, pill bottles, clinic record):None  Primary pharmacy:[facility]    Changes made to PTA medication list:  Added: -  Deleted: risperdal 2 mg daily  Changed: risperdal consta to q 28 gays    Actions taken by pharmacist (provider contacted, etc):called pt in room     Additional medication history information:None    Medication reconciliation/reorder completed by provider prior to medication history?  yes   (Y/N)     For patients on insulin therapy: no  (Y/N)  Sliding scale Novolog: -  Do you have a baseline novolog pre-meal dose:   __  units with meals or __ units/carb unit with meals  Do you eat three meals a day:  -  How many times do you check your blood glucose per day:  -  How many episodes of hypoglycemia do you have per week: -  Do you have a Continuous glucose monitor (CGM) : - (remind pt that not approved for hospital use)  Any specific barriers to therapy? -  (cost, comfortable with injections, confident with current diabetes regimen?)      Prior to Admission medications    Medication Sig Last Dose Taking? Auth Provider   QUEtiapine (SEROQUEL) 200 MG tablet Take 1 tablet (200 mg) by mouth At Bedtime 4/9/2020 at Unknown time Yes Christian Cheatham MD   risperiDONE (RISPERDAL) 4 MG tablet Take 1 tablet (4 mg) by mouth At Bedtime 4/9/2020 at Unknown time Yes Christian Cheatham MD   risperiDONE microspheres ER (RISPERDAL CONSTA) 50 MG injection Inject 50 mg into the muscle every 28 days Past Week at Unknown time Yes Unknown, Entered By History

## 2020-04-11 NOTE — CONSULTS
Care Transition Initial Assessment - RN    Met with: Patient.  DATA   Principal Problem:    Abdominal pain, generalized  Active Problems:    Rosa (H)    Schizoaffective disorder, bipolar type (H)    Suicide attempt (H)    CAP (community acquired pneumonia)    Abdominal pain     PNA Action Plan discussed with patient at bedside.   Cognitive Status: awake, alert and oriented.  Primary Care Clinic Name: [facility]  Primary Care MD Name: Dr. Kristin Zhong  Contact information and PCP information verified: Yes  Lives With: spouse      Description of Support System: Supportive   Who is your support system?: Wife       Insurance concerns: No Insurance issues identified  ASSESSMENT  Patient currently receives the following services:  None.         Identified issues/concerns regarding health management: Patient quit smoking on day of admission. He has had pneumonia prior to this occurrence. CM discussed ongoing smoking cessation to allow his lungs to heal. He has a history of mental health issues and is now refusing to take the medications his psychiatrist prescribed. CM asked if he would take an oral antibiotic to complete his treatment for pneumonia. He answered he would take an antibiotic for PNA. He does not use home oxygen. He has no issues obtaining his medications from the pharmacy.     PLAN  Financial costs for the patient were not discussed.  Patient given options and choices for discharge.  Patient/family is agreeable to the plan?  Yes  Patient anticipates discharging to home.     Other Resources: Other (see comment)(PNA Action Plan discussed with patient at bedside. )  Patient anticipates needs for home equipment: No  Transportation/person available to transport on day of discharge is his son.  He will be notified when patient is ready for discharge.   Plan/Disposition: Home   Appointments: CM needs to schedule a telephonic f/u appointment when [facility] clinic is open. Review  of his previous visits shows Dr. Kristin Zhong as his PCP. Chart has Dr. Kaplan as PCP. Clinic needs to clarify.     CM will continue to follow patient until discharge for any additional needs.     Kati Arceo RN, BSN, CPHN, CM  Inpatient Care Coordination  [facility]  [phone]

## 2020-04-11 NOTE — CONSULTS
See dictation. #[id number]  Psychiatry   Initial Consultation  Patient seen, notes reviewed. Patient has case management, likely continuation of a MI commitment with beltran order and sw consult placed to contact his team on Monday for disposition planning. He is not currently suicidal , but also tells me he never takes his oral medications and would refuse any. He will cooperate at this point with GI workup and general medical recommendations. He is cooperative with his consta IM to avoid revocation of his stay of commitment only. Poor insight overall.Call prn.  Marilynn Earl MD  4/11/2020

## 2020-04-11 NOTE — CONSULTS
"Consult Date:  04/11/2020      IDENTIFICATION:  The patient is a 65-year-old   seen for psychiatric consultation at request of Dr. Schroeder for this patient initially admitted with abdominal symptoms, but who has longstanding mental illness, recent commitment and report of outpatient overdose.      HISTORY OF PRESENT ILLNESS:  The patient was admitted from home that he tells me he shares with his wife, Gerson, for nausea, constipation, abdominal pain.  He tells me he did pass a stool today, but has no appetite, unclear weight loss.  He did have an overdose taking a handful of his Seroquel tablets.  Records indicate he is supposed to be taking 200 mg nightly, and 4 mg nightly of Risperdal.  He states he never takes his oral medications and resents having to come to get his Consta in the clinic once a month.  He did get his son, Bay, on the phone telling him \"I want to fire Dr. Clayton and I want a woman doctor\".  He related to Bay \"a woman will be more lenient with me\".  He does state he still feels pain, although is vague about this, was lying comfortably in his hospital bed.  His son reports, whom I spoke with at the patient's request, tells me his father is either a bit high and agitated or depressed and sees his father as recently depressed.  The patient states he was downplaying his overdose of Seroquel which happened on 04/03, had not sought immediate medical attention, was vague historian.  His wife reportedly had been noting short-term memory problems for several years.  He underwent an extensive workup during his hospitalization at [facility] including neuropsych testing.  He has not been combative with staff.  Denied any recurrence of suicidal thoughts.  He has not required a full-time sitter.  He states he will cooperate with his Gastroenterology workup, has not seen a specialist yet today but this had been ordered, due to CT scan showing possible metastatic masses in the liver.  He is a long-term " "smoker.      PAST PSYCHIATRIC HISTORY:  Last psychiatric hospitalization with commitment 06/18/2019 to 07/12/2019.  He had been changed over to Risperdal, is currently on IM Consta 50 mg every 28 days.  He reports he does not take his oral Seroquel or Risperdal.  He sees Dr. Clayton of [facility].  He denies a therapist.  He has a diagnosis of schizoaffective disorder, bipolar type, possible neurocognitive disorder, remote alcohol use disorder.  He may have history of prior overdose, although this is not well documented.  The patient denies this.  He does have a history of going into traffic and being disruptive to bystanders.  He does have a history of paranoid delusions.        PAST MEDICAL HISTORY:  No history of closed head trauma or seizure.  He is nicotine dependent, has a history of remote asthma, possible essential tremor.      Laboratory studies showed normal LFTs, creatinine of 1.44, elevated lipase of 609.      PAST MEDICATIONS:  Include but are not limited to Haldol, as mentioned, Zoloft, Seroquel, Zyprexa and Cogentin.      VITAL SIGNS:  Temperature afebrile, pulse 87, respiratory rate 20, /82.       SOCIAL HISTORY:  The patient lives with his wife, Gerson, in [place].  He has [facility] Case Management.  Denies any in-home service.  It is reported that he has been a long-term marriage with a mail order bride.  He has 2 grown children, no contact with his daughter whom he describes \"as a lesbian who doesn't like herself\".  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  No history of abuse growing up.      REVIEW OF SYSTEMS:  Reports vague abdominal complaints, reports he passed stool.  No further fever.  No recent fall or injury, chest pain, vomiting.  Apparently, he did eat this morning but has anorexia and otherwise, systems negative.      MENTAL STATUS EXAM:  The patient is a tall  male with graying hair.  He " was not uncooperative, remained calm.  Did ask me to get his son on the line so he could be interviewed with him.  He was oriented to place, person, year but was near on month, became frustrated with not getting day.  His speech was nonpressured.  No significant word finding difficulty.  He had no agitation with mild bilateral tremor.  No neglect.  Reasonable musculature.  Denies hallucinations, any recurrence of suicidal thoughts.  He was downplaying his reactive overdose.  No specific threats or urges to harm others admitted.  He does appear to have baseline paranoia, poor insight into his mental illness, denied a diagnosis.  Judgment has been historically impulsive.  Long-term memory appears more intact.  He is a limited historian; however, with likely moderate short-term memory deficits.      PSYCHIATRIC DIAGNOSES:  Schizoaffective disorder, bipolar type, neurocognitive disorder, mild to moderate.      RECOMMENDATION:  The patient will remain off of Seroquel, and Risperdal oral which he would refuse.  He denied a wish for any sleep aid at night except for Benadryl or Vistaril which I will order for him.  Monday  will be consulted to touch base with his .  Given his overdose, will need his treatment team to weigh in on any disposition decision including return home with monitoring.  He does not currently require a sitter, although could become impulsive and attempt to leave.  He does agree to remain cooperatively and complete his GI workup.  Also, we will need to have pharmacy confirmed with his clinic, although it may be closed for routine business to confirm last date of his Risperdal Consta.  If he is due for this, we can administer here.  Please call with questions and concerns and thank you for consultation.         JUAN MANUEL CHAMBERS MD             D: 2020   T: 2020   MT: RR      Name:     ASHANTI ESPINOZA   MRN:      [id number]        Account:       [id number]   :       1955           Consult Date:  04/11/2020      Document: [id number]

## 2020-04-11 NOTE — CONSULTS
GASTROENTEROLOGY CONSULTATION      Austyn Leach  65 year old male  Admission Date: 4/10/2020  Care Provider: Grant Smith was asked to see this patient in consultation by Dr. Vincent for evaluation of abdominal pain.    HPI:  Austyn Leach is a 65 year old male who states he has a longstanding history of abdominal pain, which has been worse recently.  He describes epigastric pain after eating, occurring with most meals.  He has nausea but no vomiting.  He also notes frequent heartburn, he denies dysphagia.  He denies any diarrhea, constipation, or blood in his stools.  He states that food does not taste good to him, but his weight is stable.     MEDICAL HISTORY:  Patient Active Problem List    Diagnosis Date Noted     Suicide attempt (H) 04/10/2020     Priority: Medium     Abdominal pain, generalized 04/10/2020     Priority: Medium     CAP (community acquired pneumonia) 04/10/2020     Priority: Medium     Abdominal pain 04/10/2020     Priority: Medium     Agitation 06/18/2019     Priority: Medium     Cigarette nicotine dependence with withdrawal 04/16/2018     Priority: Medium     Schizoaffective disorder, bipolar type (H) 04/10/2018     Priority: Medium     Rosa (H) 09/05/2017     Priority: Medium     Delusions (H) 06/08/2017     Priority: Medium     ACP (advance care planning) 08/28/2015     Priority: Medium     Advance Care Planning 8/28/2015: ACP Review and Resources Provided:  Reviewed chart for advance care plan.  Austyn Leach has no plan or code status on file. Discussed available resources and provided with information. Confirmed code status reflects current choices pending further ACP discussions.  Confirmed/documented legally designated decision maker(s). Added by Margie Soto             Living will, counseling/discussion 04/03/2014     Priority: Medium     Advance Care Planning:   ACP Review and Resources Provided:  Reviewed chart for advance care plan.  Austyn Leach has no  plan or code status on file. Discussed available resources and provided with information. Confirmed code status reflects current choices pending further ACP discussions.  Confirmed/documented designated decision maker(s). See permanent comments section of demographics in clinical tab.   Added by Lisa Bloch on 4/3/2014             Intention tremor 08/06/2010     Priority: Medium     Erectile dysfunction 02/11/2009     Priority: Medium     Personal history of tobacco use, presenting hazards to health 10/07/2008     Priority: Medium     Health Care Home 09/09/2014     Priority: Low     A comprehensive ten point review of systems was performed and was negative aside from shortness of breath.       :   Prior to Admission Medications   Prescriptions Last Dose Informant Patient Reported? Taking?   QUEtiapine (SEROQUEL) 200 MG tablet 4/9/2020 at Unknown time  Yes Yes   Sig: Take 1 tablet (200 mg) by mouth At Bedtime   risperiDONE (RISPERDAL) 4 MG tablet 4/9/2020 at Unknown time  No Yes   Sig: Take 1 tablet (4 mg) by mouth At Bedtime   risperiDONE microspheres ER (RISPERDAL CONSTA) 50 MG injection Past Week at Unknown time  Yes Yes   Sig: Inject 50 mg into the muscle every 28 days      Facility-Administered Medications: None      :   Allergies   Allergen Reactions     Bee Venom Shortness Of Breath and Swelling     Propranolol       Social History:  Social History     Tobacco Use     Smoking status: Current Every Day Smoker     Packs/day: 1.00     Years: 58.00     Pack years: 58.00     Types: Cigarettes     Smokeless tobacco: Never Used   Substance Use Topics     Alcohol use: No     Drug use: No       Family History:  No first degree relative with colon cancer, gastric cancer, crohn's disease, ulcerative colitis, or liver disease.     EXAM:  General Appearance: Alert, oriented x3, no acute distress.  BP (!) 159/82 (BP Location: Right arm)   Pulse 87   Temp 98.8  F (37.1  C) (Oral)   Resp 20   Wt 87.5 kg (193 lb)   SpO2  92%   BMI 24.78 kg/m    Eye:  PERRL, sclera anicteric.  ENT: oropharynx clear, no thrush.  CV: RRR.  Resp: CTA bilaterally.  GI: Soft, NABS, mild epigastric/RUQ tenderness without rebound.  Musculoskeletal: no joint swelling, erythema, or warmth.  Neuro: no asterixis.      Labs and imaging reviewed    Recent Labs   Lab Test 04/11/20  0630 04/10/20  1715 06/07/17  1734   WBC 6.8 9.8 7.8   HGB 12.3* 13.8 15.3   MCV 94 93 89    214 138*     Recent Labs   Lab Test 04/11/20  0630 04/10/20  1715 06/07/17  1734   POTASSIUM 3.4 3.6 4.0   CHLORIDE 112* 108 110*   CO2 25 23 28   BUN 35* 55* 26   ANIONGAP 4 6 7     Recent Labs   Lab Test 04/11/20 0630 04/10/20  1735 04/10/20  1715 05/24/16  1326   ALBUMIN 2.4*  --  3.0* 3.7   BILITOTAL 0.4  --  0.3 0.3   ALT 33  --  41 22   AST 17  --  18 19   PROTEIN  --  10*  --   --    LIPASE  --   --  609*  --        ASSESSMENT AND PLAN:  65 year old with abdominal pain, as well as multiple liver lesions.  Etiology of liver lesions unclear.  May be metastatic (?kidney lesion), infectious, granulomatous.  Would recommend contrast MRI (or possibly CT to eval kidney lesion as well) when Cr improved.    Ab pain may be from liver lesions, or could be PUD, gastritis, esophagitis, etc.    -Omeprazole 40 mg per day.  -Contrast MRI or CT when able  -Will follow     Thank you for this interesting consult, please feel to call me if any questions arise.    Mario Alberto Rosario MD

## 2020-04-11 NOTE — H&P
[facility]    History and Physical - Hospitalist Service       Date of Admission:  4/10/2020    Assessment & Plan      Austyn Leach is a 65 year old male admitted on 4/10/2020. He presents with abdominal pain.       Abdominal pain, generalized    Liver Masses    Assessment: CT does show Innumerable indeterminate liver masses. Metastases would be in the differential vs cysts. Possibly etiology of his symptoms. Otherwise lipase 609, so possible degree of mild pancreatitis from Seroquel overdose. LFTs preserved.     Plan:   - admit to inpatient  - RUQ US in AM  - [facility] consult  - Pain control as needed  - IVF      Schizoaffective disorder, bipolar type (H)    Suicide attempt (H)    Assessment: intentional suicide attempt 04/03 with seroquel overdose. No active suicidal ideations.     Plan:  - hold PTA Seroquel/Risperidal  - Psychiatry eval in AM    Acute Renal Failure  Assessment: Cr on admission of 2.8, suspect pre-renal from poor PO intake and significant urinary retention  Plan:  - BMP in AM  - IVF  - Continue wu catheter  - Avoid NSAIDs/nephrotoxins      CAP (community acquired pneumonia)    Assessment: minimal cough, no fever, normal WBC. CXR shows Right lower lobe atelectasis, consolidation, and airway thickening likely infectious or inflammatory. Underlying emphysema. Trace pleural effusion.    Plan:   - COVID rule out pending  - Ceftriaxone and Azithromycin   - Droplet/Contact precautions       Diet: ADAT  DVT Prophylaxis: Pneumatic Compression Devices  Wu Catheter: in place, indication:    Code Status: FULL CODE    Disposition Plan   Expected discharge: 2 - 3 days, recommended to prior living arrangement once adequate pain management/ tolerating PO medications and antibiotic plan established.  Entered: Kavon Lincoln MD 04/10/2020, 9:10 PM     The patient's care was discussed with the Patient and ED Provider.    Kavon Lincoln MD  [facility]    ______________________________________________________________________    Chief Complaint     Abdominal Pain    History is obtained from the patient    History of Present Illness      Austyn Leach is a 65 year old male with past medical history of schizoaffective disorder who presents for evaluation of abdominal pain and nausea.    Patient ports that on April 3, he had intentional suicide attempt in which he took 45 tablets of his cervical.  He reports uncontrolled depression and the stress of the global coronavirus pandemic was overwhelming him.  He reports that since taking those tablets, he has had intermittent nausea with diffuse abdominal pain over the past week.  He also endorses subjective abdominal distention/bloating.  He denies any bloody stools.  He denies any exacerbating factors in regards to his abdominal pain.  He denies any vomiting.  He reports that he has not had a bowel movement for over 5 days now.  He is passing gas.  He denies any weight loss or night sweats.  He denies any abnormal food exposures.  He denies any fevers, but does endorse some feeling of chills.  He denies significant alcohol use at this time.  He denies any current hallucinations both auditory or visual.  He denies any current active suicidal ideations or plans.  He denies any PND/orthopnea.  He denies any chest pain or shortness of breath.  Denies any recent sick contacts with possible coronavirus.  He denies any recent travel.  He denies any calf pain.  He does report his wife did go to [place] for 2 weeks earlier this year.  She is asymptomatic.  He otherwise has no other complaints this time.    Review of Systems      The 10 point Review of Systems is negative other than noted in the HPI or here.     Past Medical History      I have reviewed this patient's medical history and updated it with pertinent information if needed.   Past Medical History:   Diagnosis Date     Mild intermittent asthma     uses  primatent       Past Surgical History   I have reviewed this patient's surgical history and updated it with pertinent information if needed.  Past Surgical History:   Procedure Laterality Date     NO HISTORY OF SURGERY         Social History   I have reviewed this patient's social history and updated it with pertinent information if needed.  Social History     Tobacco Use     Smoking status: Current Every Day Smoker     Packs/day: 1.00     Years: 58.00     Pack years: 58.00     Types: Cigarettes     Smokeless tobacco: Never Used   Substance Use Topics     Alcohol use: No     Drug use: No       Family History   I have reviewed this patient's family history and updated it with pertinent information if needed.   Family History   Problem Relation Age of Onset     C.A.D. Father      Hypertension Father      Breast Cancer Mother      Diabetes No family hx of      Cerebrovascular Disease No family hx of      Prior to Admission Medications   Prior to Admission Medications   Prescriptions Last Dose Informant Patient Reported? Taking?   QUEtiapine (SEROQUEL) 200 MG tablet   Yes No   Sig: Take 1 tablet (200 mg) by mouth At Bedtime   risperiDONE (RISPERDAL) 2 MG tablet   No No   Sig: Take 1 tablet (2 mg) by mouth daily   risperiDONE (RISPERDAL) 4 MG tablet   No No   Sig: Take 1 tablet (4 mg) by mouth At Bedtime   risperiDONE microspheres (RISPERDAL CONSTA) 50 MG injection   No No   Sig: Inject 2 mLs (50 mg) into the muscle every 14 days      Facility-Administered Medications: None     Allergies   Allergies   Allergen Reactions     Bee Venom Shortness Of Breath and Swelling     Propranolol        Physical Exam   Vital Signs: Temp: 98.3  F (36.8  C) Temp src: Oral BP: (!) 159/99 Pulse: 93 Heart Rate: 98 Resp: 16 SpO2: 92 % O2 Device: None (Room air)    Weight: 0 lbs 0 oz    Constitutional: awake, alert, cooperative, no apparent distress.   Eyes: Lids and lashes normal, pupils equal, round and reactive to light   ENT:  Normocephalic, without obvious abnormality, atraumatic, sinuses nontender on palpation   Hematologic / Lymphatic: no cervical lymphadenopathy   Respiratory: CTABL   Cardiovascular: RRR with no m/r/g   GI: Normal bowel sounds, soft, distended, mild diffuse-tenderness.  Skin: normal skin color, texture, turgor   Musculoskeletal: There is no redness, warmth, or swelling of the joints. Full range of motion noted.   Neurologic: Awake, alert, oriented to name, place and time. Cranial nerves II-XII are grossly intact. Motor is 5 out of 5 bilaterally. Sensory is intact.   Neuropsychiatric: normal mood and affect    Data   Data reviewed today: I reviewed all medications, new labs and imaging results over the last 24 hours. I personally reviewed the abdominal CT image(s) showing see below.    CT ABDOMEN  IMPRESSION:   1.  Right lower lobe atelectasis, consolidation, and airway thickening likely infectious or inflammatory. Underlying emphysema. Trace pleural effusion.  2.  Innumerable indeterminate liver masses. Prior would be most helpful, if none recommend dedicated multiphasic enhanced study when able. Ultrasound might be of value if an enhanced study could not be performed. Metastases would be in the differential.  3.  Indeterminate mid right renal lesion. This could be evaluated with dedicated enhanced study as well and if not possible, ultrasound. Suggestion of right renal edema with differential including pyelonephritis though nonspecific.  4.  Evidence for bladder outlet obstruction with persistent distention of the urinary bladder with Wilson catheter in place.  5.  Nonobstructive left nephrolithiasis.  6.  Evidence for constipation without bowel obstruction.  7.  Small left and tiny right fat-containing inguinal hernias with small fat-containing paraumbilical hernia. No bowel obstruction.  8.  Left adrenal gland adenoma.    Most Recent 3 CBC's:  Recent Labs   Lab Test 04/10/20  1715 06/07/17  1734 05/24/16  1326   WBC  9.8 7.8 6.5   HGB 13.8 15.3 13.8   MCV 93 89 89    138* 144*     Most Recent 3 BMP's:  Recent Labs   Lab Test 04/10/20  1715 06/07/17  1734 05/24/16  1326    145* 136   POTASSIUM 3.6 4.0 3.6   CHLORIDE 108 110* 104   CO2 23 28 27   BUN 55* 26 10   CR 2.62* 1.05 0.93   ANIONGAP 6 7 5   MAL 8.7 8.7 8.0*   * 101* 103*     Most Recent 2 LFT's:  Recent Labs   Lab Test 04/10/20  1715 05/24/16  1326   AST 18 19   ALT 41 22   ALKPHOS 64 48   BILITOTAL 0.3 0.3     Most Recent 3 INR's:No lab results found.  Recent Results (from the past 24 hour(s))   CT Abdomen Pelvis w/o Contrast    Narrative    EXAM: CT ABDOMEN PELVIS W/O CONTRAST  LOCATION: [facility]  DATE/TIME: 4/10/2020 6:14 PM    INDICATION: Abdominal pain, nausea, acute kidney infection (ANATOLY).  COMPARISON: None.  TECHNIQUE: CT scan of the abdomen and pelvis was performed without IV contrast. Multiplanar reformats were obtained. Dose reduction techniques were used.  CONTRAST: None.    FINDINGS:   LOWER CHEST: Right lower lobe atelectasis and consolidation with mild airway thickening. Foci of lung destruction likely from emphysema. Mild scattered fibroatelectasis. Trace right pleural effusion. Trace pericardial effusion.    HEPATOBILIARY: Innumerable indeterminate liver masses incompletely evaluated without IV contrast. Largest 1.1 x 1.7 cm. Gallbladder unremarkable.    PANCREAS: Normal.    SPLEEN: Normal.    ADRENAL GLANDS: 2.2 x 1.7 cm left adrenal gland adenoma. Right adrenal gland normal.    KIDNEYS/BLADDER: Wilson catheter within the urinary bladder which remains distended. Mild fullness of bilateral renal collecting systems likely due to the distended urinary bladder. Nonspecific bilateral perirenal stranding. Suggestion of right renal   edema incompletely evaluated without IV contrast. Indeterminate approximately 2 cm lesion mid right kidney. Nonobstructing 2 mm stone mid left kidney.    BOWEL: Evidence for constipation without  bowel obstruction. Normal appendix.    LYMPH NODES: Normal.    VASCULATURE: Diffuse atherosclerotic plaque. No significant abdominal aortic aneurysm.    PELVIC ORGANS: Prostate mildly prominent measuring 6.3 x 5.2 cm.    MUSCULOSKELETAL: Small left and tiny right fat-containing inguinal hernias. Small fat-containing paraumbilical hernia. Degenerative disease. Potential nerve root impingement. Old rib fractures.      Impression    IMPRESSION:   1.  Right lower lobe atelectasis, consolidation, and airway thickening likely infectious or inflammatory. Underlying emphysema. Trace pleural effusion.  2.  Innumerable indeterminate liver masses. Prior would be most helpful, if none recommend dedicated multiphasic enhanced study when able. Ultrasound might be of value if an enhanced study could not be performed. Metastases would be in the differential.  3.  Indeterminate mid right renal lesion. This could be evaluated with dedicated enhanced study as well and if not possible, ultrasound. Suggestion of right renal edema with differential including pyelonephritis though nonspecific.  4.  Evidence for bladder outlet obstruction with persistent distention of the urinary bladder with Wilson catheter in place.  5.  Nonobstructive left nephrolithiasis.  6.  Evidence for constipation without bowel obstruction.  7.  Small left and tiny right fat-containing inguinal hernias with small fat-containing paraumbilical hernia. No bowel obstruction.  8.  Left adrenal gland adenoma.

## 2020-04-11 NOTE — PLAN OF CARE
End of Shift Summary  For vital signs and complete assessments, please see documentation flowsheets.     Pertinent assessments: Hx of suicide attempt on 4/3/2020, patient took 45 tablets of seroquel with alcohol. Denies suicidal ideation at this time, has chronic tremor in upper extremities, wu catheter in place for urinary retention, afebrile, lung sounds clear.  Major Shift Events: Pain improved after tylenol, intermittent nausea at times. Poison control called for updates.   Treatment Plan: IVF, pain control, monitor labs, consult with psychiatry/gastroenterology    Discharge Readiness: Medically active  Expected Discharge Date: TBD  Discharge Disposition: Home with Self care  Barriers/Criteria for discharge: COVID-19 test pending, wu catheter in place, intermittent nausea/abd pain

## 2020-04-11 NOTE — PLAN OF CARE
Pt up SBA. LS with exp wheeze. Infreq nonprod cough. On RA. States food doesn't taste good anymore. Nausea increases with eating, also had abd cramping after eating. Had BM x2. Heartburn, gave Tums, started on omeprazole. Wilson for retention, adequate output, started on flomax, plan to remove tomorrow. Hand tremors @ baseline.  Had MRI, see results. GI & psych evaluated pt. No suicidal ideation. IVF infusing.

## 2020-04-12 VITALS
HEART RATE: 81 BPM | SYSTOLIC BLOOD PRESSURE: 150 MMHG | RESPIRATION RATE: 20 BRPM | WEIGHT: 193 LBS | OXYGEN SATURATION: 93 % | BODY MASS INDEX: 24.78 KG/M2 | DIASTOLIC BLOOD PRESSURE: 76 MMHG | TEMPERATURE: 97.2 F

## 2020-04-12 LAB
ANION GAP SERPL CALCULATED.3IONS-SCNC: 2 MMOL/L (ref 3–14)
BUN SERPL-MCNC: 17 MG/DL (ref 7–30)
CALCIUM SERPL-MCNC: 7.6 MG/DL (ref 8.5–10.1)
CHLORIDE SERPL-SCNC: 113 MMOL/L (ref 94–109)
CO2 SERPL-SCNC: 28 MMOL/L (ref 20–32)
CREAT SERPL-MCNC: 1.05 MG/DL (ref 0.66–1.25)
GFR SERPL CREATININE-BSD FRML MDRD: 74 ML/MIN/{1.73_M2}
GLUCOSE SERPL-MCNC: 99 MG/DL (ref 70–99)
POTASSIUM SERPL-SCNC: 3.4 MMOL/L (ref 3.4–5.3)
SODIUM SERPL-SCNC: 143 MMOL/L (ref 133–144)

## 2020-04-12 PROCEDURE — 99238 HOSP IP/OBS DSCHRG MGMT 30/<: CPT | Performed by: INTERNAL MEDICINE

## 2020-04-12 PROCEDURE — 36415 COLL VENOUS BLD VENIPUNCTURE: CPT | Performed by: INTERNAL MEDICINE

## 2020-04-12 PROCEDURE — 25000132 ZZH RX MED GY IP 250 OP 250 PS 637: Mod: GY | Performed by: INTERNAL MEDICINE

## 2020-04-12 PROCEDURE — 80048 BASIC METABOLIC PNL TOTAL CA: CPT | Performed by: INTERNAL MEDICINE

## 2020-04-12 RX ORDER — OMEPRAZOLE 40 MG/1
40 CAPSULE, DELAYED RELEASE ORAL DAILY
Qty: 30 CAPSULE | Refills: 3 | Status: SHIPPED | OUTPATIENT
Start: 2020-04-12 | End: 2020-07-07

## 2020-04-12 RX ORDER — TAMSULOSIN HYDROCHLORIDE 0.4 MG/1
0.4 CAPSULE ORAL DAILY
Qty: 30 CAPSULE | Refills: 3 | Status: SHIPPED | OUTPATIENT
Start: 2020-04-12 | End: 2020-07-07

## 2020-04-12 RX ORDER — CEFPODOXIME PROXETIL 200 MG/1
200 TABLET, FILM COATED ORAL 2 TIMES DAILY
Qty: 6 TABLET | Refills: 0 | Status: ON HOLD | OUTPATIENT
Start: 2020-04-12 | End: 2020-04-25

## 2020-04-12 RX ADMIN — OMEPRAZOLE 40 MG: 20 CAPSULE, DELAYED RELEASE ORAL at 06:34

## 2020-04-12 RX ADMIN — TAMSULOSIN HYDROCHLORIDE 0.4 MG: 0.4 CAPSULE ORAL at 08:37

## 2020-04-12 ASSESSMENT — ACTIVITIES OF DAILY LIVING (ADL)
ADLS_ACUITY_SCORE: 12
ADLS_ACUITY_SCORE: 11
ADLS_ACUITY_SCORE: 12
ADLS_ACUITY_SCORE: 12

## 2020-04-12 NOTE — PROGRESS NOTES
Transition Communication Hand-off for Care Transitions to Next Level of Care Provider    Name: Austyn Leach  : 1955  MRN #: [id number]  Primary Care Provider: Grant Smith  Primary Care MD Name: Dr. Kristin Zhong  Primary Clinic: [address]  [facility] [address]  Primary Care Clinic Name: [facility]  Reason for Hospitalization:  Urinary retention [R33.9]  Liver lesion [K76.9]  Acute kidney injury (H) [N17.9]  Renal lesion [N28.9]  Overdose, intentional self-harm, initial encounter (H) [T50.902A]  Community acquired pneumonia of right lower lobe of lung (H) [J18.1]  Admit Date/Time: 4/10/2020  4:58 PM  Discharge Date: 20  Payor Source: Payor: MEDICARE / Plan: MEDICARE / Product Type: Medicare /     Readmission Assessment Measure (CLARK) Risk Score/category: Low         Reason for Communication Hand-off Referral: Fragility    Discharge Plan     Concern for non-adherence with plan of care:   Yes  Discharge Needs Assessment:  Needs      Most Recent Value   # of Referrals Placed by CTS  External Care Coordination, Communication hand-offs to next level of Care Providers   Other Resources  Other (see comment) [PNA Action Plan discussed with patient at bedside. ]          Already enrolled in Tele-monitoring program and name of program:  NA  Follow-up specialty is recommended: Yes    Follow-up plan:  No future appointments.    Any outstanding tests or procedures:        Referrals     Future Labs/Procedures    Urology Adult Referral     Comments:    Bladder outlet obstruction.  Patient discharged with a wu catheter (new) and needs outpatient urology follow up urodynamic studies/wu catheter management/voiding trial        Key Recommendations:  Patient quit smoking on day of admission. He has had pneumonia prior to this occurrence. CM discussed ongoing smoking cessation to allow his lungs to heal. He has a history of mental health issues and is now refusing to take the  medications his psychiatrist prescribed. CM asked if he would take an oral antibiotic to complete his treatment for pneumonia. He answered he would take an antibiotic for PNA. He does not use home oxygen. He has no issues obtaining his medications from the pharmacy.     Pt reportedly has a [employer] contract and a [facility] Lenora Pepe [phone], SW called and LM w/ CM informing that pt was admitted and discharged back to home with family. He could benefit from clinic CC follow-up.     Kati Arceo RN, BSN, CPHN, CM/ ALVINO Laguerre RN CM     AVS/Discharge Summary is the source of truth; this is a helpful guide for improved communication of patient story

## 2020-04-12 NOTE — PROGRESS NOTES
NINA received a message from Lenora Pepe asking that I fax her admission and discharge information. NINA faxed info to [phone].       NIKKIE Guzman, [facility]  [phone]  [address]   [facility]. [address]  [facility]

## 2020-04-12 NOTE — PROGRESS NOTES
Pt seen and examined.  Feels well.  Denies suicidal ideations.  Wants to go home today.  Wilson cath removed; needs to urinate prior to discharge    Anticipate home later today

## 2020-04-12 NOTE — PROGRESS NOTES
GASTROENTEROLOGY PROGRESS NOTE     SUBJECTIVE:  He is doing well without complaint.  Denies abdominal pain, tolerating diet.    GI ROS: Denies nausea, vomiting, abdominal pain, diarrhea, constipation, melena, or rectal bleeding.     OBJECTIVE:  BP (!) 150/76 (BP Location: Right arm)   Pulse 81   Temp 97.2  F (36.2  C) (Axillary)   Resp 20   Wt 87.5 kg (193 lb)   SpO2 93%   BMI 24.78 kg/m    Temp (24hrs), Av.4  F (36.9  C), Min:97.2  F (36.2  C), Max:99.3  F (37.4  C)    Patient Vitals for the past 72 hrs:   Weight   20 0700 87.5 kg (193 lb)   04/10/20 2149 87.5 kg (192 lb 12.8 oz)       Intake/Output Summary (Last 24 hours) at 2020 0826  Last data filed at 2020 0423  Gross per 24 hour   Intake 3252 ml   Output 2500 ml   Net 752 ml        General Appearance: alert, oriented x3, no acute distress.  Eyes: PERRL, sclera anicteric.  GI: Soft, NABS, NT/ND.       Labs:     Recent Labs   Lab Test 20  0630 04/10/20  1715 17  1734   WBC 6.8 9.8 7.8   HGB 12.3* 13.8 15.3   MCV 94 93 89    214 138*     Recent Labs   Lab Test 20  0639 20  0630 04/10/20  1715   POTASSIUM 3.4 3.4 3.6   CHLORIDE 113* 112* 108   CO2 28 25 23   BUN 17 35* 55*   ANIONGAP 2* 4 6     Recent Labs   Lab Test 20  0630 04/10/20  1735 04/10/20  1715 16  1326   ALBUMIN 2.4*  --  3.0* 3.7   BILITOTAL 0.4  --  0.3 0.3   ALT 33  --  41 22   AST 17  --  18 19   PROTEIN  --  10*  --   --    LIPASE  --   --  609*  --            Assessment and Plan: 65 year old male admit with abdominal pain, now resolved.  Described chronic pain after eating, consistent with PUD/gastritis/GERD. His worsening pain may have been due to urinary retention.  MRI with cysts in liver and kidney.    -Continue omeprazole  -No further recommendations from GI.  Will sign off, please call with questions.    Mario Alberto Rosario MD

## 2020-04-12 NOTE — PLAN OF CARE
/53 (BP Location: Right arm)   Pulse 81   Temp 99.3  F (37.4  C) (Oral)   Resp 18   Wt 87.5 kg (193 lb)   SpO2 92%   BMI 24.78 kg/m      Admit 4/10 with ABD pain, acute renal failure, ad recent suicide attempt on 4/3.     Neuro: A/Ox4, flat affect. BUE tremors at baseline.   Cardiac: VSS.   Respiratory: Maintains sats on RA. LS with expiratory wheezes in BL upper lobes.   GI/: Wilson in place for retention, plan to remove today. No BM overnight.   Diet/Appetite: Tolerating diet, denies nausea.   Activity: Up with SBA.   Pain: Denies.   Skin: No new deficits noted.   LDA's: PIV with MIVF.     Plan: SW to see on Monday to assist with discharge dispo. Will continue to monitoring per POC.

## 2020-04-12 NOTE — DISCHARGE INSTRUCTIONS
Pt d/c'd home. Discharge instructions given & verbalized understanding. Discharge meds sent with pt. Discharged home with wu. F/u with urology.

## 2020-04-12 NOTE — CONSULTS
Psychiatry Disposition Planning  Patient will need to be seen by  who will make contact call to  of his  [place]ounty regarding this patient who has been committed and is on beltran order medication Risperdal consta IM at his psychiatry clinic. (He will continue to refuse oral medications which cannot be forced). They are asked to increase supervision at home, relay to Dr Clayton on Monday that the filled po meds are not being taken and that he did overdose on seroquel on 4/03.  His county team can decide if he needs revocation of his stay or increased level of care.  Call prn until release. .     Patient        Marilynn Earl MD  4/12/2020

## 2020-04-12 NOTE — PLAN OF CARE
End of Shift Summary  For vital signs and complete assessments, please see documentation flowsheets.     Pertinent assessments: Denies suicidal ideation, has chronic tremor in upper extremities, wu catheter in place for urinary retention.   Major Shift Events:   Treatment Plan: IVF, pain control, monitor labs, consult with psychiatry/gastroenterology

## 2020-04-12 NOTE — CONSULTS
SW received a call from charge nurse saying patient is medically stable to discharge. SW made it clear that we would be unable to complete the consult as written (Consult: Please contact josé manuel sprague case mgmt , recent overdose impulsively, admitted refusal of his normally prescribed po psych meds. May still be on stay of commitment, has had beltran. We need confirmation of his risperdal consta last date. Thanks. ) as no one from the [facility] would be available.  No information on who the  is was provided in the note by Dr. Earl. Charge nurse called back saying the  is Lenora Pepe ([phone]).  Because it is the weekend SW could only a message. SW left a message with Lenora Pepe at [phone].    NIKKIE Guzman, [facility]  [phone]  [address]   [facility]. [id number]  [facility]

## 2020-04-12 NOTE — DISCHARGE SUMMARY
Admit Date:     04/10/2020   Discharge Date:     04/12/2020      PRINCIPAL FINAL DIAGNOSES:   1.  Bladder outlet obstruction.  The patient discharging with Wilson catheter, new diagnosis.   2.  Multiple liver cysts and renal cysts noted on abdominal MRI this admission.   3.  Abdominal pain, possibly related to reflux disease versus bladder outlet obstruction.  Resolved while hospitalized.   4.  Schizoaffective disorder.   5.  Recent suicide attempt via Seroquel overdose on 04/03/2020.   6.  Acute renal failure on presentation, due to dehydration, with also a possible post-renal obstructive component due to bladder outlet obstruction.   7.  Community-acquired pneumonia.      PRINCIPAL PROCEDURES THIS ADMISSION:   1.  Psychiatry consultation.   2.  Wilson catheter placement.   3.  MRI of the abdomen showing liver and renal cysts.  No solid enhancing lesions.  No solid enhancing lesions in the liver or kidney.  This was done to follow up CT scan results.   4.  Abdominal pelvic CT scan showing right lower lobe atelectasis consolidation, airway thickening, Innumerable indeterminate liver masses and an indeterminate mid right renal lesion.  Follow up with MRI as noted above.  Evidence for bladder outlet obstruction also noted on CT imaging.      REASON FOR ADMISSION:  Please see dictated history and physical.  In brief, Mr. Leach is a 65-year-old male with the above medical history who presented to the hospital for abdominal pain.  Please see dictated history and physical for details.      HOSPITAL COURSE:   1.  Abdominal pain:  Suspect in part due to bladder outlet obstruction with possible contribution from gastritis.  The patient was seen by Gastroenterology this admission.  CT imaging noted liver lesions and renal lesion.  This was followed up with abdominal MRI showing these to be benign-appearing cysts in both the liver and kidney.  Doubt liver cyst causing abdominal pain.  By day of discharge, his abdominal pain is  resolved and he appeared stable for discharge from the hospital.  He was started on oral PPI therapy for possible heartburn as a cause for his symptoms.   2.  Community-acquired pneumonia:  On CT imaging, there is infiltrate noted on CAT scan.  The patient was started on empiric antibiotic therapy and will be discharged on several more days of oral antibiotic therapy to complete clinical course.  Minimal infectious symptoms while hospitalized.      DISCHARGE MEDICATIONS:   1.  Vantin 200 mg twice a day for 3 more days.   2.  Omeprazole 40 mg daily, new medication.   3.  Seroquel 200 mg at bedtime.   4.  Risperidone 4 mg by mouth at bedtime.   5.  Risperidone injection 50 mg every 28 days.   6.  Tamsulosin 0.4 mg daily.  New medication this admission for bladder outlet obstruction.      FOLLOWUP INSTRUCTIONS:   1.  Follow up with your primary MD in 1-2 weeks.   2.  Referral has been sent to arrange Urology followup for you.  He will be contacted to arrange this appointment for bladder outlet obstruction.  The patient was discharged with a Wilson catheter.  We did attempt a voiding trial while hospitalized, but he was unable to void with over 600 mL of urine in his bladder.  Therefore, Wilson catheter was replaced prior to discharge from the hospital.      I examined the patient on day of discharge.         NINFA FLETCHER MD             D: 2020   T: 2020   MT: RICKY      Name:     ASHANTI ESPINOZA   MRN:      [id number]        Account:        [id number]   :      1955           Admit Date:     04/10/2020                                  Discharge Date: 2020      Document: [id number]       cc: Grant Smith MD

## 2020-04-13 ENCOUNTER — CARE COORDINATION (OUTPATIENT)
Dept: FAMILY MEDICINE | Facility: CLINIC | Age: 65
End: 2020-04-13

## 2020-04-13 NOTE — PROGRESS NOTES
Care Coordination Assessment    PCP: Grant Smith    Referral Source:  ED/IP List      Clinical Data: Patient discharged from inpatient at [facility] 04/12/2020 with Acute kidney Injury, GERD and Esophagitis presence not specified.  Patient discharged home with instructions to follow up with PCP in 1-2 weeks and Urology ASAP.     Plan: I will forward to clinical support to assess and assist with appropriate follow up

## 2020-04-16 ENCOUNTER — TELEPHONE (OUTPATIENT)
Dept: UROLOGY | Facility: CLINIC | Age: 65
End: 2020-04-16

## 2020-04-16 NOTE — TELEPHONE ENCOUNTER
----- Message from Letty Reynolds LPN sent at 4/13/2020  3:47 PM CDT -----    ----- Message -----  From: Ruben Alaniz MD  Sent: 4/13/2020   3:32 PM CDT  To: Letty Reynolds LPN    Looks like jazmin was put in on 4/10  So see me around 5/10 in the office for a cystoscopy and trial of void  Make sure he is on daily flomax in the meantime  Thanks!  ----- Message -----  From: Letty Reynolds LPN  Sent: 4/13/2020   3:09 PM CDT  To: Ruben Alaniz MD, Letty Reynolds LPN    Please review this patients ED  record  and hospital notes . Has jazmin in so how soon do you want to see him ? In the office  or d you want to set up a phone visit and go from there ?

## 2020-04-24 ENCOUNTER — HOSPITAL ENCOUNTER (INPATIENT)
Facility: CLINIC | Age: 65
LOS: 5 days | Discharge: HOME HEALTH | DRG: 885 | End: 2020-04-30
Attending: EMERGENCY MEDICINE | Admitting: PSYCHIATRY & NEUROLOGY
Payer: MEDICARE

## 2020-04-24 DIAGNOSIS — T58.92XA SUICIDE ATTEMPT BY CARBON MONOXIDE POISONING, INITIAL ENCOUNTER (H): ICD-10-CM

## 2020-04-24 DIAGNOSIS — Z20.828 CONTACT WITH AND (SUSPECTED) EXPOSURE TO OTHER VIRAL COMMUNICABLE DISEASES: ICD-10-CM

## 2020-04-24 DIAGNOSIS — F10.920 ALCOHOLIC INTOXICATION WITHOUT COMPLICATION (H): ICD-10-CM

## 2020-04-24 DIAGNOSIS — N39.0 URINARY TRACT INFECTION ASSOCIATED WITH INDWELLING URETHRAL CATHETER, INITIAL ENCOUNTER (H): ICD-10-CM

## 2020-04-24 DIAGNOSIS — F12.10 CANNABIS ABUSE, CONTINUOUS: ICD-10-CM

## 2020-04-24 DIAGNOSIS — F25.0 SCHIZOAFFECTIVE DISORDER, BIPOLAR TYPE (H): ICD-10-CM

## 2020-04-24 DIAGNOSIS — T83.511A URINARY TRACT INFECTION ASSOCIATED WITH INDWELLING URETHRAL CATHETER, INITIAL ENCOUNTER (H): ICD-10-CM

## 2020-04-24 DIAGNOSIS — F10.10 ALCOHOL ABUSE, CONTINUOUS: ICD-10-CM

## 2020-04-24 DIAGNOSIS — R05.9 COUGH: ICD-10-CM

## 2020-04-24 LAB
ALBUMIN SERPL-MCNC: 3.2 G/DL (ref 3.4–5)
ALBUMIN UR-MCNC: 100 MG/DL
ALP SERPL-CCNC: 54 U/L (ref 40–150)
ALT SERPL W P-5'-P-CCNC: 19 U/L (ref 0–70)
AMPHETAMINES UR QL SCN: NEGATIVE
ANION GAP SERPL CALCULATED.3IONS-SCNC: 7 MMOL/L (ref 3–14)
APAP SERPL-MCNC: <2 MG/L (ref 10–20)
APPEARANCE UR: ABNORMAL
AST SERPL W P-5'-P-CCNC: 11 U/L (ref 0–45)
BACTERIA #/AREA URNS HPF: ABNORMAL /HPF
BARBITURATES UR QL: NEGATIVE
BASOPHILS # BLD AUTO: 0 10E9/L (ref 0–0.2)
BASOPHILS NFR BLD AUTO: 0.8 %
BENZODIAZ UR QL: NEGATIVE
BILIRUB SERPL-MCNC: 0.3 MG/DL (ref 0.2–1.3)
BILIRUB UR QL STRIP: NEGATIVE
BUN SERPL-MCNC: 14 MG/DL (ref 7–30)
CALCIUM SERPL-MCNC: 8.6 MG/DL (ref 8.5–10.1)
CANNABINOIDS UR QL SCN: POSITIVE
CAOX CRY #/AREA URNS HPF: ABNORMAL /HPF
CHLORIDE SERPL-SCNC: 109 MMOL/L (ref 94–109)
CO2 SERPL-SCNC: 28 MMOL/L (ref 20–32)
COCAINE UR QL: NEGATIVE
COHGB MFR BLD: 11 % (ref 0–2)
COHGB MFR BLD: 7.6 % (ref 0–2)
COLOR UR AUTO: ABNORMAL
CREAT SERPL-MCNC: 0.98 MG/DL (ref 0.66–1.25)
DIFFERENTIAL METHOD BLD: NORMAL
EOSINOPHIL # BLD AUTO: 0.2 10E9/L (ref 0–0.7)
EOSINOPHIL NFR BLD AUTO: 3.2 %
ERYTHROCYTE [DISTWIDTH] IN BLOOD BY AUTOMATED COUNT: 13.8 % (ref 10–15)
ETHANOL SERPL-MCNC: 0.17 G/DL
ETHANOL UR QL SCN: POSITIVE
GFR SERPL CREATININE-BSD FRML MDRD: 80 ML/MIN/{1.73_M2}
GLUCOSE SERPL-MCNC: 90 MG/DL (ref 70–99)
GLUCOSE UR STRIP-MCNC: NEGATIVE MG/DL
HCT VFR BLD AUTO: 41.2 % (ref 40–53)
HGB BLD-MCNC: 13.7 G/DL (ref 13.3–17.7)
HGB UR QL STRIP: ABNORMAL
HYALINE CASTS #/AREA URNS LPF: 8 /LPF (ref 0–2)
IMM GRANULOCYTES # BLD: 0 10E9/L (ref 0–0.4)
IMM GRANULOCYTES NFR BLD: 0.2 %
KETONES UR STRIP-MCNC: NEGATIVE MG/DL
LEUKOCYTE ESTERASE UR QL STRIP: ABNORMAL
LYMPHOCYTES # BLD AUTO: 1.6 10E9/L (ref 0.8–5.3)
LYMPHOCYTES NFR BLD AUTO: 32.5 %
MCH RBC QN AUTO: 30.3 PG (ref 26.5–33)
MCHC RBC AUTO-ENTMCNC: 33.3 G/DL (ref 31.5–36.5)
MCV RBC AUTO: 91 FL (ref 78–100)
MONOCYTES # BLD AUTO: 0.4 10E9/L (ref 0–1.3)
MONOCYTES NFR BLD AUTO: 7.6 %
MUCOUS THREADS #/AREA URNS LPF: PRESENT /LPF
NEUTROPHILS # BLD AUTO: 2.8 10E9/L (ref 1.6–8.3)
NEUTROPHILS NFR BLD AUTO: 55.7 %
NITRATE UR QL: POSITIVE
NRBC # BLD AUTO: 0 10*3/UL
NRBC BLD AUTO-RTO: 0 /100
OPIATES UR QL SCN: NEGATIVE
PH UR STRIP: 6 PH (ref 5–7)
PLATELET # BLD AUTO: 247 10E9/L (ref 150–450)
POTASSIUM SERPL-SCNC: 3.3 MMOL/L (ref 3.4–5.3)
PROT SERPL-MCNC: 7.1 G/DL (ref 6.8–8.8)
RBC # BLD AUTO: 4.52 10E12/L (ref 4.4–5.9)
RBC #/AREA URNS AUTO: >182 /HPF (ref 0–2)
SALICYLATES SERPL-MCNC: 2 MG/DL
SARS-COV-2 PCR COMMENT: NORMAL
SARS-COV-2 RNA SPEC QL NAA+PROBE: NEGATIVE
SARS-COV-2 RNA SPEC QL NAA+PROBE: NORMAL
SODIUM SERPL-SCNC: 144 MMOL/L (ref 133–144)
SOURCE: ABNORMAL
SP GR UR STRIP: 1.02 (ref 1–1.03)
SPECIMEN SOURCE: NORMAL
SPECIMEN SOURCE: NORMAL
SQUAMOUS #/AREA URNS AUTO: 1 /HPF (ref 0–1)
UROBILINOGEN UR STRIP-MCNC: NORMAL MG/DL (ref 0–2)
WBC # BLD AUTO: 5 10E9/L (ref 4–11)
WBC #/AREA URNS AUTO: 135 /HPF (ref 0–5)
WBC CLUMPS #/AREA URNS HPF: PRESENT /HPF

## 2020-04-24 PROCEDURE — 99285 EMERGENCY DEPT VISIT HI MDM: CPT | Mod: Z6 | Performed by: EMERGENCY MEDICINE

## 2020-04-24 PROCEDURE — 80329 ANALGESICS NON-OPIOID 1 OR 2: CPT | Performed by: EMERGENCY MEDICINE

## 2020-04-24 PROCEDURE — 85025 COMPLETE CBC W/AUTO DIFF WBC: CPT | Performed by: EMERGENCY MEDICINE

## 2020-04-24 PROCEDURE — 87088 URINE BACTERIA CULTURE: CPT | Performed by: EMERGENCY MEDICINE

## 2020-04-24 PROCEDURE — 96361 HYDRATE IV INFUSION ADD-ON: CPT | Performed by: EMERGENCY MEDICINE

## 2020-04-24 PROCEDURE — 25000132 ZZH RX MED GY IP 250 OP 250 PS 637: Performed by: EMERGENCY MEDICINE

## 2020-04-24 PROCEDURE — 80053 COMPREHEN METABOLIC PANEL: CPT | Performed by: EMERGENCY MEDICINE

## 2020-04-24 PROCEDURE — 87186 SC STD MICRODIL/AGAR DIL: CPT | Performed by: EMERGENCY MEDICINE

## 2020-04-24 PROCEDURE — 80307 DRUG TEST PRSMV CHEM ANLYZR: CPT | Performed by: EMERGENCY MEDICINE

## 2020-04-24 PROCEDURE — 25800030 ZZH RX IP 258 OP 636: Performed by: EMERGENCY MEDICINE

## 2020-04-24 PROCEDURE — 90791 PSYCH DIAGNOSTIC EVALUATION: CPT

## 2020-04-24 PROCEDURE — 82375 ASSAY CARBOXYHB QUANT: CPT | Performed by: FAMILY MEDICINE

## 2020-04-24 PROCEDURE — 82375 ASSAY CARBOXYHB QUANT: CPT | Performed by: EMERGENCY MEDICINE

## 2020-04-24 PROCEDURE — 81001 URINALYSIS AUTO W/SCOPE: CPT | Performed by: EMERGENCY MEDICINE

## 2020-04-24 PROCEDURE — 99285 EMERGENCY DEPT VISIT HI MDM: CPT | Mod: 25 | Performed by: EMERGENCY MEDICINE

## 2020-04-24 PROCEDURE — 87635 SARS-COV-2 COVID-19 AMP PRB: CPT | Performed by: EMERGENCY MEDICINE

## 2020-04-24 PROCEDURE — 96360 HYDRATION IV INFUSION INIT: CPT | Performed by: EMERGENCY MEDICINE

## 2020-04-24 PROCEDURE — 80320 DRUG SCREEN QUANTALCOHOLS: CPT | Performed by: EMERGENCY MEDICINE

## 2020-04-24 PROCEDURE — 87086 URINE CULTURE/COLONY COUNT: CPT | Performed by: EMERGENCY MEDICINE

## 2020-04-24 RX ORDER — CEPHALEXIN 500 MG/1
500 CAPSULE ORAL 2 TIMES DAILY
Qty: 20 CAPSULE | Refills: 0 | Status: SHIPPED | OUTPATIENT
Start: 2020-04-24 | End: 2020-04-30

## 2020-04-24 RX ORDER — CEPHALEXIN 500 MG/1
500 CAPSULE ORAL ONCE
Status: COMPLETED | OUTPATIENT
Start: 2020-04-24 | End: 2020-04-24

## 2020-04-24 RX ORDER — SODIUM CHLORIDE 9 MG/ML
1000 INJECTION, SOLUTION INTRAVENOUS CONTINUOUS
Status: DISCONTINUED | OUTPATIENT
Start: 2020-04-24 | End: 2020-04-25

## 2020-04-24 RX ADMIN — CEPHALEXIN 500 MG: 500 CAPSULE ORAL at 18:49

## 2020-04-24 RX ADMIN — SODIUM CHLORIDE 1000 ML: 9 INJECTION, SOLUTION INTRAVENOUS at 16:43

## 2020-04-24 RX ADMIN — SODIUM CHLORIDE 1000 ML: 9 INJECTION, SOLUTION INTRAVENOUS at 18:45

## 2020-04-24 ASSESSMENT — ENCOUNTER SYMPTOMS
DYSPHORIC MOOD: 1
FEVER: 0

## 2020-04-24 NOTE — ED PROVIDER NOTES
"    [facility] EMERGENCY DEPARTMENT ([facility])     April 24, 2020  History     Chief Complaint   Patient presents with     Suicidal     attempting suicide by locking himself in the garage with vehicle running.      The history is provided by the patient and medical records.     Austyn Leach is a 65 year old male with a medical history significant for delusions, nicole, schizoaffective bipolar type, and suicide attempts, who presents to the Ed today for suicidal ideations. According to EMS, patient was found in garage with his car running in an attempt to asphyxiate himself. Patient denies being suicidal but states he has \"had enough of life\". When asked why he said this he stated \"I'm sick of my wife and just sick of everything.\" Patient was recently seen for a suicide attempt by overdose on Seroquel on (04/03/2020), and previously diagnosed with pneumonia on (4/10/2020). He denies any use of drugs or alcohol, or any fever.    I have reviewed the Medications, Allergies, Past Medical and Surgical History, and Social History in the [employer] system.  PAST MEDICAL HISTORY:   Past Medical History:   Diagnosis Date     Mild intermittent asthma     uses primatent       PAST SURGICAL HISTORY:   Past Surgical History:   Procedure Laterality Date     NO HISTORY OF SURGERY         Past medical history, past surgical history, medications, and allergies were reviewed with the patient. Additional pertinent items: None    FAMILY HISTORY:   Family History   Problem Relation Age of Onset     C.A.D. Father      Hypertension Father      Breast Cancer Mother      Diabetes No family hx of      Cerebrovascular Disease No family hx of        SOCIAL HISTORY:   Social History     Tobacco Use     Smoking status: Current Every Day Smoker     Packs/day: 1.00     Years: 58.00     Pack years: 58.00     Types: Cigarettes     Smokeless tobacco: Never Used   Substance Use Topics     Alcohol use: No     Social history was reviewed with the " patient. Additional pertinent items: None      Patient's Medications   New Prescriptions    No medications on file   Previous Medications    OMEPRAZOLE (PRILOSEC) 40 MG DR CAPSULE    Take 1 capsule (40 mg) by mouth daily    QUETIAPINE (SEROQUEL) 200 MG TABLET    Take 1 tablet (200 mg) by mouth At Bedtime    RISPERIDONE (RISPERDAL) 4 MG TABLET    Take 1 tablet (4 mg) by mouth At Bedtime    RISPERIDONE MICROSPHERES ER (RISPERDAL CONSTA) 50 MG INJECTION    Inject 50 mg into the muscle every 28 days    TAMSULOSIN (FLOMAX) 0.4 MG CAPSULE    Take 1 capsule (0.4 mg) by mouth daily   Modified Medications    No medications on file   Discontinued Medications    No medications on file          Allergies   Allergen Reactions     Bee Venom Shortness Of Breath and Swelling     Propranolol         Review of Systems   Constitutional: Negative for fever.   Psychiatric/Behavioral: Positive for dysphoric mood, self-injury (Self-asphyxiation) and suicidal ideas.   All other systems reviewed and are negative.    A complete review of systems was performed with pertinent positives and negatives noted in the HPI, and all other systems negative.       Physical Exam   BP: 117/73  Pulse: 94  Temp: 98.6  F (37  C)  Resp: 16  SpO2: 94 %      Physical Exam  Vitals signs and nursing note reviewed.   Constitutional:       General: He is not in acute distress.     Appearance: He is well-developed.   HENT:      Head: Normocephalic.   Eyes:      Extraocular Movements: Extraocular movements intact.   Neck:      Musculoskeletal: Neck supple.   Pulmonary:      Effort: No respiratory distress.   Genitourinary:     Comments: Urinary catheter in place  Musculoskeletal:         General: No deformity.   Skin:     General: Skin is dry.   Neurological:      Mental Status: He is alert.      Comments: Oriented   Psychiatric:         Behavior: Behavior normal.         ED Course   4:15 PM  The patient was seen and examined by Armani Burger DO in Room ED11..      ED Course as of Apr 24 2103 Fri Apr 24, 2020   1835 Carbon monoxide   1942 Carbon Monoxide(!!): 11.0     Procedures     Results for orders placed or performed during the hospital encounter of 04/24/20 (from the past 24 hour(s))   CBC with platelets differential   Result Value Ref Range    WBC 5.0 4.0 - 11.0 10e9/L    RBC Count 4.52 4.4 - 5.9 10e12/L    Hemoglobin 13.7 13.3 - 17.7 g/dL    Hematocrit 41.2 40.0 - 53.0 %    MCV 91 78 - 100 fl    MCH 30.3 26.5 - 33.0 pg    MCHC 33.3 31.5 - 36.5 g/dL    RDW 13.8 10.0 - 15.0 %    Platelet Count 247 150 - 450 10e9/L    Diff Method Automated Method     % Neutrophils 55.7 %    % Lymphocytes 32.5 %    % Monocytes 7.6 %    % Eosinophils 3.2 %    % Basophils 0.8 %    % Immature Granulocytes 0.2 %    Nucleated RBCs 0 0 /100    Absolute Neutrophil 2.8 1.6 - 8.3 10e9/L    Absolute Lymphocytes 1.6 0.8 - 5.3 10e9/L    Absolute Monocytes 0.4 0.0 - 1.3 10e9/L    Absolute Eosinophils 0.2 0.0 - 0.7 10e9/L    Absolute Basophils 0.0 0.0 - 0.2 10e9/L    Abs Immature Granulocytes 0.0 0 - 0.4 10e9/L    Absolute Nucleated RBC 0.0    Comprehensive metabolic panel   Result Value Ref Range    Sodium 144 133 - 144 mmol/L    Potassium 3.3 (L) 3.4 - 5.3 mmol/L    Chloride 109 94 - 109 mmol/L    Carbon Dioxide 28 20 - 32 mmol/L    Anion Gap 7 3 - 14 mmol/L    Glucose 90 70 - 99 mg/dL    Urea Nitrogen 14 7 - 30 mg/dL    Creatinine 0.98 0.66 - 1.25 mg/dL    GFR Estimate 80 >60 mL/min/[1.73_m2]    GFR Estimate If Black >90 >60 mL/min/[1.73_m2]    Calcium 8.6 8.5 - 10.1 mg/dL    Bilirubin Total 0.3 0.2 - 1.3 mg/dL    Albumin 3.2 (L) 3.4 - 5.0 g/dL    Protein Total 7.1 6.8 - 8.8 g/dL    Alkaline Phosphatase 54 40 - 150 U/L    ALT 19 0 - 70 U/L    AST 11 0 - 45 U/L   Alcohol ethyl   Result Value Ref Range    Ethanol g/dL 0.17 (H) <0.01 g/dL   Acetaminophen level   Result Value Ref Range    Acetaminophen Level <2 mg/L   Salicylate level   Result Value Ref Range    Salicylate Level 2 mg/dL   UA reflex  to Microscopic and Culture    Specimen: Urine catheter; Catheterized Urine   Result Value Ref Range    Color Urine Light Red     Appearance Urine Slightly Cloudy     Glucose Urine Negative NEG^Negative mg/dL    Bilirubin Urine Negative NEG^Negative    Ketones Urine Negative NEG^Negative mg/dL    Specific Gravity Urine 1.016 1.003 - 1.035    Blood Urine Large (A) NEG^Negative    pH Urine 6.0 5.0 - 7.0 pH    Protein Albumin Urine 100 (A) NEG^Negative mg/dL    Urobilinogen mg/dL Normal 0.0 - 2.0 mg/dL    Nitrite Urine Positive (A) NEG^Negative    Leukocyte Esterase Urine Large (A) NEG^Negative    Source Catheterized Urine     RBC Urine >182 (H) 0 - 2 /HPF    WBC Urine 135 (H) 0 - 5 /HPF    WBC Clumps Present (A) NEG^Negative /HPF    Bacteria Urine Moderate (A) NEG^Negative /HPF    Squamous Epithelial /HPF Urine 1 0 - 1 /HPF    Mucous Urine Present (A) NEG^Negative /LPF    Hyaline Casts 8 (H) 0 - 2 /LPF    Calcium Oxalate Few (A) NEG^Negative /HPF   Drug abuse screen 6 urine (chem dep)   Result Value Ref Range    Amphetamine Qual Urine Negative NEG^Negative    Barbiturates Qual Urine Negative NEG^Negative    Benzodiazepine Qual Urine Negative NEG^Negative    Cannabinoids Qual Urine Positive (A) NEG^Negative    Cocaine Qual Urine Negative NEG^Negative    Ethanol Qual Urine Positive (A) NEG^Negative    Opiates Qualitative Urine Negative NEG^Negative   COVID-19 Virus (Coronavirus) by PCR Nasopharyngeal    Specimen: Nasopharyngeal   Result Value Ref Range    COVID-19 Virus PCR to [facility] - Source Nasopharyngeal     COVID-19 Virus PCR to [facility] - Result       Test received-See reflex to IDDL test SARS CoV2 (COVID-19) Virus RT-PCR   SARS-CoV-2 COVID-19 Virus (Coronavirus) RT-PCR Nasopharyngeal    Specimen: Nasopharyngeal   Result Value Ref Range    SARS-CoV-2 Virus Specimen Source Nasopharyngeal     SARS-CoV-2 PCR Result NEGATIVE     SARS-CoV-2 PCR Comment       This automated, real-time RT-PCR assay by [employer] on the  [employer] has   been given Emergency Use Authorization (EUA) for the in vitro qualitative detection of RNA   from the SARS-CoV2 virus in nasopharyngeal swabs in viral transport medium from patients   with signs and symptoms of infection who are suspected of COVID-19. The performance is   unknown in asymptomatic patients.     Carbon monoxide   Result Value Ref Range    Carbon Monoxide 11.0 (HH) 0 - 2 %     Medications   0.9% sodium chloride BOLUS (0 mLs Intravenous Stopped 4/24/20 1844)     Followed by   sodium chloride 0.9% infusion (1,000 mLs Intravenous New Bag 4/24/20 1845)   cephALEXin (KEFLEX) capsule 500 mg (500 mg Oral Given 4/24/20 1849)      Results for orders placed or performed during the hospital encounter of 04/24/20   CBC with platelets differential     Status: None   Result Value Ref Range    WBC 5.0 4.0 - 11.0 10e9/L    RBC Count 4.52 4.4 - 5.9 10e12/L    Hemoglobin 13.7 13.3 - 17.7 g/dL    Hematocrit 41.2 40.0 - 53.0 %    MCV 91 78 - 100 fl    MCH 30.3 26.5 - 33.0 pg    MCHC 33.3 31.5 - 36.5 g/dL    RDW 13.8 10.0 - 15.0 %    Platelet Count 247 150 - 450 10e9/L    Diff Method Automated Method     % Neutrophils 55.7 %    % Lymphocytes 32.5 %    % Monocytes 7.6 %    % Eosinophils 3.2 %    % Basophils 0.8 %    % Immature Granulocytes 0.2 %    Nucleated RBCs 0 0 /100    Absolute Neutrophil 2.8 1.6 - 8.3 10e9/L    Absolute Lymphocytes 1.6 0.8 - 5.3 10e9/L    Absolute Monocytes 0.4 0.0 - 1.3 10e9/L    Absolute Eosinophils 0.2 0.0 - 0.7 10e9/L    Absolute Basophils 0.0 0.0 - 0.2 10e9/L    Abs Immature Granulocytes 0.0 0 - 0.4 10e9/L    Absolute Nucleated RBC 0.0    Comprehensive metabolic panel     Status: Abnormal   Result Value Ref Range    Sodium 144 133 - 144 mmol/L    Potassium 3.3 (L) 3.4 - 5.3 mmol/L    Chloride 109 94 - 109 mmol/L    Carbon Dioxide 28 20 - 32 mmol/L    Anion Gap 7 3 - 14 mmol/L    Glucose 90 70 - 99 mg/dL    Urea Nitrogen 14 7 - 30 mg/dL    Creatinine 0.98 0.66 -  1.25 mg/dL    GFR Estimate 80 >60 mL/min/[1.73_m2]    GFR Estimate If Black >90 >60 mL/min/[1.73_m2]    Calcium 8.6 8.5 - 10.1 mg/dL    Bilirubin Total 0.3 0.2 - 1.3 mg/dL    Albumin 3.2 (L) 3.4 - 5.0 g/dL    Protein Total 7.1 6.8 - 8.8 g/dL    Alkaline Phosphatase 54 40 - 150 U/L    ALT 19 0 - 70 U/L    AST 11 0 - 45 U/L   Alcohol ethyl     Status: Abnormal   Result Value Ref Range    Ethanol g/dL 0.17 (H) <0.01 g/dL   UA reflex to Microscopic and Culture     Status: Abnormal    Specimen: Urine catheter; Catheterized Urine   Result Value Ref Range    Color Urine Light Red     Appearance Urine Slightly Cloudy     Glucose Urine Negative NEG^Negative mg/dL    Bilirubin Urine Negative NEG^Negative    Ketones Urine Negative NEG^Negative mg/dL    Specific Gravity Urine 1.016 1.003 - 1.035    Blood Urine Large (A) NEG^Negative    pH Urine 6.0 5.0 - 7.0 pH    Protein Albumin Urine 100 (A) NEG^Negative mg/dL    Urobilinogen mg/dL Normal 0.0 - 2.0 mg/dL    Nitrite Urine Positive (A) NEG^Negative    Leukocyte Esterase Urine Large (A) NEG^Negative    Source Catheterized Urine     RBC Urine >182 (H) 0 - 2 /HPF    WBC Urine 135 (H) 0 - 5 /HPF    WBC Clumps Present (A) NEG^Negative /HPF    Bacteria Urine Moderate (A) NEG^Negative /HPF    Squamous Epithelial /HPF Urine 1 0 - 1 /HPF    Mucous Urine Present (A) NEG^Negative /LPF    Hyaline Casts 8 (H) 0 - 2 /LPF    Calcium Oxalate Few (A) NEG^Negative /HPF   Drug abuse screen 6 urine (chem dep)     Status: Abnormal   Result Value Ref Range    Amphetamine Qual Urine Negative NEG^Negative    Barbiturates Qual Urine Negative NEG^Negative    Benzodiazepine Qual Urine Negative NEG^Negative    Cannabinoids Qual Urine Positive (A) NEG^Negative    Cocaine Qual Urine Negative NEG^Negative    Ethanol Qual Urine Positive (A) NEG^Negative    Opiates Qualitative Urine Negative NEG^Negative   Acetaminophen level     Status: None   Result Value Ref Range    Acetaminophen Level <2 mg/L   Salicylate  level     Status: None   Result Value Ref Range    Salicylate Level 2 mg/dL   COVID-19 Virus (Coronavirus) by PCR Nasopharyngeal     Status: None    Specimen: Nasopharyngeal   Result Value Ref Range    COVID-19 Virus PCR to [facility] - Source Nasopharyngeal     COVID-19 Virus PCR to [facility] - Result       Test received-See reflex to IDDL test SARS CoV2 (COVID-19) Virus RT-PCR   SARS-CoV-2 COVID-19 Virus (Coronavirus) RT-PCR Nasopharyngeal     Status: None    Specimen: Nasopharyngeal   Result Value Ref Range    SARS-CoV-2 Virus Specimen Source Nasopharyngeal     SARS-CoV-2 PCR Result NEGATIVE     SARS-CoV-2 PCR Comment       This automated, real-time RT-PCR assay by [employer] on the [employer] has   been given Emergency Use Authorization (EUA) for the in vitro qualitative detection of RNA   from the SARS-CoV2 virus in nasopharyngeal swabs in viral transport medium from patients   with signs and symptoms of infection who are suspected of COVID-19. The performance is   unknown in asymptomatic patients.     Carbon monoxide     Status: Abnormal   Result Value Ref Range    Carbon Monoxide 11.0 (HH) 0 - 2 %            Assessments & Plan (with Medical Decision Making)   65-year-old male presents to us with a chief complaint of depression and suicide attempt.  Differential includes but not limited to suicide attempt, carbon monoxide poisoning, alcohol intoxication, depression.  Vital signs are unremarkable.  UA shows evidence of a UTI.  We will treat the patient with Keflex.  I did recommend to the patient we change as urinary catheter which he refused.  Alcohol is positive at 0.17.  Carbon monoxide was elevated at 11.  Patient was placed on 100% O2.  Carbon monoxide level improved he denies any symptoms such as confusion, nausea, vomiting, dizziness.  COVID-19 swab was performed as the patient likely will need admission and was observed coughing.  This was negative.  Patient evaluated by [facility]  .  She and I both feel the patient is appropriate for inpatient psychiatric admission.  Patient is currently medically cleared.  He will need to continue his Keflex but should be stable to be admitted to the behavioral health unit.  Because the patient did attempt to kill himself and does not want to stay he was placed on a 72-hour hold    I have reviewed the nursing notes.    I have reviewed the findings, diagnosis, plan and need for follow up with the patient.    New Prescriptions    No medications on file       Final diagnoses:   Alcoholic intoxication without complication (H)   Suicide attempt by carbon monoxide poisoning, initial encounter (H)   ICruz am serving as a trained medical scribe to document services personally performed by Armani Burger DO, based on the provider's statements to me.     Armani ROSENTHAL DO, was physically present and have reviewed and verified the accuracy of this note documented by Cruz Mcrae.      4/24/2020   [facility], [place], EMERGENCY DEPARTMENT     Armani Burger DO  04/24/20 [id number]

## 2020-04-25 PROCEDURE — 12400001 ZZH R&B MH UMMC

## 2020-04-25 PROCEDURE — 25000132 ZZH RX MED GY IP 250 OP 250 PS 637: Performed by: PSYCHIATRY & NEUROLOGY

## 2020-04-25 PROCEDURE — 99223 1ST HOSP IP/OBS HIGH 75: CPT | Mod: 95 | Performed by: PSYCHIATRY & NEUROLOGY

## 2020-04-25 PROCEDURE — 25000132 ZZH RX MED GY IP 250 OP 250 PS 637: Performed by: PHYSICIAN ASSISTANT

## 2020-04-25 RX ORDER — POTASSIUM CHLORIDE 1500 MG/1
20 TABLET, EXTENDED RELEASE ORAL ONCE
Status: DISCONTINUED | OUTPATIENT
Start: 2020-04-25 | End: 2020-04-30 | Stop reason: HOSPADM

## 2020-04-25 RX ORDER — CIPROFLOXACIN 500 MG/1
500 TABLET, FILM COATED ORAL EVERY 12 HOURS SCHEDULED
Status: DISCONTINUED | OUTPATIENT
Start: 2020-04-25 | End: 2020-04-30 | Stop reason: HOSPADM

## 2020-04-25 RX ORDER — CEPHALEXIN 500 MG/1
500 CAPSULE ORAL EVERY 12 HOURS SCHEDULED
Status: DISCONTINUED | OUTPATIENT
Start: 2020-04-25 | End: 2020-04-25

## 2020-04-25 RX ORDER — TAMSULOSIN HYDROCHLORIDE 0.4 MG/1
0.4 CAPSULE ORAL DAILY
Status: DISCONTINUED | OUTPATIENT
Start: 2020-04-25 | End: 2020-04-27

## 2020-04-25 RX ORDER — HYDROXYZINE HYDROCHLORIDE 25 MG/1
25 TABLET, FILM COATED ORAL EVERY 4 HOURS PRN
Status: DISCONTINUED | OUTPATIENT
Start: 2020-04-25 | End: 2020-04-30 | Stop reason: HOSPADM

## 2020-04-25 RX ORDER — RISPERIDONE 4 MG/1
4 TABLET ORAL AT BEDTIME
Status: DISCONTINUED | OUTPATIENT
Start: 2020-04-25 | End: 2020-04-30 | Stop reason: HOSPADM

## 2020-04-25 RX ORDER — CIPROFLOXACIN 500 MG/1
500 TABLET, FILM COATED ORAL EVERY 12 HOURS SCHEDULED
Status: DISCONTINUED | OUTPATIENT
Start: 2020-04-25 | End: 2020-04-25

## 2020-04-25 RX ORDER — OLANZAPINE 10 MG/2ML
5 INJECTION, POWDER, FOR SOLUTION INTRAMUSCULAR
Status: DISCONTINUED | OUTPATIENT
Start: 2020-04-25 | End: 2020-04-30 | Stop reason: HOSPADM

## 2020-04-25 RX ORDER — OLANZAPINE 5 MG/1
5 TABLET ORAL
Status: DISCONTINUED | OUTPATIENT
Start: 2020-04-25 | End: 2020-04-30 | Stop reason: HOSPADM

## 2020-04-25 RX ORDER — QUETIAPINE FUMARATE 200 MG/1
200 TABLET, FILM COATED ORAL AT BEDTIME
Status: DISCONTINUED | OUTPATIENT
Start: 2020-04-25 | End: 2020-04-30 | Stop reason: HOSPADM

## 2020-04-25 RX ORDER — TRAZODONE HYDROCHLORIDE 50 MG/1
50 TABLET, FILM COATED ORAL
Status: DISCONTINUED | OUTPATIENT
Start: 2020-04-25 | End: 2020-04-30 | Stop reason: HOSPADM

## 2020-04-25 RX ORDER — ALUMINA, MAGNESIA, AND SIMETHICONE 2400; 2400; 240 MG/30ML; MG/30ML; MG/30ML
30 SUSPENSION ORAL EVERY 4 HOURS PRN
Status: DISCONTINUED | OUTPATIENT
Start: 2020-04-25 | End: 2020-04-30 | Stop reason: HOSPADM

## 2020-04-25 RX ORDER — BISACODYL 10 MG
10 SUPPOSITORY, RECTAL RECTAL DAILY PRN
Status: DISCONTINUED | OUTPATIENT
Start: 2020-04-25 | End: 2020-04-30 | Stop reason: HOSPADM

## 2020-04-25 RX ORDER — ACETAMINOPHEN 325 MG/1
650 TABLET ORAL EVERY 4 HOURS PRN
Status: DISCONTINUED | OUTPATIENT
Start: 2020-04-25 | End: 2020-04-30 | Stop reason: HOSPADM

## 2020-04-25 RX ADMIN — CEPHALEXIN 500 MG: 500 CAPSULE ORAL at 08:16

## 2020-04-25 RX ADMIN — RISPERIDONE 4 MG: 4 TABLET ORAL at 20:22

## 2020-04-25 RX ADMIN — QUETIAPINE 200 MG: 200 TABLET, FILM COATED ORAL at 20:21

## 2020-04-25 RX ADMIN — CIPROFLOXACIN HYDROCHLORIDE 500 MG: 500 TABLET, FILM COATED ORAL at 20:16

## 2020-04-25 RX ADMIN — TAMSULOSIN HYDROCHLORIDE 0.4 MG: 0.4 CAPSULE ORAL at 20:17

## 2020-04-25 RX ADMIN — CIPROFLOXACIN HYDROCHLORIDE 500 MG: 500 TABLET, FILM COATED ORAL at 11:31

## 2020-04-25 ASSESSMENT — ACTIVITIES OF DAILY LIVING (ADL)
TOILETING: 0-->INDEPENDENT
DRESS: SCRUBS (BEHAVIORAL HEALTH);INDEPENDENT
AMBULATION: 0-->INDEPENDENT
DRESS: INDEPENDENT;SCRUBS (BEHAVIORAL HEALTH)
RETIRED_EATING: 0-->INDEPENDENT
TRANSFERRING: 0-->INDEPENDENT
BATHING: 0-->INDEPENDENT
HYGIENE/GROOMING: INDEPENDENT
ORAL_HYGIENE: INDEPENDENT
HYGIENE/GROOMING: INDEPENDENT
SWALLOWING: 0-->SWALLOWS FOODS/LIQUIDS WITHOUT DIFFICULTY
COGNITION: 0 - NO COGNITION ISSUES REPORTED
RETIRED_COMMUNICATION: 0-->UNDERSTANDS/COMMUNICATES WITHOUT DIFFICULTY
DRESS: 0-->INDEPENDENT
FALL_HISTORY_WITHIN_LAST_SIX_MONTHS: NO

## 2020-04-25 ASSESSMENT — MIFFLIN-ST. JEOR: SCORE: 1712.05

## 2020-04-25 NOTE — PLAN OF CARE
"S: Austyn \"Kendall\" Hany, 65/M admitted on 72 hour hold to [facility] after attempting suicide by carbon monoxide poisoning. Pt was intoxicated ETOH 0.17 and carbon monoxide level 11.  Mental Health Hx: Schizoaffective bipolar type, nicole, delusions, suicide attempts  Medical: Wu catheter currently in place, 4/24/20 UTI, 4/10/20 pneumonia    B: Committed and [facility] through [facility]. Last  admission 4/3/20 after overdose on seroquel.     A: Pt appeared aggitated and irritable. Stated he wanted to go to bed. Pt uncooperative with interview, many times stated \"I won't take my medications.\" Pt refused to verify medications and answered very few questions. Pt not reliable and medications, allergies, and suicidality should be reassessed. Poor insight, stated no current SI. UTOX positive ETOH and Cannabinoids. Pt refused to have ED staff change catheter and refuses to keep urine bag off of the floor.     R: SIO 1:1 placed due to suicidal risk with wu catheter. Precautions: Suicide. Single room. First dose of Keflex taken in ED for UTI. Will need to assess if pt needs medical bed for wu catheter.  "

## 2020-04-25 NOTE — PHARMACY-ADMISSION MEDICATION HISTORY
Admission Medication History Completed by Pharmacy    Sources:   - Outpatient pharmacy fill records    [facility] pharmacy: omeprazole and tamsulosin   [facility] pharmacy in [place] ([phone]): quetiapine and risperidone  - Patient interview (via phone due to enhanced contact precautions)  - Left  for , Lenora Pepe ([phone]), never returned call    Additional Information:   1) Writer called [facility] pharmacy and verified quetiapine and risperidone prescriptions, both prescribed by Dr. Clayton from [facility]    2) Unable to verify Risperidone Consta injection information currently listed in [facility] [place] med list. Patient stated he is receiving a QUINTANILLA antipsychotic medication, but did not know the name. States he receives it at clinic once monthly and estimated last injection was on 4/4/2020. [facility] [place] [facility] Invega Sustenna 156mg injection was last filled 3/25/2020 (no record of Risperidone Consta injection fills).     3) Addendum 4/27/2020: confirmed current QUINTANILLA is Invega Sustenna 156 mg [id number]. Last dose was administered in clinic on 3/31/2020.   Christian Banks, PharmD  [facility]: [employer] *[phone] or [phone]      Prior to Admission Medications:  Medication Sig Last Fill Date   omeprazole (PRILOSEC) 40 MG DR capsule Take 1 capsule (40 mg) by mouth daily  4/12/2020, qty #30 at [facility]       QUEtiapine (SEROQUEL) 200 MG tablet Take 1 tablet (200 mg) by mouth At Bedtime     3/11/2020 qty #30 at [facility]    risperiDONE (RISPERDAL) 4 MG tablet Take 1 tablet (4 mg) by mouth At Bedtime     3/11/2020 qty #30 at [facility]    paliperidone (INVEGA SUSTENNA) 156 MG/ML VEE injection   Inject 156 mg into the muscle every 28 days 3/31/2020   tamsulosin (FLOMAX) 0.4 MG capsule Take 1 capsule (0.4 mg) by mouth daily 4/12/2020, qty #30 at [facility]            Time spent: 70 minutes  -----------  Elsie Herrera, Pharm.D., [facility]  Behavioral Health Inpatient Pharmacist  [facility] Emergency Department  Phone: *[phone] ([place]) or [phone]

## 2020-04-25 NOTE — PROGRESS NOTES
When Austyn came from the ED, the belongings that came with him, came in 2 plastic bags, sealed and knotted and no information about why it was that way, so the bag was put into locker #10, not searched and unopened.  Contents unknown.  Besides plastic bag there is a white t-shirt and a pair of socks and 2 pieces of paper.    04/25/20 0313   Valuables   Patient Belongings locker   Patient Belongings Put in Hospital Secure Location (Security or Locker, etc.) other (see comments)

## 2020-04-25 NOTE — PLAN OF CARE
Patient ate most of breakfast he has been irritable and rude with staff.  When this staff brought him his antibiotic and his potassium he refused. After a minute of encouragement he took Cipro and continued to refuse Potassium saying 'shuv it out your ass'

## 2020-04-25 NOTE — PROGRESS NOTES
Checked in with patient and asked if his catheter could be changed and a night bag could be attached later. Patient declined both of these things. Asked writer to fix his bag because it was falling off of him. Bag adjusted and emptied, 200 mL output, bron[place] in color. Medical RN stopped by to speak with patient and he again is declining to have his bag removed and changed but agreed to switch to a night bag when he goes to sleep. Patient reported having chills, a blanket was offered and placed over patients shoulder. Patient declined staff to check vitals and temp in fear that he would be charged. Writer approached him again asking if it could be done. He agreed to only checking his temp which was 98.4 oral. Sitting in lounge area, eating crackers and drinking juice.

## 2020-04-25 NOTE — ED NOTES
ED to Behavioral Floor Handoff    SITUATION  Austyn Leach is a 65 year old male who speaks English and lives in a home with a spouse The patient arrived in the ED by ambulance from home with a complaint of Suicidal (attempting suicide by locking himself in the garage with vehicle running. )  .The patient's current symptoms started/worsened 1 day(s) ago and during this time the symptoms have increased.   In the ED, pt was diagnosed with   Final diagnoses:   Alcoholic intoxication without complication (H)   Suicide attempt by carbon monoxide poisoning, initial encounter (H)        Initial vitals were: BP: 117/73  Pulse: 94  Temp: 98.6  F (37  C)  Resp: 16  SpO2: 94 %   --------  Is the patient diabetic? No   If yes, last blood glucose? --     If yes, was this treated in the ED? --  --------  Is the patient inebriated (ETOH) Yes or Impaired on other substances? No  MSSA done? Yes  Last MSSA score: --    Were withdrawal symptoms treated? N/A  Does the patient have a seizure history? No. If yes, date of most recent seizure--  --------  Is the patient patient experiencing suicidal ideation? denies current or recent suicidal ideation     Homicidal ideation? denies current or recent homicidal ideation or behaviors.    Self-injurious behavior/urges? denies current or recent self injurious behavior or ideation.  ------  Was pt aggressive in the ED No  Was a code called No  Is the pt now cooperative? Yes  -------  Meds given in ED:   Medications   0.9% sodium chloride BOLUS (0 mLs Intravenous Stopped 4/24/20 1844)     Followed by   sodium chloride 0.9% infusion (1,000 mLs Intravenous New Bag 4/24/20 1845)   cephALEXin (KEFLEX) capsule 500 mg (500 mg Oral Given 4/24/20 1849)      Family present during ED course? No  Family currently present? No    BACKGROUND  Does the patient have a cognitive impairment or developmental disability? No  Allergies:   Allergies   Allergen Reactions     Bee Venom Shortness Of Breath and Swelling      Propranolol    .   Social demographics are   Social History     Socioeconomic History     Marital status:      Spouse name: Angela     Number of children: 1     Years of education: Not on file     Highest education level: Not on file   Occupational History     Employer: [employer]   Social Needs     Financial resource strain: Not on file     Food insecurity     Worry: Not on file     Inability: Not on file     Transportation needs     Medical: Not on file     Non-medical: Not on file   Tobacco Use     Smoking status: Current Every Day Smoker     Packs/day: 1.00     Years: 58.00     Pack years: 58.00     Types: Cigarettes     Smokeless tobacco: Never Used   Substance and Sexual Activity     Alcohol use: No     Drug use: No     Sexual activity: Yes     Partners: Female   Lifestyle     Physical activity     Days per week: Not on file     Minutes per session: Not on file     Stress: Not on file   Relationships     Social connections     Talks on phone: Not on file     Gets together: Not on file     Attends Mandaen service: Not on file     Active member of club or organization: Not on file     Attends meetings of clubs or organizations: Not on file     Relationship status: Not on file     Intimate partner violence     Fear of current or ex partner: Not on file     Emotionally abused: Not on file     Physically abused: Not on file     Forced sexual activity: Not on file   Other Topics Concern     Parent/sibling w/ CABG, MI or angioplasty before 65F 55M? Yes   Social History Narrative    Born and raised in [place] has 2 siblings that he does not have contact with raised by both mother and father no abuse history or neglect.  Completed school on time and went into [employer] work did that for many years has not been working recently.  Has the one marriage from a mail order bride service as he describes it 1 previous child from a previous relationship and one child with his wife.  He has contact with  his son.  He was never in the .  Currently lives in a house with his wife enjoys writing books and going to the gym and exercising.  No access to guns or weapons at the house.        ASSESSMENT  Labs results   Labs Ordered and Resulted from Time of ED Arrival Up to the Time of Departure from the ED   COMPREHENSIVE METABOLIC PANEL - Abnormal; Notable for the following components:       Result Value    Potassium 3.3 (*)     Albumin 3.2 (*)     All other components within normal limits   ALCOHOL ETHYL - Abnormal; Notable for the following components:    Ethanol g/dL 0.17 (*)     All other components within normal limits   UA MACROSCOPIC WITH REFLEX TO MICRO AND CULTURE - Abnormal; Notable for the following components:    Blood Urine Large (*)     Protein Albumin Urine 100 (*)     Nitrite Urine Positive (*)     Leukocyte Esterase Urine Large (*)     RBC Urine >182 (*)     WBC Urine 135 (*)     WBC Clumps Present (*)     Bacteria Urine Moderate (*)     Mucous Urine Present (*)     Hyaline Casts 8 (*)     Calcium Oxalate Few (*)     All other components within normal limits   DRUG ABUSE SCREEN 6 CHEM DEP URINE ([facility]) - Abnormal; Notable for the following components:    Cannabinoids Qual Urine Positive (*)     Ethanol Qual Urine Positive (*)     All other components within normal limits   CARBON MONOXIDE - Abnormal; Notable for the following components:    Carbon Monoxide 11.0 (*)     All other components within normal limits   CBC WITH PLATELETS DIFFERENTIAL   ACETAMINOPHEN LEVEL   SALICYLATE LEVEL   COVID-19 VIRUS (CORONAVIRUS) BY PCR   SARS-COV-2 (COVID-19) VIRUS RT-PCR   CARBON MONOXIDE   UA MACROSCOPIC WITH REFLEX TO MICRO AND CULTURE   PERIPHERAL IV CATHETER   CHRONIC URINARY MANAGEMENT   URINE CULTURE AEROBIC BACTERIAL      Imaging Studies: No results found for this or any previous visit (from the past 24 hour(s)).   Most recent vital signs /73   Pulse 94   Temp 98.6  F (37  C) (Tympanic)   Resp 16    SpO2 94%    Abnormal labs/tests/findings requiring intervention:---   Pain control: pt had none  Nausea control: pt had none    RECOMMENDATION  Are any infection precautions needed (MRSA, VRE, etc.)? No If yes, what infection? --  ---  Does the patient have mobility issues? independently. If yes, what device does the pt use? ---  ---  Is patient on 72 hour hold or commitment? Yes If on 72 hour hold, have hold and rights been given to patient? Yes.  Are admitting orders written if after 10 p.m. ?N/A  Tasks needing to be completed:---     Bonnie Guzman, RN    [phone] [place] [facility]   [phone] [facility]

## 2020-04-25 NOTE — PROGRESS NOTES
"Medical flyer asked to assess wu catheter and drainage bag.  Patient was walking in the king with wu catheter hanging out of the open fly of his jeans, walking the king with a 1:1 attendant. Patient was walked to patients room with writer and his assigned RN, Gianluca. When asked why he has a wu catheter patient states \"Because the urine was just coming out\". Patient was not sure how long the catheter had been in place. Writer placed stat lock securement device on right thigh, changed collection bag to leg bag and will follow up to arrange routine cath care every 8 hours per policy, as well as changing leg bag to night drainage bag in the evening. Medical record indicates indwelling wu catheter placed 4/10/2020 due to bladder outlet obstruction. Follow up with Urology is planned for 5/10/2020 for urodynamic studies, wu cath management, and voiding trial.   "

## 2020-04-25 NOTE — PROGRESS NOTES
Patient intermittently visible in the milieu for about 30% of the shift between periods of resting and sleeping in his room today.  Peer interaction limited but respectful and appropriate.  No observed program engagement (did ask to go to one group but space restrictions prevented him).  Overall presentation quiet and withdrawn but generally calm (except for some early shift irritability during his catheter change procedure), pleasant on approach, and responsive to staff inquiries and feedback. Observed mood depressed but stable.  Affect constricted/flat overall with some brightening during conversation.  Verbalized thoughts generally organized and free of delusional references or other overt psychotic distortions.  Patient currently endorses significant depression with passive SI (death wish) without plan and a strong sense of hopelessness about his future.  Currently denies active SI/SIB/HI, AH/VH's, paranoia, or any cognitive processing or memory concerns/issues/acuities.  Patient reported ongoing difficulties with sleep quality in conjunction with nightmares and low energy.  Appetite reported as fair to good.  Patient refused his AM vitals today as well as 2 of his scheduled medications.  Has presented no behavioral management challenges of any kind so far today, self-directed or otherwise.   Raul Rosario   04/25/2020 04/25/20 1315   Sleep/Rest/Relaxation   Day/Evening Time Hours napping;resting in bed   Number of hours napping 2 hours   Number of hours resting in bed 2 hours   Behavioral Health   Hallucinations denies / not responding to hallucinations   Orientation person: oriented;place: oriented   Memory baseline memory   Insight other (see comment)  (limited)   Judgement impaired   Eye Contact at examiner   Affect full range affect   Mood mood is calm;irritable   Physical Appearance/Attire appears stated age;attire appropriate to age and situation;neat   Hygiene other (see comment)  (adequate)    Suicidality other (see comments)  (denied SI)   1. Wish to be Dead (Recent) Yes   2. Non-Specific Active Suicidal Thoughts (Recent) No   Self Injury other (see comment)  (denied current SIB thoughts or urges)   Elopement   (no current indicators)   Activity withdrawn;refusal  (refused VS's, refused select medications)   Speech clear;coherent   Psychomotor / Gait slow;unsteady  (unsteady at times, some shakiness in his hands)   Urethral Catheter   Placement Date/Time: 04/12/20 1406   Pre-existing: No  Rationale for Insertion: Retention;Obstruction  Catheter Balloon Size: 10 mL  Collection Container: Standard  Securement Method: Securing device (Describe)  Urine Returned: Yes   Collection Container Leg bag;Bag changed   Coping/Psychosocial   Verbalized Emotional State depression;hopelessness   Psycho Education   Type of Intervention 1:1 intervention   Response participates with encouragement   Hours 0.5   Treatment Detail current MH status   Safety   Suicidality Status 15   Violence Risk   Feels Like Hurting Others no   Activities of Daily Living   Hygiene/Grooming independent   Dress scrubs (behavioral health);independent   Room Organization independent

## 2020-04-25 NOTE — PROGRESS NOTES
Initial Psychosocial Assessment    I have reviewed the chart, met with the patient, and developed Care Plan.  Information for assessment was obtained from patient and chart notes.     Presenting Problem:  Patient was admitted on a 72 hour hold with depression, suicidal ideation and suicide attempt by CO2 poisoning.  U-tox was positive for ETOH and cannabis.  He is under current commitment and [facility] in [facility].  Documents located in media section of Epic and were printed for current chart.    Upon approach today patient indicates that he has nothing to live for and doesn't think it is worthwhile to take his medications.  He requests his son Minh's phone number but this was not found in chart so he accepted # for ex-wife in order to get Minh's #.      History of Mental Health and Chemical Dependency:  Patient has a history of schizoaffective disorder, bipolar type, alcohol abuse, cannabis abuse.  History of prior suicide attempts.    Family Description (Constellation, Family Psychiatric History):  Patient is  with one adult son.    Significant Life Events (Illness, Abuse, Trauma, Death):  None noted.    Living Situation:  Patient lives with his ex-wife Anegla [phone]    Educational Background:  Patient completed high school and AA degree.    Occupational History:  Patient is not currently employed.  Has worked part-time as a .    Financial Status:  [facility]    Legal Issues:  None     Ethnic/Cultural Issues:  None noted    Spiritual Orientation:  None      Service History:  None     Social Functioning (organization, interests):  Patient indicates he has no interests or hobbies.  He used to enjoy boating.  He talks about going to [employer] and that he is no longer able to do that.  He is depressed regarding stay at home order and being cut off from so much.     Current Treatment Providers are:  [phone]  Psychiatrist is Richard Clayton at [facility]  [place] [phone]  [place].     Social Service Assessment/Plan:  Patient will be seen by the on-call psychiatric staff.  Medications will be offered.  CTC to coordinate care with CCM regarding the status of his commitment.  Expect that Provisional Discharge may be revoked.  Patient will be encouraged to attend unit programming.  CTC available to assist as needed to ensure appropriate aftercare is in place prior to discharge.

## 2020-04-25 NOTE — PROGRESS NOTES
"RAZA Kemp, writer, completed catheter cleansing cares. Patient refused change of catheter at this time saying \"I don't have an infection, I don't need a new catheter\". Will reassess catheter change with next cath care cycle.   "

## 2020-04-25 NOTE — CONSULTS
"Received ASAP consult in telephone readback ordering mode by RN, Carrie Barbosa for \"catheter\".     Circumstances of recent discharge and re-admittance noted. Please refer to recent medicine H&P by Dr. Lincoln in charting dated 04/10/2020 & discharge summary by Dr. Webster dated 04/12/2020, which was reviewed by this writer and is up to date. Please call the on-call [facility] for any f/u medical concerns if they arise.     In short, Austyn Leach is a 65 year old male with a past medical history of schizoaffective d/o, multiple suicide attempts, recent admission to [facility] 04/10-04/12 for ANATOLY found to have bladder outlet obstruction, abdominal pain which is resolved and CAP treated with antibiotics who is admitted to station 30[facility] for depression w/ SI.    Diagnoses:  #Depression, SI:  -Management per psychiatry  #Concern for UTI: UA grossly abnormal (100 protein, positive nitrties, large blood, large LE, microscopy w/ >182 WBC, moderate bacteria, WBC clumps), has indwelling wu placed last admission. Prelim UC w/ >100k non lactose fermenting gram negative rods. Afebrile (tmax 99.3) w/ normal WBC count.   -Transition to cipro 500 mg BID x7 days given prelim UC results  -Will continue follow UC  -Notify medicine if any fever, suprapubic abdominal pain, flank pain, nausea/vomiting  #Bladder outlet obstruction: Indwelling wu in place.   -Wu care per RN flyer, please assess if patient would be willing to have this exchanged given UTI  -Continue flomax  -Urology f/u scheduled for 05/11/2020  #Hypokalemia: K of 3.3. no replacement in ED. Will trend in AM.   #ANATOLY at OSH, resolved: Creat of 0.98 w/ normal labs. UA on admission as above. Thought to be 2/2 bladder outlet obstruction and improved w/ wu. Recheck PRN.   #GERD: Continue Prilosec.   #Multiple liver cysts and renal cysts: Noted on abdominal MRI this admission: Continue OP f/u for surveillance.     Medicine to follow K and UC. Notify medicine if any fevers or " clinical changes, or if any intercurrent medical issues arise.     Katie Barrett PA-C

## 2020-04-26 LAB
BACTERIA SPEC CULT: ABNORMAL
BACTERIA SPEC CULT: ABNORMAL
CHOLEST SERPL-MCNC: 125 MG/DL
HDLC SERPL-MCNC: 44 MG/DL
LDLC SERPL CALC-MCNC: 63 MG/DL
NONHDLC SERPL-MCNC: 81 MG/DL
POTASSIUM SERPL-SCNC: 3.8 MMOL/L (ref 3.4–5.3)
SPECIMEN SOURCE: ABNORMAL
TRIGL SERPL-MCNC: 89 MG/DL
TSH SERPL DL<=0.005 MIU/L-ACNC: 0.97 MU/L (ref 0.4–4)

## 2020-04-26 PROCEDURE — 80061 LIPID PANEL: CPT | Performed by: PSYCHIATRY & NEUROLOGY

## 2020-04-26 PROCEDURE — 25000132 ZZH RX MED GY IP 250 OP 250 PS 637: Performed by: PHYSICIAN ASSISTANT

## 2020-04-26 PROCEDURE — 12400001 ZZH R&B MH UMMC

## 2020-04-26 PROCEDURE — 36415 COLL VENOUS BLD VENIPUNCTURE: CPT | Performed by: PSYCHIATRY & NEUROLOGY

## 2020-04-26 PROCEDURE — 25000132 ZZH RX MED GY IP 250 OP 250 PS 637: Performed by: PSYCHIATRY & NEUROLOGY

## 2020-04-26 PROCEDURE — 84132 ASSAY OF SERUM POTASSIUM: CPT | Performed by: PSYCHIATRY & NEUROLOGY

## 2020-04-26 PROCEDURE — 84443 ASSAY THYROID STIM HORMONE: CPT | Performed by: PSYCHIATRY & NEUROLOGY

## 2020-04-26 RX ADMIN — RISPERIDONE 4 MG: 4 TABLET ORAL at 20:41

## 2020-04-26 RX ADMIN — CIPROFLOXACIN HYDROCHLORIDE 500 MG: 500 TABLET, FILM COATED ORAL at 08:48

## 2020-04-26 RX ADMIN — QUETIAPINE 200 MG: 200 TABLET, FILM COATED ORAL at 20:41

## 2020-04-26 ASSESSMENT — ACTIVITIES OF DAILY LIVING (ADL)
ORAL_HYGIENE: INDEPENDENT
LAUNDRY: UNABLE TO COMPLETE
HYGIENE/GROOMING: INDEPENDENT
HYGIENE/GROOMING: INDEPENDENT
DRESS: INDEPENDENT
ORAL_HYGIENE: INDEPENDENT
LAUNDRY: UNABLE TO COMPLETE
DRESS: STREET CLOTHES;SCRUBS (BEHAVIORAL HEALTH);INDEPENDENT

## 2020-04-26 NOTE — PROGRESS NOTES
"Pt was in a calm mood for most of the evening. He spent most of his time resting in bed, though he came out for dinner/snacks and to watch tv for a short period of time. He was mostly cooperative with instances of stubbornness . He had a blunted affect, though he brightened slightly upon approach. He denies any mental health concerns. He said he is nervous of catching Covid-19, but he also said, \"I might as well get Covid to get it over with.\" When asked what he means by that, he claimed that the virus would kill him if he became infected. He didn't have a goal for the evening. He was encouraged to take a shower, though he said he wanted to take one tomorrow instead. He hopes to discharge and go back home after his 72 hour hold is over.     04/25/20 2040   Behavioral Health   Hallucinations denies / not responding to hallucinations   Thinking other (see comment)  (mostly intact)   Orientation person: oriented;place: oriented   Memory baseline memory   Insight poor   Judgement impaired   Eye Contact at examiner   Affect full range affect   Mood mood is calm;irritable   Physical Appearance/Attire appears stated age;attire appropriate to age and situation   Hygiene neglected grooming - unclean body, hair, teeth   Suicidality other (see comments)  (denies)   1. Wish to be Dead (Recent) Yes   Wish to be Dead Description (Recent) \"I might as well get Covid to get it over with\"   2. Non-Specific Active Suicidal Thoughts (Recent) No   Self Injury other (see comment)  (denies)   Elopement   (none)   Activity isolative   Speech clear;coherent   Medication Sensitivity no stated side effects;no observed side effects   Psychomotor / Gait balanced;steady   Activities of Daily Living   Hygiene/Grooming independent   Oral Hygiene independent   Dress independent;scrubs (behavioral health)   Room Organization independent     "

## 2020-04-26 NOTE — H&P
"Admitted:     04/24/2020      PSYCHIATRIC EVALUATION      The patient was seen via telemedicine because of COVID-19 epidemic on 04/25/2020 for 70 minutes.  Greater than 50% of time was spent on counseling and coordinating care, clarifying diagnostic/prognostic issues, presence of support in community, discussing his medications and his plans for discharge.     The patient is a  who is being evaluated via a video billable telemedicine visit due to Covid-19 epidemy. The patient/guardian has consented to being seen via telemedicine. The provider was in front of a computer in a home office. The patient was on the inpatient unit at [facility].      Start time: 12:50  Stop time: 14:00     The patient/guardian has been notified of the following:   This telemedicine visit is conducted live between you and your clinician. We have found that certain health care needs can be provided without the need for a physical exam. This service lets us provide the care you need with a telemedicine conversation.           CHIEF COMPLAINT AND REASON FOR ADMISSION:  The patient is a 65-year-old  male who was admitted by his ex-wife, [employer] and the police after he voiced threats to kill himself and others.      HISTORY OF PRESENT ILLNESS:  The patient presented as not a very reliable historian and was focused on being discharged from the hospital.  He seemed to be having difficulties with hearing and tried to joke that instead of medications he needed vodka or gin \"to make me feel better.\" Collateral sources, however, stated that the patient's wife called [employer] as well as 911 as patient has been drinking.  He pulled his car into the garage and threatened to carbon monoxide himself.  She came into the garage and found him with the car running.  The couple had been  for 24 years and she  him last July.  The patient is reported to be under civil commitment and [employer] petition that should extent until " 10/2020.  He had been diagnosed with schizoaffective disorder, cannabis use disorder, alcohol use disorder.  Has a history of poor compliance with his medications.  Has been drinking and using marijuana.  According to his DEC 's note, patient called his wife and told her that by the time she returned home he would be dead.  He also informed his [facility] , Neema Pepe, that he would no longer take oral or long acting injectable medications.  On interview, the patient indicates that he was suicidal and that he intentionally overdosed on his prescribed Seroquel with plan to kill himself.  In the Emergency Room, when asked what could help him, he said that he would like to have another drink of vodka and go home.      PAST PSYCHIATRIC HISTORY:  As above.  Patient last time was hospitalized at this facility in 06/2019.  The patient was admitted because of current documentation he was attempting to communicate with the governor [facility], but computer virus that his wife was sending.  The patient was disorganized, psychotic, needed to be placed in restraints, force medicated.  Has a history of suicide attempts, multiple psychiatric hospitalizations, a history of commitments; apparently outside of his current commitment he was also committed at least 2 or 3 times.  Sees a psychiatrist, Dr. Clayton at [facility].  Had tried Cogentin, haloperidol, sertraline, Seroquel, Zyprexa.  Apparently went through 1 chemical dependency treatment program an currently continues to drink.      PAST MEDICAL HISTORY:  Significant for mild intermittent asthma, prostate hypertrophy.      ALLERGIES:  BEE VENOM AND PROPRANOLOL.      HOME MEDICATIONS:   1.  Omeprazole 40 mg daily.   2.  Seroquel 200 mg at bedtime.   3.  Risperdal 4 mg at bedtime.   4.  Risperdal Consta 50 mg every 20 days.   5.  Flomax 0.4 mg daily.      FAMILY HISTORY AND SOCIAL HISTORY:  He has recently got  but lives with his  "ex-wife, says that he helps her financially.  He is on Social Security Disability Income because of a combination of medical and mental health illnesses.  Completed high school.  Has a degree.  Currently not employed; has worked part-time as a , also as a  on packaging machines.  Has 1 biological son and adopted daughter, said that he gets along okay with his son, but not with his daughter.  Family history of mental illness is not in the file.      MENTAL STATUS EXAMINATION:  The patient is a , unshaven male looking according to stated age.  Had visible upper extremity tremor.  I would describe his cooperation as marginal and he was easily distracted, focused on being discharged from the hospital and then on being treated with vodka or gin.  Mood was mildly elevated.  Affect labile.  He maintained good eye contact.  Speech was fast but not pressured.  Thought process is poorly organized.  Associations loose.  Ability to focus and concentrate poor.  The patient appeared to be at least hypomanic.  I did not see any evidence of auditory or visual hallucinations.  He denied having them.  Fund of knowledge appeared to be average based on his vocabulary.  Insight and judgment both poor.  He denied presence of suicidal or homicidal thoughts.  When asked about his previous suicidal statements and behavior, said that that was just \"heat of the moment\" and he was misunderstood and did not feel suicidal or homicidal now and \"can I go home?\"  Gait and posture were within normal limits.  Insight and judgment poor.      IMPRESSION:   1.  Schizoaffective disorder, bipolar type.     2.  The patient also carried a diagnosis of bipolar affective disorder, alcohol use disorder.      TREATMENT PLAN:  He presents as manic, disorganized, refusing medications, at the same time reports presence of suicidal thoughts.  Refused to take medication and focused on treating his problems with alcohol.  We will " communicate with his outpatient  about relocating the patient's provisional discharge.  Will be continued on his home medications including Risperdal M-Tab, Risperdal Consta, Seroquel with adjusting dosages according to his symptoms.  The patient, in fact, might benefit from assertive community training service because of his poor compliance with meds.         SLIM LIAO MD             D: 2020   T: 2020   MT: DAVEY      Name:     ASHANTI ESPINOZA   MRN:      [id number]        Account:      [id number]   :      1955        Admitted:     2020                   Document: [id number]

## 2020-04-26 NOTE — PROGRESS NOTES
"Pt kept to himself most of the shift.  Pt was in the milieu.  Pt denies SI, SIB, dep, anx.  \"I feel fine.  I really don't need to be here but I need my catheter shit taken care of before I can leave.\"  "

## 2020-04-26 NOTE — PROGRESS NOTES
Patient refused his vitals and temp. Asked if his bag could be looked at to empty, stated he would let writer empty it after 6.

## 2020-04-26 NOTE — PROGRESS NOTES
Patient agreeable to change his wu urine bag to a night bag. Stated it was more comfortable. Output of 300 mL, dark bronwyn color.

## 2020-04-26 NOTE — PROGRESS NOTES
Pt had no behavioral concerns this shift, and did not verbalize si. Pleasant on approach. He remains on sio.

## 2020-04-27 PROCEDURE — 12400001 ZZH R&B MH UMMC

## 2020-04-27 PROCEDURE — 25000132 ZZH RX MED GY IP 250 OP 250 PS 637: Mod: GY | Performed by: PSYCHIATRY & NEUROLOGY

## 2020-04-27 PROCEDURE — 25000132 ZZH RX MED GY IP 250 OP 250 PS 637: Mod: GY | Performed by: PHYSICIAN ASSISTANT

## 2020-04-27 PROCEDURE — 99232 SBSQ HOSP IP/OBS MODERATE 35: CPT | Mod: 95 | Performed by: PSYCHIATRY & NEUROLOGY

## 2020-04-27 RX ORDER — TAMSULOSIN HYDROCHLORIDE 0.4 MG/1
0.4 CAPSULE ORAL DAILY
Status: DISCONTINUED | OUTPATIENT
Start: 2020-04-27 | End: 2020-04-30 | Stop reason: HOSPADM

## 2020-04-27 RX ADMIN — RISPERIDONE 4 MG: 4 TABLET ORAL at 20:53

## 2020-04-27 RX ADMIN — QUETIAPINE 200 MG: 200 TABLET, FILM COATED ORAL at 20:53

## 2020-04-27 RX ADMIN — TAMSULOSIN HYDROCHLORIDE 0.4 MG: 0.4 CAPSULE ORAL at 12:36

## 2020-04-27 RX ADMIN — CIPROFLOXACIN HYDROCHLORIDE 500 MG: 500 TABLET, FILM COATED ORAL at 20:53

## 2020-04-27 ASSESSMENT — ACTIVITIES OF DAILY LIVING (ADL)
DRESS: SCRUBS (BEHAVIORAL HEALTH);STREET CLOTHES
ORAL_HYGIENE: INDEPENDENT
HYGIENE/GROOMING: INDEPENDENT
DRESS: INDEPENDENT;SCRUBS (BEHAVIORAL HEALTH)
HYGIENE/GROOMING: INDEPENDENT
ORAL_HYGIENE: INDEPENDENT
LAUNDRY: WITH SUPERVISION

## 2020-04-27 NOTE — PROVIDER NOTIFICATION
04/27/20 1558   Behavioral Health   Suicidality other (see comments)  (Denies)   1. Wish to be Dead (Recent) No   2. Non-Specific Active Suicidal Thoughts (Recent) No   3. Active Sucidal Ideation with any Methods (Not Plan) Without Intent to Act (Recent) No   4. Active Suicidal Ideation with Some Intent to Act, Without Specific Plan (Recent) No   5. Active Suicidal Ideation with Specific Plan and Intent (Recent) No   Duration (Lifetime) NA   Change in Protective Factors? No   Enviromental Risk Factors None   Self Injury other (see comment)  (Denies)       SIO discontinued by Dr. Major at this time. Patient made aware and feels good about this order. Kendall has contracted with this writer to seek her out if patient starts feeling unsafe or feels as if he might hurt himself

## 2020-04-27 NOTE — PROGRESS NOTES
"[facility], [place]   Psychiatric Progress Note  Hospital Day: 2     Telemedicine Visit: The patient's condition can be safely assessed and treated via synchronous audio and visual telemedicine encounter.      Start Time: 1200  Stop Time: 1210    Reason for Telemedicine Visit: Covid-19    Originating Site (Patient Location): [facility]    Distant Site (Provider Location): Provider Remote Setting    Consent:  The patient/guardian has verbally consented to: the potential risks and benefits of telemedicine (video visit) versus in person care; bill my insurance or make self-payment for services provided; and responsibility for payment of non-covered services.     Mode of Communication:  Video Conference via Skype    As the provider I attest to compliance with applicable laws and regulations related to telemedicine.           Interim History:   The patient's care was discussed with the treatment team during the daily team meeting and/or staff's chart notes were reviewed.  Staff report patient has been uncooperative with cares at times, not taking all prescribed medications including antibiotic for UTI, appears paranoid and disorganized, remains on SIO, no acute events overnight.     Upon interview, the patient reports his mood is okay and that he had just been \"bored\" prior to admission and this is what led to him using alcohol again for the first time in 40 years.  He did have some insight into when he uses alcohol it \"leads to trouble\" and that is why his family tells him that he should not drink.  Reports that he wants to discharge when his hold is up as he \"wants to have a cigarette\" and then he does not feel like he needs continued hospitalization.  He denies any suicidal ideation at this time and reports that his statements regarding a suicide attempt prior to admission due to his intoxication of alcohol he was \"not myself\".  He reports he does not want to die " "and has no plan or intent to hurt himself or others.  He feels like his medications are helpful and he does not wish to undergo any medication changes, he does feel like he needs something that could help him feel less bored when he is at home.  Discussed some of his other physical health medications including his Flomax as he wants that moved to the morning time, also discussed the reasoning as to why he is being prescribed an antibiotic and he appeared agreeable to continue to take this.  Team will reach out to his  to help work on disposition planning, no additional concerns at this time.         Medications:       ciprofloxacin  500 mg Oral Q12H VERITO     omeprazole  40 mg Oral QAM AC     potassium chloride  20 mEq Oral Once     QUEtiapine  200 mg Oral At Bedtime     risperiDONE  4 mg Oral At Bedtime     tamsulosin  0.4 mg Oral Daily          Allergies:     Allergies   Allergen Reactions     Bee Venom Shortness Of Breath and Swelling     Propranolol           Labs:     Recent Results (from the past 48 hour(s))   Lipid panel    Collection Time: 04/26/20  7:06 AM   Result Value Ref Range    Cholesterol 125 <200 mg/dL    Triglycerides 89 <150 mg/dL    HDL Cholesterol 44 >39 mg/dL    LDL Cholesterol Calculated 63 <100 mg/dL    Non HDL Cholesterol 81 <130 mg/dL   TSH with free T4 reflex and/or T3 as indicated    Collection Time: 04/26/20  7:06 AM   Result Value Ref Range    TSH 0.97 0.40 - 4.00 mU/L   Potassium    Collection Time: 04/26/20  7:06 AM   Result Value Ref Range    Potassium 3.8 3.4 - 5.3 mmol/L          Psychiatric Examination:     BP (!) 161/87   Pulse 102   Temp 98.4  F (36.9  C) (Oral)   Resp 16   Ht 1.88 m (6' 2\")   Wt 85.7 kg (189 lb)   SpO2 94%   BMI 24.27 kg/m    Weight is 189 lbs 0 oz  Body mass index is 24.27 kg/m .    Orthostatic Vitals     None            Appearance: awake, alert, adequately groomed and appeared as age stated  Attitude:  somewhat cooperative  Eye Contact:  " "fair  Mood:  \"okay\"  Affect:  appropriate and in normal range and mood congruent  Speech:  clear, coherent and a bit pressured  Language: fluent and intact in English  Psychomotor, Gait, Musculoskeletal:  no evidence of tardive dyskinesia, dystonia, or tics  Thought Process:  tangental  Associations:  no loose associations  Thought Content:  no evidence of suicidal ideation or homicidal ideation and no evidence of psychotic thought  Insight:  limited  Judgement:  limited  Oriented to:  time, person, and place  Attention Span and Concentration:  fair  Recent and Remote Memory:  appears intact, not formally tested  Fund of Knowledge:  appropriate    Clinical Global Impressions  First:  Considering your total clinical experience with this particular patient population, how severe are the patient's symptoms at this time?: 7 (04/27/20 1539)  Compared to the patient's condition at the START of treatment, this patient's condition is: 3 (04/27/20 1539)  Most recent:  Considering your total clinical experience with this particular patient population, how severe are the patient's symptoms at this time?: 7 (04/27/20 1539)  Compared to the patient's condition at the START of treatment, this patient's condition is: 3 (04/27/20 1539)           Precautions:     Behavioral Orders   Procedures     Assault precautions     Code 1 - Restrict to Unit     Routine Programming     As clinically indicated     Single Room     Status 15     Every 15 minutes.     Status Individual Observation     Patient SIO status reviewed with team/RN.  Please also refer to RN/team documentation for add'l detail.    -SIO staff to monitor following which have contributed to patient being on SIO:  Suicidal Ideation  -Possible interventions SIO staff could use to support patient's treatment progress:  Monitor suicidal ideation  -When following observed, team will review discontinuation of SIO:  Patient states no suicidal ideation     Order Specific Question:   " CONTINUOUS 24 hours / day     Answer:   5 feet     Order Specific Question:   Indications for SIO     Answer:   Suicide risk     Order Specific Question:   Indications for SIO     Answer:   Medical equipment / ligature risk     Suicide precautions     Patients on Suicide Precautions should have a Combination Diet ordered that includes a Diet selection(s) AND a Behavioral Tray selection for Safe Tray - with utensils, or Safe Tray - NO utensils            Diagnoses:      Schizoaffective disorder bipolar type, current episode manic  Alcohol use disorder  Cannabis use disorder           Assessment & Plan:   Assessment and hospital summary:  65-year-old male with history of the above diagnoses who was brought into the ED after crisis was called by his ex-wife due to patient reporting thoughts of hurting self or others.  This occurred in the context of ongoing issues with medication adherence, patient is currently under a commitment and has been under commitment for almost 3 years.  He also has a history of alcohol and cannabis use disorders and was intoxicated prior to admission and U tox was positive for both alcohol and cannabinoids.  Since being admitted to the unit, patient has denied having any thoughts of hurting self or others, he was admitted on a 72-hour hold and has declined to sign in as a voluntary patient.  Team has contacted patient's  who does not plan to revoke his provisional discharge at this time and would like him to be referred to the 55+ program prior to discharge.  Patient will likely discharge when hold expires if stabilization continues and no evidence of imminent safety concerns.    Psychiatric treatment/inteventions:  Medications:   -Continue PTA Risperdal 4 mg at bedtime  -Continue PTA quetiapine 200 mg at bedtime  -Continue PTA Invega Sustenna 156 mg every 28 days, last dose administered 3/31/2020    -will place referral for 55+ program      Laboratory/Imaging:  No new labs  ordered, admission labs reviewed     Patient will be treated in therapeutic milieu with appropriate individual and group therapies as described.     Medical treatment/interventions:  Medical concerns: IM consult placed on admission, please see note dated 4/25 for complete details, did discuss patients refusal of antibiotic for past 2 doses with IM, they recommended continuing and for patient to receive 14 total doses         This note was created by undersigned using a Dragon dictation system. All typing errors or contextual distortion are unintentional and software inherent.     Disposition Plan   Reason for ongoing admission: poses an imminent risk to self  Discharge location: home with self-care  Discharge Medications: not ordered  Follow-up Appointments: not scheduled  Legal Status: 72 hour hold however on commitment, PD has not been revoked, see CTC notes for complete details   Entered by: Brenda Major on 4/27/2020 at 8:03 AM

## 2020-04-27 NOTE — PLAN OF CARE
"Output 575 ml this morning.  Pt ate breakast.  Refused his Cipro and stated \"I do not take antibiotics.\"  Pt was reminded that this was for UTI and he still stated \"I am not taking it\".  Pt requested for his flomax and was reminded that it is scheduled for evenings and pt reported he usually takes it in the morning.  Will continue to assess.    "

## 2020-04-27 NOTE — PROVIDER NOTIFICATION
"   04/27/20 1748   Behavioral Health   Suicidality (WDL) WDL   Suicidality other (see comments)  (Denies)   1. Wish to be Dead (Recent) No   2. Non-Specific Active Suicidal Thoughts (Recent) No   3. Active Sucidal Ideation with any Methods (Not Plan) Without Intent to Act (Recent) No   4. Active Suicidal Ideation with Some Intent to Act, Without Specific Plan (Recent) No   5. Active Suicidal Ideation with Specific Plan and Intent (Recent) No   Duration (Lifetime) NA   Change in Protective Factors? No   Enviromental Risk Factors None   Self Injury other (see comment)  (Denies)       \"Kendall\" reports 'Doing well.\"  SIO order discontinued within the last two hours. Kendall is able to verbally contract to contact staff should he experience suicidal ideation or feeling unsafe on the unit.  "

## 2020-04-27 NOTE — PROGRESS NOTES
"Diagnosis/Procedure:   Patient Active Problem List   Diagnosis     Personal history of tobacco use, presenting hazards to health     Erectile dysfunction     Intention tremor     Living will, counseling/discussion     Health Care Home     ACP (advance care planning)     Delusions (H)     Rosa (H)     Schizoaffective disorder, bipolar type (H)     Cigarette nicotine dependence with withdrawal     Agitation     Suicide attempt (H)     Abdominal pain, generalized     CAP (community acquired pneumonia)     Abdominal pain     Work Completed: WR spoke with pt's CM Betty Pepe P: [phone] F: [phone]. Pt has been working with Betty for 3 years and has been on a commitment the entire time. Pt has always had compliance issues related to his medications and treatment. Pt doesn't believe in his diagnoses and the CM believes that much of his behavior is \"attention seeking.\" She does not want to revoke the PD since he is going to be on the commitment through September this year. She does not believe he is a good candidate for IRTS. She does not think he is a good candidate for ACT. She is interested in [facility] and will look into, is not considering [place] since the PD needs to be revoked and she wants to avoid this. She asked WR to speak to Pt to request he sign in voluntarily and notify him that Betty will be contacting him soon.    WR met with pt; he wants to discharge home with some structure in place but is unsure what he can do during the day. He is not willing to sign in voluntarily. He believes that his ex-wife needs his help financially and with help around the house so he needs to go back home.     Another phone call with Betty; she is recommending that the pt be referred to the 55+ program through [place] before discharge.     WR cancelled pt's appointment with his psych and scheduled a new f/u. WR added this to AVS.     Discharge plan or goal: discharge is not yet decided.                 Barriers to " discharge: On 72 HH. Pt committed with PD in tact.

## 2020-04-27 NOTE — PROGRESS NOTES
"Patient spent majority of the evening in his room, but did come out for dinner and to use the phone several times. Patient allowed writer to switch his catheter bag to a night bag. Patient had a total output of about 400 mL dark bronwyn in color. Patient became agitated when offering him his HS medications. Refused to take his Cipro, \" I took it this morning, I'm not taking it now\". Writer encouraged him to take it for his UTI, but still refused.  Also refused to take his flomax. Told a staff that he thought people were throwing away phone numbers in his room. Social with peers and appropriate on unit.     Patient evaluation continues. Assessed mood,anxiety,thoughts and behavior.     Patient gradually progressing towards goals.    Patient is encouraged to participate in groups and assisted to develop healthy coping skills.     VS reviewed: BP (!) 161/87   Pulse 102   Temp 98.4  F (36.9  C) (Oral)   Resp 16   Ht 1.88 m (6' 2\")   Wt 85.7 kg (189 lb)   SpO2 94%   BMI 24.27 kg/m      Length of stay: 1    Refer to daily team meeting notes for individualized plan of care. Nursing will continue to assess.      "

## 2020-04-27 NOTE — PROGRESS NOTES
Discussed catheter care with patient. Patient given SURESTEP  Post Insertion [facility] wipes. Patient does not want assistance with catheter care at this time, states that he can do it himself. He is agreeable to following diagram of pictures and performing catheter care based on package recommendations. Reference number for wipe to reorder is [id number]. Will order patient several days supply for him to use daily.

## 2020-04-27 NOTE — PLAN OF CARE
BEHAVIORAL TEAM DISCUSSION    Participants: Dr. Brenda Major via telephone, Magaly Espinal RN, Aliza Sanchez [facility]    Progress: Day 3. Pt is on a 72HH ex: 4/29 @ 9:40PM, Pt is committed [facility] to [facility] with Ganesh. Pt came to [facility] through the ED, showing up intoxicated ETOH and Cannabis. Pt had a suicide attempt where he was running his car in the garage with the door closed in an attempt to asphyxiate self.     Anticipated length of stay: 1-2 weeks    Continued Stay Criteria/Rationale: Pt has some medical concerns that need to be monitored. Pt is on a civil commitment with ganesh in another [facility], pt's CM needs to agree to next steps in treatment. An appropriate discharge plan needs to be initiated.     Medical/Physical: UTI- pt has a wu catheter. COPD. Carbon monoxide exposure from suicide attempt.     Precautions:   Behavioral Orders   Procedures     Assault precautions     Code 1 - Restrict to Unit     Routine Programming     As clinically indicated     Single Room     Status 15     Every 15 minutes.     Status Individual Observation     Patient SIO status reviewed with team/RN.  Please also refer to RN/team documentation for add'l detail.    -SIO staff to monitor following which have contributed to patient being on SIO:  Suicidal Ideation  -Possible interventions SIO staff could use to support patient's treatment progress:  Monitor suicidal ideation  -When following observed, team will review discontinuation of SIO:  Patient states no suicidal ideation     Order Specific Question:   CONTINUOUS 24 hours / day     Answer:   5 feet     Order Specific Question:   Indications for SIO     Answer:   Suicide risk     Order Specific Question:   Indications for SIO     Answer:   Medical equipment / ligature risk     Suicide precautions     Patients on Suicide Precautions should have a Combination Diet ordered that includes a Diet selection(s) AND a Behavioral Tray selection for Safe Tray - with utensils, or  Safe Tray - NO utensils       Plan: Patient has been admitted to the psychiatric unit for stabilization.  Patient will be seen and assessed by psychiatric doctor.  Medication changes will be reviewed and monitored.  Groups will be offered to assist patient with increasing knowledge, strategies, and copping techniques to help manage mental health.  CTC will meet with patient to provide individualized mental health resources and support throughout patient s hospitalization.  CTC will assist with developing a Care Plan and after care appointments.    Rationale for change in precautions or plan: No changes

## 2020-04-27 NOTE — PROGRESS NOTES
Pt was calm and cooperative with blunted, fl;at mood affect. He was isolative and withdrawn to his room for the majority part of the shift, otherwise occasionally visibile in milieu for meals and requests. He denied all mental health symptoms including SI/SIb ideation. His appetite was good and sleeping/napping well. He was behaviorally appropriate with peers and staff members.      04/27/20 1406   Behavioral Health   Hallucinations denies / not responding to hallucinations   Thinking poor concentration   Orientation time: oriented;date: oriented;place: oriented   Memory baseline memory   Insight insight appropriate to situation   Judgement impaired   Eye Contact at examiner   Affect blunted, flat   Mood mood is calm   Physical Appearance/Attire attire appropriate to age and situation   Hygiene neglected grooming - unclean body, hair, teeth   Suicidality   (denied )   1. Wish to be Dead (Recent) No   2. Non-Specific Active Suicidal Thoughts (Recent) No   Self Injury   (denied )   Elopement   (denied )   Activity withdrawn;isolative   Speech coherent;clear   Medication Sensitivity no observed side effects;no stated side effects   Psychomotor / Gait steady;balanced   Psycho Education   Type of Intervention 1:1 intervention   Response participates, initiates socially appropriate   Hours 0.5   Activities of Daily Living   Hygiene/Grooming independent   Oral Hygiene independent   Dress scrubs (behavioral health);street clothes   Laundry with supervision   Room Organization independent

## 2020-04-27 NOTE — PROGRESS NOTES
Pt started the shift asleep, wu catheter in place. 0mL urine in wu at start of shift and at 0430. 75mL total urine output at end of shift.  Reported that pt didn't take his scheduled flomax or cipro on previous shift.  Fluids encouraged while pt briefly awake, pt continued to decline.  Will continue to encourage fluids and assist w/ catheter care. Pt remains on SIO 1:1 for safety. Pt appears to have slept majority of night. Will continue to monitor and support plan of care.

## 2020-04-28 ENCOUNTER — HOSPITAL ENCOUNTER (OUTPATIENT)
Dept: BEHAVIORAL HEALTH | Facility: CLINIC | Age: 65
Discharge: HOME | DRG: 885 | End: 2020-04-28
Attending: PSYCHIATRY & NEUROLOGY | Admitting: PSYCHIATRY & NEUROLOGY
Payer: MEDICARE

## 2020-04-28 PROCEDURE — 25000132 ZZH RX MED GY IP 250 OP 250 PS 637: Mod: GY | Performed by: PSYCHIATRY & NEUROLOGY

## 2020-04-28 PROCEDURE — 99232 SBSQ HOSP IP/OBS MODERATE 35: CPT | Mod: 95 | Performed by: PSYCHIATRY & NEUROLOGY

## 2020-04-28 PROCEDURE — 90791 PSYCH DIAGNOSTIC EVALUATION: CPT | Mod: GT | Performed by: SOCIAL WORKER

## 2020-04-28 PROCEDURE — 12400001 ZZH R&B MH UMMC

## 2020-04-28 PROCEDURE — 25000132 ZZH RX MED GY IP 250 OP 250 PS 637: Mod: GY | Performed by: PHYSICIAN ASSISTANT

## 2020-04-28 RX ADMIN — RISPERIDONE 4 MG: 4 TABLET ORAL at 19:25

## 2020-04-28 RX ADMIN — QUETIAPINE 200 MG: 200 TABLET, FILM COATED ORAL at 19:25

## 2020-04-28 RX ADMIN — TAMSULOSIN HYDROCHLORIDE 0.4 MG: 0.4 CAPSULE ORAL at 08:24

## 2020-04-28 RX ADMIN — ALUMINUM HYDROXIDE, MAGNESIUM HYDROXIDE, AND DIMETHICONE 30 ML: 400; 400; 40 SUSPENSION ORAL at 21:04

## 2020-04-28 RX ADMIN — CIPROFLOXACIN HYDROCHLORIDE 500 MG: 500 TABLET, FILM COATED ORAL at 19:25

## 2020-04-28 RX ADMIN — CIPROFLOXACIN HYDROCHLORIDE 500 MG: 500 TABLET, FILM COATED ORAL at 08:24

## 2020-04-28 ASSESSMENT — COLUMBIA-SUICIDE SEVERITY RATING SCALE - C-SSRS
5. HAVE YOU STARTED TO WORK OUT OR WORKED OUT THE DETAILS OF HOW TO KILL YOURSELF? DO YOU INTEND TO CARRY OUT THIS PLAN?: YES
2. HAVE YOU ACTUALLY HAD ANY THOUGHTS OF KILLING YOURSELF?: YES
2. HAVE YOU ACTUALLY HAD ANY THOUGHTS OF KILLING YOURSELF LIFETIME?: YES
ATTEMPT PAST THREE MONTHS: YES
4. HAVE YOU HAD THESE THOUGHTS AND HAD SOME INTENTION OF ACTING ON THEM?: YES
4. HAVE YOU HAD THESE THOUGHTS AND HAD SOME INTENTION OF ACTING ON THEM?: YES
1. IN THE PAST MONTH, HAVE YOU WISHED YOU WERE DEAD OR WISHED YOU COULD GO TO SLEEP AND NOT WAKE UP?: YES
REASONS FOR IDEATION LIFETIME: MOSTLY TO END OR STOP THE PAIN (YOU COULDN'T GO ON LIVING WITH THE PAIN OR HOW YOU WERE FEELING)
REASONS FOR IDEATION PAST MONTH: MOSTLY TO END OR STOP THE PAIN (YOU COULDN'T GO ON LIVING WITH THE PAIN OR HOW YOU WERE FEELING)
MOST RECENT DATE: 65493
ATTEMPT LIFETIME: YES
5. HAVE YOU STARTED TO WORK OUT OR WORKED OUT THE DETAILS OF HOW TO KILL YOURSELF? DO YOU INTEND TO CARRY OUT THIS PLAN?: YES
1. IN THE PAST MONTH, HAVE YOU WISHED YOU WERE DEAD OR WISHED YOU COULD GO TO SLEEP AND NOT WAKE UP?: YES

## 2020-04-28 ASSESSMENT — ANXIETY QUESTIONNAIRES
7. FEELING AFRAID AS IF SOMETHING AWFUL MIGHT HAPPEN: NOT AT ALL
1. FEELING NERVOUS, ANXIOUS, OR ON EDGE: MORE THAN HALF THE DAYS
5. BEING SO RESTLESS THAT IT IS HARD TO SIT STILL: SEVERAL DAYS
3. WORRYING TOO MUCH ABOUT DIFFERENT THINGS: MORE THAN HALF THE DAYS
IF YOU CHECKED OFF ANY PROBLEMS ON THIS QUESTIONNAIRE, HOW DIFFICULT HAVE THESE PROBLEMS MADE IT FOR YOU TO DO YOUR WORK, TAKE CARE OF THINGS AT HOME, OR GET ALONG WITH OTHER PEOPLE: VERY DIFFICULT
6. BECOMING EASILY ANNOYED OR IRRITABLE: SEVERAL DAYS
GAD7 TOTAL SCORE: 10
2. NOT BEING ABLE TO STOP OR CONTROL WORRYING: MORE THAN HALF THE DAYS

## 2020-04-28 ASSESSMENT — PATIENT HEALTH QUESTIONNAIRE - PHQ9
SUM OF ALL RESPONSES TO PHQ QUESTIONS 1-9: 18
5. POOR APPETITE OR OVEREATING: MORE THAN HALF THE DAYS

## 2020-04-28 ASSESSMENT — ACTIVITIES OF DAILY LIVING (ADL)
DRESS: SCRUBS (BEHAVIORAL HEALTH);INDEPENDENT
HYGIENE/GROOMING: INDEPENDENT
LAUNDRY: WITH SUPERVISION
HYGIENE/GROOMING: INDEPENDENT
DRESS: INDEPENDENT;PROMPTS
ORAL_HYGIENE: INDEPENDENT
ORAL_HYGIENE: INDEPENDENT

## 2020-04-28 NOTE — PROGRESS NOTES
Completed DA for the 55+ program. Client reported he does have computer and webcam at home.  He will be in the A2 group ( M, W, TH) 0900-noon.  Please contact michela mayer either by her email or office [phone] for a start date.  He will get an email link to invite him to the group. His admission is conditional on stipulation he maintains abstinence from alcohol and adheres with medication and is able to participate. He was informed of these conditions.

## 2020-04-28 NOTE — PROVIDER NOTIFICATION
04/27/20 2025   Behavioral Health   Suicidality other (see comments)  (Denies)   1. Wish to be Dead (Recent) No   2. Non-Specific Active Suicidal Thoughts (Recent) No   3. Active Sucidal Ideation with any Methods (Not Plan) Without Intent to Act (Recent) No   4. Active Suicidal Ideation with Some Intent to Act, Without Specific Plan (Recent) No   5. Active Suicidal Ideation with Specific Plan and Intent (Recent) No   Duration (Lifetime) NA   Change in Protective Factors? No   Enviromental Risk Factors None   Self Injury other (see comment)  (Denies)       Austyn denies SI/SIB at this time. Is currently watching television with peers in television lounge. Has agreed to seek out writer if should he feel unsafe or experiencing suicidal ideation

## 2020-04-28 NOTE — PROGRESS NOTES
"55+ Program  Evaluator Name: LISANDRO Dale        PATIENT'S NAME: Austyn Leach  PREFERRED NAME: Kendall  PREFERRED PRONOUNS:He/Him       MRN:   [id number]  :   1955   ACCT. NUMBER: [id number]  DATE OF SERVICE: 20  START TIME: 0945  END TIME: 1100  PREFERRED PHONE: [phone]/ [email or web address]  May we leave a program related message: Yes    STANDARD ADULT DIAGNOSTIC ASSESSMENT    Telemedicine Visit: The patient's condition can be safely assessed and treated via synchronous audio and visual telemedicine encounter.      Reason for Telemedicine Visit: Patient has requested telehealth visit    Originating Site (Patient Location): [facility]    Distant Site (Provider Location): Provider Remote Setting    Consent:  The patient/guardian has verbally consented to: the potential risks and benefits of telemedicine (video visit) versus in person care; bill my insurance or make self-payment for services provided; and responsibility for payment of non-covered services.     Mode of Communication:  Video Conference via [employer]Stand    As the provider I attest to compliance with applicable laws and regulations related to telemedicine.    Identifying Information:  Patient is a 65 year old, .  The pronoun use throughout this assessment reflects the patient's chosen pronoun.  Patient was referred for an assessment by treatment team on station 30  [facility] at request of [facility].  Patient attended the session alone.     Chief Complaint:   The reason for seeking services at this time is: \" I'm lonely and I need support to talk to people\".   The problem(s) began: \" the past couple months, my friends and family are not helping me in anyway, I have pissed off my family and friends\".    Patient has not attempted to resolve these concerns in the past.  H & P dated 20, CHARAN Lopez MD (  Admission note)  CHIEF COMPLAINT AND REASON FOR ADMISSION:  The " "patient is a 65-year-old  male who was admitted by his ex-wife, Travis Lolly Wolly Doodle and the police after he voiced threats to kill himself and others.      HISTORY OF PRESENT ILLNESS:  The patient presented as not a very reliable historian and was focused on being discharged from the hospital.  He seemed to be having difficulties with hearing and tried to joke that instead of medications he needed vodka or gin \"to make me feel better.\" Collateral sources, however, stated that the [facility] as patient has been drinking.  He pulled his car into the garage and threatened to carbon monoxide himself.  She came into the garage and found him with the car running.  The couple had been  for 24 years and she  him last July.  The patient is reported to be under civil commitment and [employer] petition that should extent until 10/2020.  He had been diagnosed with schizoaffective disorder, cannabis use disorder, alcohol use disorder.  Has a history of poor compliance with his medications.  Has been drinking and using marijuana.  According to his DEC 's note, patient called his wife and told her that by the time she returned home he would be dead.  He also informed his [facility] , Neema Afshin, that he would no longer take oral or long acting injectable medications.  On interview, the patient indicates that he was suicidal and that he intentionally overdosed on his prescribed Seroquel with plan to kill himself.  In the Emergency Room, when asked what could help him, he said that he would like to have another drink of vodka and go home.      PAST PSYCHIATRIC HISTORY:  As above.  Patient last time was hospitalized at this facility in 06/2019.  The patient was admitted because of current documentation he was attempting to communicate with the governor [facility], but computer virus that his wife was sending.  The patient was disorganized, psychotic, needed to " "be placed in restraints, force medicated.  Has a history of suicide attempts, multiple psychiatric hospitalizations, a history of commitments; apparently outside of his current commitment he was also committed at least 2 or 3 times.  Sees a psychiatrist, Dr. Clayton at [facility].  Had tried Cogentin, haloperidol, sertraline, Seroquel, Zyprexa.  Apparently went through 1 chemical dependency treatment program an currently continues to drink.     ER Admission Note, Tao FARRAR DO dated 4.24.20: Patient was recently seen for a suicide attempt by overdose on Seroquel on (04/03/2020), and previously diagnosed with pneumonia on (4/10/2020).     Does the client have any condition that is currently presenting as a potential to harm themselves or others (severe withdrawal, serious medical condition, severe emotional/behavioral problem)? No.  Proceed with assessment.    Social/Family History:  Patient reported they grew up in [place], [place].    They were raised by biological parents.     Patient reported that     childhood was \" it was good and fun\".    Patient described their current relationships with family of origin as \" does not see siblings and is becoming estranged from family\".     The patient describes their cultural background as did not indicate.    Cultural influences and impact on patient's life structure, values, norms, and healthcare: none identified.    Contextual influences on patient's health include: Individual Factors non adherent with expectations of civil commitment including not taking medications and using alcohol. and Community Factors very isolated due to pandemic and lacks insight..      These factors will be addressed in the Preliminary Treatment plan.   Patient identified their preferred language to be English.   Patient reported they does not need the assistance of an  or other support involved in therapy.     Patient reported had no significant delays in " "developmental tasks.     Patient's highest education level was associate degree / vocational certificate.   Patient identified the following learning problems: none reported.    Modifications will not be used to assist communication in therapy.     Patient reports they are  able to understand written materials.    Patient reported the following relationship history Patient lives with his ex-wife Angela [phone], in [place].     Educational Background:  Patient completed high school and AA degree.     Occupational History:  Patient is not currently employed.  Has worked part-time as a .     Patient's current relationship status is  for almost 1 year, ex wife continues to live with him. Lives in a house in [place]   Patient identified their sexual orientation as heterosexual.    Patient reported having two child(paula).  [place]     Patient's current living/housing situation involves staying in own home/apartment.    They live with ex wife and they report that housing is stable.   Patient identified  and ex wife as part of their support system. Neema Pepe CM, Richard Clayton \" I don't like them though\".   Patient identified the quality of these relationships as inconsistent.      Patient is currently disabled.    Patient reports their finances are obtained through disability.    Patient does identify finances as a current stressor.      Patient reported that they have been involved with the legal system.  Currently he is on a full commitment and [employer] that has been extended several times and continues through October 2020. Neema Pepe from [facility] is his .   Patient denies being on probation / parole / under the jurisdiction of the court.        Patient's Strengths and Limitations:  Patient identified the following strengths or resources that will help them succeed in treatment:  and family support.   Things that may interfere with the patient's " "success in treatment include: few friends and lack of social support.   _______________________________________________  Personal and Family Medical History:   Patient did not report a family history of mental health concerns.  Patient reports family history includes Breast Cancer in his mother; C.A.D. in his father; Hypertension in his father..     Patient reported the following previous diagnoses which include(s): a Bipolar Disorder and alcohol use disorder, cannabis use disorder, schizoaffetive disorder. Patient has a history of schizoaffective disorder, bipolar type, alcohol abuse, cannabis abuse.  History of prior suicide attempts.     Patient reported symptoms began: \" about 2 years\", denied any past MH diagnosis    Patient has received mental health services in the past: case management with Travis Dubon-Neema Pepe/viktoriya johnson MD [facility] and psychiatry with see. .    Psychiatric Hospitalizations: [facility] record review indicates multple- 4.3.20 due to overdose on seroqule; June 2019 due to psychotic symptoms..  Patient reports they are currently under a civil commitment please see above under legal. [facility].   . Current Treatment Providers are:  [phone]  Psychiatrist is Viktoriya Johnson at [facility] [phone]  PCP is Kristin Lewis at [facility].    Currently, patient is receiving other mental health services.  These include case management with Neema Haddad and psychiatry with Viktoriya Johnson MD @ [facility]..  Next appointment: unknown..     Patient has had a physical exam to rule out medical causes for current symptoms.  Date of last physical exam was within the past year. Client was encouraged to follow up with PCP if symptoms were to develop. The patient has a non-[facility] Primary Care Provider. Their PCP is [facility] Debbie SHARMA..    Patient reports the following " current medical concerns: please refer to medical list.    There are not significant appetite / nutritional concerns / weight changes.     Patient does report a history of head injury / trauma / cognitive impairment.  Notes indicate there is some concern for mild cognitive impairment. He has not had neuropsych testing.       medications have not been rectified as pt remains inpatient; Meds as of today are as follows:    ciprofloxacin  500 mg Oral Q12H VERITO     omeprazole  40 mg Oral QAM AC     potassium chloride  20 mEq Oral Once     QUEtiapine  200 mg Oral At Bedtime     risperiDONE  4 mg Oral At Bedtime     tamsulosin  0.4 mg Oral Daily       Medication Adherence:  Patient reports Pt has a history on non adherence and is on a [employer] commitment. He gets an injectable medications monthly but has struggled to adhere with other meds. He is aware adherence is epectation of commitment..    Patient Allergies:    Allergies   Allergen Reactions     Bee Venom Shortness Of Breath and Swelling     Propranolol        Medical History:    Past Medical History:   Diagnosis Date     Bipolar 1 disorder (H)      GERD (gastroesophageal reflux disease)      Mild intermittent asthma     uses primatent     Schizophrenia (H)          Current Mental Status Exam:   Appearance:  unable to assess    Eye Contact:  unable to assess   Psychomotor:  unable to assess       Gait / station:  Unable to assess  Attitude / Demeanor: Cooperative   Speech      Rate / Production: Monotone       Volume:  Normal  volume      Language:  intact  Mood:   Anxious  Depressed   Affect:   unable to assess    Thought Content: Perservative  Rumination   Thought Process: Circumstantial [place]      Associations: Perseverative  Insight:   Impaired  Judgment:  Impaired   Orientation:  All  Attention/concentration: Easily Distracted    Rating Scales:    PHQ9:    PHQ-9 SCORE 4/28/2020   PHQ-9 Total Score 18   ;    GAD7:    JUAN RAMON-7 SCORE 4/28/2020   Total Score 10     CGI:      First:Considering your total clinical experience with this particular patient population, how severe are the patient's symptoms at this time?: 6 (4/28/2020  9:50 AM)  ;    Most recentCompared to the patient's condition at the START of treatment, this patient's condition is: 6 (4/28/2020  9:50 AM)      Substance Use:  Patient did not report a family history of substance use concerns; see medical history section for details.    Patient has received chemical dependency treatment in the past at 40 years ago [address].  Patient has ever been to detox.      Patient is not currently receiving any chemical dependency treatment.   Patient reported the following problems as a result of their substance use: it makes me want to commit suicide..    Patient REPORTS ALCOHOL A shot every week for past 2 weeks, he reported he just recently relapsed, had long term sobriety.   Patient .a pack a day.  Patient denies using marijuana.denied use.  Patient denies using caffeine. denied  Patient reports using/abusing the following substance(s). Patient reported no other substance use.       Substance Use: other substance related medical issue gets very depressed and suicidal., family relationship problems due to substance use, social problems related to substance use and cravings/urges to use    Based on the positive CAGE score and clinical interview there  limited insight into maintaining sobriety, agreed to go to 55+ and abstain..      Significant Losses / Trauma / Abuse / Neglect Issues:   Patient did not serve in the .  There are indications or report of significant loss, trauma, abuse or neglect issues related to: Denied any loss of past trauma.  Concerns for possible neglect are not present.     Safety Assessment: Reported that when he drinks he feels suicidal. He was a very poor historian but did endorse that drinking is very bad for his mental health. According to the record review. He overdosed on Seroquel and was  hospitalized on medical unit for phy he issues 4.2.20. He was discharged and again attempted suicide via CO2 in the garage with car on 4.24.20. He created safety plan and agreed to allow writer to send him link for MY CHART to email..   Current Safety Concerns:  Inlet Suicide Severity Rating Scale (Lifetime/Recent)  Inlet Suicide Severity Rating (Lifetime/Recent) 4/27/2020 4/27/2020 4/27/2020 4/27/2020 4/27/2020 4/28/2020 4/28/2020   1. Wish to be Dead (Lifetime) - - - - - - Yes   1. Wish to be Dead (Recent) No No No No No No Yes   Comments - - - - - - -   Wish to be Dead Description (Recent) - - - - - - -   2. Non-Specific Active Suicidal Thoughts (Lifetime) - - - - - - Yes   2. Non-Specific Active Suicidal Thoughts (Recent) No No No No No No Yes   Comments - - - - - - -   Non-Specific Active Suicidal Thought Description (Recent) - - - - - - -   3. Active Suicidal Ideation with any Methods (Not Plan) Without Intent to Act (Lifetime) - - - - - - -   3. Active Sucidal Ideation with any Methods (Not Plan) Without Intent to Act (Recent) - No No No - No Yes   4. Active Suicidal Ideation with Some Intent to Act, Without Specific Plan (Lifetime) - - - - - - Yes   4. Active Suicidal Ideation with Some Intent to Act, Without Specific Plan (Recent) - No No No - No Yes   5. Active Suicidal Ideation with Specific Plan and Intent (Lifetime) - - - - - - Yes   5. Active Suicidal Ideation with Specific Plan and Intent (Recent) - No No No - No Yes   Most Severe Ideation Rating (Lifetime) - - - - - - 5   Most Severe Ideation Description (Lifetime) - - - - - - -   Frequency (Lifetime) - - - - - - 3   Duration (Lifetime) - NA NA NA - - -   Controllability (Lifetime) - - - - - - 4   Protective Factors  (Lifetime) - - - - - - -   Reasons for Ideation (Lifetime) - - - - - - 4   Most Severe Ideation Rating (Past Month) - - - - - - 5   Most Severe Ideation Description (Past Month) - - - - - - -   Frequency (Past Month) - - - - - - 3    Duration (Past Month) - - - - - - -   Controllability (Past Month) - - - - - - 5   Protective Factors (Past Month) - - - - - - -   Reasons for Ideation (Past Month) - - - - - - 4   Actual Attempt (Lifetime) - - - - - - Yes   Actual Attempt (Past 3 Months) - - - - - - Yes   Total Number of Actual Attempts (Past 3 Months) - - - - - - (No Data)   Comments - - - - - - 2   Has subject engaged in non-suicidal self-injurious behavior? (Lifetime) - - - - - - No   Has subject engaged in non-suicidal self-injurious behavior? (Past 3 Months) - - - - - - -   Interrupted Attempts (Lifetime) - - - - - - -   Interrupted Attempts (Past 3 Months) - - - - - - -   Aborted or Self-Interrupted Attempt (Lifetime) - - - - - - -   Aborted or Self-Interrupted Attempt (Past 3 Months) - - - - - - -   Preparatory Acts or Behavior (Lifetime) - - - - - - -   Preparatory Acts or Behavior (Past 3 Months) - - - - - - -   Most Recent Attempt Date - - - - - - [id number]   Most Recent Attempt Actual Lethality Code - - - - - - -     Patient denies current homicidal ideation and behaviors.  Patient denies current self-injurious ideation and behaviors.    Patient suicidal when drinking associated with substance use.  Patient non adherent with meds associated with mental health symptoms.  Patient reports the following current concerns for their personal safety: None.  Patient reports thereno are firearms in the house. none.     History of Safety Concerns:  Patient denied a history of homicidal ideation.     Patient denied a history of personal safety concerns.    Patient denied a history of assaultive behaviors.    Patient denied a history of assaultive behaviors.     Patient denied a history of risk behaviors associated with substance use.  Patient denies any history of high risk behaviors associated with mental health symptoms.  Patient reports the following protective factors: safe and stable environment, help seeking behaviors when distressed calls wife  for help. MANDA's agreed to., agreement to use safety plan and living with other people    Risk Plan:  See Preliminary Treatment Plan for Safety and Risk Management Plan    Review of Symptoms per patient report:  Depression: No symptoms, Lack of interest, Change in energy level, Difficulties concentrating, Psychomotor slowing or agitation, Suicidal ideation, Low self-worth, Ruminations, Feeling sad, down, or depressed and Withdrawn  Rosa:  Distractibility and Impulsiveness  Psychosis: Delusions and paranoia  Anxiety: Nervousness, Social anxiety, Psychomotor agitation, Ruminations and Poor concentration  Panic:  No symptoms  Post Traumatic Stress Disorder:  No Symptoms   Eating Disorder: No Symptoms  ADD / ADHD:  No symptoms  Conduct Disorder: No symptoms  Autism Spectrum Disorder: No symptoms  Obsessive Compulsive Disorder: No Symptoms    Patient reports the following compulsive behaviors and treatment history: No.      Diagnostic Criteria:   A. Characteristic symptoms: Two (or more) of the following, each present for a significant portion of time during a 1-month period (or less if successfully treated)*:    - Delusions   - Hallucinations   - Disorganized speech (eg, frequent derailment or incoherence)    - Grossly disorganized or catatonic behavior    - Negative symptoms, ie, affective flattening, alogia, or avolition   B. Marked functional impairment in Occupational or Academic/Interpersonal Relationships/Self-care: For a significant portion of the time since the onset of the disturbance, one or more major areas of functioning such as work, interpersonal relations, or self-care are markedly below the level achieved prior to the onset (or when the onset is in childhood or adolescence, failure to achieve expected level of interpersonal, academic, or occupational achievement).  C. Duration: Continuous signs of the disturbance persist for at least 6 months. This 6-month period must include at least 1 month of symptoms  (or less if successfully treated) that meet Criterion A (ie, active-phase symptoms) and may include periods of prodromal or residual symptoms. During these prodromal or residual periods, the signs of the disturbance may be manifested by only negative symptoms or two or more symptoms listed in Criterion A present in an attenuated form (eg, odd beliefs, unusual perceptual experiences).  D. Schizoaffective and mood disorder exclusion: Schizoaffective disorder and mood disorder with psychotic features have been ruled out because either (1) no major depressive, manic, or mixed episodes have occurred concurrently with the active-phase symptoms; or (2) if mood episodes have occurred during active-phase symptoms, their total duration has been brief relative to the duration of the active and residual periods.  E. Substance/general medical condition exclusion: The disturbance is not due to the direct physiological effects of a substance (eg, a drug of abuse, a medication) or a general medical condition.  F. Relationship to a pervasive developmental disorder: If there is a history of autistic disorder or another pervasive developmental disorder, the additional diagnosis of schizophrenia is made only if prominent delusions or hallucinations are also present for at least a month (or less if successfully treated).  Schizoaffective disorder, bipolar type.     2.  The patient also carried a diagnosis of bipolar affective disorder, alcohol use disorder.          Functional Status:  Patient reports the following functional impairments: health maintenance, management of the household and or completion of tasks, operation of a motor vehicle, relationship(s), self-care, social interactions, use of public transportation and work / vocational responsibilities.     WHODAS:   WHODAS 2.0 Total Score 4/28/2020   Total Score 38       Clinical Summary:  1. Reason for assessment: suicide attempt, lacks adherence to medication, alcohol use,  SPMI.  2. Psychosocial, Cultural and Contextual Factors: poor insight, poor support, on civil commitment .  3. As evidenced by self report and criteria, client meets the following DSM5 Diagnoses:   (Sustained by DSM5 Criteria Listed Above)  295.70  (F25) Schizoaffective Disorder Bipolar Type.  Other Diagnoses that is relevant to services: 295.70  (F25) Schizoaffective Disorder Bipolar Type. Alcohol use disorder 303.90  4. R/O: bipolar disorder due to this has been a past diagnosis but current see above  5. Provisional Diagnosis: see above.  6. Prognosis: Unknown.  7. Likely consequences of symptoms if not treated: vulnerable to need for higher level of care.  8. Client strengths include:  support of family, friends and providers .     Recommendations:     1. Plan for Safety and Risk Management:A safety and risk management plan has been developed including: Patient consented to co-developed safety plan.  Safety and risk management plan was completed.  Patient agreed to use safety plan should any safety concerns arise.  A copy was given to the patient..  Report to child / adult protection services was NA.     2. Patient's identified NA.     3. Initial Treatment will focus on: Alcohol / Substance Use - maintain sobriety  Thought Disturbance including: delusions, hallucinations and paranoia. Decrease isolation.      4. Resources/Service Plan:       services are not indicated.     Modifications to assist communication are not indicated.     Additional disability accommodations are not indicated.      5. Collaboration:  Collaboration / coordination of treatment will be initiated with the following support professionals: TBD.      6.  Referrals:  The following referral(s) will be initiated: 55+ [facility] Program. Next Scheduled Appointment: TBD.  A Release of Information has been obtained for the following: commitment .    7. CLINTON: CLINTON:  Discussed the general effects of drugs and alcohol on  health and well-being. Provider gave patient printed information about the effects of chemical use on their health and well being. Recommendations:  Accepted on condition he can maintain sobriety, if not he will be referred to [facility] treatment .     8. Records were reviewed at time of assessment.  Information in this assessment was obtained from the medical record and provided by patient who is a poor historian.   Patient will have open access to their mental health medical record.      Eval type:  Mental Health

## 2020-04-28 NOTE — PROGRESS NOTES
"Outpatient Mental Health Services - Adult    MY COPING PLAN FOR SAFETY    PATIENT'S NAME: Austyn Leach  MRN:   [id number]  SAFETY PLAN:  Step 1: Warning signs / cues (Thoughts, images, mood, situation, behavior) that a crisis may be developing:    Thoughts: \"People would be better off without me\", \"I can't do this anymore\", \"I just want this to end\" and \"Nothing makes it better\"    Images: No    Thinking Processes: ruminations (can't stop thinking about my problems): feeling lonely, intrusive thoughts (bothersome, unwanted thoughts that come out of nowhere): drinking alcohol causes suicidal thoughts, highly critical and negative thoughts: thoughts of ending life by overdose or co2 poisoining, disorganized thinking: when not taking medications and usig alcohol. and paranoia: when not adherent with meds    Mood: worsening depression, hopelessness and helplessness    Behaviors: isolating/withdrawing , using alcohol, impulsive, reckless behaviors (acting without thinking): suicide attempts and alcohol use, not taking care of myself and not taking care of my responsibilities    Situations: relationship problems, relapse and financial stress   Step 2: Coping strategies - Things I can do to take my mind off of my problems without contacting another person (relaxation technique, physical activity):    Distress Tolerance Strategies:  have a cup of coffee, have some juice.    Physical Activities: Not right now.    Focus on helpful thoughts:  Just dont drink.  Step 3: People and social settings that provide distraction:   Name: Bay Phone: He did not remember his phone number      try a virtual AA meeting   Step 4: Remind myself of people and things that are important to me and worth living for:  \" my wife and my son and my cat\".  Step 5: When I am in crisis, I can ask these people to help me use my safety plan:   [facility] LOLLY Pepe P: [phone] F: [phone].    Name: " Bay, son.- could not remember phone has it in cell.   Step 6: Making the environment safe:     remove alcohol and be around others  Step 7: Professionals or agencies I can contact during a crisis:    Suicide Prevention Lifeline: 8-798-848-TALK (3441)    Crisis Text Line Service (available 24 hours a day, 7 days a week): Text [place] to 700545    Call  **CRISIS (565966) from a cell phone to talk to a team of professionals who can help you.  Crisis Services By [facility]: Phone Number:   [place]     [phone]   [place]    [phone]   [place]    [phone]   [place]    [phone]   [place]    [phone]   [place] [phone]   [place]     [phone]       Call 911 or go to my nearest emergency department.     I helped develop this safety plan and agree to use it when needed.  I have been given a copy of this plan.      Client signature _________________________________________________________________  Today s date:  4/28/2020  Adapted from Safety Plan Template 2008 Anna Parikh and Raul Mukherjee is reprinted with the express permission of the authors.  No portion of the Safety Plan Template may be reproduced without the express, written permission.  You can contact the authors at [email or web address] or [email or web address].

## 2020-04-28 NOTE — PROGRESS NOTES
Diagnosis/Procedure:   Patient Active Problem List   Diagnosis     Personal history of tobacco use, presenting hazards to health     Erectile dysfunction     Intention tremor     Living will, counseling/discussion     Health Care Home     ACP (advance care planning)     Delusions (H)     Rosa (H)     Schizoaffective disorder, bipolar type (H)     Cigarette nicotine dependence with withdrawal     Agitation     Suicide attempt (H)     Abdominal pain, generalized     CAP (community acquired pneumonia)     Abdominal pain     Work Completed:WALTER received a call from Shi Ayala for the 55+ referral, she will be calling the unit at 10am to complete a DA for pt.     WALTER spoke with pt's CM Betty Pepe several times today. CM wanted to inquire about wu cath, home health care, and discharge planning. She reported that the police report stated that the pt did not actually attempt suicide, he was found in his driveway and was threatening it. She was not comfortable with pt discharge until wu cath removed, but was able to understand that staff cannot remove, urology or PCP will need to do this once pt is discharge.     Discharge plan or goal: Pt to discharge 4/30/20 with all necessary appointments in place including home health care and 55+ program.                 Barriers to discharge: Pt is in need of further monitoring.

## 2020-04-28 NOTE — PROGRESS NOTES
Pt was in a good, calm mood this evening. He spent his time resting in bed and watching movies in the lounge. He didn't interact with peers or staff much and he had a blunted affect. He didn't attend community meeting. He ate his dinner. He denies any SI, SIB, or hallucinations. His goal for the evening was to make some phone calls, which he was able to do. He plans to discharge on Thursday.      04/28/20 1800   Behavioral Health   Hallucinations denies / not responding to hallucinations   Thinking intact   Orientation person: oriented;place: oriented;date: oriented;time: oriented   Memory baseline memory   Insight poor   Judgement impaired   Eye Contact at examiner   Affect blunted, flat   Mood mood is calm   Physical Appearance/Attire appears stated age;attire appropriate to age and situation   Hygiene neglected grooming - unclean body, hair, teeth   Suicidality other (see comments)  (denies)   1. Wish to be Dead (Recent) No   2. Non-Specific Active Suicidal Thoughts (Recent) No   Self Injury other (see comment)  (denies)   Elopement   (none)   Activity withdrawn   Speech clear;coherent   Medication Sensitivity no stated side effects;no observed side effects   Psychomotor / Gait balanced;steady   Activities of Daily Living   Hygiene/Grooming independent   Oral Hygiene independent   Dress scrubs (behavioral health);independent   Room Organization independent

## 2020-04-28 NOTE — PROGRESS NOTES
Pt was in a good, calm mood this evening. He spent his time resting in bed and watching movies in the lounge. He didn't interact with peers or staff much and he had a blunted affect, though he brightened slightly upon approach. He ate his dinner. He denies any SI, SIB, or hallucinations. He didn't have a goal for the evening. He hopes to discharge later this week.      04/27/20 2030   Behavioral Health   Hallucinations denies / not responding to hallucinations   Thinking intact   Orientation person: oriented;place: oriented   Memory baseline memory   Insight poor   Judgement impaired   Eye Contact at examiner   Affect blunted, flat   Mood mood is calm   Physical Appearance/Attire appears stated age;attire appropriate to age and situation   Hygiene other (see comment)  (adequate)   Suicidality other (see comments)  (denies)   1. Wish to be Dead (Recent) No   2. Non-Specific Active Suicidal Thoughts (Recent) No   Self Injury other (see comment)  (denies)   Elopement   (none)   Activity withdrawn   Speech coherent;clear   Medication Sensitivity no stated side effects;no observed side effects   Psychomotor / Gait balanced;steady   Activities of Daily Living   Hygiene/Grooming independent   Oral Hygiene independent   Dress independent;scrubs (behavioral health)   Room Organization independent

## 2020-04-28 NOTE — PROGRESS NOTES
"[facility], [place]   Psychiatric Progress Note  Hospital Day: 3     Telemedicine Visit: The patient's condition can be safely assessed and treated via synchronous audio and visual telemedicine encounter.      Start Time: 1100  Stop Time: 1113    Reason for Telemedicine Visit: Covid-19    Originating Site (Patient Location): [place] [address]    Distant Site (Provider Location): Provider Remote Setting    Consent:  The patient/guardian has verbally consented to: the potential risks and benefits of telemedicine (video visit) versus in person care; bill my insurance or make self-payment for services provided; and responsibility for payment of non-covered services.     Mode of Communication:  Video Conference via Skype    As the provider I attest to compliance with applicable laws and regulations related to telemedicine.           Interim History:   The patient's care was discussed with the treatment team during the daily team meeting and/or staff's chart notes were reviewed.  Staff report patient was visible in the milieu at times but tended to isolate in room, taking medications as prescribed, denying SI, no acute events overnight.     Upon interview, the patient reports that he had just completed his diagnostic assessment for the 55+ program and he is feeling hopeful about this and looking forward to this.  He feels like this will help address that \"bored\" feeling that he was having prior to admission that led to him using alcohol again and then having suicidal ideation.  He continues to deny having any suicidal ideation at this time.  He denies having any thoughts of hurting others.  He reports he last had a suicidal thought when he was intoxicated.  He is tolerating his medications, denies any side effects.  He states his mood today is \"pretty good\".  He is physically feeling well, has been taking all of his physical health medications including his antibiotic over the " "past 24 hours.  He is agreeable to being set up with home health upon discharge to help manage his Wilson catheter that needs to remain in place until he meets with urology.  He continues to express desire to discharge when his 72-hour hold is up, but he would like to discharge on Thursday morning, discussed how his hold  before this and he is agreeable to signing in voluntary and working with treatment team to establish outpatient appointments and then plan to discharge Thursday morning.  No additional concerns at this time.         Medications:       ciprofloxacin  500 mg Oral Q12H VERITO     omeprazole  40 mg Oral QAM AC     potassium chloride  20 mEq Oral Once     QUEtiapine  200 mg Oral At Bedtime     risperiDONE  4 mg Oral At Bedtime     tamsulosin  0.4 mg Oral Daily          Allergies:     Allergies   Allergen Reactions     Bee Venom Shortness Of Breath and Swelling     Propranolol           Labs:     No results found for this or any previous visit (from the past 48 hour(s)).       Psychiatric Examination:     BP (!) 161/87   Pulse 102   Temp 98.4  F (36.9  C) (Oral)   Resp 16   Ht 1.88 m (6' 2\")   Wt 85.7 kg (189 lb)   SpO2 94%   BMI 24.27 kg/m    Weight is 189 lbs 0 oz  Body mass index is 24.27 kg/m .    Orthostatic Vitals     None          Appearance: awake, alert, adequately groomed and appeared as age stated  Attitude:  more cooperative  Eye Contact:  fair  Mood:  \"pretty good\"  Affect:  appropriate and in normal range and mood congruent  Speech:  clear, coherent and normal prosody  Language: fluent and intact in English  Psychomotor, Gait, Musculoskeletal:  no evidence of tardive dyskinesia, dystonia, or tics  Thought Process:  more linear but still tangential   Associations:  no loose associations  Thought Content:  no evidence of suicidal ideation or homicidal ideation and no evidence of psychotic thought  Insight:  limited, improving  Judgement:  limited, improving  Oriented to:  time, " person, and place  Attention Span and Concentration:  fair  Recent and Remote Memory:  appears intact, not formally tested  Fund of Knowledge:  appropriate    Clinical Global Impressions  First:  Considering your total clinical experience with this particular patient population, how severe are the patient's symptoms at this time?: 7 (04/27/20 1539)  Compared to the patient's condition at the START of treatment, this patient's condition is: 3 (04/27/20 1539)  Most recent:  Considering your total clinical experience with this particular patient population, how severe are the patient's symptoms at this time?: 7 (04/27/20 1539)  Compared to the patient's condition at the START of treatment, this patient's condition is: 3 (04/27/20 1539)           Precautions:     Behavioral Orders   Procedures     Assault precautions     Code 1 - Restrict to Unit     Routine Programming     As clinically indicated     Single Room     Status 15     Every 15 minutes.     Suicide precautions     Patients on Suicide Precautions should have a Combination Diet ordered that includes a Diet selection(s) AND a Behavioral Tray selection for Safe Tray - with utensils, or Safe Tray - NO utensils            Diagnoses:      Schizoaffective disorder bipolar type, current episode mixed  Alcohol use disorder  Cannabis use disorder         Assessment & Plan:   Assessment and hospital summary:  65-year-old male with history of the above diagnoses who was brought into the ED after crisis was called by his ex-wife due to patient reporting thoughts of hurting self or others.  This occurred in the context of ongoing issues with medication adherence, patient is currently under a commitment and has been under commitment for almost 3 years.  He also has a history of alcohol and cannabis use disorders and was intoxicated prior to admission and U tox was positive for both alcohol and cannabinoids.  Since being admitted to the unit, patient has denied having any  thoughts of hurting self or others, he was admitted on a 72-hour hold and has declined to sign in as a voluntary patient.  Team has contacted patient's  who does not plan to revoke his provisional discharge at this time and would like him to be referred to the 55+ program prior to discharge.  Patient agreeable to signing in voluntary on 4/28 and will plan to discharge home 4/30 if stabilization continues and no evidence of imminent safety concerns.    Psychiatric treatment/inteventions:  Medications:   -Continue PTA Risperdal 4 mg at bedtime  -Continue PTA quetiapine 200 mg at bedtime  -Continue PTA Invega Sustenna 156 mg every 28 days, last dose administered 3/31/2020    -referral placed for 55+ program, DA completed on unit 4/28     Laboratory/Imaging:  No new labs ordered, admission labs reviewed     Patient will be treated in therapeutic milieu with appropriate individual and group therapies as described.     Medical treatment/interventions:  Medical concerns:   1) IM consult placed on admission, please see note dated 4/25 for complete details, did discuss patients refusal of antibiotic for past 2 doses with IM, they recommended continuing and for patient to receive 14 total doses     2) Will place home health referral for patient to assist in management of wu catheter until follow up with urology is completed      This note was created by undersigned using a Dragon dictation system. All typing errors or contextual distortion are unintentional and software inherent.     Disposition Plan   Reason for ongoing admission: continued stabilization and formulation of appropriate discharge plan  Discharge location: home with self-care  Discharge Medications: not ordered  Follow-up Appointments: not scheduled  Legal Status: 72 hour hold however on commitment, PD has not been revoked, see CTC notes for complete details, patient agrees to sign in voluntary 4/28  Entered by: Brenda Major on 4/28/2020 at  7:42 AM

## 2020-04-28 NOTE — PLAN OF CARE
Patient has been isolating to room today. He was cooperative with the interview with the 55 plus day treatment. Appetite is good, VSS, A&O x2. He ha been cooperative and pleasant today. He has poor ADL'S, dirty clothes on but refuses to take a shower. Plan is to discharge tomorrow.

## 2020-04-28 NOTE — CONSULTS
Inpatient Diagnostic Assessment order acknowledged.  Patient is anticipated to be seen on 4/28/2020 or possibly sooner if schedule allows.    Bernadette Eugene, LICSW

## 2020-04-29 PROCEDURE — 25000132 ZZH RX MED GY IP 250 OP 250 PS 637: Mod: GY | Performed by: PHYSICIAN ASSISTANT

## 2020-04-29 PROCEDURE — 25000132 ZZH RX MED GY IP 250 OP 250 PS 637: Mod: GY | Performed by: PSYCHIATRY & NEUROLOGY

## 2020-04-29 PROCEDURE — 12400001 ZZH R&B MH UMMC

## 2020-04-29 RX ADMIN — RISPERIDONE 4 MG: 4 TABLET ORAL at 19:22

## 2020-04-29 RX ADMIN — OMEPRAZOLE 40 MG: 20 CAPSULE, DELAYED RELEASE ORAL at 08:40

## 2020-04-29 RX ADMIN — CIPROFLOXACIN HYDROCHLORIDE 500 MG: 500 TABLET, FILM COATED ORAL at 08:40

## 2020-04-29 RX ADMIN — QUETIAPINE 200 MG: 200 TABLET, FILM COATED ORAL at 19:22

## 2020-04-29 RX ADMIN — CIPROFLOXACIN HYDROCHLORIDE 500 MG: 500 TABLET, FILM COATED ORAL at 19:22

## 2020-04-29 RX ADMIN — TAMSULOSIN HYDROCHLORIDE 0.4 MG: 0.4 CAPSULE ORAL at 08:40

## 2020-04-29 ASSESSMENT — ACTIVITIES OF DAILY LIVING (ADL)
ORAL_HYGIENE: INDEPENDENT
HYGIENE/GROOMING: INDEPENDENT
HYGIENE/GROOMING: INDEPENDENT
LAUNDRY: WITH SUPERVISION
DRESS: INDEPENDENT
ORAL_HYGIENE: INDEPENDENT
DRESS: INDEPENDENT
LAUNDRY: WITH SUPERVISION

## 2020-04-29 ASSESSMENT — ANXIETY QUESTIONNAIRES: GAD7 TOTAL SCORE: 10

## 2020-04-29 NOTE — PROGRESS NOTES
"Austyn \"Kendall\" refused his vital signs this afternoon. RN notified.        04/29/20 2937   Vital Signs   Temp   (Refused)   Pulse   (Refused)   BP   (Refused)     "

## 2020-04-29 NOTE — PROGRESS NOTES
Diagnosis/Procedure:   Patient Active Problem List   Diagnosis     Personal history of tobacco use, presenting hazards to health     Erectile dysfunction     Intention tremor     Living will, counseling/discussion     Health Care Home     ACP (advance care planning)     Delusions (H)     Rosa (H)     Schizoaffective disorder, bipolar type (H)     Cigarette nicotine dependence with withdrawal     Agitation     Suicide attempt (H)     Abdominal pain, generalized     CAP (community acquired pneumonia)     Abdominal pain     Work Completed:WR completed AVS for discharge tomorrow. Pt signed in voluntarily today.     Discharge plan or goal: Pt to discharge tomorrow.                 Barriers to discharge: Pt to discharge tomorrow.

## 2020-04-29 NOTE — PROGRESS NOTES
patient denies all issues and looks forward to discharging tomorrow. He spent a good amount of time in his room napping, but also socialized with peers early in the shift.       04/29/20 1347   Behavioral Health   Hallucinations denies / not responding to hallucinations   Thinking intact   Orientation person: oriented;place: oriented;date: oriented;time: oriented   Memory baseline memory   Insight insight appropriate to situation;insight appropriate to events   Judgement impaired   Eye Contact at examiner   Affect full range affect   Mood mood is calm   Physical Appearance/Attire appears stated age;attire appropriate to age and situation   Hygiene well groomed   1. Wish to be Dead (Recent) No   2. Non-Specific Active Suicidal Thoughts (Recent) No   Activity isolative   Speech clear;coherent   Medication Sensitivity no stated side effects;no observed side effects   Psychomotor / Gait balanced;steady   Psycho Education   Type of Intervention 1:1 intervention   Response participates, initiates socially appropriate   Hours 0.5   Activities of Daily Living   Hygiene/Grooming independent   Oral Hygiene independent   Dress independent   Laundry with supervision   Room Organization independent

## 2020-04-29 NOTE — DISCHARGE INSTRUCTIONS
Behavioral Discharge Planning and Instructions      Summary:  You were admitted on 4/24/2020  due to Depression, Suicidal Ideations and Suicide Attempt.  You were treated by Dr. Brenda Major DO and discharged on 4/30/20 from [facility].     You are due for your next Invega injection on 5/5/20. You will need to call your clinic to schedule an injection. Your , Betty, is also aware of this and can assist you as needed.     In the Emergency Department you tested positive for Alcohol (0.17)  and Cannabis. It was also discovered you had a UTI which you were treated for with a Wilson Catheter and Antibiotic medications that were monitored while you were on [facility].     You are currently under a Civil Commitment to [facility] as a person who is mentally ill, with a Anders[place] order in place, which means that court has ordered that you take your medications.     You were referred to the [facility] program. You will be attending groups virtually on Monday, Wednesday and Friday from 9:00am - Noon.     You tested negative for COVID-19.     Principal Diagnosis:   Schizoaffective Disorder   Alcohol Use Disorder   Cannabis Use Disorder     Health Care Follow-up Appointments:   Urology  Date/Time:  5/11/20 @ 9:00AM   Provider: Ruben Alaniz MD  Address: [address] [place] [address]   Phone: [phone]     Psychiatry  Date/Time:  5/14/20 @1:40PM   Provider: Dr. Richard Clayton MD  Address: This will be a telephonic visit, the provider will call you   Phone: [phone]    Primary Care Appointment  Date/Time: 5/26/20 @ 2:30PM    Provider: Dr. Grant Thomson MD  Address: [address] [place] [address] [address]  [place] [address]  Phone: [phone]      Your provider [place] you [place]  [facility] [facility]. If you do not hear from Home Care and Hospice, or you would like to call to schedule, please call  [phone]      Continue to meet with your  [facility]  Lenora Pepe   Phone: [phone]   Fax [phone]    Attend all scheduled appointments with your outpatient providers. Call at least 24 hours in advance if you need to reschedule an appointment to ensure continued access to your outpatient providers.   Lifestyle Adjustment:   1. Adjust your lifestyle to get enough sleep, relaxation, exercise and good nutrition.  Continue to develop healthy coping skills to decrease stress and promote a healthy  lifestyle.  2. Abstain from all substances of abuse.  3. Take medications as prescribed.  Please work with your doctor to discuss any concerns you have with your medications or side effects you may be experiencing.  4. Follow up with appointments as scheduled.      General Medication Instructions:   See your medication sheet(s) for instructions.   2. Take all medicines as directed.  Make no changes unless your doctor suggests them.   3. Go to all your doctor visits.  4. Be sure to have all your required lab tests. This way, your medicines can be refilled on time.  5. Do not use any drugs not prescribed by your doctor.    Major Treatments, Procedures and Findings:  You were provided with: a psychiatric assessment, assessed for medical stability, medication evaluation and/or management, group therapy, milieu management and medical interventions    Symptoms to Report: feeling more aggressive, increased confusion, losing more sleep, mood getting worse or thoughts of suicide    Early warning signs can include: increased depression or anxiety sleep disturbances increased thoughts or behaviors of suicide or self-harm  increased unusual thinking, such as paranoia or hearing voices    Safety and Wellness:  Take all medicines as directed.  Make no changes unless your doctor suggests them. Follow treatment recommendations. Refrain from alcohol and non-prescribed drugs. If there is a concern for safety, call  [phone].    Resources:   [employer] [place] [employer] Mental Illness (www.[place].nicole.org): 985.510.8167 or 533-602-4155.  [facility] is a support group for adults with a mental illness regardless of diagnosis. Groups are free and meet for 90 minutes. All groups are led by trained individuals who are also in recovery. No registration or enrollment required.  Wednesdays  Starting March 25 11am-12:3opm Annel [email or web address] Call in - [phone] Meeting ID: [phone] There is no participant ID. Just press # if this is requested.   Wednesdays  Starting March 25 6:30pm-8pm Thi [email or web address] Call in - [phone] Meeting ID: [phone] There is no participant ID. Just press # if this is requested.   Thursdays   Starting March 26 6:30pm-8pm Thi [email or web address] Call In - [phone] Meeting ID: [phone] There is no participant ID. Just press # if this is requested.   Every Friday  Starting March 27 2:30pm-4pm Don [email or web address] Call In - [phone] Meeting ID: [phone] There is no participant ID. Just press # if this is requested.   Every Friday   Starting March 27 6:30pm-8pm Annel [email or web address] Call In - [phone] Meeting ID: [phone] There is no participant ID. Just press # if this is requested.         [facility]  Mental [facility] Crisis Response Services - Adult  [facility]  [address] [place], [address]  Crisis Line: [phone]  The Crisis Response Unit can also help with things like:    Mental health crisis assessments    Access to urgent psychiatry and therapy    Family education and support    Crisis phone support    Referrals to community resources    Crisis Stabilization Services    [facility]  Adult Mental Health: [phone]  [facility] Helpline  Call [phone] ext. [phone] and leave a detailed message.   Helpline advocates return messages Monday-Friday from  9:00am-1:00pm.     The treatment team has appreciated the opportunity to work with you.     If you have any questions or concerns our unit number is [phone].     If you would like to obtain any specific documentation regarding your hospitalization after your discharge, contact [facility] of Information/Medical Records at: [phone].

## 2020-04-30 VITALS
HEART RATE: 102 BPM | WEIGHT: 189 LBS | BODY MASS INDEX: 24.26 KG/M2 | DIASTOLIC BLOOD PRESSURE: 87 MMHG | HEIGHT: 74 IN | OXYGEN SATURATION: 94 % | RESPIRATION RATE: 16 BRPM | SYSTOLIC BLOOD PRESSURE: 161 MMHG | TEMPERATURE: 98.4 F

## 2020-04-30 PROCEDURE — 25000132 ZZH RX MED GY IP 250 OP 250 PS 637: Mod: GY | Performed by: PHYSICIAN ASSISTANT

## 2020-04-30 PROCEDURE — 25000132 ZZH RX MED GY IP 250 OP 250 PS 637: Mod: GY | Performed by: PSYCHIATRY & NEUROLOGY

## 2020-04-30 PROCEDURE — 99239 HOSP IP/OBS DSCHRG MGMT >30: CPT | Mod: 95 | Performed by: PSYCHIATRY & NEUROLOGY

## 2020-04-30 RX ORDER — RISPERIDONE 4 MG/1
4 TABLET ORAL AT BEDTIME
Qty: 14 TABLET | Refills: 0 | Status: SHIPPED | OUTPATIENT
Start: 2020-04-30 | End: 2020-10-26

## 2020-04-30 RX ORDER — CIPROFLOXACIN 500 MG/1
500 TABLET, FILM COATED ORAL EVERY 12 HOURS
Qty: 5 TABLET | Refills: 0 | Status: SHIPPED | OUTPATIENT
Start: 2020-04-30 | End: 2020-07-07

## 2020-04-30 RX ORDER — QUETIAPINE FUMARATE 200 MG/1
200 TABLET, FILM COATED ORAL AT BEDTIME
Qty: 14 TABLET | Refills: 0 | Status: SHIPPED | OUTPATIENT
Start: 2020-04-30 | End: 2020-07-07

## 2020-04-30 RX ADMIN — TRAZODONE HYDROCHLORIDE 50 MG: 50 TABLET ORAL at 02:50

## 2020-04-30 RX ADMIN — CIPROFLOXACIN HYDROCHLORIDE 500 MG: 500 TABLET, FILM COATED ORAL at 08:08

## 2020-04-30 RX ADMIN — TAMSULOSIN HYDROCHLORIDE 0.4 MG: 0.4 CAPSULE ORAL at 08:08

## 2020-04-30 NOTE — PLAN OF CARE
Pt discharging from stn:30 to home.  Affect is full range.  Pt denies suicidal ideation reports he is safe to discharge.  Pt was given copy of his discharge instructions and medication administration instructions. All discharge plans were discussed with patient. Pt reports no questions at this time regarding discharge plans or medications. Pt denies any suicidal ideation, plans or intent at this time. Pt left on a cab to home.

## 2020-04-30 NOTE — PROGRESS NOTES
"Patient has been visible in the milieu for majority of the evening. Patient states he is ready for discharge and wants to go home Denies suicidal ideation and self injurious thoughts. Denies auditory and visual hallucinations. Denies anxiety but says that he is always depressed. Ate dinner. Med compliant. Urine catheter bag was changed to his larger evening bag. Pleasant and cooperative on the unit.     Patient evaluation continues. Assessed mood,anxiety,thoughts and behavior.     Patient gradually progressing towards goals.    Patient is encouraged to participate in groups and assisted to develop healthy coping skills.     VS reviewed: BP (!) 161/87   Pulse 102   Temp 98.4  F (36.9  C) (Oral)   Resp 16   Ht 1.88 m (6' 2\")   Wt 85.7 kg (189 lb)   SpO2 94%   BMI 24.27 kg/m      Length of stay: 4    Refer to daily team meeting notes for individualized plan of care. Nursing will continue to assess.      "

## 2020-04-30 NOTE — DISCHARGE SUMMARY
"Psychiatric Discharge Summary    Austyn Leach MRN# [id number]   Age: 65 year old YOB: 1955     Date of Admission:  4/24/2020  Date of Discharge:  4/30/2020  Admitting Physician:  Reuben Lopez MD  Discharge Physician:  Brenda Major DO    Telemedicine Visit: The patient's condition can be safely assessed and treated via synchronous audio and visual telemedicine encounter.      Start Time: 0913  Stop Time: 0923    Reason for Telemedicine Visit: Covid-19    Originating Site (Patient Location): [place] [address]    Distant Site (Provider Location): Provider Remote Setting    Consent:  The patient/guardian has verbally consented to: the potential risks and benefits of telemedicine (video visit) versus in person care; bill my insurance or make self-payment for services provided; and responsibility for payment of non-covered services.     Mode of Communication:  Video Conference via Skype    As the provider I attest to compliance with applicable laws and regulations related to telemedicine.                  Event Leading to Hospitalization:   CHIEF COMPLAINT AND REASON FOR ADMISSION:  The patient is a 65-year-old  male who was admitted by his ex-wife, [employer] and the police after he voiced threats to kill himself and others.      HISTORY OF PRESENT ILLNESS:  The patient presented as not a very reliable historian and was focused on being discharged from the hospital.  He seemed to be having difficulties with hearing and tried to joke that instead of medications he needed vodka or gin \"to make me feel better.\" Collateral sources, however, stated that the patient's wife called [employer] as well as 911 as patient has been drinking.  He pulled his car into the garage and threatened to carbon monoxide himself.  She came into the garage and found him with the car running.  The couple had been  for 24 years and she  him last July.  The patient is " reported to be under civil commitment and Andersen petition that should extent until 10/2020.  He had been diagnosed with schizoaffective disorder, cannabis use disorder, alcohol use disorder.  Has a history of poor compliance with his medications.  Has been drinking and using marijuana.  According to his DEC 's note, patient called his wife and told her that by the time she returned home he would be dead.  He also informed his formerly [facility] , Neema Pepe, that he would no longer take oral or long acting injectable medications.  On interview, the patient indicates that he was suicidal and that he intentionally overdosed on his prescribed Seroquel with plan to kill himself.  In the Emergency Room, when asked what could help him, he said that he would like to have another drink of vodka and go home.        See Admission note by Reuben Lopez MD on 4/25/2020 for additional details.          Diagnoses:     Schizoaffective disorder bipolar type, current episode mixed  Alcohol use disorder  Cannabis use disorder  UTI likely 2/2 catheter; being treated with Ciprofloxacin on discharge         Labs:     Recent Results (from the past 168 hour(s))   CBC with platelets differential    Collection Time: 04/24/20  4:21 PM   Result Value Ref Range    WBC 5.0 4.0 - 11.0 10e9/L    RBC Count 4.52 4.4 - 5.9 10e12/L    Hemoglobin 13.7 13.3 - 17.7 g/dL    Hematocrit 41.2 40.0 - 53.0 %    MCV 91 78 - 100 fl    MCH 30.3 26.5 - 33.0 pg    MCHC 33.3 31.5 - 36.5 g/dL    RDW 13.8 10.0 - 15.0 %    Platelet Count 247 150 - 450 10e9/L    Diff Method Automated Method     % Neutrophils 55.7 %    % Lymphocytes 32.5 %    % Monocytes 7.6 %    % Eosinophils 3.2 %    % Basophils 0.8 %    % Immature Granulocytes 0.2 %    Nucleated RBCs 0 0 /100    Absolute Neutrophil 2.8 1.6 - 8.3 10e9/L    Absolute Lymphocytes 1.6 0.8 - 5.3 10e9/L    Absolute Monocytes 0.4 0.0 - 1.3 10e9/L    Absolute Eosinophils 0.2 0.0 - 0.7 10e9/L    Absolute  Basophils 0.0 0.0 - 0.2 10e9/L    Abs Immature Granulocytes 0.0 0 - 0.4 10e9/L    Absolute Nucleated RBC 0.0    Comprehensive metabolic panel    Collection Time: 04/24/20  4:21 PM   Result Value Ref Range    Sodium 144 133 - 144 mmol/L    Potassium 3.3 (L) 3.4 - 5.3 mmol/L    Chloride 109 94 - 109 mmol/L    Carbon Dioxide 28 20 - 32 mmol/L    Anion Gap 7 3 - 14 mmol/L    Glucose 90 70 - 99 mg/dL    Urea Nitrogen 14 7 - 30 mg/dL    Creatinine 0.98 0.66 - 1.25 mg/dL    GFR Estimate 80 >60 mL/min/[1.73_m2]    GFR Estimate If Black >90 >60 mL/min/[1.73_m2]    Calcium 8.6 8.5 - 10.1 mg/dL    Bilirubin Total 0.3 0.2 - 1.3 mg/dL    Albumin 3.2 (L) 3.4 - 5.0 g/dL    Protein Total 7.1 6.8 - 8.8 g/dL    Alkaline Phosphatase 54 40 - 150 U/L    ALT 19 0 - 70 U/L    AST 11 0 - 45 U/L   Alcohol ethyl    Collection Time: 04/24/20  4:21 PM   Result Value Ref Range    Ethanol g/dL 0.17 (H) <0.01 g/dL   Acetaminophen level    Collection Time: 04/24/20  4:21 PM   Result Value Ref Range    Acetaminophen Level <2 mg/L   Salicylate level    Collection Time: 04/24/20  4:21 PM   Result Value Ref Range    Salicylate Level 2 mg/dL   UA reflex to Microscopic and Culture    Collection Time: 04/24/20  4:22 PM    Specimen: Urine catheter; Catheterized Urine   Result Value Ref Range    Color Urine Light Red     Appearance Urine Slightly Cloudy     Glucose Urine Negative NEG^Negative mg/dL    Bilirubin Urine Negative NEG^Negative    Ketones Urine Negative NEG^Negative mg/dL    Specific Gravity Urine 1.016 1.003 - 1.035    Blood Urine Large (A) NEG^Negative    pH Urine 6.0 5.0 - 7.0 pH    Protein Albumin Urine 100 (A) NEG^Negative mg/dL    Urobilinogen mg/dL Normal 0.0 - 2.0 mg/dL    Nitrite Urine Positive (A) NEG^Negative    Leukocyte Esterase Urine Large (A) NEG^Negative    Source Catheterized Urine     RBC Urine >182 (H) 0 - 2 /HPF    WBC Urine 135 (H) 0 - 5 /HPF    WBC Clumps Present (A) NEG^Negative /HPF    Bacteria Urine Moderate (A)  NEG^Negative /HPF    Squamous Epithelial /HPF Urine 1 0 - 1 /HPF    Mucous Urine Present (A) NEG^Negative /LPF    Hyaline Casts 8 (H) 0 - 2 /LPF    Calcium Oxalate Few (A) NEG^Negative /HPF   Drug abuse screen 6 urine (chem dep)    Collection Time: 04/24/20  4:22 PM   Result Value Ref Range    Amphetamine Qual Urine Negative NEG^Negative    Barbiturates Qual Urine Negative NEG^Negative    Benzodiazepine Qual Urine Negative NEG^Negative    Cannabinoids Qual Urine Positive (A) NEG^Negative    Cocaine Qual Urine Negative NEG^Negative    Ethanol Qual Urine Positive (A) NEG^Negative    Opiates Qualitative Urine Negative NEG^Negative   COVID-19 Virus (Coronavirus) by PCR Nasopharyngeal    Collection Time: 04/24/20  4:38 PM    Specimen: Nasopharyngeal   Result Value Ref Range    COVID-19 Virus PCR to [facility] - Source Nasopharyngeal     COVID-19 Virus PCR to [facility] - Result       Test received-See reflex to IDDL test SARS CoV2 (COVID-19) Virus RT-PCR   SARS-CoV-2 COVID-19 Virus (Coronavirus) RT-PCR Nasopharyngeal    Collection Time: 04/24/20  4:38 PM    Specimen: Nasopharyngeal   Result Value Ref Range    SARS-CoV-2 Virus Specimen Source Nasopharyngeal     SARS-CoV-2 PCR Result NEGATIVE     SARS-CoV-2 PCR Comment       This automated, real-time RT-PCR assay by [employer] on the [employer] has   been given Emergency Use Authorization (EUA) for the in vitro qualitative detection of RNA   from the SARS-CoV2 virus in nasopharyngeal swabs in viral transport medium from patients   with signs and symptoms of infection who are suspected of COVID-19. The performance is   unknown in asymptomatic patients.     Urine Culture Aerobic Bacterial    Collection Time: 04/24/20  5:04 PM    Specimen: Catheterized Urine   Result Value Ref Range    Specimen Description Catheterized Urine     Culture Micro >100,000 colonies/mL  Pseudomonas aeruginosa   (A)     Culture Micro >100,000 colonies/mL  Enterococcus faecalis   (A)         "Susceptibility    Enterococcus faecalis - URBANO     AMPICILLIN <=2 Sensitive ug/mL     NITROFURANTOIN <=16 Sensitive ug/mL     PENICILLIN 8 Sensitive ug/mL     VANCOMYCIN 2 Sensitive ug/mL    Pseudomonas aeruginosa - URBANO     AMIKACIN <=2 Sensitive ug/mL     CEFEPIME <=1 Sensitive ug/mL     CEFTAZIDIME 2 Sensitive ug/mL     CIPROFLOXACIN <=0.25 Sensitive ug/mL     GENTAMICIN <=1 Sensitive ug/mL     LEVOFLOXACIN 0.5 Sensitive ug/mL     Piperacillin/Tazo <=4 Sensitive ug/mL     TOBRAMYCIN <=1 Sensitive ug/mL     MEROPENEM <=0.25 Sensitive ug/mL   Carbon monoxide    Collection Time: 04/24/20  7:20 PM   Result Value Ref Range    Carbon Monoxide 11.0 (HH) 0 - 2 %   Carbon monoxide    Collection Time: 04/24/20  9:05 PM   Result Value Ref Range    Carbon Monoxide 7.6 (H) 0 - 2 %   Lipid panel    Collection Time: 04/26/20  7:06 AM   Result Value Ref Range    Cholesterol 125 <200 mg/dL    Triglycerides 89 <150 mg/dL    HDL Cholesterol 44 >39 mg/dL    LDL Cholesterol Calculated 63 <100 mg/dL    Non HDL Cholesterol 81 <130 mg/dL   TSH with free T4 reflex and/or T3 as indicated    Collection Time: 04/26/20  7:06 AM   Result Value Ref Range    TSH 0.97 0.40 - 4.00 mU/L   Potassium    Collection Time: 04/26/20  7:06 AM   Result Value Ref Range    Potassium 3.8 3.4 - 5.3 mmol/L            Consults:   1) IM consult placed on admission:   Received ASAP consult in telephone readback ordering mode by RNCarrie for \"catheter\".      Circumstances of recent discharge and re-admittance noted. Please refer to recent medicine H&P by Dr. Lincoln in charting dated 04/10/2020 & discharge summary by Dr. Webster dated 04/12/2020, which was reviewed by this writer and is up to date. Please call the on-call [facility] for any f/u medical concerns if they arise.      In short, Austyn Leach is a 65 year old male with a past medical history of schizoaffective d/o, multiple suicide attempts, recent admission to [facility] 04/10-04/12 for ANATOLY found to have " bladder outlet obstruction, abdominal pain which is resolved and CAP treated with antibiotics who is admitted to [facility] for depression w/ SI.     Diagnoses:  #Depression, SI:  -Management per psychiatry  #Concern for UTI: UA grossly abnormal (100 protein, positive nitrties, large blood, large LE, microscopy w/ >182 WBC, moderate bacteria, WBC clumps), has indwelling wu placed last admission. Prelim UC w/ >100k non lactose fermenting gram negative rods. Afebrile (tmax 99.3) w/ normal WBC count.   -Transition to cipro 500 mg BID x7 days given prelim UC results  -Will continue follow UC  -Notify medicine if any fever, suprapubic abdominal pain, flank pain, nausea/vomiting  #Bladder outlet obstruction: Indwelling wu in place.   -Wu care per RN flyer, please assess if patient would be willing to have this exchanged given UTI  -Continue flomax  -Urology f/u scheduled for 05/11/2020  #Hypokalemia: K of 3.3. no replacement in ED. Will trend in AM.   #ANATOLY at OSH, resolved: Creat of 0.98 w/ normal labs. UA on admission as above. Thought to be 2/2 bladder outlet obstruction and improved w/ wu. Recheck PRN.   #GERD: Continue Prilosec.   #Multiple liver cysts and renal cysts: Noted on abdominal MRI this admission: Continue OP f/u for surveillance.      Medicine to follow K and UC. Notify medicine if any fevers or clinical changes, or if any intercurrent medical issues arise.      Katie Barrett PA-C           2) Referral for 55+ Day Treatment Program placed and patient had on unit diagnostic assessment completed prior to discharge     3) Also placed home health referral for patient to have RN assist in management of wu catheter until follow up with urology is completed         Hospital Course:   Austyn Leach was admitted to [facility] with attending Brenda Major DO on a 72 hour mental health hold. The patient was placed under status 15 (15 minute checks) to ensure patient safety. Labs obtained  as above and PTA medications were continued. Patient has been under a  commitment for almost 3 years.  He was intoxicated prior to admission and U tox was positive for both alcohol and cannabinoids.  IM was consulted due to patient having wu catheter in place, there was concern for UTI and patient had been started on Keflex in ED however once C&S returned IM transitioned patient to Cipro for planned 7 day (or 14 dose) course, patient had declined a few doses not understanding why he was being prescribed this medication but once thoroughly explained he was in agreement with continuing the antibiotic. Since being admitted to the unit and evaluated when sober patient has denied having any thoughts of hurting self or others, he was admitted on a 72-hour hold and had declined initially to sign in as a voluntary patient.  Team CTC contacted patient's  who does not plan to revoke his provisional discharge at this time and would like him to be referred to the 55+ program prior to discharge. Patient agreeable to signing in voluntary on 4/28 in order for proper appointments and follow up arrangements be made prior to discharge including referral to home health for assistance with management of patients wu. He requested to be discharged 4/29.    Today Austyn Leach reports that he does not have any thoughts of hurting self or others and the last time he had a suicidal thought was when he was intoxicated. In addition, he has notable risk factors for self-harm, including age, single status, anxiety, substance abuse and previous suicide attempts. However, risk is mitigated by commitment to family, ability to volunteer a safety plan and history of seeking help when needed. Therefore, based on all available evidence including the factors cited above, he does not appear to be at imminent risk for self-harm, does not meet criteria for a 72-hr hold, and therefore remains appropriate for ongoing outpatient level  "of care. Voluntary referral for day treatment was offered, he accepted this offer and planned to start 55+ program upon discharge.    Austyn Leach was discharged to home. At the time of discharge Austyn Leach was determined to not be a danger to himself or others.          Discharge Medications:     Current Discharge Medication List      START taking these medications    Details   cephALEXin (KEFLEX) 500 MG capsule Take 1 capsule (500 mg) by mouth 2 times daily for 10 days  Qty: 20 capsule, Refills: 0      ciprofloxacin (CIPRO) 500 MG tablet Take 1 tablet (500 mg) by mouth every 12 hours For 5 more doses  Qty: 5 tablet, Refills: 0    Associated Diagnoses: Urinary tract infection associated with indwelling urethral catheter, initial encounter (H)         CONTINUE these medications which have CHANGED    Details   QUEtiapine (SEROQUEL) 200 MG tablet Take 1 tablet (200 mg) by mouth At Bedtime  Qty: 14 tablet, Refills: 0    Associated Diagnoses: Schizoaffective disorder, bipolar type (H)      risperiDONE (RISPERDAL) 4 MG tablet Take 1 tablet (4 mg) by mouth At Bedtime  Qty: 14 tablet, Refills: 0    Associated Diagnoses: Schizoaffective disorder, bipolar type (H)         CONTINUE these medications which have NOT CHANGED    Details   omeprazole (PRILOSEC) 40 MG DR capsule Take 1 capsule (40 mg) by mouth daily  Qty: 30 capsule, Refills: 3    Associated Diagnoses: Gastroesophageal reflux disease, esophagitis presence not specified      paliperidone (INVEGA SUSTENNA) 156 MG/ML VEE injection Inject 156 mg into the muscle every 28 days      tamsulosin (FLOMAX) 0.4 MG capsule Take 1 capsule (0.4 mg) by mouth daily  Qty: 30 capsule, Refills: 3    Associated Diagnoses: Bladder outlet obstruction                  Psychiatric Examination:   Appearance: awake, alert, adequately groomed and appeared as age stated  Attitude:  cooperative  Eye Contact:  fair  Mood:  \"pretty good\"  Affect:  appropriate and in normal range and mood " congruent  Speech:  clear, coherent and normal prosody  Language: fluent and intact in English  Psychomotor, Gait, Musculoskeletal:  no evidence of tardive dyskinesia, dystonia, or tics  Thought Process: more linear and goal oriented with some continued tangential thoughts   Associations:  no loose associations  Thought Content:  no evidence of suicidal ideation or homicidal ideation and no evidence of psychotic thought  Insight:  limited  Judgement:  limited, adequate for safety at this time  Oriented to:  time, person, and place  Attention Span and Concentration:  fair  Recent and Remote Memory:  appears intact, not formally tested  Fund of Knowledge:  appropriate         Discharge Plan:   You are due for your next Invega injection on 5/5/20. You will need to call your clinic to schedule an injection. Your , Betty, is also aware of this and can assist you as needed.      In the Emergency Department you tested positive for Alcohol (0.17)  and Cannabis. It was also discovered you had a UTI which you were treated for with a Wilson Catheter and Antibiotic medications that were monitored while you were on [facility].      You are currently under a Civil Commitment to [facility] as a person who is mentally ill, with a [employer]en order in place, which means that court has ordered that you take your medications.      You were referred to the 55+ Outpatient program. You will be attending groups virtually on Monday, Wednesday and Friday from 9:00am - Noon.      You tested negative for COVID-19.     Health Care Follow-up Appointments:   Urology  Date/Time:  5/11/20 @ 9:00AM   Provider: Ruben Alaniz MD  Address: [address] [place] [address]   Phone: [phone]      Psychiatry  Date/Time:  5/14/20 @1:40PM   Provider: Dr. Richard Clayton MD  Address: This will be a telephonic visit, the provider will call you   Phone: [phone]     Primary Care  Appointment  Date/Time: 5/26/20 @ 2:30PM    Provider: Dr. Grant Thomson MD  Address: [address] [place] [address] [address]  [place] [address]  Phone: [phone]       Your provider has referred you to:  [facility]: [facility]. If you do not hear from Home Care and Hospice, or you would like to call to schedule, please call  [phone]      Continue to meet with your  [facility]   Phone: [phone]   Fax [phone]     Attend all scheduled appointments with your outpatient providers. Call at least 24 hours in advance if you need to reschedule an appointment to ensure continued access to your outpatient providers.     Attestation:  Patient has been seen and evaluated by me, Brenda Major DO on day of discharge. 40 minutes were spent in coordination of discharge planning.

## 2020-04-30 NOTE — PROGRESS NOTES
Diagnosis/Procedure:   Patient Active Problem List   Diagnosis     Personal history of tobacco use, presenting hazards to health     Erectile dysfunction     Intention tremor     Living will, counseling/discussion     Health Care Home     ACP (advance care planning)     Delusions (H)     Rosa (H)     Schizoaffective disorder, bipolar type (H)     Cigarette nicotine dependence with withdrawal     Agitation     Suicide attempt (H)     Abdominal pain, generalized     CAP (community acquired pneumonia)     Abdominal pain     Work Completed:WR completed AVS and scheduled [facility] medical transportation to  pt at 12:30pm.     Discharge plan or goal: WR to discharge home today                 Barriers to discharge: None

## 2020-05-01 ENCOUNTER — TELEPHONE (OUTPATIENT)
Dept: FAMILY MEDICINE | Facility: CLINIC | Age: 65
End: 2020-05-01

## 2020-05-01 ENCOUNTER — CARE COORDINATION (OUTPATIENT)
Dept: FAMILY MEDICINE | Facility: CLINIC | Age: 65
End: 2020-05-01

## 2020-05-01 NOTE — TELEPHONE ENCOUNTER
MTM referral from: Transitions of Care (recent hospital discharge or ED visit)    MTM referral outreach attempt #1 on May 1, 2020 at 8:32 AM      Outcome: Patient is not interested at this time because they are an [facility] patient, will route to MTM Pharmacist/Provider as an FYI. Thank you for the referral.     Marycruz Peterson, MTM Coordinator

## 2020-05-01 NOTE — PROGRESS NOTES
Care Coordination Assessment    PCP: Grant Smith    Referral Source:  ED/IP List      Clinical Data: Patient discharged from [facility] 04/30/2020 with Alcoholic Intoxication with suicide attempt.  Patient discharged to inpatient treatment and will follow up with mental health    Plan: I will close encounter as patient does not need to follow up with [facility]

## 2020-05-06 ENCOUNTER — TRANSFERRED RECORDS (OUTPATIENT)
Dept: FAMILY MEDICINE | Facility: CLINIC | Age: 65
End: 2020-05-06

## 2020-05-11 ENCOUNTER — OFFICE VISIT (OUTPATIENT)
Dept: UROLOGY | Facility: CLINIC | Age: 65
End: 2020-05-11
Payer: MEDICARE

## 2020-05-11 VITALS
DIASTOLIC BLOOD PRESSURE: 70 MMHG | SYSTOLIC BLOOD PRESSURE: 138 MMHG | HEART RATE: 80 BPM | WEIGHT: 200 LBS | HEIGHT: 74 IN | BODY MASS INDEX: 25.67 KG/M2

## 2020-05-11 DIAGNOSIS — R33.9 URINARY RETENTION: ICD-10-CM

## 2020-05-11 DIAGNOSIS — N40.0 ENLARGED PROSTATE: Primary | ICD-10-CM

## 2020-05-11 PROCEDURE — 99204 OFFICE O/P NEW MOD 45 MIN: CPT | Mod: 25 | Performed by: UROLOGY

## 2020-05-11 PROCEDURE — 52000 CYSTOURETHROSCOPY: CPT | Performed by: UROLOGY

## 2020-05-11 RX ORDER — LIDOCAINE HYDROCHLORIDE 20 MG/ML
JELLY TOPICAL ONCE
Status: DISCONTINUED | OUTPATIENT
Start: 2020-05-11 | End: 2020-05-12 | Stop reason: HOSPADM

## 2020-05-11 ASSESSMENT — PAIN SCALES - GENERAL: PAINLEVEL: NO PAIN (0)

## 2020-05-11 ASSESSMENT — MIFFLIN-ST. JEOR: SCORE: 1761.94

## 2020-05-11 ASSESSMENT — PATIENT HEALTH QUESTIONNAIRE - PHQ9: SUM OF ALL RESPONSES TO PHQ QUESTIONS 1-9: 11

## 2020-05-11 NOTE — LETTER
"5/11/2020       RE: Austyn Leach  [address] [place] [address]     Dear Colleague,    Thank you for referring your patient, Austyn Leach, to the [facility] [place] at [facility]. Please see a copy of my visit note below.    [facility]  Main Office: [address]  [address]  [place] [address]       CHIEF COMPLAINT:  Urinary retention    HISTORY:   This is a 65-year-old gentleman who was in the emergency department 1 month ago after a suicide attempt in which he took 45 tablets of his Seroquel.  He has a history of alcohol abuse as well.  His creatinine is found to be elevated at 2.62.  He had a CT scan performed that showed a distended bladder.  He had a Wilson catheter placed for 1800 mL of output.  He was started on Flomax.  He was admitted to the hospital.  His creatinine eventually improved down to 0.98.  He has continued the Flomax.  He is here today for catheter removal, cystoscopy and trial of void.  I discussed my recommendation to proceed with a cystoscopy today and he agreed.  He says that he was wearing depends at all times before going to the emergency department.  It is unclear for how long he was requiring depends.  He says that he is \"going all the time \"in small amounts.  However he says very recently he seemed to be going normally.  He thinks that alcohol intake may have affected his urination.  He has never seen a urologist previously.  He reports no history of gross hematuria or infections.  He has no family history of prostate cancer.  He has not been having PSA screening performed.    PAST MEDICAL HISTORY:   Past Medical History:   Diagnosis Date     Bipolar 1 disorder (H)      GERD (gastroesophageal reflux disease)      Mild intermittent asthma     uses primatent     Schizophrenia (H)        PAST SURGICAL HISTORY:   Past Surgical History:   Procedure Laterality Date     NO HISTORY OF " SURGERY       TESTICLE SURGERY       VASECTOMY         FAMILY HISTORY:   Family History   Problem Relation Age of Onset     C.A.D. Father      Hypertension Father      Breast Cancer Mother      Diabetes No family hx of      Cerebrovascular Disease No family hx of        SOCIAL HISTORY:   Social History     Tobacco Use     Smoking status: Current Every Day Smoker     Packs/day: 1.00     Years: 58.00     Pack years: 58.00     Types: Cigarettes     Smokeless tobacco: Never Used   Substance Use Topics     Alcohol use: No          Allergies   Allergen Reactions     Bee Venom Shortness Of Breath and Swelling     Propranolol          Current Outpatient Medications:      ciprofloxacin (CIPRO) 500 MG tablet, Take 1 tablet (500 mg) by mouth every 12 hours For 5 more doses, Disp: 5 tablet, Rfl: 0     omeprazole (PRILOSEC) 40 MG DR capsule, Take 1 capsule (40 mg) by mouth daily, Disp: 30 capsule, Rfl: 3     paliperidone (INVEGA SUSTENNA) 156 MG/ML VEE injection, Inject 156 mg into the muscle every 28 days, Disp: , Rfl:      QUEtiapine (SEROQUEL) 200 MG tablet, Take 1 tablet (200 mg) by mouth At Bedtime, Disp: 14 tablet, Rfl: 0     risperiDONE (RISPERDAL) 4 MG tablet, Take 1 tablet (4 mg) by mouth At Bedtime, Disp: 14 tablet, Rfl: 0     tamsulosin (FLOMAX) 0.4 MG capsule, Take 1 capsule (0.4 mg) by mouth daily, Disp: 30 capsule, Rfl: 3    Review Of Systems:  Skin: No rash, pruritis, or skin pigmentation  Eyes: No changes in vision  Ears/Nose/Throat: No changes in hearing, no nosebleeds  Respiratory: No shortness of breath, dyspnea on exertion, cough, or hemoptysis  Cardiovascular: No chest pain or palpitations  Gastrointestinal: No diarrhea or constipation. No abdominal pain. No hematochezia  Genitourinary: see HPI  Musculoskeletal: No pain or swelling of joints, normal range of motion  Neurologic: No weakness or tremors  Psychiatric: No recent changes in memory or mood  Hematologic/Lymphatic/Immunologic: No easy bruising or  "enlarged lymph nodes  Endocrine: No weight gain or loss      PHYSICAL EXAM:    /70 (BP Location: Right arm)   Pulse 80   Ht 1.88 m (6' 2\")   Wt 90.7 kg (200 lb)   BMI 25.68 kg/m    General appearance: In NAD, conversant  HEENT: Normocephalic and atraumatic, anicteric sclera  Cardiovascular: Not examined  Respiratory: normal, non-labored breathing  Gastrointestinal: negative, Abdomen soft, non-tender, and non-distended.   Musculoskeletal: Not Examined  Peripheral Vascular/extremity: No peripheral edema  Skin: Normal temperature, turgor, and texture. No rash  Psychiatric: Appropriate affect, alert and oriented to person, place, and time    Penis: Normal  Scrotal skin: Normal, no lesions  Testicles: Normal to palpation bilaterally  Epididymis: Normal to palpation bilaterally  Lymphatic: Normal inguinal lymph nodes    Digital Rectal Exam: His prostate is moderately enlarged, benign and symmetric to palpation    Cystoscopy: I performed flexible cystoscopy and the bladder showed edema from the wu catheter.  Otherwise the bladder was normal throughout with no tumors identified.  On retroflexion of the scope there was a somewhat enlarged median lobe present.  In pulling back the scope there was trilobar obstruction from the prostate.  I filled the bladder with 500 mL of sterile water and then removed the scope.      PSA:     UA RESULTS:  Recent Labs   Lab Test 04/24/20  1622  08/28/15  1036   COLOR Light Red   < > Yellow   APPEARANCE Slightly Cloudy   < > Slightly Cloudy*   URINEGLC Negative   < > Neg   URINEBILI Negative   < > Neg   URINEKETONE Negative   < > Neg   SG 1.016   < >  --    UBLD Large*   < > Trace*   URINEPH 6.0   < > 6.0   PROTEIN 100*   < >  --    UROBILINOGEN  --   --  0.2   NITRITE Positive*   < > Neg   LEUKEST Large*   < >  --    RBCU >182*   < >  --    WBCU 135*   < >  --     < > = values in this interval not displayed.       Bladder Scan:     Other Labs:      Imaging Studies: " None      CLINICAL IMPRESSION:   Urinary retention, enlarged prostate    PLAN:   Unfortunately, he continues to have urinary retention.  He certainly has an enlarged prostate that is likely contributing.  His previous alcohol abuse and earlier overdose may be contributing as well.  We had a lengthy discussion today about his situation.  We first discussed my short-term recommendations.  He continues in retention and previously had acute renal failure so I recommended we either replace the Wilson catheter today or that he learn self intermittent catheterization.  He preferred to learn self intermittent catheterization.  This was taught to him by the nurse today and he will self catheterize twice daily, I recommended he catheterize before bed and then again when he wakes up in the morning.  I recommended that he urinate before catheterization to get a sense of how much he continues to retain.  I will follow-up with him in 1 to 2 weeks to make certain self-catheterization is going well.    We then discussed the long-term plan.  He may be a candidate for a TURP or photo vaporization of the prostate.  The prostate does appear to be in the size range where he would be a good candidate for photo vaporization of the prostate.  We discussed this procedure in detail today.  However, I advised him that I would usually recommend urodynamics in this situation before proceeding with the procedure.  We need to get a sense of whether or not the bladder functions and this will help guide us as to how successful the procedure may be.  Due to the coronavirus pandemic we are not performing urodynamics at this time.  Therefore his options would be to continue with self-catheterization or to proceed with photo vaporization the prostate, understanding that the procedure may not work and he may be in retention even after the procedure.  He will think things over.  I will see him back in 1 to 2 weeks to talk things over again and also to  check up on his bladder emptying and self-catheterization.  He also will have a PSA checked before next visit.      Ruben Alaniz MD

## 2020-05-11 NOTE — PROGRESS NOTES
"[facility]  Main Office: [address]  [address]  [place] [address]       CHIEF COMPLAINT:  Urinary retention    HISTORY:   This is a 65-year-old gentleman who was in the emergency department 1 month ago after a suicide attempt in which he took 45 tablets of his Seroquel.  He has a history of alcohol abuse as well.  His creatinine is found to be elevated at 2.62.  He had a CT scan performed that showed a distended bladder.  He had a Wilson catheter placed for 1800 mL of output.  He was started on Flomax.  He was admitted to the hospital.  His creatinine eventually improved down to 0.98.  He has continued the Flomax.  He is here today for catheter removal, cystoscopy and trial of void.  I discussed my recommendation to proceed with a cystoscopy today and he agreed.  He says that he was wearing depends at all times before going to the emergency department.  It is unclear for how long he was requiring depends.  He says that he is \"going all the time \"in small amounts.  However he says very recently he seemed to be going normally.  He thinks that alcohol intake may have affected his urination.  He has never seen a urologist previously.  He reports no history of gross hematuria or infections.  He has no family history of prostate cancer.  He has not been having PSA screening performed.    PAST MEDICAL HISTORY:   Past Medical History:   Diagnosis Date     Bipolar 1 disorder (H)      GERD (gastroesophageal reflux disease)      Mild intermittent asthma     uses primatent     Schizophrenia (H)        PAST SURGICAL HISTORY:   Past Surgical History:   Procedure Laterality Date     NO HISTORY OF SURGERY       TESTICLE SURGERY       VASECTOMY         FAMILY HISTORY:   Family History   Problem Relation Age of Onset     C.A.D. Father      Hypertension Father      Breast Cancer Mother      Diabetes No family hx of      Cerebrovascular Disease No family hx of        SOCIAL HISTORY:   Social History     Tobacco Use     " "Smoking status: Current Every Day Smoker     Packs/day: 1.00     Years: 58.00     Pack years: 58.00     Types: Cigarettes     Smokeless tobacco: Never Used   Substance Use Topics     Alcohol use: No          Allergies   Allergen Reactions     Bee Venom Shortness Of Breath and Swelling     Propranolol          Current Outpatient Medications:      ciprofloxacin (CIPRO) 500 MG tablet, Take 1 tablet (500 mg) by mouth every 12 hours For 5 more doses, Disp: 5 tablet, Rfl: 0     omeprazole (PRILOSEC) 40 MG DR capsule, Take 1 capsule (40 mg) by mouth daily, Disp: 30 capsule, Rfl: 3     paliperidone (INVEGA SUSTENNA) 156 MG/ML VEE injection, Inject 156 mg into the muscle every 28 days, Disp: , Rfl:      QUEtiapine (SEROQUEL) 200 MG tablet, Take 1 tablet (200 mg) by mouth At Bedtime, Disp: 14 tablet, Rfl: 0     risperiDONE (RISPERDAL) 4 MG tablet, Take 1 tablet (4 mg) by mouth At Bedtime, Disp: 14 tablet, Rfl: 0     tamsulosin (FLOMAX) 0.4 MG capsule, Take 1 capsule (0.4 mg) by mouth daily, Disp: 30 capsule, Rfl: 3    Review Of Systems:  Skin: No rash, pruritis, or skin pigmentation  Eyes: No changes in vision  Ears/Nose/Throat: No changes in hearing, no nosebleeds  Respiratory: No shortness of breath, dyspnea on exertion, cough, or hemoptysis  Cardiovascular: No chest pain or palpitations  Gastrointestinal: No diarrhea or constipation. No abdominal pain. No hematochezia  Genitourinary: see HPI  Musculoskeletal: No pain or swelling of joints, normal range of motion  Neurologic: No weakness or tremors  Psychiatric: No recent changes in memory or mood  Hematologic/Lymphatic/Immunologic: No easy bruising or enlarged lymph nodes  Endocrine: No weight gain or loss      PHYSICAL EXAM:    /70 (BP Location: Right arm)   Pulse 80   Ht 1.88 m (6' 2\")   Wt 90.7 kg (200 lb)   BMI 25.68 kg/m    General appearance: In NAD, conversant  HEENT: Normocephalic and atraumatic, anicteric sclera  Cardiovascular: Not " examined  Respiratory: normal, non-labored breathing  Gastrointestinal: negative, Abdomen soft, non-tender, and non-distended.   Musculoskeletal: Not Examined  Peripheral Vascular/extremity: No peripheral edema  Skin: Normal temperature, turgor, and texture. No rash  Psychiatric: Appropriate affect, alert and oriented to person, place, and time    Penis: Normal  Scrotal skin: Normal, no lesions  Testicles: Normal to palpation bilaterally  Epididymis: Normal to palpation bilaterally  Lymphatic: Normal inguinal lymph nodes    Digital Rectal Exam: His prostate is moderately enlarged, benign and symmetric to palpation    Cystoscopy: I performed flexible cystoscopy and the bladder showed edema from the wu catheter.  Otherwise the bladder was normal throughout with no tumors identified.  On retroflexion of the scope there was a somewhat enlarged median lobe present.  In pulling back the scope there was trilobar obstruction from the prostate.  I filled the bladder with 500 mL of sterile water and then removed the scope.      PSA:     UA RESULTS:  Recent Labs   Lab Test 04/24/20  1622  08/28/15  1036   COLOR Light Red   < > Yellow   APPEARANCE Slightly Cloudy   < > Slightly Cloudy*   URINEGLC Negative   < > Neg   URINEBILI Negative   < > Neg   URINEKETONE Negative   < > Neg   SG 1.016   < >  --    UBLD Large*   < > Trace*   URINEPH 6.0   < > 6.0   PROTEIN 100*   < >  --    UROBILINOGEN  --   --  0.2   NITRITE Positive*   < > Neg   LEUKEST Large*   < >  --    RBCU >182*   < >  --    WBCU 135*   < >  --     < > = values in this interval not displayed.       Bladder Scan:     Other Labs:      Imaging Studies: None      CLINICAL IMPRESSION:   Urinary retention, enlarged prostate    PLAN:   Unfortunately, he continues to have urinary retention.  He certainly has an enlarged prostate that is likely contributing.  His previous alcohol abuse and earlier overdose may be contributing as well.  We had a lengthy discussion today  about his situation.  We first discussed my short-term recommendations.  He continues in retention and previously had acute renal failure so I recommended we either replace the Wilson catheter today or that he learn self intermittent catheterization.  He preferred to learn self intermittent catheterization.  This was taught to him by the nurse today and he will self catheterize twice daily, I recommended he catheterize before bed and then again when he wakes up in the morning.  I recommended that he urinate before catheterization to get a sense of how much he continues to retain.  I will follow-up with him in 1 to 2 weeks to make certain self-catheterization is going well.    We then discussed the long-term plan.  He may be a candidate for a TURP or photo vaporization of the prostate.  The prostate does appear to be in the size range where he would be a good candidate for photo vaporization of the prostate.  We discussed this procedure in detail today.  However, I advised him that I would usually recommend urodynamics in this situation before proceeding with the procedure.  We need to get a sense of whether or not the bladder functions and this will help guide us as to how successful the procedure may be.  Due to the coronavirus pandemic we are not performing urodynamics at this time.  Therefore his options would be to continue with self-catheterization or to proceed with photo vaporization the prostate, understanding that the procedure may not work and he may be in retention even after the procedure.  He will think things over.  I will see him back in 1 to 2 weeks to talk things over again and also to check up on his bladder emptying and self-catheterization.  He also will have a PSA checked before next visit.      Ruben Alaniz MD

## 2020-05-12 NOTE — PATIENT INSTRUCTIONS
"     AFTER YOUR CYSTOSCOPY         You have just completed a cystoscopy, or \"cysto\", which allowed your physician to learn more about your bladder (or to remove a stent placed after surgery). We suggest that you continue to avoid caffeine, fruit juice, and alcohol for the next 24 hours, however, you are encouraged to return to your normal activities.       A few things that are considered normal after your cystoscopy:    * small amount of bleeding (or spotting) that clears within the next 24 hours    * slight burning sensation with urination    * sensation to of needing to avoid more frequently    * the feeling of \"air\" in your urine    * mild discomfort that is relieved with Tylonol        Please contact our office promptly if you:    * develop a fever above 101 degrees    * are unable to urinate    * develop bright red blood that does not stop    * severe pain or swelling        And of course, please contact our office with any concerns or questions [phone]  "

## 2020-05-12 NOTE — NURSING NOTE
Chief Complaint   Patient presents with     Urinary Retention     Cystoscopy      Prior to the start of the procedure and with procedural staff participation, I verbally confirmed the patient s identity using two indicators, relevant allergies, that the procedure was appropriate and matched the consent or emergent situation, and that the correct equipment/implants were available. Immediately prior to starting the procedure I conducted the Time Out with the procedural staff and re-confirmed the patient s name, procedure, and site/side. (The Joint Commission universal protocol was followed.)  Yes    Sedation (Moderate or Deep): None    Following Cystoscopy per MD patient was taught self-intermittent catheterization. Patient used a 16 Croatian coude-tip catheter and passed this with ease. Due to patient's anatomy patient is not able to pass a straight-tip. Patient will do this at twice daily as instructed by MD. Patient given supplies, patient will follow-up with MD if he is to continue and need supplies ordered.     Michelle Pepe LPN

## 2020-05-19 ENCOUNTER — TELEPHONE (OUTPATIENT)
Dept: UROLOGY | Facility: CLINIC | Age: 65
End: 2020-05-19

## 2020-05-19 ENCOUNTER — DOCUMENTATION ONLY (OUTPATIENT)
Dept: UROLOGY | Facility: CLINIC | Age: 65
End: 2020-05-19

## 2020-05-19 DIAGNOSIS — R33.9 URINARY RETENTION: ICD-10-CM

## 2020-05-19 DIAGNOSIS — R33.9 URINARY RETENTION: Primary | ICD-10-CM

## 2020-05-19 DIAGNOSIS — N40.0 ENLARGED PROSTATE: ICD-10-CM

## 2020-05-19 NOTE — TELEPHONE ENCOUNTER
Called patient and informed him that an order has been sent to [facility]. Patient will wait to hear back in the next couple days from them. If he needs more catheter instructed patient to call our office to make an appointment with nurse to pick-up supplies due to covid.    Michelle Pepe LPN

## 2020-05-19 NOTE — TELEPHONE ENCOUNTER
Called Austyn to follow-up on this message. He has 6 days left of catheter supplies. He's using 16 Fr, coude tip. Will initiate order to [facility].  Order, clinic notes and demographic sheet faxed to Hal Marshall RN       Health Call Center    Phone Message    May a detailed message be left on voicemail: no     Reason for Call: Other: Pt says he will run out of catheter supplies by 5/25 and needs more until his next appt on 5/29. Pt is self-cathing. Please call back to discuss how to get more.      Action Taken: Message routed to:  Other:  CLINICAL POOL    Travel Screening: Not Applicable

## 2020-05-26 DIAGNOSIS — N40.0 ENLARGED PROSTATE: ICD-10-CM

## 2020-05-26 PROCEDURE — 84153 ASSAY OF PSA TOTAL: CPT | Performed by: UROLOGY

## 2020-05-26 PROCEDURE — 36415 COLL VENOUS BLD VENIPUNCTURE: CPT | Performed by: UROLOGY

## 2020-05-27 LAB — PSA SERPL-MCNC: 7.8 UG/L (ref 0–4)

## 2020-05-28 ENCOUNTER — TELEPHONE (OUTPATIENT)
Dept: UROLOGY | Facility: CLINIC | Age: 65
End: 2020-05-28

## 2020-05-28 NOTE — TELEPHONE ENCOUNTER
Called [employer] and did prescreen. I informed him to only come 5 minutes early. Patient thought he may come 15 minutes early. I explained to him we are trying to reduce risk of being exposed to anyone in waiting room. Explained he may be sent away until appointment time and that he should really only come 5 minutes early. Also informed patient that he should wear a mask and have no visitors with him. Also informed him that if he has had symptoms of cough, fever, respiratory symptoms, rash or other symptoms of COVID in last 48 hours he should avoid coming into clinic.     Michelle Pepe LPN

## 2020-05-29 ENCOUNTER — OFFICE VISIT (OUTPATIENT)
Dept: UROLOGY | Facility: CLINIC | Age: 65
End: 2020-05-29
Payer: MEDICARE

## 2020-05-29 VITALS — BODY MASS INDEX: 25.93 KG/M2 | HEIGHT: 74 IN | WEIGHT: 202 LBS

## 2020-05-29 DIAGNOSIS — R33.9 URINARY RETENTION: ICD-10-CM

## 2020-05-29 DIAGNOSIS — R97.20 ELEVATED PROSTATE SPECIFIC ANTIGEN (PSA): ICD-10-CM

## 2020-05-29 DIAGNOSIS — N40.0 ENLARGED PROSTATE: Primary | ICD-10-CM

## 2020-05-29 PROCEDURE — 99214 OFFICE O/P EST MOD 30 MIN: CPT | Performed by: UROLOGY

## 2020-05-29 ASSESSMENT — MIFFLIN-ST. JEOR: SCORE: 1771.02

## 2020-05-29 ASSESSMENT — PAIN SCALES - GENERAL: PAINLEVEL: NO PAIN (0)

## 2020-05-29 NOTE — NURSING NOTE
Chief Complaint   Patient presents with     Urinary Retention     Enlarged Prostate       Irasema Parker, EMT

## 2020-05-29 NOTE — PROGRESS NOTES
Office Visit Note  [facility]  [phone]    UROLOGIC DIAGNOSES:   Enlarged prostate, urinary retention, elevated PSA    CURRENT INTERVENTIONS:   Self-catheterization    HISTORY:   Austyn returns to the urology clinic today for follow-up.  He reports that since his last visit with me he has been catheterizing every 12 hours without any difficulty.  He says he gets out large amounts of clear urine every time he catheterizes.  He has not been urinating at all between catheterizations and has no urge to urinate between catheterizations.  He had a PSA checked recently and it was elevated at 7.8      PAST MEDICAL HISTORY:   Past Medical History:   Diagnosis Date     Bipolar 1 disorder (H)      GERD (gastroesophageal reflux disease)      Mild intermittent asthma     uses primatent     Schizophrenia (H)        PAST SURGICAL HISTORY:   Past Surgical History:   Procedure Laterality Date     NO HISTORY OF SURGERY       TESTICLE SURGERY       VASECTOMY         FAMILY HISTORY:   Family History   Problem Relation Age of Onset     C.A.D. Father      Hypertension Father      Breast Cancer Mother      Diabetes No family hx of      Cerebrovascular Disease No family hx of        SOCIAL HISTORY:   Social History     Socioeconomic History     Marital status:      Spouse name: Angela     Number of children: 1     Years of education: None     Highest education level: None   Occupational History     Employer: [employer]   Social Needs     Financial resource strain: None     Food insecurity     Worry: None     Inability: None     Transportation needs     Medical: None     Non-medical: None   Tobacco Use     Smoking status: Current Every Day Smoker     Packs/day: 1.00     Years: 58.00     Pack years: 58.00     Types: Cigarettes     Smokeless tobacco: Never Used   Substance and Sexual Activity     Alcohol use: No     Drug use: No     Sexual activity: Yes     Partners: Female   Lifestyle     Physical activity     Days per  "week: None     Minutes per session: None     Stress: None   Relationships     Social connections     Talks on phone: None     Gets together: None     Attends Church service: None     Active member of club or organization: None     Attends meetings of clubs or organizations: None     Relationship status: None     Intimate partner violence     Fear of current or ex partner: None     Emotionally abused: None     Physically abused: None     Forced sexual activity: None   Other Topics Concern     Parent/sibling w/ CABG, MI or angioplasty before 65F 55M? Yes   Social History Narrative    Born and raised in [place] has 2 siblings that he does not have contact with raised by both mother and father no abuse history or neglect.  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  Has the one marriage from a mail order bride service as he describes it 1 previous child from a previous relationship and one child with his wife.  He has contact with his son.  He was never in the .  Currently lives in a house with his wife enjoys writing books and going to the gym and exercising.  No access to guns or weapons at the house.       Review Of Systems:  Skin: No rash, pruritis, or skin pigmentation  Eyes: No changes in vision  Ears/Nose/Throat: No changes in hearing, no nosebleeds  Respiratory: No shortness of breath, dyspnea on exertion, cough, or hemoptysis  Cardiovascular: No chest pain or palpitations  Gastrointestinal: No diarrhea or constipation. No abdominal pain. No hematochezia  Genitourinary: see HPI  Musculoskeletal: No pain or swelling of joints, normal range of motion  Neurologic: No weakness or tremors  Psychiatric: No recent changes in memory or mood  Hematologic/Lymphatic/Immunologic: No easy bruising or enlarged lymph nodes  Endocrine: No weight gain or loss      PHYSICAL EXAM:    Ht 1.88 m (6' 2\")   Wt 91.6 kg (202 lb)   BMI 25.94 " kg/m      Constitutional: Well developed. Conversant and in no acute distress  Eyes: Anicteric sclera, conjunctiva clear, normal extraocular movements  ENT: Normocephalic and atraumatic,   Skin: Warm and dry. No rashes or lesions  Cardiac: No peripheral edema  Back/Flank: Not done  CNS/PNS: Normal musculature and movements, moves all extremities normally  Respiratory: Normal non-labored breathing  Abdomen: Soft nontender and nondistended  Peripheral Vascular: No peripheral edema  Mental Status/Psych: Alert and Oriented x 3. Normal mood and affect    Penis: Not done  Scrotal Skin: Not done  Testicles: Not done  Epididymis: Not done  Digital Rectal Exam:     Cystoscopy: Not done    Imaging: None    Urinalysis: UA RESULTS:  Recent Labs   Lab Test 04/24/20  1622  08/28/15  1036   COLOR Light Red   < > Yellow   APPEARANCE Slightly Cloudy   < > Slightly Cloudy*   URINEGLC Negative   < > Neg   URINEBILI Negative   < > Neg   URINEKETONE Negative   < > Neg   SG 1.016   < >  --    UBLD Large*   < > Trace*   URINEPH 6.0   < > 6.0   PROTEIN 100*   < >  --    UROBILINOGEN  --   --  0.2   NITRITE Positive*   < > Neg   LEUKEST Large*   < >  --    RBCU >182*   < >  --    WBCU 135*   < >  --     < > = values in this interval not displayed.       PSA: 7.8    Post Void Residual:     Other labs: None today      IMPRESSION:  Elevated PSA, enlarged prostate, urinary retention    PLAN:  He continues to be in urinary retention.  He has been self catheterizing without difficulty.  Since he is not voiding at all between catheterizations I recommended that he catheterize at least 3 or 4 times every 24 hours and he voiced understanding.  We discussed treatment options.  He wants to avoid any sort of surgery and wants to continue self-catheterization, which is a good option for him.    We discussed his PSA.  He has an elevated PSA.  This is his first known PSA.  This denotes a potential for prostate cancer.  I discussed the recommended work-up.   I would recommend that we get a catheterized urine sample to rule out a urinary tract infection and also recheck the PSA.  He declines this.  He does not wish to pursue the elevated PSA any further.  He says that he understands that he may have a prostate cancer but he simply does not wish to work this up.  Therefore, he will follow-up with me as needed and he will continue to self catheterize 3-4 times daily.      Ruben Alaniz M.D.

## 2020-05-29 NOTE — LETTER
5/29/2020       RE: Austyn Leach  [address] [place] [address]     Dear Colleague,    Thank you for referring your patient, Austyn Leach, to the [facility] [place] at [facility]. Please see a copy of my visit note below.    Office Visit Note  [facility]  [phone]    UROLOGIC DIAGNOSES:   Enlarged prostate, urinary retention, elevated PSA    CURRENT INTERVENTIONS:   Self-catheterization    HISTORY:   Austyn returns to the urology clinic today for follow-up.  He reports that since his last visit with me he has been catheterizing every 12 hours without any difficulty.  He says he gets out large amounts of clear urine every time he catheterizes.  He has not been urinating at all between catheterizations and has no urge to urinate between catheterizations.  He had a PSA checked recently and it was elevated at 7.8      PAST MEDICAL HISTORY:   Past Medical History:   Diagnosis Date     Bipolar 1 disorder (H)      GERD (gastroesophageal reflux disease)      Mild intermittent asthma     uses primatent     Schizophrenia (H)        PAST SURGICAL HISTORY:   Past Surgical History:   Procedure Laterality Date     NO HISTORY OF SURGERY       TESTICLE SURGERY       VASECTOMY         FAMILY HISTORY:   Family History   Problem Relation Age of Onset     C.A.D. Father      Hypertension Father      Breast Cancer Mother      Diabetes No family hx of      Cerebrovascular Disease No family hx of        SOCIAL HISTORY:   Social History     Socioeconomic History     Marital status:      Spouse name: Angela     Number of children: 1     Years of education: None     Highest education level: None   Occupational History     Employer: [employer]   Social Needs     Financial resource strain: None     Food insecurity     Worry: None     Inability: None     Transportation needs     Medical: None     Non-medical: None   Tobacco Use      Smoking status: Current Every Day Smoker     Packs/day: 1.00     Years: 58.00     Pack years: 58.00     Types: Cigarettes     Smokeless tobacco: Never Used   Substance and Sexual Activity     Alcohol use: No     Drug use: No     Sexual activity: Yes     Partners: Female   Lifestyle     Physical activity     Days per week: None     Minutes per session: None     Stress: None   Relationships     Social connections     Talks on phone: None     Gets together: None     Attends Pentecostalism service: None     Active member of club or organization: None     Attends meetings of clubs or organizations: None     Relationship status: None     Intimate partner violence     Fear of current or ex partner: None     Emotionally abused: None     Physically abused: None     Forced sexual activity: None   Other Topics Concern     Parent/sibling w/ CABG, MI or angioplasty before 65F 55M? Yes   Social History Narrative    Born and raised in [place] has 2 siblings that he does not have contact with raised by both mother and father no abuse history or neglect.  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  Has the one marriage from a mail order bride service as he describes it 1 previous child from a previous relationship and one child with his wife.  He has contact with his son.  He was never in the .  Currently lives in a house with his wife enjoys writing books and going to the gym and exercising.  No access to guns or weapons at the house.       Review Of Systems:  Skin: No rash, pruritis, or skin pigmentation  Eyes: No changes in vision  Ears/Nose/Throat: No changes in hearing, no nosebleeds  Respiratory: No shortness of breath, dyspnea on exertion, cough, or hemoptysis  Cardiovascular: No chest pain or palpitations  Gastrointestinal: No diarrhea or constipation. No abdominal pain. No hematochezia  Genitourinary: see HPI  Musculoskeletal: No pain  "or swelling of joints, normal range of motion  Neurologic: No weakness or tremors  Psychiatric: No recent changes in memory or mood  Hematologic/Lymphatic/Immunologic: No easy bruising or enlarged lymph nodes  Endocrine: No weight gain or loss      PHYSICAL EXAM:    Ht 1.88 m (6' 2\")   Wt 91.6 kg (202 lb)   BMI 25.94 kg/m      Constitutional: Well developed. Conversant and in no acute distress  Eyes: Anicteric sclera, conjunctiva clear, normal extraocular movements  ENT: Normocephalic and atraumatic,   Skin: Warm and dry. No rashes or lesions  Cardiac: No peripheral edema  Back/Flank: Not done  CNS/PNS: Normal musculature and movements, moves all extremities normally  Respiratory: Normal non-labored breathing  Abdomen: Soft nontender and nondistended  Peripheral Vascular: No peripheral edema  Mental Status/Psych: Alert and Oriented x 3. Normal mood and affect    Penis: Not done  Scrotal Skin: Not done  Testicles: Not done  Epididymis: Not done  Digital Rectal Exam:     Cystoscopy: Not done    Imaging: None    Urinalysis: UA RESULTS:  Recent Labs   Lab Test 04/24/20  1622  08/28/15  1036   COLOR Light Red   < > Yellow   APPEARANCE Slightly Cloudy   < > Slightly Cloudy*   URINEGLC Negative   < > Neg   URINEBILI Negative   < > Neg   URINEKETONE Negative   < > Neg   SG 1.016   < >  --    UBLD Large*   < > Trace*   URINEPH 6.0   < > 6.0   PROTEIN 100*   < >  --    UROBILINOGEN  --   --  0.2   NITRITE Positive*   < > Neg   LEUKEST Large*   < >  --    RBCU >182*   < >  --    WBCU 135*   < >  --     < > = values in this interval not displayed.       PSA: 7.8    Post Void Residual:     Other labs: None today      IMPRESSION:  Elevated PSA, enlarged prostate, urinary retention    PLAN:  He continues to be in urinary retention.  He has been self catheterizing without difficulty.  Since he is not voiding at all between catheterizations I recommended that he catheterize at least 3 or 4 times every 24 hours and he voiced " understanding.  We discussed treatment options.  He wants to avoid any sort of surgery and wants to continue self-catheterization, which is a good option for him.    We discussed his PSA.  He has an elevated PSA.  This is his first known PSA.  This denotes a potential for prostate cancer.  I discussed the recommended work-up.  I would recommend that we get a catheterized urine sample to rule out a urinary tract infection and also recheck the PSA.  He declines this.  He does not wish to pursue the elevated PSA any further.  He says that he understands that he may have a prostate cancer but he simply does not wish to work this up.  Therefore, he will follow-up with me as needed and he will continue to self catheterize 3-4 times daily.      Ruben Alaniz M.D.

## 2020-07-07 ENCOUNTER — VIRTUAL VISIT (OUTPATIENT)
Dept: UROLOGY | Facility: CLINIC | Age: 65
End: 2020-07-07
Payer: MEDICARE

## 2020-07-07 VITALS — HEIGHT: 74 IN | WEIGHT: 202 LBS | BODY MASS INDEX: 25.93 KG/M2

## 2020-07-07 DIAGNOSIS — R97.20 ELEVATED PROSTATE SPECIFIC ANTIGEN (PSA): Primary | ICD-10-CM

## 2020-07-07 DIAGNOSIS — R33.9 URINARY RETENTION: ICD-10-CM

## 2020-07-07 PROCEDURE — 99441 ZZC PHYSICIAN TELEPHONE EVALUATION 5-10 MIN: CPT | Performed by: UROLOGY

## 2020-07-07 ASSESSMENT — MIFFLIN-ST. JEOR: SCORE: 1771.02

## 2020-07-07 ASSESSMENT — PAIN SCALES - GENERAL: PAINLEVEL: NO PAIN (0)

## 2020-07-07 NOTE — LETTER
"7/7/2020       RE: Austyn Leach  [address] [place] [address]     Dear Colleague,    Thank you for referring your patient, Austyn Leach, to the [facility] [place] at [facility]. Please see a copy of my visit note below.    Austyn Leach is a 65 year old male who is being evaluated via a billable telephone visit.      The patient has been notified of following:     \"This telephone visit will be conducted via a call between you and your physician/provider. We have found that certain health care needs can be provided without the need for a physical exam.  This service lets us provide the care you need with a short phone conversation.  If a prescription is necessary we can send it directly to your pharmacy.  If lab work is needed we can place an order for that and you can then stop by our lab to have the test done at a later time.    Telephone visits are billed at different rates depending on your insurance coverage. During this emergency period, for some insurers they may be billed the same as an in-person visit.  Please reach out to your insurance provider with any questions.    If during the course of the call the physician/provider feels a telephone visit is not appropriate, you will not be charged for this service.\"    Patient has given verbal consent for Telephone visit?  Yes    What phone number would you like to be contacted at? [phone]    How would you like to obtain your AVS? Mail a copy    Office Visit Note  [facility]  [phone]    UROLOGIC DIAGNOSES:   Urinary retention    CURRENT INTERVENTIONS:   Self catheterization    HISTORY:   Austyn has been self catheterizing 4 times daily without difficulty.  However, he says that sometimes he leaks urine around the catheter during catheterization and is bothering him.  He is interested in seeing if there are other options other than " self-catheterization.  Previously he did not wish to discuss these options but today he does.      PAST MEDICAL HISTORY:   Past Medical History:   Diagnosis Date     Bipolar 1 disorder (H)      GERD (gastroesophageal reflux disease)      Mild intermittent asthma     uses primatent     Schizophrenia (H)        PAST SURGICAL HISTORY:   Past Surgical History:   Procedure Laterality Date     NO HISTORY OF SURGERY       TESTICLE SURGERY       VASECTOMY         FAMILY HISTORY:   Family History   Problem Relation Age of Onset     C.A.D. Father      Hypertension Father      Breast Cancer Mother      Diabetes No family hx of      Cerebrovascular Disease No family hx of        SOCIAL HISTORY:   Social History     Socioeconomic History     Marital status:      Spouse name: Angela     Number of children: 1     Years of education: None     Highest education level: None   Occupational History     Employer: [employer]   Social Needs     Financial resource strain: None     Food insecurity     Worry: None     Inability: None     Transportation needs     Medical: None     Non-medical: None   Tobacco Use     Smoking status: Current Every Day Smoker     Packs/day: 1.00     Years: 58.00     Pack years: 58.00     Types: Cigarettes     Smokeless tobacco: Never Used   Substance and Sexual Activity     Alcohol use: No     Drug use: No     Sexual activity: Yes     Partners: Female   Lifestyle     Physical activity     Days per week: None     Minutes per session: None     Stress: None   Relationships     Social connections     Talks on phone: None     Gets together: None     Attends Latter day service: None     Active member of club or organization: None     Attends meetings of clubs or organizations: None     Relationship status: None     Intimate partner violence     Fear of current or ex partner: None     Emotionally abused: None     Physically abused: None     Forced sexual activity: None   Other Topics Concern     Parent/sibling w/  CABG, MI or angioplasty before 65F 55M? Yes   Social History Narrative    Born and raised in [place] has 2 siblings that he does not have contact with raised by both mother and father no abuse history or neglect.  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  Has the one marriage from a mail order bride service as he describes it 1 previous child from a previous relationship and one child with his wife.  He has contact with his son.  He was never in the .  Currently lives in a house with his wife enjoys writing books and going to the gym and exercising.  No access to guns or weapons at the house.       Review Of Systems:  Skin: No rash, pruritis, or skin pigmentation  Eyes: No changes in vision  Ears/Nose/Throat: No changes in hearing, no nosebleeds  Respiratory: No shortness of breath, dyspnea on exertion, cough, or hemoptysis  Cardiovascular: No chest pain or palpitations  Gastrointestinal: No diarrhea or constipation. No abdominal pain. No hematochezia  Genitourinary: see HPI  Musculoskeletal: No pain or swelling of joints, normal range of motion  Neurologic: No weakness or tremors  Psychiatric: No recent changes in memory or mood  Hematologic/Lymphatic/Immunologic: No easy bruising or enlarged lymph nodes  Endocrine: No weight gain or loss      PHYSICAL EXAM:    General: Alert and oriented to time, place, and self. In NAD   Lungs: no respiratory distress or labored breathing  Neuro: Alert, oriented, speech and mentation normal   Psych: affect and mood normal      Imaging: None    Urinalysis: UA RESULTS:  Recent Labs   Lab Test 04/24/20  1622  08/28/15  1036   COLOR Light Red   < > Yellow   APPEARANCE Slightly Cloudy   < > Slightly Cloudy*   URINEGLC Negative   < > Neg   URINEBILI Negative   < > Neg   URINEKETONE Negative   < > Neg   SG 1.016   < >  --    UBLD Large*   < > Trace*   URINEPH 6.0   < > 6.0   PROTEIN 100*   < >   --    UROBILINOGEN  --   --  0.2   NITRITE Positive*   < > Neg   LEUKEST Large*   < >  --    RBCU >182*   < >  --    WBCU 135*   < >  --     < > = values in this interval not displayed.       PSA: 7.8    Post Void Residual:     Other labs: None today      IMPRESSION:  Elevated PSA, urinary retention    PLAN:  He continues to be in urinary retention. He would like another option instead of self cateterization.  We discussed potential surgery for the enlarged prostate.  We do not know anything about bladder function at this time so even after surgery he may still require self-catheterization.  He also may have a prostate cancer present as the PSA was elevated.  Previously he declined any further evaluation of the elevated PSA.  However, if he is considering surgery I strongly recommended that he be evaluated for potential prostate cancer.  He agrees to this at this time.  I recommended we begin his evaluation with an MRI of the prostate.  MRI of the prostate was ordered for him and I will see him back in the clinic for the results.      Ruben Alaniz M.D.              Phone call duration: 7 minutes    Ruben Alaniz MD

## 2020-07-07 NOTE — PROGRESS NOTES
"Austyn Leach is a 65 year old male who is being evaluated via a billable telephone visit.      The patient has been notified of following:     \"This telephone visit will be conducted via a call between you and your physician/provider. We have found that certain health care needs can be provided without the need for a physical exam.  This service lets us provide the care you need with a short phone conversation.  If a prescription is necessary we can send it directly to your pharmacy.  If lab work is needed we can place an order for that and you can then stop by our lab to have the test done at a later time.    Telephone visits are billed at different rates depending on your insurance coverage. During this emergency period, for some insurers they may be billed the same as an in-person visit.  Please reach out to your insurance provider with any questions.    If during the course of the call the physician/provider feels a telephone visit is not appropriate, you will not be charged for this service.\"    Patient has given verbal consent for Telephone visit?  Yes    What phone number would you like to be contacted at? [phone]    How would you like to obtain your AVS? Mail a copy    Office Visit Note  [facility]  [phone]    UROLOGIC DIAGNOSES:   Urinary retention    CURRENT INTERVENTIONS:   Self catheterization    HISTORY:   Austyn has been self catheterizing 4 times daily without difficulty.  However, he says that sometimes he leaks urine around the catheter during catheterization and is bothering him.  He is interested in seeing if there are other options other than self-catheterization.  Previously he did not wish to discuss these options but today he does.      PAST MEDICAL HISTORY:   Past Medical History:   Diagnosis Date     Bipolar 1 disorder (H)      GERD (gastroesophageal reflux disease)      Mild intermittent asthma     uses primatent     Schizophrenia (H)        PAST SURGICAL HISTORY: " ----- Message from Julieta Lozoya sent at 6/26/2017  2:25 PM CDT -----  Contact: Pt  Pt is returning call and can be reached at [phone].    Thank you     Past Surgical History:   Procedure Laterality Date     NO HISTORY OF SURGERY       TESTICLE SURGERY       VASECTOMY         FAMILY HISTORY:   Family History   Problem Relation Age of Onset     C.A.D. Father      Hypertension Father      Breast Cancer Mother      Diabetes No family hx of      Cerebrovascular Disease No family hx of        SOCIAL HISTORY:   Social History     Socioeconomic History     Marital status:      Spouse name: Angela     Number of children: 1     Years of education: None     Highest education level: None   Occupational History     Employer: [employer]   Social Needs     Financial resource strain: None     Food insecurity     Worry: None     Inability: None     Transportation needs     Medical: None     Non-medical: None   Tobacco Use     Smoking status: Current Every Day Smoker     Packs/day: 1.00     Years: 58.00     Pack years: 58.00     Types: Cigarettes     Smokeless tobacco: Never Used   Substance and Sexual Activity     Alcohol use: No     Drug use: No     Sexual activity: Yes     Partners: Female   Lifestyle     Physical activity     Days per week: None     Minutes per session: None     Stress: None   Relationships     Social connections     Talks on phone: None     Gets together: None     Attends Am[employer] service: None     Active member of club or organization: None     Attends meetings of clubs or organizations: None     Relationship status: None     Intimate partner violence     Fear of current or ex partner: None     Emotionally abused: None     Physically abused: None     Forced sexual activity: None   Other Topics Concern     Parent/sibling w/ CABG, MI or angioplasty before 65F 55M? Yes   Social History Narrative    Born and raised in [place] has 2 siblings that he does not have contact with raised by both mother and father no abuse history or neglect.  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   Has the one marriage from a mail order bride service as he describes it 1 previous child from a previous relationship and one child with his wife.  He has contact with his son.  He was never in the .  Currently lives in a house with his wife enjoys writing books and going to the gym and exercising.  No access to guns or weapons at the house.       Review Of Systems:  Skin: No rash, pruritis, or skin pigmentation  Eyes: No changes in vision  Ears/Nose/Throat: No changes in hearing, no nosebleeds  Respiratory: No shortness of breath, dyspnea on exertion, cough, or hemoptysis  Cardiovascular: No chest pain or palpitations  Gastrointestinal: No diarrhea or constipation. No abdominal pain. No hematochezia  Genitourinary: see HPI  Musculoskeletal: No pain or swelling of joints, normal range of motion  Neurologic: No weakness or tremors  Psychiatric: No recent changes in memory or mood  Hematologic/Lymphatic/Immunologic: No easy bruising or enlarged lymph nodes  Endocrine: No weight gain or loss      PHYSICAL EXAM:    General: Alert and oriented to time, place, and self. In NAD   Lungs: no respiratory distress or labored breathing  Neuro: Alert, oriented, speech and mentation normal   Psych: affect and mood normal      Imaging: None    Urinalysis: UA RESULTS:  Recent Labs   Lab Test 04/24/20  1622  08/28/15  1036   COLOR Light Red   < > Yellow   APPEARANCE Slightly Cloudy   < > Slightly Cloudy*   URINEGLC Negative   < > Neg   URINEBILI Negative   < > Neg   URINEKETONE Negative   < > Neg   SG 1.016   < >  --    UBLD Large*   < > Trace*   URINEPH 6.0   < > 6.0   PROTEIN 100*   < >  --    UROBILINOGEN  --   --  0.2   NITRITE Positive*   < > Neg   LEUKEST Large*   < >  --    RBCU >182*   < >  --    WBCU 135*   < >  --     < > = values in this interval not displayed.       PSA: 7.8    Post Void Residual:     Other labs: None today      IMPRESSION:  Elevated PSA, urinary retention    PLAN:  He continues to be in urinary  retention. He would like another option instead of self cateterization.  We discussed potential surgery for the enlarged prostate.  We do not know anything about bladder function at this time so even after surgery he may still require self-catheterization.  He also may have a prostate cancer present as the PSA was elevated.  Previously he declined any further evaluation of the elevated PSA.  However, if he is considering surgery I strongly recommended that he be evaluated for potential prostate cancer.  He agrees to this at this time.  I recommended we begin his evaluation with an MRI of the prostate.  MRI of the prostate was ordered for him and I will see him back in the clinic for the results.      Ruben Alaniz M.D.              Phone call duration: 7 minutes    Ruben Alaniz MD

## 2020-07-07 NOTE — NURSING NOTE
Chief Complaint   Patient presents with     Prostate Cancer     Discuss Surgery      Michelle Pepe LPN

## 2020-07-27 ENCOUNTER — HOSPITAL ENCOUNTER (OUTPATIENT)
Dept: MRI IMAGING | Facility: CLINIC | Age: 65
Discharge: HOME | End: 2020-07-27
Attending: UROLOGY | Admitting: UROLOGY
Payer: MEDICARE

## 2020-07-27 DIAGNOSIS — R97.20 ELEVATED PROSTATE SPECIFIC ANTIGEN (PSA): ICD-10-CM

## 2020-07-27 PROCEDURE — 72195 MRI PELVIS W/O DYE: CPT

## 2020-08-07 ENCOUNTER — TELEPHONE (OUTPATIENT)
Dept: UROLOGY | Facility: CLINIC | Age: 65
End: 2020-08-07

## 2020-08-07 NOTE — TELEPHONE ENCOUNTER
Called to screen for CODVID-19 symptoms prior to tomorrow's visit.  Patient does not have any symptoms at this time.    Irasema Parker, EMT

## 2020-08-10 ENCOUNTER — OFFICE VISIT (OUTPATIENT)
Dept: UROLOGY | Facility: CLINIC | Age: 65
End: 2020-08-10
Payer: MEDICARE

## 2020-08-10 VITALS
DIASTOLIC BLOOD PRESSURE: 80 MMHG | SYSTOLIC BLOOD PRESSURE: 128 MMHG | WEIGHT: 200 LBS | HEIGHT: 74 IN | BODY MASS INDEX: 25.67 KG/M2

## 2020-08-10 DIAGNOSIS — R97.20 ELEVATED PROSTATE SPECIFIC ANTIGEN (PSA): ICD-10-CM

## 2020-08-10 DIAGNOSIS — R33.9 URINARY RETENTION: Primary | ICD-10-CM

## 2020-08-10 DIAGNOSIS — N40.0 ENLARGED PROSTATE: ICD-10-CM

## 2020-08-10 LAB — RESIDUAL VOLUME (RV) (EXTERNAL): 38

## 2020-08-10 PROCEDURE — 51798 US URINE CAPACITY MEASURE: CPT | Performed by: UROLOGY

## 2020-08-10 PROCEDURE — 99214 OFFICE O/P EST MOD 30 MIN: CPT | Mod: 25 | Performed by: UROLOGY

## 2020-08-10 ASSESSMENT — PAIN SCALES - GENERAL: PAINLEVEL: NO PAIN (0)

## 2020-08-10 ASSESSMENT — MIFFLIN-ST. JEOR: SCORE: 1761.94

## 2020-08-10 NOTE — NURSING NOTE
Chief Complaint   Patient presents with     Elevated PSA     Review MRI results     Hx Urinary Retention     Patient states he stopped using the self-catheters about a week ago and says he is urinating ok and feeling fine.    PVR: 38 mL    Irasema Parker, EMT

## 2020-08-10 NOTE — PROGRESS NOTES
Office Visit Note  [facility]  [phone]    UROLOGIC DIAGNOSES:   Enlarged prostate, elevated PSA    CURRENT INTERVENTIONS:   Self-catheterization, MRI prostate    HISTORY:   Austyn returns clinic today for urologic follow-up.  He reports that he has not catheterized for 9 days now.  He says that he feels that he is voiding well. He is no longer taking flomax. He last voided about 1 hour ago.  His bladder scan today shows only 38mL.  His MRI of the prostate showed an enlarged 86 g prostate with no areas concerning for prostate cancer.      PAST MEDICAL HISTORY:   Past Medical History:   Diagnosis Date     Bipolar 1 disorder (H)      GERD (gastroesophageal reflux disease)      Mild intermittent asthma     uses primatent     Schizophrenia (H)        PAST SURGICAL HISTORY:   Past Surgical History:   Procedure Laterality Date     NO HISTORY OF SURGERY       TESTICLE SURGERY       VASECTOMY         FAMILY HISTORY:   Family History   Problem Relation Age of Onset     C.A.D. Father      Hypertension Father      Breast Cancer Mother      Diabetes No family hx of      Cerebrovascular Disease No family hx of        SOCIAL HISTORY:   Social History     Socioeconomic History     Marital status:      Spouse name: Angela     Number of children: 1     Years of education: Not on file     Highest education level: Not on file   Occupational History     Employer: [employer]   Social Needs     Financial resource strain: Not on file     Food insecurity     Worry: Not on file     Inability: Not on file     Transportation needs     Medical: Not on file     Non-medical: Not on file   Tobacco Use     Smoking status: Current Every Day Smoker     Packs/day: 1.00     Years: 58.00     Pack years: 58.00     Types: Cigarettes     Smokeless tobacco: Never Used   Substance and Sexual Activity     Alcohol use: No     Drug use: No     Sexual activity: Yes     Partners: Female   Lifestyle     Physical activity     Days per week:  Not on file     Minutes per session: Not on file     Stress: Not on file   Relationships     Social connections     Talks on phone: Not on file     Gets together: Not on file     Attends Church service: Not on file     Active member of club or organization: Not on file     Attends meetings of clubs or organizations: Not on file     Relationship status: Not on file     Intimate partner violence     Fear of current or ex partner: Not on file     Emotionally abused: Not on file     Physically abused: Not on file     Forced sexual activity: Not on file   Other Topics Concern     Parent/sibling w/ CABG, MI or angioplasty before 65F 55M? Yes   Social History Narrative    Born and raised in [place] has 2 siblings that he does not have contact with raised by both mother and father no abuse history or neglect.  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  Has the one marriage from a [employer] service as he describes it 1 previous child from a previous relationship and one child with his wife.  He has contact with his son.  He was never in the .  Currently lives in a house with his wife enjoys writing books and going to the gym and exercising.  No access to guns or weapons at the house.       Review Of Systems:  Skin: No rash, pruritis, or skin pigmentation  Eyes: No changes in vision  Ears/Nose/Throat: No changes in hearing, no nosebleeds  Respiratory: No shortness of breath, dyspnea on exertion, cough, or hemoptysis  Cardiovascular: No chest pain or palpitations  Gastrointestinal: No diarrhea or constipation. No abdominal pain. No hematochezia  Genitourinary: see HPI  Musculoskeletal: No pain or swelling of joints, normal range of motion  Neurologic: No weakness or tremors  Psychiatric: No recent changes in memory or mood  Hematologic/Lymphatic/Immunologic: No easy bruising or enlarged lymph nodes  Endocrine: No weight gain or  "loss      PHYSICAL EXAM:    /80   Ht 1.88 m (6' 2\")   Wt 90.7 kg (200 lb)   BMI 25.68 kg/m      Constitutional: Well developed. Conversant and in no acute distress  Eyes: Anicteric sclera, conjunctiva clear, normal extraocular movements  ENT: Normocephalic and atraumatic,   Skin: Warm and dry. No rashes or lesions  Cardiac: No peripheral edema  Back/Flank: Not done  CNS/PNS: Normal musculature and movements, moves all extremities normally  Respiratory: Normal non-labored breathing  Abdomen: Soft nontender and nondistended  Peripheral Vascular: No peripheral edema  Mental Status/Psych: Alert and Oriented x 3. Normal mood and affect    Penis: Not done  Scrotal Skin: Not done  Testicles: Not done  Epididymis: Not done  Digital Rectal Exam:     Cystoscopy: Not done    Imaging: None    Urinalysis: UA RESULTS:  Recent Labs   Lab Test 04/24/20  1622  08/28/15  1036   COLOR Light Red   < > Yellow   APPEARANCE Slightly Cloudy   < > Slightly Cloudy*   URINEGLC Negative   < > Neg   URINEBILI Negative   < > Neg   URINEKETONE Negative   < > Neg   SG 1.016   < >  --    UBLD Large*   < > Trace*   URINEPH 6.0   < > 6.0   PROTEIN 100*   < >  --    UROBILINOGEN  --   --  0.2   NITRITE Positive*   < > Neg   LEUKEST Large*   < >  --    RBCU >182*   < >  --    WBCU 135*   < >  --     < > = values in this interval not displayed.       PSA: 7.8    Post Void Residual: 38mL    Other labs: None today      IMPRESSION:  Enlarged prostate, elevated PSA    PLAN:  He is doing well and his urinary retention appears resolved today.  He is emptying his bladder well.  We did discuss the risks for continued retention.  I recommended taking Flomax to prevent future episodes of retention but he declines this.  We discussed the MRI results as well.  At this time I think it would be fine to defer prostate biopsy but he will need to proceed with prostate biopsy if the PSA continues to rise.  I recommended that we follow him closely.  I will see " him back in 6 months for a PSA urinalysis and bladder scan.      Ruben Alaniz M.D.

## 2020-08-10 NOTE — LETTER
8/10/2020       RE: Austyn Leach  [address] [place] [address]     Dear Colleague,    Thank you for referring your patient, Austyn Leach, to the [facility] at [facility] Please see a copy of my visit note below.    Office Visit Note  [facility]  [phone]    UROLOGIC DIAGNOSES:   Enlarged prostate, elevated PSA    CURRENT INTERVENTIONS:   Self-catheterization, MRI prostate    HISTORY:   Austyn returns clinic today for urologic follow-up.  He reports that he has not catheterized for 9 days now.  He says that he feels that he is voiding well. He is no longer taking flomax. He last voided about 1 hour ago.  His bladder scan today shows only 38mL.  His MRI of the prostate showed an enlarged 86 g prostate with no areas concerning for prostate cancer.      PAST MEDICAL HISTORY:   Past Medical History:   Diagnosis Date     Bipolar 1 disorder (H)      GERD (gastroesophageal reflux disease)      Mild intermittent asthma     uses primatent     Schizophrenia (H)        PAST SURGICAL HISTORY:   Past Surgical History:   Procedure Laterality Date     NO HISTORY OF SURGERY       TESTICLE SURGERY       VASECTOMY         FAMILY HISTORY:   Family History   Problem Relation Age of Onset     C.A.D. Father      Hypertension Father      Breast Cancer Mother      Diabetes No family hx of      Cerebrovascular Disease No family hx of        SOCIAL HISTORY:   Social History     Socioeconomic History     Marital status:      Spouse name: Angela     Number of children: 1     Years of education: Not on file     Highest education level: Not on file   Occupational History     Employer: [employer]   Social Needs     Financial resource strain: Not on file     Food insecurity     Worry: Not on file     Inability: Not on file     Transportation needs     Medical: Not on file     Non-medical: Not on file   Tobacco Use      Smoking status: Current Every Day Smoker     Packs/day: 1.00     Years: 58.00     Pack years: 58.00     Types: Cigarettes     Smokeless tobacco: Never Used   Substance and Sexual Activity     Alcohol use: No     Drug use: No     Sexual activity: Yes     Partners: Female   Lifestyle     Physical activity     Days per week: Not on file     Minutes per session: Not on file     Stress: Not on file   Relationships     Social connections     Talks on phone: Not on file     Gets together: Not on file     Attends Che[employer] service: Not on file     Active member of club or organization: Not on file     Attends meetings of clubs or organizations: Not on file     Relationship status: Not on file     Intimate partner violence     Fear of current or ex partner: Not on file     Emotionally abused: Not on file     Physically abused: Not on file     Forced sexual activity: Not on file   Other Topics Concern     Parent/sibling w/ CABG, MI or angioplasty before 65F 55M? Yes   Social History Narrative    Born and raised in [place] has 2 siblings that he does not have contact with raised by both mother and father no abuse history or neglect.  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  Has the one marriage from a mail order bride service as he describes it 1 previous child from a previous relationship and one child with his wife.  He has contact with his son.  He was never in the .  Currently lives in a house with his wife enjoys writing books and going to the gym and exercising.  No access to guns or weapons at the house.       Review Of Systems:  Skin: No rash, pruritis, or skin pigmentation  Eyes: No changes in vision  Ears/Nose/Throat: No changes in hearing, no nosebleeds  Respiratory: No shortness of breath, dyspnea on exertion, cough, or hemoptysis  Cardiovascular: No chest pain or palpitations  Gastrointestinal: No diarrhea or constipation.  "No abdominal pain. No hematochezia  Genitourinary: see HPI  Musculoskeletal: No pain or swelling of joints, normal range of motion  Neurologic: No weakness or tremors  Psychiatric: No recent changes in memory or mood  Hematologic/Lymphatic/Immunologic: No easy bruising or enlarged lymph nodes  Endocrine: No weight gain or loss      PHYSICAL EXAM:    /80   Ht 1.88 m (6' 2\")   Wt 90.7 kg (200 lb)   BMI 25.68 kg/m      Constitutional: Well developed. Conversant and in no acute distress  Eyes: Anicteric sclera, conjunctiva clear, normal extraocular movements  ENT: Normocephalic and atraumatic,   Skin: Warm and dry. No rashes or lesions  Cardiac: No peripheral edema  Back/Flank: Not done  CNS/PNS: Normal musculature and movements, moves all extremities normally  Respiratory: Normal non-labored breathing  Abdomen: Soft nontender and nondistended  Peripheral Vascular: No peripheral edema  Mental Status/Psych: Alert and Oriented x 3. Normal mood and affect    Penis: Not done  Scrotal Skin: Not done  Testicles: Not done  Epididymis: Not done  Digital Rectal Exam:     Cystoscopy: Not done    Imaging: None    Urinalysis: UA RESULTS:  Recent Labs   Lab Test 04/24/20  1622  08/28/15  1036   COLOR Light Red   < > Yellow   APPEARANCE Slightly Cloudy   < > Slightly Cloudy*   URINEGLC Negative   < > Neg   URINEBILI Negative   < > Neg   URINEKETONE Negative   < > Neg   SG 1.016   < >  --    UBLD Large*   < > Trace*   URINEPH 6.0   < > 6.0   PROTEIN 100*   < >  --    UROBILINOGEN  --   --  0.2   NITRITE Positive*   < > Neg   LEUKEST Large*   < >  --    RBCU >182*   < >  --    WBCU 135*   < >  --     < > = values in this interval not displayed.       PSA: 7.8    Post Void Residual: 38mL    Other labs: None today      IMPRESSION:  Enlarged prostate, elevated PSA    PLAN:  He is doing well and his urinary retention appears resolved today.  He is emptying his bladder well.  We did discuss the risks for continued retention.  I " recommended taking Flomax to prevent future episodes of retention but he declines this.  We discussed the MRI results as well.  At this time I think it would be fine to defer prostate biopsy but he will need to proceed with prostate biopsy if the PSA continues to rise.  I recommended that we follow him closely.  I will see him back in 6 months for a PSA urinalysis and bladder scan.      Ruben Alaniz M.D.

## 2020-08-18 NOTE — PROGRESS NOTES
Consent: Serena Richards, who was seen by synchronous (real-time) audio-video technology, and/or her healthcare decision maker, is aware that this patient-initiated, Telehealth encounter on 8/18/2020 is a billable service, with coverage as determined by her insurance carrier. She is aware that she may receive a bill and has provided verbal consent to proceed: Yes. Assessment & Plan:   Diagnoses and all orders for this visit:    1. Upper respiratory tract infection, unspecified type    Advised patient to go to [facility] urgent care for COVID testing because of being high risk for complications due to diabetes. Follow-up and Dispositions    · Return if symptoms worsen or fail to improve. I ADVISED PATIENT TO GO TO ER IF SYMPTOMS WORSEN , CHANGE OR FAILS TO IMPROVE. I spent at least 15 minutes with this established patient, and >50% of the time was spent counseling and/or coordinating care regarding SEE BELOW  [id number]  Subjective:   Serena Richards is a 48 y.o. female who was seen for Fever (Coughing /  no sob / body aches / chills  x 3 days )      1. Upper respiratory tract infection, unspecified type  Serena Richards is a 48 y.o. female who complains of recent onset of cough , tickle in her throat, fever, fatigue, chills, clammy and body aches . She denies shortness of breath, wheezing, sore throat, sputum production, sinus congestion. Patient also noted that OTC remedies did not help. She reports no known exposure to [employer] but is concerned due to being high risk for complications. She reports temperature of 99.6 months yesterday, she took ibuprofen. She did not have fever today. Symptoms started Saturday. She also reports having one episode of diarrhea on Sunday after eating [employer] food. Prior to Admission medications    Medication Sig Start Date End Date Taking?  Authorizing Provider   Victoza 3-William 0.6 mg/0.1 mL (18 mg/3 mL) trino ADMINISTER 1.8 MG UNDER THE SKIN DAILY [facility], [place]   Psychiatric Progress Note        Interim History:     The patient's care was discussed with the treatment team during the daily team meeting and/or staff's chart notes were reviewed.  Staff report patient is volatile, has poor insight and judgment, but somewhat more appropriate and was allowed to attend groups. Yesterday he complained of left knee pain, was given and accepted Tylenol. He was seen today in his room. I informed him that outpatient team felt that he was doing better on injectable long acting antipsychotic and that Brendon Call was approved by [facility]. He immediately became agitated, stated that he didn't need any injectables and didn't need to be at this hospital to start with. He demanded to give him [facility] papers and I promised to do it. As I was leaving Kendall continued to stare at me and was clearly very angry.         Medications:       Risperidone  3 mg Sublingual At Bedtime    Or     OLANZapine  5 mg Intramuscular At Bedtime     risperiDONE  1 mg Oral Daily    Or     OLANZapine  5 mg Intramuscular Daily     QUEtiapine  200 mg Oral At Bedtime     risperiDONE microspheres  25 mg Intramuscular Q14 Days          Allergies:     Allergies   Allergen Reactions     Bee Venom      Propranolol           Labs:     No results found for this or any previous visit (from the past 24 hour(s)).       Psychiatric Examination:     Vitals:    06/23/19 0800 06/23/19 2017 06/24/19 0830 06/24/19 2019   BP: (!) 149/96 153/85 181/87 (!) 166/102   Pulse: 85 71 78 88   Resp: 16 16 17 16   Temp:   97.5  F (36.4  C) 98.3  F (36.8  C)   TempSrc: Tympanic Tympanic Tympanic Oral   SpO2: 97% 97% 97%    Weight: 90.2 kg (198 lb 14.4 oz)          Orthostatic Vitals       Most Recent      Sitting Orthostatic BP --  Comment: Pt declined 06/27 0900    Sitting Orthostatic Pulse (bpm) --  Comment: Pt declined 06/27 0900         Weight is 198 lbs 14.4 oz  Body mass index is 25.54  "kg/m .    Appearance: awake, alert, appeared older than stated age, poorly groomed and mild distress, poor dentition  Attitude:  somewhat cooperative  Eye Contact:  intense  Mood:  'fine\", then, angry  Affect:  intensity is heightened and guarded  Speech:  clear, coherent and normal prosody  Psychomotor Behavior:  no evidence of tardive dyskinesia, dystonia, or tics, upper extremities tremor is present.  Throught Process:  illogical and perseverative  Associations:  no loose associations  Thought Content:  no evidence of suicidal ideation or homicidal ideation, no auditory hallucinations present, no visual hallucinations present and suspicious, guarded and internal stimuli suspected. Delusions are present.   Insight:  limited  Judgement:  limited  Oriented to:  partial  Attention Span and Concentration:  limited  Recent and Remote Memory:  limited         Precautions:     Behavioral Orders   Procedures     Assault precautions     Code 1 - Restrict to Unit     Code 2     For Xray and CT only     Code 2     Code 2 to facilitate transfer to 3bw     Fall precautions     Neuropsych Testing     Routine Programming     As clinically indicated     Sexual precautions     Status 15     Every 15 minutes.          Diagnoses:     Schizoaffective disorder bipolar type  Tobacco use disorder  Rule out pornography or sexual addiction outside of the context of manic episode         Plan:     Medications:  -- Seroquel was restarted at 200 mg qhs. Reportedly he was on 600 mg qhs at home, but not clear if he was compliant with medications.   --  Haldol was discontinued and and switched to Risperdal.   -- Risperdal started and titrated to 3 mg qhs and 1 mg qam (IM Zyprexa if refused)      Will start Risperdal Consta 25 mg IM every 2 weeks today.   -- PRN Haldol, Benadryl and ativan for agitation and combative behavior.     Legal Status and Disposition:  -- PD revoked and currently under full commitment with [facility] (risperidone, " 5/7/20  Yes Sarita Zimmer MD   glipiZIDE (GLUCOTROL) 10 mg tablet Take 1 Tab by mouth two (2) times a day. 2/5/20  Yes Sarita Zimmer MD   citalopram (CELEXA) 40 mg tablet Take 1 Tab by mouth daily. 2/5/20  Yes Sarita Zimmer MD   valsartan-hydroCHLOROthiazide (DIOVAN-HCT) 80-12.5 mg per tablet Take 1 Tab by mouth daily. 2/5/20  Yes Sarita Zimmer MD   omeprazole (PRILOSEC) 20 mg capsule Take 1 Cap by mouth daily. 2/5/20  Yes Sarita Zimmer MD   empagliflozin (JARDIANCE) 25 mg tablet Take 1 Tab by mouth daily. 2/5/20  Yes Sarita Zimmer MD   albuterol (PROVENTIL HFA, VENTOLIN HFA, PROAIR HFA) 90 mcg/actuation inhaler Take 1 Puff by inhalation every four (4) hours as needed for Wheezing. Dispense 8g inhaler. 1/22/20  Yes Jonas Dennison MD   Insulin Needles, Disposable, (LORENA PEN NEEDLE) 32 gauge x 5/32\" ndle For daily use with victoza. 10/22/19  Yes Sarita Zimmer MD   ALPRAZolam Roselee Quar) 0.5 mg tablet Take 1 Tab by mouth nightly as needed for Anxiety.  Max Daily Amount: 0.5 mg. 11/20/18  Yes Adelina Jensen MD   methylPREDNISolone (MEDROL DOSEPACK) 4 mg tablet Take as directed on the pack 1/22/20 8/18/20  Jonas Dennison MD     Allergies   Allergen Reactions    Lavender (Julio Cesar Simple) Hives    Raspberry Unknown (comments)       Patient Active Problem List   Diagnosis Code    Anxiety F41.9    Essential hypertension I10    Hyperlipidemia E78.5    Diabetes mellitus type 2, controlled ([address] E11.9    Obesity, morbid ([address] E66.01    Uncontrolled type 2 diabetes mellitus with hyperglycemia ([address] E11.65     Patient Active Problem List    Diagnosis Date Noted    Obesity, morbid ([address] 09/10/2019    Uncontrolled type 2 diabetes mellitus with hyperglycemia ([facility] 09/10/2019    Anxiety 09/25/2018    Essential hypertension 09/25/2018    Hyperlipidemia 09/25/2018    Diabetes mellitus type 2, controlled ([facility] 09/25/2018     Current Outpatient Medications   Medication Sig Dispense Refill    Victoza 3-William 0.6 mg/0.1 mL (18 mg/3 mL) pnij ADMINISTER 1.8 MG UNDER THE SKIN DAILY 9 mL 2    glipiZIDE (GLUCOTROL) 10 mg tablet Take 1 Tab by mouth two (2) times a day. 180 Tab 1    citalopram (CELEXA) 40 mg tablet Take 1 Tab by mouth daily. 90 Tab 1    valsartan-hydroCHLOROthiazide (DIOVAN-HCT) 80-12.5 mg per tablet Take 1 Tab by mouth daily. 90 Tab 1    omeprazole (PRILOSEC) 20 mg capsule Take 1 Cap by mouth daily. 90 Cap 1    empagliflozin (JARDIANCE) 25 mg tablet Take 1 Tab by mouth daily. 90 Tab 1    albuterol (PROVENTIL HFA, VENTOLIN HFA, PROAIR HFA) 90 mcg/actuation inhaler Take 1 Puff by inhalation every four (4) hours as needed for Wheezing. Dispense 8g inhaler. 1 Inhaler 0    Insulin Needles, Disposable, (LORENA PEN NEEDLE) 32 gauge x 5/32\" ndle For daily use with victoza. 100 Pen Needle 1    ALPRAZolam (XANAX) 0.5 mg tablet Take 1 Tab by mouth nightly as needed for Anxiety. Max Daily Amount: 0.5 mg. 30 Tab 0     Allergies   Allergen Reactions    Lavender (Domi Felix) Hives    Raspberry Unknown (comments)     Past Medical History:   Diagnosis Date    Anxiety and depression     GERD (gastroesophageal reflux disease)     HTN (hypertension)     Hyperlipemia     Type 2 diabetes mellitus (HCC)      Past Surgical History:   Procedure Laterality Date    HX HYSTERECTOMY       Family History   Problem Relation Age of Onset    Stroke Mother     Diabetes Mother    [place]ett Arthritis-rheumatoid Mother     Prostate Cancer Father     COPD Father     Diabetes Father     Colon Cancer Maternal Aunt      Social History     Tobacco Use    Smoking status: Current Every Day Smoker     Packs/day: 0.50     Types: Cigarettes    Smokeless tobacco: Never Used   Substance Use Topics    Alcohol use: Yes     Comment: ocasionally        Review of Systems   Constitutional: Positive for chills and fever. HENT: Negative. Eyes: Negative. Respiratory: Positive for cough. paliperidone, olanzapine, quetiapine, aripiprazole, and haloperidol.)  -- disposition to be determined pending clinical response.  -- will await neuropsychological testing results.         Cardiovascular: Negative. Gastrointestinal: Negative. Genitourinary: Negative. Musculoskeletal: Positive for myalgias. Skin: Negative. Neurological: Negative. Endo/Heme/Allergies: Negative. Psychiatric/Behavioral: Negative. Objective:     Patient-Reported Vitals 8/18/2020   Patient-Reported Weight 275lb   Patient-Reported Height 5f5   Patient-Reported Temperature -        [INSTRUCTIONS:  \"[x]\" Indicates a positive item  \"[]\" Indicates a negative item  -- DELETE ALL ITEMS NOT EXAMINED]    Constitutional: [x] Appears well-developed and well-nourished [x] No apparent distress      [] Abnormal -     Mental status: [x] Alert and awake  [x] Oriented to person/place/time [x] Able to follow commands    [] Abnormal -     Eyes:   EOM    [x]  Normal    [] Abnormal -   Sclera  [x]  Normal    [] Abnormal -          Discharge [x]  None visible   [] Abnormal -     HENT: [x] Normocephalic, atraumatic  [] Abnormal -   [x] Mouth/Throat: Mucous membranes are moist    External Ears [x] Normal  [] Abnormal -    Neck: [x] No visualized mass [] Abnormal -     Pulmonary/Chest: [x] Respiratory effort normal   [x] No visualized signs of difficulty breathing or respiratory distress        [] Abnormal -      Musculoskeletal:   [x] Normal gait with no signs of ataxia         [x] Normal range of motion of neck        [] Abnormal -     Neurological:        [x] No Facial Asymmetry (Cranial nerve 7 motor function) (limited exam due to video visit)          [x] No gaze palsy        [] Abnormal -          Skin:        [x] No significant exanthematous lesions or discoloration noted on facial skin         [] Abnormal -            Psychiatric:       [x] Normal Affect [] Abnormal -        [x] No Hallucinations    Other pertinent observable physical exam findings:-    We discussed the expected course, resolution and complications of the diagnosis(es) in detail.   Medication risks, benefits, costs, interactions, and alternatives were discussed as indicated. I advised her to contact the office if her condition worsens, changes or fails to improve as anticipated. She expressed understanding with the diagnosis(es) and plan. Tiffany Talbot is a 48 y.o. female being evaluated by a video visit encounter for concerns as above. A caregiver was present when appropriate. Due to this being a TeleHealth encounter ([facility]), evaluation of the following organ systems was limited: Vitals/Constitutional/EENT/Resp/CV/GI//MS/Neuro/Skin/Heme-Lymph-Imm. Pursuant to the emergency declaration under the [facility] waiver authority and the [employer] and [employer] Act, this Virtual  Visit was conducted, with patient's (and/or legal guardian's) consent, to reduce the patient's risk of exposure to COVID-19 and provide necessary medical care. Services were provided through a video synchronous discussion virtually to substitute for in-person clinic visit. Patient was located at her home. I was at office while conducting this encounter.        Chrystal Villalobos MD

## 2020-10-22 NOTE — PROGRESS NOTES
"Patient's wife and daughter called the unit (both have signed MANDA). Spoke to both wife and daughter regarding pt care. Daughter expressed concern that her father appears delusional and doesn't understand what she is telling her father when she speaks to him. She also would like to know more about the commitment process, and what is involved with this. Writer gave an explanation to daughter, but also suggested she speak to his  for more information.      Pt approached writer, and expressed concern that he was being poisoned by his Zyprexa, primarily that it was giving him cancer. Writer attempted to ease patient's mind by explaining the purpose of the medication. However, pt insisted that he did not need to be on this medication and that \"Giving up coffee is what I need to do. That's what gets me agitated the most.\"    No additional concerns at this time. Will continue to monitor and assess.      " NEGATIVE

## 2020-10-26 ENCOUNTER — OFFICE VISIT (OUTPATIENT)
Dept: FAMILY MEDICINE | Facility: CLINIC | Age: 65
End: 2020-10-26

## 2020-10-26 VITALS
HEART RATE: 88 BPM | BODY MASS INDEX: 22.72 KG/M2 | OXYGEN SATURATION: 97 % | SYSTOLIC BLOOD PRESSURE: 130 MMHG | WEIGHT: 177 LBS | TEMPERATURE: 97.6 F | DIASTOLIC BLOOD PRESSURE: 82 MMHG | HEIGHT: 74 IN | RESPIRATION RATE: 20 BRPM

## 2020-10-26 DIAGNOSIS — F25.0 SCHIZOAFFECTIVE DISORDER, BIPOLAR TYPE (H): ICD-10-CM

## 2020-10-26 DIAGNOSIS — S33.8XXA SPRAIN OF ILIOLUMBAR LIGAMENT, INITIAL ENCOUNTER: Primary | ICD-10-CM

## 2020-10-26 PROCEDURE — 99213 OFFICE O/P EST LOW 20 MIN: CPT | Performed by: FAMILY MEDICINE

## 2020-10-26 RX ORDER — CYCLOBENZAPRINE HCL 5 MG
5 TABLET ORAL 3 TIMES DAILY PRN
Qty: 21 TABLET | Refills: 0 | Status: ON HOLD | OUTPATIENT
Start: 2020-10-26 | End: 2021-05-05

## 2020-10-26 ASSESSMENT — MIFFLIN-ST. JEOR: SCORE: 1657.62

## 2020-10-26 NOTE — PROGRESS NOTES
"SUBJECTIVE:   The patients complains of low back pain for 4 days, slipping on ice and caught himself, no fall but immediate back tenderness over 3-4 hours, positional with bending or lifting, without radiation down the legs. Precipitating factors: slipping on ice/ previous back and hip issues/ adjustments.  Prior history of back problems: previous fall many years ago. There is no numbness/tingling in the right or left leg.     ROS: 10 point ROS neg other than the symptoms noted above in the HPI.  No weakness, incontinence of bowel or bladder.    Tells me he refuses all medications for his tremor, mental health issues/ \" I stopped seeing doctors\"    OBJECTIVE:  Appears well, in no apparent distress.  Vital signs are normal. Lumbo-sacral spine area reveals no local tenderness or mass.  Painful and reduced LS ROM noted. Straight leg raise is negative at 90 degrees on right and left/ knee to chest gives pain relief and good tension to the back. DTR's, motor strength and sensation normal, including heel and toe gait.  Peripheral pulses are palpable. Lumbar spine X-Ray are not indicated.    ASSESSMENT:   Lumbar strain  Schizoaffective disorder    PLAN:  Discussed treatment plan of prn NSAID's and back care exercise program, given in writing. Intermittent rest, proper lifting with avoidance of heavy lifting and intermittent use of heat discussed - avoid sleeping on a heating pad. Flexeril prn.    Consider Physical Therapy and XRay studies if not improving. Call or return to clinic prn if these symptoms worsen or fail to improve as anticipated.    "

## 2020-10-26 NOTE — PATIENT INSTRUCTIONS
Ice, 5 minutes be careful of skin with a barrier    Tylenol or ibuprofen with breakfast and dinner    Rehab stretches/ exercises to begin immediately and given in writing with illustrations.  Follow the sheet    Pain medication for spasms as needed 2-3 times daily

## 2020-10-26 NOTE — NURSING NOTE
Austyn is here today for back pain after slipping on ice last Thursday.    Pre-visit Screening:  Immunizations:  not up to date - pt declines  Colonoscopy:  is due and to be scheduled by patient for later completion  Mammogram: NA  Asthma Action Test/Plan:  NA  PHQ9:  NA  GAD7:  NA  Questioned patient about current smoking habits Pt. smokes 10 cigerettes a day  Ok to leave detailed message on voice mail for today's visit only Yes, phone # [phone]

## 2021-05-05 ENCOUNTER — APPOINTMENT (OUTPATIENT)
Dept: GENERAL RADIOLOGY | Facility: CLINIC | Age: 66
DRG: 201 | End: 2021-05-05
Attending: PHYSICIAN ASSISTANT
Payer: MEDICARE

## 2021-05-05 ENCOUNTER — HOSPITAL ENCOUNTER (INPATIENT)
Facility: CLINIC | Age: 66
LOS: 1 days | Discharge: HOME | DRG: 201 | End: 2021-05-07
Attending: EMERGENCY MEDICINE | Admitting: HOSPITALIST
Payer: MEDICARE

## 2021-05-05 ENCOUNTER — APPOINTMENT (OUTPATIENT)
Dept: GENERAL RADIOLOGY | Facility: CLINIC | Age: 66
DRG: 201 | End: 2021-05-05
Attending: EMERGENCY MEDICINE
Payer: MEDICARE

## 2021-05-05 DIAGNOSIS — K21.00 GASTROESOPHAGEAL REFLUX DISEASE WITH ESOPHAGITIS, UNSPECIFIED WHETHER HEMORRHAGE: Primary | ICD-10-CM

## 2021-05-05 DIAGNOSIS — J93.9 PNEUMOTHORAX ON RIGHT: ICD-10-CM

## 2021-05-05 DIAGNOSIS — J96.01 ACUTE RESPIRATORY FAILURE WITH HYPOXIA (H): ICD-10-CM

## 2021-05-05 DIAGNOSIS — J44.1 COPD EXACERBATION (H): ICD-10-CM

## 2021-05-05 LAB
ALBUMIN SERPL-MCNC: 4 G/DL (ref 3.4–5)
ALP SERPL-CCNC: 64 U/L (ref 40–150)
ALT SERPL W P-5'-P-CCNC: 20 U/L (ref 0–70)
AMPHETAMINES UR QL SCN: NEGATIVE
ANION GAP SERPL CALCULATED.3IONS-SCNC: 3 MMOL/L (ref 3–14)
AST SERPL W P-5'-P-CCNC: 16 U/L (ref 0–45)
BARBITURATES UR QL: NEGATIVE
BASE EXCESS BLDV CALC-SCNC: 5.5 MMOL/L
BASOPHILS # BLD AUTO: 0 10E9/L (ref 0–0.2)
BASOPHILS NFR BLD AUTO: 0.7 %
BENZODIAZ UR QL: NEGATIVE
BILIRUB DIRECT SERPL-MCNC: 0.1 MG/DL (ref 0–0.2)
BILIRUB SERPL-MCNC: 0.5 MG/DL (ref 0.2–1.3)
BUN SERPL-MCNC: 17 MG/DL (ref 7–30)
CALCIUM SERPL-MCNC: 9.2 MG/DL (ref 8.5–10.1)
CANNABINOIDS UR QL SCN: NEGATIVE
CHLORIDE SERPL-SCNC: 106 MMOL/L (ref 94–109)
CO2 SERPL-SCNC: 32 MMOL/L (ref 20–32)
COCAINE UR QL: NEGATIVE
CREAT SERPL-MCNC: 0.79 MG/DL (ref 0.66–1.25)
D DIMER PPP FEU-MCNC: 0.5 UG/ML FEU (ref 0–0.5)
DIFFERENTIAL METHOD BLD: NORMAL
EOSINOPHIL # BLD AUTO: 0.2 10E9/L (ref 0–0.7)
EOSINOPHIL NFR BLD AUTO: 2.9 %
ERYTHROCYTE [DISTWIDTH] IN BLOOD BY AUTOMATED COUNT: 13.2 % (ref 10–15)
ETHANOL SERPL-MCNC: <0.01 G/DL
GFR SERPL CREATININE-BSD FRML MDRD: >90 ML/MIN/{1.73_M2}
GLUCOSE SERPL-MCNC: 119 MG/DL (ref 70–99)
HCO3 BLDV-SCNC: 34 MMOL/L (ref 21–28)
HCT VFR BLD AUTO: 46.2 % (ref 40–53)
HGB BLD-MCNC: 14.7 G/DL (ref 13.3–17.7)
IMM GRANULOCYTES # BLD: 0 10E9/L (ref 0–0.4)
IMM GRANULOCYTES NFR BLD: 0.2 %
INTERPRETATION ECG - MUSE: NORMAL
LABORATORY COMMENT REPORT: NORMAL
LYMPHOCYTES # BLD AUTO: 1.5 10E9/L (ref 0.8–5.3)
LYMPHOCYTES NFR BLD AUTO: 23.7 %
MCH RBC QN AUTO: 29.6 PG (ref 26.5–33)
MCHC RBC AUTO-ENTMCNC: 31.8 G/DL (ref 31.5–36.5)
MCV RBC AUTO: 93 FL (ref 78–100)
MONOCYTES # BLD AUTO: 0.6 10E9/L (ref 0–1.3)
MONOCYTES NFR BLD AUTO: 10.2 %
NEUTROPHILS # BLD AUTO: 3.8 10E9/L (ref 1.6–8.3)
NEUTROPHILS NFR BLD AUTO: 62.3 %
NRBC # BLD AUTO: 0 10*3/UL
NRBC BLD AUTO-RTO: 0 /100
NT-PROBNP SERPL-MCNC: 89 PG/ML (ref 0–900)
O2/TOTAL GAS SETTING VFR VENT: ABNORMAL %
OPIATES UR QL SCN: NEGATIVE
PCO2 BLDV: 63 MM HG (ref 40–50)
PCP UR QL SCN: NEGATIVE
PH BLDV: 7.34 PH (ref 7.32–7.43)
PLATELET # BLD AUTO: 166 10E9/L (ref 150–450)
PO2 BLDV: 26 MM HG (ref 25–47)
POTASSIUM SERPL-SCNC: 4 MMOL/L (ref 3.4–5.3)
PROT SERPL-MCNC: 7.9 G/DL (ref 6.8–8.8)
PSA SERPL-MCNC: 4.22 UG/L (ref 0–4)
RADIOLOGIST FLAGS: ABNORMAL
RBC # BLD AUTO: 4.96 10E12/L (ref 4.4–5.9)
SARS-COV-2 RNA RESP QL NAA+PROBE: NEGATIVE
SODIUM SERPL-SCNC: 141 MMOL/L (ref 133–144)
SPECIMEN SOURCE: NORMAL
TROPONIN I SERPL-MCNC: <0.015 UG/L (ref 0–0.04)
WBC # BLD AUTO: 6.2 10E9/L (ref 4–11)

## 2021-05-05 PROCEDURE — 84153 ASSAY OF PSA TOTAL: CPT | Performed by: PHYSICIAN ASSISTANT

## 2021-05-05 PROCEDURE — 93005 ELECTROCARDIOGRAM TRACING: CPT

## 2021-05-05 PROCEDURE — 999N000105 HC STATISTIC NO DOCUMENTATION TO SUPPORT CHARGE

## 2021-05-05 PROCEDURE — 82803 BLOOD GASES ANY COMBINATION: CPT | Performed by: EMERGENCY MEDICINE

## 2021-05-05 PROCEDURE — 96374 THER/PROPH/DIAG INJ IV PUSH: CPT

## 2021-05-05 PROCEDURE — C9803 HOPD COVID-19 SPEC COLLECT: HCPCS

## 2021-05-05 PROCEDURE — 32551 INSERTION OF CHEST TUBE: CPT

## 2021-05-05 PROCEDURE — 250N000011 HC RX IP 250 OP 636: Performed by: EMERGENCY MEDICINE

## 2021-05-05 PROCEDURE — G0378 HOSPITAL OBSERVATION PER HR: HCPCS

## 2021-05-05 PROCEDURE — 94640 AIRWAY INHALATION TREATMENT: CPT

## 2021-05-05 PROCEDURE — 83880 ASSAY OF NATRIURETIC PEPTIDE: CPT | Performed by: EMERGENCY MEDICINE

## 2021-05-05 PROCEDURE — 94640 AIRWAY INHALATION TREATMENT: CPT | Mod: 76

## 2021-05-05 PROCEDURE — 99285 EMERGENCY DEPT VISIT HI MDM: CPT | Mod: 25

## 2021-05-05 PROCEDURE — 87635 SARS-COV-2 COVID-19 AMP PRB: CPT | Performed by: EMERGENCY MEDICINE

## 2021-05-05 PROCEDURE — 80048 BASIC METABOLIC PNL TOTAL CA: CPT | Performed by: EMERGENCY MEDICINE

## 2021-05-05 PROCEDURE — 250N000012 HC RX MED GY IP 250 OP 636 PS 637: Performed by: PHYSICIAN ASSISTANT

## 2021-05-05 PROCEDURE — 80307 DRUG TEST PRSMV CHEM ANLYZR: CPT | Performed by: PHYSICIAN ASSISTANT

## 2021-05-05 PROCEDURE — 99233 SBSQ HOSP IP/OBS HIGH 50: CPT | Performed by: PHYSICIAN ASSISTANT

## 2021-05-05 PROCEDURE — 999N000065 XR CHEST PORT 1 VIEW

## 2021-05-05 PROCEDURE — 250N000009 HC RX 250: Performed by: PHYSICIAN ASSISTANT

## 2021-05-05 PROCEDURE — 85379 FIBRIN DEGRADATION QUANT: CPT | Performed by: EMERGENCY MEDICINE

## 2021-05-05 PROCEDURE — 250N000009 HC RX 250: Performed by: EMERGENCY MEDICINE

## 2021-05-05 PROCEDURE — 82077 ASSAY SPEC XCP UR&BREATH IA: CPT | Performed by: EMERGENCY MEDICINE

## 2021-05-05 PROCEDURE — 999N000157 HC STATISTIC RCP TIME EA 10 MIN

## 2021-05-05 PROCEDURE — 80076 HEPATIC FUNCTION PANEL: CPT | Performed by: EMERGENCY MEDICINE

## 2021-05-05 PROCEDURE — 99220 PR INITIAL OBSERVATION CARE,LEVEL III: CPT | Performed by: PHYSICIAN ASSISTANT

## 2021-05-05 PROCEDURE — 84484 ASSAY OF TROPONIN QUANT: CPT | Performed by: EMERGENCY MEDICINE

## 2021-05-05 PROCEDURE — 0W9930Z DRAINAGE OF RIGHT PLEURAL CAVITY WITH DRAINAGE DEVICE, PERCUTANEOUS APPROACH: ICD-10-PCS | Performed by: EMERGENCY MEDICINE

## 2021-05-05 PROCEDURE — 71045 X-RAY EXAM CHEST 1 VIEW: CPT | Mod: 77

## 2021-05-05 PROCEDURE — 85025 COMPLETE CBC W/AUTO DIFF WBC: CPT | Performed by: EMERGENCY MEDICINE

## 2021-05-05 PROCEDURE — 250N000013 HC RX MED GY IP 250 OP 250 PS 637: Performed by: PHYSICIAN ASSISTANT

## 2021-05-05 PROCEDURE — 71045 X-RAY EXAM CHEST 1 VIEW: CPT

## 2021-05-05 RX ORDER — NALOXONE HYDROCHLORIDE 0.4 MG/ML
0.2 INJECTION, SOLUTION INTRAMUSCULAR; INTRAVENOUS; SUBCUTANEOUS
Status: DISCONTINUED | OUTPATIENT
Start: 2021-05-05 | End: 2021-05-07 | Stop reason: HOSPADM

## 2021-05-05 RX ORDER — LIDOCAINE HYDROCHLORIDE AND EPINEPHRINE 10; 10 MG/ML; UG/ML
INJECTION, SOLUTION INFILTRATION; PERINEURAL
Status: DISCONTINUED
Start: 2021-05-05 | End: 2021-05-05 | Stop reason: HOSPADM

## 2021-05-05 RX ORDER — AMOXICILLIN 250 MG
1 CAPSULE ORAL 2 TIMES DAILY
Status: DISCONTINUED | OUTPATIENT
Start: 2021-05-05 | End: 2021-05-07 | Stop reason: HOSPADM

## 2021-05-05 RX ORDER — ONDANSETRON 4 MG/1
4 TABLET, ORALLY DISINTEGRATING ORAL EVERY 6 HOURS PRN
Status: DISCONTINUED | OUTPATIENT
Start: 2021-05-05 | End: 2021-05-07 | Stop reason: HOSPADM

## 2021-05-05 RX ORDER — LORAZEPAM 2 MG/ML
1 INJECTION INTRAMUSCULAR ONCE
Status: COMPLETED | OUTPATIENT
Start: 2021-05-05 | End: 2021-05-05

## 2021-05-05 RX ORDER — NALOXONE HYDROCHLORIDE 0.4 MG/ML
0.4 INJECTION, SOLUTION INTRAMUSCULAR; INTRAVENOUS; SUBCUTANEOUS
Status: DISCONTINUED | OUTPATIENT
Start: 2021-05-05 | End: 2021-05-07 | Stop reason: HOSPADM

## 2021-05-05 RX ORDER — HYDROMORPHONE HCL IN WATER/PF 6 MG/30 ML
0.2 PATIENT CONTROLLED ANALGESIA SYRINGE INTRAVENOUS
Status: DISCONTINUED | OUTPATIENT
Start: 2021-05-05 | End: 2021-05-07 | Stop reason: HOSPADM

## 2021-05-05 RX ORDER — PREDNISONE 20 MG/1
40 TABLET ORAL DAILY
Status: DISCONTINUED | OUTPATIENT
Start: 2021-05-05 | End: 2021-05-07 | Stop reason: HOSPADM

## 2021-05-05 RX ORDER — OXYCODONE HYDROCHLORIDE 5 MG/1
5-10 TABLET ORAL
Status: DISCONTINUED | OUTPATIENT
Start: 2021-05-05 | End: 2021-05-07 | Stop reason: HOSPADM

## 2021-05-05 RX ORDER — LIDOCAINE HYDROCHLORIDE AND EPINEPHRINE 10; 10 MG/ML; UG/ML
1 INJECTION, SOLUTION INFILTRATION; PERINEURAL ONCE
Status: DISCONTINUED | OUTPATIENT
Start: 2021-05-05 | End: 2021-05-05

## 2021-05-05 RX ORDER — NICOTINE 21 MG/24HR
1 PATCH, TRANSDERMAL 24 HOURS TRANSDERMAL DAILY
Status: DISCONTINUED | OUTPATIENT
Start: 2021-05-05 | End: 2021-05-07 | Stop reason: HOSPADM

## 2021-05-05 RX ORDER — IPRATROPIUM BROMIDE AND ALBUTEROL SULFATE 2.5; .5 MG/3ML; MG/3ML
3 SOLUTION RESPIRATORY (INHALATION)
Status: DISCONTINUED | OUTPATIENT
Start: 2021-05-05 | End: 2021-05-05

## 2021-05-05 RX ORDER — ACETAMINOPHEN 325 MG/1
975 TABLET ORAL EVERY 6 HOURS PRN
Status: ON HOLD | COMMUNITY
End: 2021-05-07

## 2021-05-05 RX ORDER — ONDANSETRON 2 MG/ML
4 INJECTION INTRAMUSCULAR; INTRAVENOUS EVERY 6 HOURS PRN
Status: DISCONTINUED | OUTPATIENT
Start: 2021-05-05 | End: 2021-05-07 | Stop reason: HOSPADM

## 2021-05-05 RX ORDER — IPRATROPIUM BROMIDE AND ALBUTEROL SULFATE 2.5; .5 MG/3ML; MG/3ML
3 SOLUTION RESPIRATORY (INHALATION)
Status: DISCONTINUED | OUTPATIENT
Start: 2021-05-05 | End: 2021-05-07 | Stop reason: HOSPADM

## 2021-05-05 RX ORDER — ACETAMINOPHEN 325 MG/1
975 TABLET ORAL 3 TIMES DAILY
Status: DISCONTINUED | OUTPATIENT
Start: 2021-05-05 | End: 2021-05-07 | Stop reason: HOSPADM

## 2021-05-05 RX ORDER — IBUPROFEN 200 MG
400-600 TABLET ORAL EVERY 6 HOURS PRN
Status: ON HOLD | COMMUNITY
End: 2021-05-07

## 2021-05-05 RX ORDER — QUETIAPINE FUMARATE 25 MG/1
25 TABLET, FILM COATED ORAL
Status: DISCONTINUED | OUTPATIENT
Start: 2021-05-05 | End: 2021-05-07 | Stop reason: HOSPADM

## 2021-05-05 RX ORDER — AMOXICILLIN 250 MG
2 CAPSULE ORAL 2 TIMES DAILY
Status: DISCONTINUED | OUTPATIENT
Start: 2021-05-05 | End: 2021-05-07 | Stop reason: HOSPADM

## 2021-05-05 RX ADMIN — OXYCODONE HYDROCHLORIDE 5 MG: 5 TABLET ORAL at 19:43

## 2021-05-05 RX ADMIN — IPRATROPIUM BROMIDE AND ALBUTEROL SULFATE 3 ML: .5; 3 SOLUTION RESPIRATORY (INHALATION) at 16:04

## 2021-05-05 RX ADMIN — ACETAMINOPHEN 975 MG: 325 TABLET, FILM COATED ORAL at 14:30

## 2021-05-05 RX ADMIN — PREDNISONE 40 MG: 20 TABLET ORAL at 17:13

## 2021-05-05 RX ADMIN — ACETAMINOPHEN 975 MG: 325 TABLET, FILM COATED ORAL at 19:43

## 2021-05-05 RX ADMIN — LORAZEPAM 1 MG: 2 INJECTION INTRAMUSCULAR; INTRAVENOUS at 10:40

## 2021-05-05 RX ADMIN — IPRATROPIUM BROMIDE AND ALBUTEROL SULFATE 3 ML: .5; 3 SOLUTION RESPIRATORY (INHALATION) at 09:35

## 2021-05-05 RX ADMIN — NICOTINE 1 PATCH: 14 PATCH, EXTENDED RELEASE TRANSDERMAL at 17:14

## 2021-05-05 RX ADMIN — IPRATROPIUM BROMIDE AND ALBUTEROL SULFATE 3 ML: .5; 3 SOLUTION RESPIRATORY (INHALATION) at 19:22

## 2021-05-05 RX ADMIN — OXYCODONE HYDROCHLORIDE 5 MG: 5 TABLET ORAL at 14:30

## 2021-05-05 ASSESSMENT — MIFFLIN-ST. JEOR
SCORE: 1642.64
SCORE: 1711.58

## 2021-05-05 ASSESSMENT — ENCOUNTER SYMPTOMS
NAUSEA: 0
SHORTNESS OF BREATH: 1
FEVER: 0
COUGH: 1
CHILLS: 0
VOMITING: 0
ABDOMINAL PAIN: 0

## 2021-05-05 NOTE — PROVIDER NOTIFICATION
Patient arrived to floor ~13:15    Vitals: /104  Pulse 89  Temp 98.3  F  Resp 25  SpO2 90% on 2L O2 via NC.     Patient severely dyspneic with ambulation from cart to bed. Pigtail chest tube dressing peeling off of chest; heimlich valve leaking serosangenous fluid, hetal with coughing. RR 25 with accessory muscle use. Provider (Tejadaimelda ALFARO) updated.    13:34 -14:30 Vitals: /82  Pulse 78   Temp 98.3  F  Resp 24  SpO2 97% on 2L O2 via NC      Ciara Pepe PA-C exchanged heimlich valve for atrium connected to 20mmhg suction. Patient tolerated fair; reported increased pain. Oxycodone 5mg & Tylenol given. [employer] RN banded connections.     14:40 - Patient continues to report dyspnea with RR 24 & accessory muscle use. Unable to complete sentences. Tejada ANGELINA notified; Stat CXR ordered.     15:10 - Patient reassessed, appears more comfortable. Reports pain is decreased. RR 20.

## 2021-05-05 NOTE — PLAN OF CARE
ROOM # 227    Living Situation (if not independent, order SW consult): Ind with spouse  Facility name:  : Angela (spouse) - [phone]    Activity level at baseline: Ind  Activity level on admit: Ax1       Patient registered to observation; given Patient Bill of Rights; given the opportunity to ask questions about observation status and their plan of care.  Patient has been oriented to the observation room, bathroom and call light is in place.    Discussed discharge goals and expectations with patient/family.

## 2021-05-05 NOTE — H&P
History and Physical     Austyn Leach MRN# [id number]   YOB: 1955 Age: 66 year old      Date of Admission:  5/5/2021    Primary care provider: Grant Smith          Assessment and Plan:   Austyn Leach is a 66 year old male with a PMH significant for bipolar 1 disorder, GERD, intermittent asthma, schizophrenia, history of BPH/BARRIOS with concerns for potential prostate cancer due to elevated PSA and no longer seeing any medical providers or taking any of his medications who presents with shortness of breath over the last 4 days.  He comes in for increasing dyspnea on exertion.  Work-up in the emergency room reveals right sided pneumothorax.  Rest of his labs are fairly unremarkable with normal electrolytes, BMP troponin and CBC.  A pigtail catheter was placed and repeat chest x-ray shows expansion of his pneumothorax.  He is to be imaged hospital for further cares.    1.  Spontaneous right lung pneumothorax--cause is unclear but my suspicion is that he probably blew a bleb.  He has a reported history of asthma and with greater than 40-pack-year history of smoking he likely has some degree of COPD/emphysema.  He does describe a chronic cough which is at baseline but probably contributed to his pneumothorax.  --Patient continues to appear to be dyspneic on arrival with increased RR around 22's  --Chest tube placed initially to a Heimlich, but appears to be leaking   --CT are now transition to [facility] with suction  --Repeat chest x-ray now  --Continue supportive cares  --Consult thoracic surgery  --Repeat chest x-ray in the a.m. as well    Follow up:  Repeat CXR this afternoon shows CT in place. Lungs still expanded. Small tiny apical pneumothorax o/w stable.     2.  Suspected COPD/emphysema  #chronic tobacco abuse <40 pack year hx  --patient has a longstanding history of tobacco abuse  --My exam reveals wheezing consistent with acute ecxacerbation  --Suspect mild COPD exacerbation, will place on  DuoNebs 4 times daily  --We will give 40 mg of prednisone  --Nicotine patch ordered  --We will need formal outpatient pulmonary function testing    3.  History of enlarged prostate, urinary retention with elevated PSA concerning for possible prostate cancer  --Previously had admission to the hospital for ANATOLY due to bladder outlet obstruction over a year ago   --have not seen Dr. Alaniz since last admission a year ago for B00.  He was recommended to continue Flomax but patient declined.  --Also had elevated PSA 8/20, and declined prostate biopsy at the time  --Currently no signs of urinary retention, but will have low threshold for as needed bladder scan, and as needed straight cath  --Resume Yvan,max   --We will recheck PSA given that he has not followed up with medical cares    4.  History of tremor  --Patient describes a history of tremor?  --I do not see this in his history and his most recent admission a year ago  --He does appear to be somewhat tremulous.   --Has a history of alcohol abuse but he adamantly denies that he has not been drinking at all  --Cont to monitor for now   --We will send off urine tox    5.  History of bipolar disorder/schizoaffective disorder  --Sounds like he has a longstanding history of mental health issues  --He also had suicidal attempts over a year ago sounds like in the setting of a difficult divorce?  --He was admitted to [facility] a year ago for suicidal ideation and depression  --PTA meds included; Seroquel 200 mg nightly, Risperdal 4 mg nightly  --Does not have any acute mental health compensation at this time  --I will order as needed Seroquel at night if needed    6.  History of GERD  --Resume PPI    [place] to OBS  FULL Code  DVT prophylaxis: anticipate short stay, PCD if prologue  Dispo: unclear likely 1-2 days pending CXR outcome            Chief Complaint:   SBO      History of Present Illness:   Austyn Leach is a 66 year old male with a PMH significant for  bipolar 1 disorder, GERD, intermittent asthma, schizophrenia, history of multiple suicide attempts, history of BPH/BARRIOS with concerns for potential prostate cancer due to elevated PSA and no longer seeing any medical providers or taking any of his medications who presents with shortness of breath over the last 4 days.    It appears the patient has not been seeking any medical attention over the last several months.  He was admitted to the hospital in April 2020 due to ANATOLY, abdominal pain was found to have bladder outlet obstruction due to urinary retention.  At that time he also had concerns for elevated PSA concerning for development of prostate cancer.  Ultimately his symptoms improved.  He was able to urinate freely and had declined taking additional Flomax.  He also declined biopsy but was supposed to follow-up with Dr. Alaniz in 1 years time.    In regards to his shortness of breath, he complained increasing shortness of breath over the last 4 days.  He does have a history of chronic tobacco abuse and states that he has a chronic cough.  He tells me he comes in because he was having increasing shortness of breath and chest discomfort.  Work-up in the emergency room reveals right sided pneumothorax.  Rest of his labs are fairly unremarkable with normal electrolytes, BMP troponin and CBC.  A pigtail catheter was placed and repeat chest x-ray shows expansion of his pneumothorax.  He is to be imaged hospital for further cares.  96       Past Medical History:     Past Medical History:   Diagnosis Date     Bipolar 1 disorder (H)      GERD (gastroesophageal reflux disease)      Mild intermittent asthma     uses primatent     Schizophrenia (H)    schizoaffective disorder, cannabis use disorder, alcohol use disorder            Past Surgical History:     Past Surgical History:   Procedure Laterality Date     NO HISTORY OF SURGERY       TESTICLE SURGERY       VASECTOMY                 Social History:     Social History      Socioeconomic History     Marital status:      Spouse name: Angela     Number of children: 1     Years of education: Not on file     Highest education level: Not on file   Occupational History     Employer: [employer]   Social Needs     Financial resource strain: Not on file     Food insecurity     Worry: Not on file     Inability: Not on file     Transportation needs     Medical: Not on file     Non-medical: Not on file   Tobacco Use     Smoking status: Current Every Day Smoker     Packs/day: 1.00     Years: 58.00     Pack years: 58.00     Types: Cigarettes     Smokeless tobacco: Never Used   Substance and Sexual Activity     Alcohol use: No     Drug use: No     Sexual activity: Yes     Partners: Female   Lifestyle     Physical activity     Days per week: Not on file     Minutes per session: Not on file     Stress: Not on file   Relationships     Social connections     Talks on phone: Not on file     Gets together: Not on file     Attends [employer] service: Not on file     Active member of club or organization: Not on file     Attends meetings of clubs or organizations: Not on file     Relationship status: Not on file     Intimate partner violence     Fear of current or ex partner: Not on file     Emotionally abused: Not on file     Physically abused: Not on file     Forced sexual activity: Not on file   Other Topics Concern     Parent/sibling w/ CABG, MI or angioplasty before 65F 55M? Yes   Social History Narrative    Born and raised in [place] has 2 siblings that he does not have contact with raised by both mother and father no abuse history or neglect.  Completed school on time and went into [employer] work did that for many years has not been working recently.  Has the one marriage from a mail order bride service as he describes it 1 previous child from a previous relationship and one child with his wife.  He has contact with his son.  He was never in the .  Currently lives in a  "house with his wife enjoys writing books and going to the gym and exercising.  No access to guns or weapons at the house.               Family History:     Family History   Problem Relation Age of Onset     C.A.D. Father      Hypertension Father      Breast Cancer Mother      Diabetes No family hx of      Cerebrovascular Disease No family hx of               Allergies:      Allergies   Allergen Reactions     Bee Venom Shortness Of Breath and Swelling               Medications:     Prior to Admission medications    Medication Sig Last Dose Taking? Auth Provider   acetaminophen (TYLENOL) 325 MG tablet Take 975 mg by mouth every 6 hours as needed for mild pain  at no recent usage Yes Unknown, Entered By History   ibuprofen (ADVIL/MOTRIN) 200 MG tablet Take 400-600 mg by mouth every 6 hours as needed for mild pain Past Month at Unknown time Yes Unknown, Entered By History              Review of Systems:     A Comprehensive greater than 10 system review of systems was carried out.  Pertinent positives and negatives are noted above.  Otherwise negative for contributory information.            Physical Exam:   Blood pressure (!) 142/82, pulse 78, temperature 98.3  F (36.8  C), temperature source Temporal, resp. rate 24, height 1.88 m (6' 2\"), weight 79.3 kg (174 lb 12.8 oz), SpO2 97 %.  Exam:  GENERAL:  Comfortable. Tachypenic, sitting in bed watching TicTok  PSYCH: oriented, No acute distress, mild resting tremor, no tongue fasiculation  HEENT:  PERRLA. Normal conjunctiva, normal hearing, nasal mucosa and Oropharynx are normal.  NECK:  Supple, no neck vein distention, adenopathy or bruits, normal thyroid.  HEART:  Normal S1, S2 with no murmur, no pericardial rub, gallops or S3 or S4.  LUNGS:  Clear to auscultation, increased Respiratory effort. Mild wheezing, No rales or ronchi.  ABDOMEN:  Soft, no hepatosplenomegaly, normal bowel sounds. Non-tender, non distended.   EXTREMITIES:  No pedal edema, +2 pulses bilateral and " equal.  SKIN:  Dry to touch, No rash, wound or ulcerations.  NEUROLOGIC:  CN 2-12 intact, BL 5/5 symmetric upper and lower extremity strength, sensation is intact with no focal deficits.           Data:     Recent Labs   Lab 05/05/21  0908   WBC 6.2   HGB 14.7   HCT 46.2   MCV 93        Recent Labs   Lab 05/05/21  0908      POTASSIUM 4.0   CHLORIDE 106   CO2 32   ANIONGAP 3   *   BUN 17   CR 0.79   GFRESTIMATED >90   GFRESTBLACK >90   MAL 9.2     No results for input(s): CULT in the last 168 hours.  Recent Labs   Lab 05/05/21  0908   DD 0.5     No results for input(s): SED, CRP in the last 168 hours.  Recent Labs   Lab 05/05/21  0908   HGB 14.7     No results for input(s): LACT in the last 168 hours.  No results for input(s): LIPASE in the last 168 hours.  No results for input(s): CHOL, HDL, LDL, TRIG, CHOLHDLRATIO in the last 168 hours.      Results for orders placed or performed during the hospital encounter of 05/05/21   XR Chest Port 1 View     Value    Radiologist flags Large right pneumothorax (AA)    Narrative    CHEST ONE VIEW PORTABLE   5/5/2021 9:36 AM     HISTORY:  Shortness of breath. Cough.    COMPARISON: None.      Impression    IMPRESSION: There is a large right pneumothorax, most prominent at the  right lung base and laterally. There is likely mild associated shift  of the mediastinum to the left. The left lung is clear. Pulmonary  vascularity is within normal limits.    [Critical Result: Large right pneumothorax]    Finding was identified on 5/5/2021 9:54 AM.     Dr. Horn was contacted by me on 5/5/2021 9:55 AM and verbalized  understanding of the critical result.     SARIKA POLLARD MD   XR Chest Port 1 View    Narrative    CHEST ONE VIEW PORTABLE May 5, 2021 11:48 AM     HISTORY: Chest tube placement. Right pneumothorax.    COMPARISON: Chest radiograph performed earlier today at 9:28 AM.      Impression    IMPRESSION: There has been interval placement of a single  pigtail  chest tube at the right lung base. Previously described right  pneumothorax has decreased in size, with a small residual pneumothorax  noted at the right lung base. The left lung remains clear. No shift of  the mediastinum is identified on the current exam.    SARIKA POLLARD MD   XR Chest Port 1 View    Narrative    XR CHEST PORT 1 VIEW   5/5/2021 3:02 PM    INDICATION: Shortness of breath.    COMPARISON: The previous study from 5/5/2021.    FINDINGS:   Compared to the previous study the right basilar chest tube has pulled  back although this appears to remain within the pleural space. There  has been significant reduction in the overall size of the pneumothorax  with a tiny apical pneumothorax persisting. Additionally a small  amount of atelectasis is present in the right lung base and there is a  new small amount of subcutaneous gas along the chest tube insertion  site. Otherwise no change.    MD Greer NEWSOME PA-C

## 2021-05-05 NOTE — ED PROVIDER NOTES
"  History     Chief Complaint:  Shortness of Breath    The history is provided by the patient.      Austyn Leach is a 66 year old male with a history of asthma and is a daily smoker, who presents with shortness of breath. The patient reports shortness of breath for months that significant worsened in the past few days. His shortness of breath worsens with exertion and he can makes it about 20 feet before he has to stop walking. He is unable to lay down to sleep which is also new for him. He denies any chest pain, but states he had some a couple of weeks ago. He also denies leg swelling, abdominal pain, fever, chills, nausea or vomiting. Patient has a chronic smokers cough but denies any changes to it. He is not on home oxygen. He received his second COVID-19 shot on April 24th.    Review of Systems   Constitutional: Negative for chills and fever.   Respiratory: Positive for cough (chronic; unchanged) and shortness of breath.    Cardiovascular: Negative for chest pain and leg swelling.   Gastrointestinal: Negative for abdominal pain, nausea and vomiting.   All other systems reviewed and are negative.    Allergies:  Bee Venom    Medications:    Patient denies any prescribed medications.    Past Medical History:    Bipolar 1  GERD  Asthma  Schizophrenia  Suicide Attempt  Abdominal Pain  Pneumonia  Abdominal Pain  Agitation  Rosa  Delusions   Erectile Dysfunction     Past Surgical History:    Right Knee Surgery  Testicle Surgery  Vasectomy    Family History:    Father: Coronary Artery Disease, Hypertension  Mother: Breast Cancer    Social History:  Accompanied to ER by personal vehicle.  Smoker: Yes    Physical Exam     Patient Vitals for the past 24 hrs:   BP Temp Temp src Pulse Resp SpO2 Height Weight   05/05/21 1544 (!) 151/89 97.6  F (36.4  C) Oral 86 20 96 % -- --   05/05/21 1334 (!) 142/82 -- -- 78 24 97 % -- --   05/05/21 1309 (!) 181/104 98.3  F (36.8  C) Temporal 89 25 90 % 1.88 m (6' 2\") 79.3 kg (174 lb " "12.8 oz)   05/05/21 1230 (!) 165/108 -- -- 90 27 93 % -- --   05/05/21 1200 (!) 176/105 -- -- 94 13 -- -- --   05/05/21 1100 (!) 163/91 -- -- 98 17 95 % -- --   05/05/21 1000 (!) 159/101 -- -- 82 (!) 33 97 % -- --   05/05/21 0930 (!) 156/109 -- -- 93 24 95 % -- --   05/05/21 0900 (!) 120/94 -- -- 95 -- (!) 84 % -- --   05/05/21 0852 (!) 166/115 98.2  F (36.8  C) Temporal 93 24 93 % 1.88 m (6' 2\") 86.2 kg (190 lb)     Physical Exam  General: Alert, increased work of breathing with accessory muscle use.  Speaking in 2-3 word sentences.  HEENT:  Moist mucous membranes.  Conjunctiva normal.   CV:  RRR, no m/r/g, skin warm and well perfused  Pulm:  Tachypneic, accessory muscle use.  Decreased R lung sounds.  Left lung sound with expiratory wheezing, prolonged expiratory phase.  GI:  Soft, nontender, nondistended.  No rebound or guarding.  Normal bowel sounds  MSK:  Moving all extremities.  No focal areas of edema, erythema, or tenderness  Skin:  WWP, no rashes, no lower extremity edema, skin color normal, no diaphoresis  Psych:  Well-appearing, normal affect, regular speech    Emergency Department Course     ECG:  ECG taken at 0908, ECG read at 0908  Normal Sinus Rhythm   Junctional ST Depression, probably normal  Borderline ECG   No significant changes as compared to prior, dated 04/10/2021.  Rate 91 bpm. NE interval 166 ms. QRS duration 100 ms. QT/QTc 362/445 ms. P-R-T axes 96 86 73.     Imaging:    XR Chest Port 1 View: 10:02  IMPRESSION: There is a large right pneumothorax, most prominent at the   right lung base and laterally. There is likely mild associated shift   of the mediastinum to the left. The left lung is clear. Pulmonary   vascularity is within normal limits.   Report per radiology    XR Chest Port 1 View: 12:00  IMPRESSION: There has been interval placement of a single pigtail   chest tube at the right lung base. Previously described right   pneumothorax has decreased in size, with a small residual " pneumothorax   noted at the right lung base. The left lung remains clear. No shift of   the mediastinum is identified on the current exam.  Reading per radiology     Laboratory:    Symptomatic Influenza A/B & SARS-CoV2 (COVID19) Virus PCR Multiplex: Negative     CBC: WBC 6.2, HGB 14.7,    BMP: Glucose 119 (H) o/w WNL (Creatinine 0.79)     Ddimer: 0.5    Troponin (Collected 0908): <0.015    Nt probnp: 89    Blood Gas: Ph Venous 7.43, PCO2 Venous: 63 (H), PO2 26, Bicarbonate Venous 34 (H), Base Excess Venous 5.5, FIO2 2 L/min NC    Procedures:    [facility]    -Chest Tube Insertion    Date/Time: 5/5/2021 9:58 AM  Performed by: Conor Horn MD  Authorized by: Conor Horn MD     UNIVERSAL PROTOCOL   Site Marked: Yes  Prior Images Obtained and Reviewed:  Yes  Required items: Required blood products, implants, devices and special equipment available    Patient identity confirmed:  Verbally with patient and arm band  Patient was reevaluated immediately before administering moderate or deep sedation or anesthesia  Confirmation Checklist:  Patient's identity using two indicators, relevant allergies and procedure was appropriate and matched the consent or emergent situation  Time out: Immediately prior to the procedure a time out was called    Universal Protocol: the Joint Commission Universal Protocol was followed    Preparation: Patient was prepped and draped in usual sterile fashion          PRE-PROCEDURE DETAILS:     Skin preparation:  ChloraPrep and Betadine  ANESTHESIA (see MAR for exact dosages):     Anesthesia method:  Local infiltration    Local anesthetic:  Lidocaine 1% WITH epi (Total 12 mL)    PROCEDURE DETAILS:     Placement location:  R lateral    Scalpel size:  11    Tube size (Tamazight): 14.    Ultrasound guidance: no      Tension pneumothorax: no      Tube connected to:  Heimlich valve    Drainage characteristics:  Air only    Suture material:  0 silk     Dressing:  Petrolatum-impregnated gauze    POST-PROCEDURE DETAILS:     Post-insertion x-ray findings: tube in good position    PROCEDURE   Patient Tolerance:  Patient tolerated the procedure well with no immediate complications  Describe Procedure: Patient was placed in a semirecumbent position with the head of the bed at 30 degree.  The side noted above was prepped and the patient was medicated as above. An incision was made and blunt dissection up and over the rib was performed. Access was then obtained to the pleural cavity. A chest tube was inserted and secured as above.    Emergency Department Course:    Reviewed:  0855 I reviewed nursing notes, vitals, past history and care everywhere    Assessments:  0859 I obtained history and examined the patient as noted above.     0957 I rechecked the patient and explained findings.     1025 I rechecked the patient.    1222 I rechecked the patient.    Consults:     0955 I spoke with Dr. Hobbs of the radiology service regarding patient's critical imaging result.     1013 I spoke with Dr. Becker of thoracic surgery about the patient.    1224 I spoke with Greer Farrell PA-C, hospitalist for Dr. Champion hospitalist about the patient.    Interventions:  0935 DuoNeb 3mL Nebulizer  1040 Ativan 1mg IV      Disposition:  The patient was admitted to the hospital under the care of Dr. Champion.    Impression & Plan        Medical Decision Making:  Austyn Leach is a 66 year old male with history significant daily tobacco use presents to the ER for evaluation of shortness of breath that is worsened over the last several days.  Denies any chest pain.  He is noted to be hypoxic and tachypneic with increased work of breathing and accessory muscle use on exam.  Expiratory wheezes noted on the left concerning for possible reactive airway exacerbation as he has no formal diagnosis of COPD.  Other things considered including ACS, PE, CHF, pneumonia, pneumothorax amongst other things.  EKG  is normal without signs of acute ischemia or infarct.  Troponin is within normal limits.  He is low risk for PE and D-dimer is negative making this unlikely.  BNP is normal and he does not appear fluid overloaded.  Chest x-ray does show right-sided pneumothorax as noted above.  He is hemodynamically stable.  Discussed case with thoracic surgery who agreed with pigtail catheter insertion which was performed at bedside (14 F) in the ER after consent obtained.  Patient tolerated procedure well.  Overall presentation spontaneous pneumothorax with possible COPD exacerbation given some improvement of wheezing and shortness of breath with DuoNeb.  No signs of pneumonia on x-ray.  Repeat chest x-ray shows improved aeration of the right lung.  He remained stable on 2 L nasal cannula with improvement of work of breathing and tachypnea.  We will admit to the hospital service for ongoing observation and formal thoracic surgery consultation.  Patient was admitted in stable condition.    Covid-19  Austyn Leach was evaluated during a global COVID-19 pandemic, which necessitated consideration that the patient might be at risk for infection with the SARS-CoV-2 virus that causes COVID-19.   Applicable protocols for evaluation were followed during the patient's care.   COVID-19 was considered as part of the patient's evaluation. The plan for testing is:  a test was obtained during this visit.    Diagnosis:    ICD-10-CM    1. Pneumothorax on right  J93.9 Symptomatic SARS-CoV-2 COVID-19 Virus (Coronavirus) by PCR     PSA tumor marker     Hepatic panel     Hepatic panel     Alcohol ethyl     Alcohol ethyl     CANCELED: Hepatic panel     CANCELED: Alcohol ethyl   2. Acute respiratory failure with hypoxia (H)  J96.01    3. COPD exacerbation (H)  J44.1      Scribe Disclosure:  Vanessa ROSENTHAL and Orla Severson, am serving as a scribe at 8:59 AM on 5/5/2021 to document services personally performed by Conor Horn MD based on my  observations and the provider's statements to me.      Conor Horn MD  05/05/21 1601       Conor Horn MD  05/05/21 1601

## 2021-05-05 NOTE — ED TRIAGE NOTES
ABCs intact. Pt smokes about 10 cigarettes a day. Pt c/o SOB with exertion for the past few days. Pt c/o smokers cough, no increase recently.

## 2021-05-05 NOTE — PHARMACY-ADMISSION MEDICATION HISTORY
Admission medication history interview status for this patient is complete. See [facility] admission navigator for allergy information, prior to admission medications and immunization status.     Medication history interview source(s):Patient  Medication history resources (including written lists, pill bottles, clinic record):None  Primary pharmacy:[facility]    Changes made to PTA medication list:  Added: tylenol, ibuprofen  Deleted: ----  Changed: ---    Actions taken by pharmacist (provider contacted, etc):None     Additional medication history information:pt states he has a tremor and is interested in medicaiton to treat/manage this tremor. Sticky note left for MD.    Medication reconciliation/reorder completed by provider prior to medication history? No, sticky note left for MD      For patients on insulin therapy:N    Prior to Admission medications    Medication Sig Last Dose Taking? Auth Provider   acetaminophen (TYLENOL) 325 MG tablet Take 975 mg by mouth every 6 hours as needed for mild pain  at no recent usage Yes Unknown, Entered By History   ibuprofen (ADVIL/MOTRIN) 200 MG tablet Take 400-600 mg by mouth every 6 hours as needed for mild pain Past Month at Unknown time Yes Unknown, Entered By History

## 2021-05-05 NOTE — ED NOTES
After receiving nebulizer treatment, pt's work of breathing decreased. Use of accessory muscles is less now than prior to nebulizer. Pt states that he feels it is easier to breath.

## 2021-05-05 NOTE — ED NOTES
"[facility]  ED Nurse Handoff Report    Austyn Leach is a 66 year old male   ED Chief complaint: Shortness of Breath  . ED Diagnosis:   Final diagnoses:   Pneumothorax on right   Acute respiratory failure with hypoxia (H)   Reactive airway disease with acute exacerbation, unspecified asthma severity, unspecified whether persistent     Allergies:   Allergies   Allergen Reactions     Bee Venom Shortness Of Breath and Swelling       Code Status: Full Code  Activity level - Baseline/Home:  Independent. Activity Level - Current:   Stand by Assist. Lift room needed: No. Bariatric: No   Needed: No   Isolation: No. Infection: Not Applicable.     Vital Signs:   Vitals:    05/05/21 0852 05/05/21 0930   BP: (!) 166/115 (!) 156/109   Pulse: 93 93   Resp: 24 24   Temp: 98.2  F (36.8  C)    TempSrc: Temporal    SpO2: 93% 95%   Weight: 86.2 kg (190 lb)    Height: 1.88 m (6' 2\")        Cardiac Rhythm:  ,      Pain level:    Patient confused: No. Patient Falls Risk: Yes.   Elimination Status: Has voided   Patient Report - Initial Complaint: shortness of breath. Focused Assessment:   Pt initially appeared short of breath, worse with exertion. Using accessory muscles.  Chest tube place (pigtail)  Bacitracin, 4x4 gauze, and tape used for dressing per MD instructions  Tests Performed:   XR Chest Port 1 View   Final Result   Abnormal   IMPRESSION: There is a large right pneumothorax, most prominent at the   right lung base and laterally. There is likely mild associated shift   of the mediastinum to the left. The left lung is clear. Pulmonary   vascularity is within normal limits.      [Critical Result: Large right pneumothorax]      Finding was identified on 5/5/2021 9:54 AM.       Dr. Horn was contacted by me on 5/5/2021 9:55 AM and verbalized   understanding of the critical result.       SARIKA POLLARD MD      XR Chest Port 1 View    (Results Pending)     Labs Ordered and Resulted from Time of ED Arrival " Up to the Time of Departure from the ED   BASIC METABOLIC PANEL - Abnormal; Notable for the following components:       Result Value    Glucose 119 (*)     All other components within normal limits   BLOOD GAS VENOUS - Abnormal; Notable for the following components:    PCO2 Venous 63 (*)     Bicarbonate Venous 34 (*)     All other components within normal limits   CBC WITH PLATELETS DIFFERENTIAL   D DIMER QUANTITATIVE   TROPONIN I   NT PROBNP INPATIENT   SARS-COV-2 (COVID-19) VIRUS RT-PCR   VITAL SIGNS   PULSE OXIMETRY NURSING   CARDIAC CONTINUOUS MONITORING   PERIPHERAL IV CATHETER     Treatments provided: monitoring  Family Comments: none present  OBS brochure/video discussed/provided to patient:  Yes  ED Medications:   Medications   ipratropium - albuterol 0.5 mg/2.5 mg/3 mL (DUONEB) neb solution 3 mL (3 mLs Nebulization Given 5/5/21 0935)   lidocaine 1% with EPINEPHrine 1:100,000 injection 1 mL (has no administration in time range)   lidocaine 1% with EPINEPHrine 1:100,000 1 %-1:[id number] injection (has no administration in time range)   LORazepam (ATIVAN) injection 1 mg (1 mg Intravenous Given 5/5/21 1040)     Drips infusing:  No  For the majority of the shift, the patient's behavior Green. Interventions performed were rounding.    Sepsis treatment initiated: No     Patient tested for COVID 19 prior to admission: YES    ED Nurse Name/Phone Number: Ana Paula Swift RN,   11:31 AM    RECEIVING UNIT ED HANDOFF REVIEW    Above ED Nurse Handoff Report was reviewed: Yes  Reviewed by: Iris Meng RN on May 5, 2021 at 12:26 PM

## 2021-05-06 ENCOUNTER — APPOINTMENT (OUTPATIENT)
Dept: GENERAL RADIOLOGY | Facility: CLINIC | Age: 66
DRG: 201 | End: 2021-05-06
Attending: PHYSICIAN ASSISTANT
Payer: MEDICARE

## 2021-05-06 PROBLEM — J44.1 COPD EXACERBATION (H): Status: ACTIVE | Noted: 2021-05-06

## 2021-05-06 LAB
ANION GAP SERPL CALCULATED.3IONS-SCNC: 2 MMOL/L (ref 3–14)
BUN SERPL-MCNC: 20 MG/DL (ref 7–30)
CALCIUM SERPL-MCNC: 8.4 MG/DL (ref 8.5–10.1)
CHLORIDE SERPL-SCNC: 106 MMOL/L (ref 94–109)
CO2 SERPL-SCNC: 30 MMOL/L (ref 20–32)
CREAT SERPL-MCNC: 0.73 MG/DL (ref 0.66–1.25)
ERYTHROCYTE [DISTWIDTH] IN BLOOD BY AUTOMATED COUNT: 13.3 % (ref 10–15)
GFR SERPL CREATININE-BSD FRML MDRD: >90 ML/MIN/{1.73_M2}
GLUCOSE SERPL-MCNC: 137 MG/DL (ref 70–99)
HCT VFR BLD AUTO: 39.2 % (ref 40–53)
HGB BLD-MCNC: 12.6 G/DL (ref 13.3–17.7)
MCH RBC QN AUTO: 29.8 PG (ref 26.5–33)
MCHC RBC AUTO-ENTMCNC: 32.1 G/DL (ref 31.5–36.5)
MCV RBC AUTO: 93 FL (ref 78–100)
PLATELET # BLD AUTO: 149 10E9/L (ref 150–450)
POTASSIUM SERPL-SCNC: 4.4 MMOL/L (ref 3.4–5.3)
RBC # BLD AUTO: 4.23 10E12/L (ref 4.4–5.9)
SODIUM SERPL-SCNC: 138 MMOL/L (ref 133–144)
WBC # BLD AUTO: 5.5 10E9/L (ref 4–11)

## 2021-05-06 PROCEDURE — 94640 AIRWAY INHALATION TREATMENT: CPT

## 2021-05-06 PROCEDURE — 85027 COMPLETE CBC AUTOMATED: CPT | Performed by: PHYSICIAN ASSISTANT

## 2021-05-06 PROCEDURE — 250N000009 HC RX 250: Performed by: PHYSICIAN ASSISTANT

## 2021-05-06 PROCEDURE — 120N000004 HC R&B MS OVERFLOW

## 2021-05-06 PROCEDURE — 71045 X-RAY EXAM CHEST 1 VIEW: CPT

## 2021-05-06 PROCEDURE — 94640 AIRWAY INHALATION TREATMENT: CPT | Mod: 76

## 2021-05-06 PROCEDURE — 250N000013 HC RX MED GY IP 250 OP 250 PS 637: Performed by: PHYSICIAN ASSISTANT

## 2021-05-06 PROCEDURE — 250N000012 HC RX MED GY IP 250 OP 636 PS 637: Performed by: PHYSICIAN ASSISTANT

## 2021-05-06 PROCEDURE — 80048 BASIC METABOLIC PNL TOTAL CA: CPT | Performed by: PHYSICIAN ASSISTANT

## 2021-05-06 PROCEDURE — 999N000157 HC STATISTIC RCP TIME EA 10 MIN

## 2021-05-06 PROCEDURE — 36415 COLL VENOUS BLD VENIPUNCTURE: CPT | Performed by: PHYSICIAN ASSISTANT

## 2021-05-06 PROCEDURE — G0378 HOSPITAL OBSERVATION PER HR: HCPCS

## 2021-05-06 PROCEDURE — 99221 1ST HOSP IP/OBS SF/LOW 40: CPT | Performed by: PHYSICIAN ASSISTANT

## 2021-05-06 RX ADMIN — ACETAMINOPHEN 975 MG: 325 TABLET, FILM COATED ORAL at 15:51

## 2021-05-06 RX ADMIN — OXYCODONE HYDROCHLORIDE 5 MG: 5 TABLET ORAL at 09:19

## 2021-05-06 RX ADMIN — OXYCODONE HYDROCHLORIDE 5 MG: 5 TABLET ORAL at 04:09

## 2021-05-06 RX ADMIN — Medication 1 MG: at 04:09

## 2021-05-06 RX ADMIN — ACETAMINOPHEN 975 MG: 325 TABLET, FILM COATED ORAL at 20:05

## 2021-05-06 RX ADMIN — OMEPRAZOLE 20 MG: 20 CAPSULE, DELAYED RELEASE ORAL at 09:18

## 2021-05-06 RX ADMIN — ACETAMINOPHEN 975 MG: 325 TABLET, FILM COATED ORAL at 09:19

## 2021-05-06 RX ADMIN — IPRATROPIUM BROMIDE AND ALBUTEROL SULFATE 3 ML: .5; 3 SOLUTION RESPIRATORY (INHALATION) at 16:03

## 2021-05-06 RX ADMIN — NICOTINE 1 PATCH: 14 PATCH, EXTENDED RELEASE TRANSDERMAL at 15:50

## 2021-05-06 RX ADMIN — DOCUSATE SODIUM 50 MG AND SENNOSIDES 8.6 MG 1 TABLET: 8.6; 5 TABLET, FILM COATED ORAL at 09:19

## 2021-05-06 RX ADMIN — IPRATROPIUM BROMIDE AND ALBUTEROL SULFATE 3 ML: .5; 3 SOLUTION RESPIRATORY (INHALATION) at 20:26

## 2021-05-06 RX ADMIN — IPRATROPIUM BROMIDE AND ALBUTEROL SULFATE 3 ML: .5; 3 SOLUTION RESPIRATORY (INHALATION) at 07:46

## 2021-05-06 RX ADMIN — PREDNISONE 40 MG: 20 TABLET ORAL at 09:18

## 2021-05-06 ASSESSMENT — ACTIVITIES OF DAILY LIVING (ADL)
FALL_HISTORY_WITHIN_LAST_SIX_MONTHS: NO
WHICH_OF_THE_ABOVE_FUNCTIONAL_RISKS_HAD_A_RECENT_ONSET_OR_CHANGE?: AMBULATION
DIFFICULTY_COMMUNICATING: NO
TOILETING_ISSUES: NO
DIFFICULTY_EATING/SWALLOWING: NO
DOING_ERRANDS_INDEPENDENTLY_DIFFICULTY: NO
WALKING_OR_CLIMBING_STAIRS: OTHER (SEE COMMENTS)
DRESSING/BATHING_DIFFICULTY: NO
CONCENTRATING,_REMEMBERING_OR_MAKING_DECISIONS_DIFFICULTY: NO
WALKING_OR_CLIMBING_STAIRS_DIFFICULTY: YES
WEAR_GLASSES_OR_BLIND: NO

## 2021-05-06 NOTE — PROGRESS NOTES
[facility]  Hospitalist Progress Note  Greer Tejada PA-C 05/06/2021    Reason for Stay (Diagnosis): right spontaneous PTX         Assessment and Plan:      Austyn Leach is a 66 year old male with a PMH significant for bipolar 1 disorder, GERD, intermittent asthma, schizophrenia, history of BPH/BARRIOS with concerns for potential prostate cancer due to elevated PSA and no longer seeing any medical providers or taking any of his medications who presents with shortness of breath over the last 4 days.  He comes in for increasing dyspnea on exertion.  Work-up in the emergency room reveals right sided pneumothorax.  A pigtail catheter was placed and repeat chest x-ray shows expansion of his pneumothorax.  He is admitted for further cares.     1.  Spontaneous right lung pneumothorax--cause is unclear but my suspicion is that he probably blew a bleb.  He has a reported history of asthma and with greater than 40-pack-year history of smoking he likely has some degree of COPD/emphysema.  He does describe a chronic cough which is at baseline but probably contributed to his pneumothorax.  --S/p pigtail cath with suction  --Doing better today with improved RR, no leak  --Appreciate Thoracic consult  --Plan to keep on suction for today and repeat CXR in am, with trial clamp     2.  Suspected COPD/emphysema  #chronic tobacco abuse >40 pack year hx  --patient has a longstanding history of tobacco abuse  --Suspect mild COPD exacerbation  --Decreased wheezing today, Cont DuoNebs 4 times/day with daily prednisone.  --Will discharge with steroid burst at discharge  --Also recommend discharge on duo nebs and Albuterol HFA  --Nicotine patch ordered  --We will need formal outpatient pulmonary function testing     3.  History of enlarged prostate, urinary retention with elevated PSA concerning for possible prostate cancer  --Previously had admission to the hospital for ANATOLY due to bladder outlet obstruction over a year ago    --have not seen Dr. Alaniz since last admission a year ago for B00.  He was recommended to continue Flomax but patient declined.  --Also had elevated PSA 8/20, and declined prostate biopsy at the time  --Currently no signs of urinary retention, but will have low threshold for as needed bladder scan, and as needed straight cath  --Flomax resumed this admission  --Repeat PSA today still positive but decreased to 4.22 from 7.9 which is reassuring   --Recommend follow up with Dr. Alaniz at discharge      4.  History of tremor  --Patient describes a history of tremor, looks like he was on propanolol sometime ago which he has discontinued himself  --Does have mild resting tremors  --Offered to resume propanolol for him but he prefer to hold off and follow-up with his regular doctor which is reasonable    5.  History of bipolar disorder/schizoaffective disorder  History of EtOH, substance abuse  --Sounds like he has a longstanding history of mental health issues  --He also had suicidal attempts over a year ago   --He was admitted to [facility] a year ago for suicidal ideation and depression  --PTA meds included; Seroquel 200 mg nightly, Risperdal 4 mg nightly  --Does not have any acute mental health compensation at this time  --I will order as needed Seroquel at night if needed  --Urine tox and alcohol were all negative as he claimed  --No signs of withdrawal     6.  History of GERD  --Resume PPI     Transition to inpatient status given continue need for chest tube and suction   FULL Code  DVT prophylaxis: SCDs  Dispo: possibly tomorrow          Interval History (Subjective):      Feels better today.  Breathing easier.  Anxious to get the tube out and wants to leave ASAP  However after discussion was willing to stay at least 1 more day to ensure lungs remain expanded  Somewhat difficult historian given his mental health history                  Physical Exam:      Last Vital Signs:  /65 (BP Location:  "Right arm)   Pulse 86   Temp 96.8  F (36  C) (Axillary)   Resp 18   Ht 1.88 m (6' 2\")   Wt 79.3 kg (174 lb 12.8 oz)   SpO2 92%   BMI 22.44 kg/m        Constitutional: Awake, alert, cooperative, no apparent distress, mild resting tremor   Respiratory:  Improved air flow, does still have some mild wheezing.  Breath sounds at all bases   Cardiovascular: Regular rate and rhythm, normal S1 and S2, and no murmur noted   Abdomen: Normal bowel sounds, soft, non-distended, non-tender   Skin: No rashes, no cyanosis, dry to touch   Neuro: Alert and oriented x3, no weakness, numbness, memory loss   Extremities: No edema, normal range of motion   Other(s):        All other systems: Negative          Medications:      All current medications were reviewed with changes reflected in problem list.         Data:      All new lab and imaging data was reviewed.   Labs:  No results for input(s): CULT in the last 168 hours.  Recent Labs   Lab 05/06/21  0551 05/05/21  0908    141   POTASSIUM 4.4 4.0   CHLORIDE 106 106   CO2 30 32   ANIONGAP 2* 3   * 119*   BUN 20 17   CR 0.73 0.79   GFRESTIMATED >90 >90   GFRESTBLACK >90 >90   MAL 8.4* 9.2     Recent Labs   Lab 05/06/21  0551 05/05/21  0908   WBC 5.5 6.2   HGB 12.6* 14.7   HCT 39.2* 46.2   MCV 93 93   * 166        Imaging:   Results for orders placed or performed during the hospital encounter of 05/05/21   XR Chest Port 1 View     Value    Radiologist flags Large right pneumothorax (AA)    Narrative    CHEST ONE VIEW PORTABLE   5/5/2021 9:36 AM     HISTORY:  Shortness of breath. Cough.    COMPARISON: None.      Impression    IMPRESSION: There is a large right pneumothorax, most prominent at the  right lung base and laterally. There is likely mild associated shift  of the mediastinum to the left. The left lung is clear. Pulmonary  vascularity is within normal limits.    [Critical Result: Large right pneumothorax]    Finding was identified on 5/5/2021 9:54 AM. "     Dr. Horn was contacted by me on 5/5/2021 9:55 AM and verbalized  understanding of the critical result.     SARIKA POLLARD MD   XR Chest Port 1 View    Narrative    CHEST ONE VIEW PORTABLE May 5, 2021 11:48 AM     HISTORY: Chest tube placement. Right pneumothorax.    COMPARISON: Chest radiograph performed earlier today at 9:28 AM.      Impression    IMPRESSION: There has been interval placement of a single pigtail  chest tube at the right lung base. Previously described right  pneumothorax has decreased in size, with a small residual pneumothorax  noted at the right lung base. The left lung remains clear. No shift of  the mediastinum is identified on the current exam.    SARIKA POLLARD MD   XR Chest Port 1 View    Narrative    XR CHEST PORT 1 VIEW   5/5/2021 3:02 PM    INDICATION: Shortness of breath.    COMPARISON: The previous study from 5/5/2021.    FINDINGS:   Compared to the previous study the right basilar chest tube has pulled  back although this appears to remain within the pleural space. There  has been significant reduction in the overall size of the pneumothorax  with a tiny apical pneumothorax persisting. Additionally a small  amount of atelectasis is present in the right lung base and there is a  new small amount of subcutaneous gas along the chest tube insertion  site. Otherwise no change.    VICENTE MCMULLEN MD   XR Chest Port 1 View    Narrative    CHEST ONE VIEW PORTABLE  May 6, 2021 8:02 AM     HISTORY: Followup pneumothorax.    COMPARISON: 5/5/2021.      Impression    IMPRESSION: Pigtail chest tube present at the right costophrenic  angle. No pneumothorax or pleural effusion on either side.    VICENTE JENSEN MD

## 2021-05-06 NOTE — PLAN OF CARE
Vitals: /67  Pulse 82  Temp 97.2  F  Resp 18  SpO2 92% on 2L      Neuro: A&Ox4. Flat affect at times.  Cardiac: WNL. Tachycardic with ambulation (up to 130's). HR recovers quickly.  Lungs: LS clear ex RLL - slight inspiratory wheeze. Chest tube in place set to -20mmHg suction w/ 27mL serosang output this shift. Dressing CDI. Abdominal muscle use w/ breathing noted. Patient reports breathing is significantly improved from yesterday. Requiring 2L supplemental O2 to keep sats >90%.   GI: WNL  : WNL  Pain: Oxy given x 1 PRN. Nikki tylenol given.   IV: SL  Labs/Imaging: CXR shows pneumo resolved.  Diet: Regular - tolerating well  Activity: Ax1  Misc: Tremors noted in BUE. Patient ambulated halls on RA. O2 saturation 86-90% with ambulation. 90-92% at rest on RA.   Plan: CXR in AM. Possible clamping trial tomorrow. Continue prednisone & nebs. Nicotine patch in place.

## 2021-05-06 NOTE — PLAN OF CARE
PRIMARY DIAGNOSIS:  RIGHT PNEUMOTHORAX  OUTPATIENT/OBSERVATION GOALS TO BE MET BEFORE DISCHARGE:  1. Vital signs stable: Yes    2. Dyspnea improved and O2 sats >88% at RA or at prior home O2 therapy level: Yes      SpO2: 92 %, O2 Device: Nasal cannula    3. Short term supplemental O2 needed for use with activity at home: Yes    4. Tolerating adequate PO diet and medications: Yes    5. Return to near baseline physical activity: No    Discharge Planner Nurse   Safe discharge environment identified: Yes  Barriers to discharge: Yes       Entered by: Sujata Yuan 05/06/2021 6:21 AM     Vitals are Temp: 97.3  F (36.3  C) Temp src: Oral BP: 126/70 Pulse: 75   Resp: 20 SpO2: 94 %.  Patient is Alert and Oriented x4. Chest tube in place set to continuous suction at - 20. Small amount of serosanguinous out observed in chest tube. No output drained into collection chamber. Pt currently on 2L O2 per NC. Chest rising equally, no use of accessory muscles. They are 1 Assist with Gait Belt and Walker.  Pt is a Regular diet.  Patient is Saline locked. Will continue to monitor and provide cares.      Please review provider order for any additional goals.   Nurse to notify provider when observation goals have been met and patient is ready for discharge.

## 2021-05-06 NOTE — UTILIZATION REVIEW
Admission Status; Secondary Review Determination       Under the authority of the Utilization Management Committee, the utilization review process indicated a secondary review on the above patient. The review outcome is based on review of the medical records, discussions with staff, and applying clinical experience noted on the date of the review.     (x) Inpatient Status Appropriate - This patient's medical care is consistent with medical management for inpatient care and reasonable inpatient medical practice.     RATIONALE FOR DETERMINATION   66 year old male with a PMH significant for bipolar 1 disorder, GERD, intermittent asthma, schizophrenia, history of BPH/BARRIOS with concerns for potential prostate cancer due to elevated PSA and no longer seeing any medical providers or taking any of his medications who presents with shortness of breath over the last 4 days.  He comes in for increasing dyspnea on exertion.Work-up in the emergency room reveals right sided pneumothorax.   Patient has a chest tube on continuous suction, discussed with the physician assistant Greer Tejada currently is on observation thoracic surgery is recommending ongoing suctioning and chest you due to patient COPD and multiple comorbidities he is a high risk  The expected length of stay at the time of admission was more than 2 nights because of the severity of illness, intensity of service provided, and risk for adverse outcome. Inpatient admission is appropriate.     This document was produced using voice recognition software       The information on this document is developed by the utilization review team in order for the business office to ensure compliance. This only denotes the appropriateness of proper admission status and does not reflect the quality of care rendered.   The definitions of Inpatient Status and Observation Status used in making the determination above are those provided in the CMS Coverage Manual, Chapter 1 and Chapter  6, section 70.4.   Sincerely,   SULMA BELLO MD   System Medical Director   Utilization Management   [facility].

## 2021-05-06 NOTE — PLAN OF CARE
PRIMARY DIAGNOSIS:  RIGHT PNEUMOTHORAX  OUTPATIENT/OBSERVATION GOALS TO BE MET BEFORE DISCHARGE:  1. Vital signs stable: Yes    2. Dyspnea improved and O2 sats >88% at RA or at prior home O2 therapy level: Yes      SpO2: 92 %, O2 Device: Nasal cannula    3. Short term supplemental O2 needed for use with activity at home: Yes    4. Tolerating adequate PO diet and medications: Yes    5. Return to near baseline physical activity: No    Discharge Planner Nurse   Safe discharge environment identified: Yes  Barriers to discharge: Yes       Entered by: Sujata Yuan 05/05/2021 9:16 PM     Vitals are Temp: 98.8  F (37.1  C) Temp src: Oral BP: 117/67 Pulse: 83   Resp: 22 SpO2: 92 %.  Patient is Alert and Oriented x4. Chest tube in place set to continuous suction at - 20. Small amount of serosanguinous out observed in chest tube. Pt currently on 2L O2 per NC, denies sob. Chest rising equally, no use of accessory muscles. They are 1 Assist with Gait Belt and Walker.  Pt is a Regular diet.  They are complaining of 3/10 pain around chest tube insertion site.  Tylenol and Oxycodone given for pain.  Medicatons relieved pain.  Patient is Saline locked. Will continue to monitor and provide cares.      Please review provider order for any additional goals.   Nurse to notify provider when observation goals have been met and patient is ready for discharge.

## 2021-05-06 NOTE — CONSULTS
THORACIC SURGERY CONSULT NOTE    Consult Reason: Right spontaneous pneumothorax    HPI: Austyn Leach is a 66 year old man current smoker with a h/o asthma who has been experiencing SOB for over a year, but worse in the last week. He was unable to walk across his room due to SOB and presented to the ED on 5/5/21. Imaging revealed new right PTX and chest tube was placed in the ED. Heimlich was placed, but changed to wall suction when transferred to the floor. His breathing is better and he has not had any CP with these sx. No recent fever, chills, new cough, hemoptysis, v/d.     A/P: Patient is a 66 year old male with right spontaneous pneumothorax s/p chest tube  -CXR 5/5 with right ptx  -CXR 5/6 with resolved ptx  -keep tube to suction, no leak today  -ambulate  -IS  -pain control  -repeat CXR tomorrow morning, if stable, then plan for clamp trial on water seal, likely tube out tomorrow afternoon    Patient and plan discussed with attending.    Thank you for the opportunity to participate in the care of this patient.    PMH:  Past Medical History:   Diagnosis Date     Bipolar 1 disorder (H)      GERD (gastroesophageal reflux disease)      Mild intermittent asthma     uses primatent     Schizophrenia (H)      PSH:  Past Surgical History:   Procedure Laterality Date     NO HISTORY OF SURGERY       TESTICLE SURGERY       VASECTOMY       FH:  family history includes Breast Cancer in his mother; C.A.D. in his father; Hypertension in his father.    SH:  +Tobacco use, 55 pack year hx  No EtOH use  No Illicit drug use    Allergies:  Allergies   Allergen Reactions     Bee Venom Shortness Of Breath and Swelling       Home Meds:  Medications Prior to Admission   Medication Sig Dispense Refill Last Dose     acetaminophen (TYLENOL) 325 MG tablet Take 975 mg by mouth every 6 hours as needed for mild pain    at no recent usage     ibuprofen (ADVIL/MOTRIN) 200 MG tablet Take 400-600 mg by mouth every 6 hours as needed for mild  pain   Past Month at Unknown time       ROS: SOB better, no CP, fever, chills, abd pain, n/v/d, bleeding.    Physical Exam:  Temp:  [96.8  F (36  C)-98.8  F (37.1  C)] 97.9  F (36.6  C)  Pulse:  [75-93] 93  Resp:  [17-25] 17  BP: (114-181)/() 129/65  SpO2:  [90 %-98 %] 90 %    Gen: NAD, resting comfortably in bed  Lungs: Non-labored breathing  Abd: Soft, NT, ND  Ext: No pitting edema  Neuro: Alert    Labs:  CBC  Recent Labs   Lab 05/06/21  0551 05/05/21  0908   WBC 5.5 6.2   HGB 12.6* 14.7   * 166     BMP  Recent Labs   Lab 05/06/21  0551 05/05/21  0908    141   POTASSIUM 4.4 4.0   CHLORIDE 106 106   CO2 30 32   BUN 20 17   CR 0.73 0.79   * 119*     LFT  Recent Labs   Lab 05/05/21  0908   AST 16   ALT 20   ALKPHOS 64   BILITOTAL 0.5   ALBUMIN 4.0     Imaging:  CXR 5/6 with no ptx          Katie Kaur PA-C

## 2021-05-06 NOTE — PLAN OF CARE
PRIMARY DIAGNOSIS:  RIGHT PNEUMOTHORAX  OUTPATIENT/OBSERVATION GOALS TO BE MET BEFORE DISCHARGE:  1. Vital signs stable: Yes    2. Dyspnea improved and O2 sats >88% at RA or at prior home O2 therapy level: Yes      SpO2: 92 %, O2 Device: Nasal cannula    3. Short term supplemental O2 needed for use with activity at home: Yes    4. Tolerating adequate PO diet and medications: Yes    5. Return to near baseline physical activity: No    Discharge Planner Nurse   Safe discharge environment identified: Yes  Barriers to discharge: Yes       Entered by: Sujata Yuan 05/06/2021 1:58 AM     Vitals are Temp: 97.3  F (36.3  C) Temp src: Oral BP: 114/63 Pulse: 77   Resp: 20 SpO2: 93 %.  Patient is Alert and Oriented x4. Chest tube in place set to continuous suction at - 20. Small amount of serosanguinous out observed in chest tube. Pt currently on 2L O2 per NC. Chest rising equally, no use of accessory muscles. They are 1 Assist with Gait Belt and Walker.  Pt is a Regular diet.  Patient is Saline locked. Will continue to monitor and provide cares.      Please review provider order for any additional goals.   Nurse to notify provider when observation goals have been met and patient is ready for discharge.

## 2021-05-06 NOTE — PLAN OF CARE
PRIMARY DIAGNOSIS: PNEUMOTHORAX  OUTPATIENT/OBSERVATION GOALS TO BE MET BEFORE DISCHARGE:  Dyspnea improved and O2 sats >88% on RA or back to baseline O2 levels: No   SpO2: 97 %, O2 Device: Nasal cannula    Vitals signs normal or return to baseline:  Yes, ex O2    Short term supplemental O2 needed with activity at home:  Unk    Tolerate oral intake to maintain hydration: Yes    Return to near baseline physical activity:  Unclear    Discharge Planner Nurse   Safe discharge environment identified: Yes  Barriers to discharge: Yes - chest tube, thoracic surgery consult       Entered by: Iris Meng 05/05/2021      Please review provider order for any additional goals.   Nurse to notify provider when observation goals have been met and patient is ready for discharge.

## 2021-05-07 ENCOUNTER — APPOINTMENT (OUTPATIENT)
Dept: GENERAL RADIOLOGY | Facility: CLINIC | Age: 66
DRG: 201 | End: 2021-05-07
Attending: PHYSICIAN ASSISTANT
Payer: MEDICARE

## 2021-05-07 VITALS
TEMPERATURE: 98.1 F | BODY MASS INDEX: 22.43 KG/M2 | DIASTOLIC BLOOD PRESSURE: 75 MMHG | HEART RATE: 102 BPM | HEIGHT: 74 IN | RESPIRATION RATE: 20 BRPM | WEIGHT: 174.8 LBS | SYSTOLIC BLOOD PRESSURE: 133 MMHG | OXYGEN SATURATION: 90 %

## 2021-05-07 PROCEDURE — 71046 X-RAY EXAM CHEST 2 VIEWS: CPT

## 2021-05-07 PROCEDURE — 71046 X-RAY EXAM CHEST 2 VIEWS: CPT | Mod: 76

## 2021-05-07 PROCEDURE — 94640 AIRWAY INHALATION TREATMENT: CPT

## 2021-05-07 PROCEDURE — 250N000012 HC RX MED GY IP 250 OP 636 PS 637: Performed by: PHYSICIAN ASSISTANT

## 2021-05-07 PROCEDURE — 999N000157 HC STATISTIC RCP TIME EA 10 MIN

## 2021-05-07 PROCEDURE — 250N000009 HC RX 250: Performed by: PHYSICIAN ASSISTANT

## 2021-05-07 PROCEDURE — 250N000013 HC RX MED GY IP 250 OP 250 PS 637: Performed by: PHYSICIAN ASSISTANT

## 2021-05-07 PROCEDURE — 99239 HOSP IP/OBS DSCHRG MGMT >30: CPT | Performed by: PHYSICIAN ASSISTANT

## 2021-05-07 PROCEDURE — 99231 SBSQ HOSP IP/OBS SF/LOW 25: CPT | Performed by: PHYSICIAN ASSISTANT

## 2021-05-07 PROCEDURE — 94640 AIRWAY INHALATION TREATMENT: CPT | Mod: 76

## 2021-05-07 RX ORDER — ACETAMINOPHEN 325 MG/1
975 TABLET ORAL 3 TIMES DAILY PRN
COMMUNITY
Start: 2021-05-07 | End: 2022-08-23

## 2021-05-07 RX ORDER — ALBUTEROL SULFATE 90 UG/1
1-2 AEROSOL, METERED RESPIRATORY (INHALATION) EVERY 6 HOURS PRN
Qty: 6.7 G | Refills: 0 | Status: SHIPPED | OUTPATIENT
Start: 2021-05-07 | End: 2022-01-13

## 2021-05-07 RX ORDER — PREDNISONE 20 MG/1
40 TABLET ORAL DAILY
Qty: 10 TABLET | Refills: 0 | Status: SHIPPED | OUTPATIENT
Start: 2021-05-07 | End: 2021-05-12

## 2021-05-07 RX ADMIN — IPRATROPIUM BROMIDE AND ALBUTEROL SULFATE 3 ML: .5; 3 SOLUTION RESPIRATORY (INHALATION) at 11:25

## 2021-05-07 RX ADMIN — ACETAMINOPHEN 975 MG: 325 TABLET, FILM COATED ORAL at 10:12

## 2021-05-07 RX ADMIN — OMEPRAZOLE 20 MG: 20 CAPSULE, DELAYED RELEASE ORAL at 06:37

## 2021-05-07 RX ADMIN — IPRATROPIUM BROMIDE AND ALBUTEROL SULFATE 3 ML: .5; 3 SOLUTION RESPIRATORY (INHALATION) at 15:14

## 2021-05-07 RX ADMIN — NICOTINE 1 PATCH: 14 PATCH, EXTENDED RELEASE TRANSDERMAL at 16:21

## 2021-05-07 RX ADMIN — PREDNISONE 40 MG: 20 TABLET ORAL at 10:12

## 2021-05-07 RX ADMIN — IPRATROPIUM BROMIDE AND ALBUTEROL SULFATE 3 ML: .5; 3 SOLUTION RESPIRATORY (INHALATION) at 07:17

## 2021-05-07 NOTE — PROGRESS NOTES
Care from 1500-discharge    Patient alert and oriented x4. Vitals are Temp: 98.1  F (36.7  C) Temp src: Oral BP: 133/75 Pulse: 102   Resp: 20 SpO2: 91 % RA. Denies shortness of breath and chest pain. Denies nausea. Tolerating diet. Chest tube removed and repeat CXR completed.

## 2021-05-07 NOTE — PROGRESS NOTES
THORACIC & FOREGUT SURGERY    S:  No overnight events.  Pt seen at bedside resting comfortably.  Feels good today.      O:  Vitals:    05/07/21 0730 05/07/21 1125 05/07/21 1148 05/07/21 1514   BP: 130/67  126/77    BP Location: Right arm  Right arm    Pulse: 84  101    Resp: 18 20 18 20   Temp: 97.3  F (36.3  C)  98.2  F (36.8  C)    TempSrc: Oral  Oral    SpO2: 91% 90% 90% 91%   Weight:       Height:           A&Ox3, NAD  Breathing non-labored  Soft, NDNT      A/P: Patient is a 66 year old male with right spontaneous pneumothorax s/p chest tube  -Clamp trial x 4 hours today with stable CXR and no leak  -Chest tube removed  -Post pull CXR in 1 hour, if stable, can be discharged home  -ambulate  -IS  -pain control  -Instructed to quit smoking  -Follow up with Dr Villagomez in 2 weeks, CXR and we will coordinate      Katie Kaur PA-C  Thoracic Surgery

## 2021-05-07 NOTE — PLAN OF CARE
Temp: 98.2  F (36.8  C) Temp src: Oral BP: 126/77 Pulse: 101   Resp: 20 SpO2: 91 % O2 Device: Nasal cannula Oxygen Delivery: 2 LPM  [place]: A&Ox4, forgetful at times  LS: diminished, RLL exp wheezing, right sided chest tube clamped - tolerating, dressing CDI  Cardiac: WNL  GI:WNL  : WNL  Skin: scattered bruising and scabbing  Activity: up with SBA  Diet: regular  Pain: denies pain  Plan: remove chest tube, then repeat cxr, possible discharge later this evening.

## 2021-05-07 NOTE — DISCHARGE INSTRUCTIONS
THORACIC SURGERY DISCHARGE INSTRUCTIONS      DIET: Regular diet as tolerated    If your plans upon discharge include prolonged periods of sitting (i.e a lengthy car or plane ride), it is highly beneficial to get up and walk at least once per hour to help prevent swelling and blood clots.     You may remove chest tube dressing 48 hours after tube removal and bandage the site at your own discretion thereafter.  Small amounts of leakage are normal for 2-3 days after removal.  Feel free to call with questions.    You may get incision wet 2 days after operation. Do not submerge, soak, or scrub incision or swim until seen in follow-up.    Take incentive spirometer home for continued frequent use    Activity as tolerated, no strenous activity until seen in follow-up, no lifting greater than 20 pounds for the next 2 weeks.    Stay hydrated. Take over the counter fiber (metamucil or benefiber) and stool softeners (Miralax, docusate or senna) if becoming constipated.     Call for fever greater than 101.5, chills, increased size of incision, red skin around incision, vision changes, muscle strength changes, sensation changes, shortness of breath, or other concerns.    No driving while taking narcotic pain medication.    Transition to ibuprofen or tylenol/acetaminophen for pain control. Do not take tylenol/acetaminophen and acetaminophen containing narcotic (e.g., percocet or vicodin) at the same time. If you have known ulcer problems, or kidney trouble (elevated creatinine) do not take the ibuprofen.    In emergencies, call 911

## 2021-05-07 NOTE — PLAN OF CARE
Pt on 1L O2. Denies pain. Up with SBA. Bladder scanned at 4am for 288. Chest tube is at -20 mmHg suction. Plan to repeat chest x-ray today and possible clamp trial.

## 2021-05-07 NOTE — PLAN OF CARE
[id number]    VSS this shift on 2 LPM NC. Denies pain but agreeable to scheduled tylenol. Takes PO meds and diet without difficulty, PIV SL. Denies urge to void this shift, encouraged to call when ready. Chest tube to R chest in place to -20 mmHg suction, scant serosanguinous output. Refused hygiene assistance. Bed alarms in use, pt not attempting to exit bed. Alert and oriented, able to make needs known. Continue to monitor.

## 2021-05-12 ENCOUNTER — CARE COORDINATION (OUTPATIENT)
Dept: FAMILY MEDICINE | Facility: CLINIC | Age: 66
End: 2021-05-12

## 2021-05-12 NOTE — PROGRESS NOTES
Care Coordination Initial Assessment    Patient was seen at [facility] for a pneumothorax of right lung along with GERD.    PCP: Kristin Zhong    Referral Source:  ED/IP List    Plan:   I called the patient and left a voicemail for him to call the clinic back to see how he is doing along with getting his post hospital follow up visit scheduled. Per the discharge instructions, patient is to follow up with PCP within 7-10 days of discharge.  Will wait for the patient to call back.

## 2021-05-20 NOTE — PROGRESS NOTES
Austyn E Simon called the clinic support line with the following:    States that he is feeling fine. Feels that he does not need an x-ray. Can come in for termors and maybe listen to his lungs, but no more x-rays.    He will call back to schedule

## 2021-07-25 ENCOUNTER — PREP FOR PROCEDURE (OUTPATIENT)
Dept: SURGERY | Facility: CLINIC | Age: 66
End: 2021-07-25

## 2021-07-25 ENCOUNTER — APPOINTMENT (OUTPATIENT)
Dept: GENERAL RADIOLOGY | Facility: CLINIC | Age: 66
DRG: 163 | End: 2021-07-25
Attending: EMERGENCY MEDICINE
Payer: MEDICARE

## 2021-07-25 ENCOUNTER — APPOINTMENT (OUTPATIENT)
Dept: CT IMAGING | Facility: CLINIC | Age: 66
DRG: 163 | End: 2021-07-25
Attending: EMERGENCY MEDICINE
Payer: MEDICARE

## 2021-07-25 ENCOUNTER — HOSPITAL ENCOUNTER (INPATIENT)
Facility: CLINIC | Age: 66
LOS: 7 days | Discharge: HOME | DRG: 163 | End: 2021-08-01
Attending: EMERGENCY MEDICINE | Admitting: INTERNAL MEDICINE
Payer: MEDICARE

## 2021-07-25 DIAGNOSIS — E87.6 HYPOKALEMIA: ICD-10-CM

## 2021-07-25 DIAGNOSIS — J44.9 CHRONIC OBSTRUCTIVE PULMONARY DISEASE, UNSPECIFIED COPD TYPE (H): ICD-10-CM

## 2021-07-25 DIAGNOSIS — J93.12 SECONDARY SPONTANEOUS PNEUMOTHORAX: Primary | ICD-10-CM

## 2021-07-25 DIAGNOSIS — J93.12 SECONDARY SPONTANEOUS PNEUMOTHORAX: ICD-10-CM

## 2021-07-25 DIAGNOSIS — J93.83 SPONTANEOUS PNEUMOTHORAX: ICD-10-CM

## 2021-07-25 DIAGNOSIS — J93.9 PNEUMOTHORAX ON RIGHT: Primary | ICD-10-CM

## 2021-07-25 DIAGNOSIS — Z72.0 TOBACCO USE: ICD-10-CM

## 2021-07-25 LAB
ANION GAP SERPL CALCULATED.3IONS-SCNC: 3 MMOL/L (ref 3–14)
BASOPHILS # BLD AUTO: 0 10E3/UL (ref 0–0.2)
BASOPHILS NFR BLD AUTO: 0 %
BUN SERPL-MCNC: 22 MG/DL (ref 7–30)
CALCIUM SERPL-MCNC: 8.7 MG/DL (ref 8.5–10.1)
CHLORIDE BLD-SCNC: 112 MMOL/L (ref 94–109)
CO2 SERPL-SCNC: 30 MMOL/L (ref 20–32)
CREAT SERPL-MCNC: 0.87 MG/DL (ref 0.66–1.25)
EOSINOPHIL # BLD AUTO: 0.1 10E3/UL (ref 0–0.7)
EOSINOPHIL NFR BLD AUTO: 2 %
ERYTHROCYTE [DISTWIDTH] IN BLOOD BY AUTOMATED COUNT: 13.5 % (ref 10–15)
GFR SERPL CREATININE-BSD FRML MDRD: 90 ML/MIN/1.73M2
GLUCOSE BLD-MCNC: 109 MG/DL (ref 70–99)
HCO3 BLDV-SCNC: 30 MMOL/L (ref 21–28)
HCT VFR BLD AUTO: 40.3 % (ref 40–53)
HGB BLD-MCNC: 13.2 G/DL (ref 13.3–17.7)
HOLD SPECIMEN: NORMAL
IMM GRANULOCYTES # BLD: 0 10E3/UL
IMM GRANULOCYTES NFR BLD: 0 %
LACTATE BLD-SCNC: 0.8 MMOL/L
LYMPHOCYTES # BLD AUTO: 1.7 10E3/UL (ref 0.8–5.3)
LYMPHOCYTES NFR BLD AUTO: 27 %
MCH RBC QN AUTO: 30.3 PG (ref 26.5–33)
MCHC RBC AUTO-ENTMCNC: 32.8 G/DL (ref 31.5–36.5)
MCV RBC AUTO: 92 FL (ref 78–100)
MONOCYTES # BLD AUTO: 0.5 10E3/UL (ref 0–1.3)
MONOCYTES NFR BLD AUTO: 9 %
NEUTROPHILS # BLD AUTO: 3.9 10E3/UL (ref 1.6–8.3)
NEUTROPHILS NFR BLD AUTO: 62 %
NRBC # BLD AUTO: 0 10E3/UL
NRBC BLD AUTO-RTO: 0 /100
PCO2 BLDV: 58 MM HG (ref 40–50)
PH BLDV: 7.32 [PH] (ref 7.32–7.43)
PLATELET # BLD AUTO: 190 10E3/UL (ref 150–450)
PO2 BLDV: 45 MM HG (ref 25–47)
POTASSIUM BLD-SCNC: 3.2 MMOL/L (ref 3.4–5.3)
POTASSIUM BLD-SCNC: 4.1 MMOL/L (ref 3.4–5.3)
PROCALCITONIN SERPL-MCNC: <0.05 NG/ML
RADIOLOGIST FLAGS: ABNORMAL
RBC # BLD AUTO: 4.36 10E6/UL (ref 4.4–5.9)
SAO2 % BLDV: 76 % (ref 94–100)
SARS-COV-2 RNA RESP QL NAA+PROBE: NEGATIVE
SODIUM SERPL-SCNC: 145 MMOL/L (ref 133–144)
TROPONIN I SERPL-MCNC: <0.015 UG/L (ref 0–0.04)
WBC # BLD AUTO: 6.3 10E3/UL (ref 4–11)

## 2021-07-25 PROCEDURE — 71045 X-RAY EXAM CHEST 1 VIEW: CPT

## 2021-07-25 PROCEDURE — 94640 AIRWAY INHALATION TREATMENT: CPT

## 2021-07-25 PROCEDURE — 84145 PROCALCITONIN (PCT): CPT | Performed by: EMERGENCY MEDICINE

## 2021-07-25 PROCEDURE — 93005 ELECTROCARDIOGRAM TRACING: CPT

## 2021-07-25 PROCEDURE — C9803 HOPD COVID-19 SPEC COLLECT: HCPCS

## 2021-07-25 PROCEDURE — 250N000011 HC RX IP 250 OP 636: Performed by: EMERGENCY MEDICINE

## 2021-07-25 PROCEDURE — 250N000009 HC RX 250: Performed by: EMERGENCY MEDICINE

## 2021-07-25 PROCEDURE — 250N000009 HC RX 250

## 2021-07-25 PROCEDURE — 999N000065 XR CHEST PORT 1 VIEW

## 2021-07-25 PROCEDURE — 82803 BLOOD GASES ANY COMBINATION: CPT

## 2021-07-25 PROCEDURE — 36415 COLL VENOUS BLD VENIPUNCTURE: CPT | Performed by: EMERGENCY MEDICINE

## 2021-07-25 PROCEDURE — 99223 1ST HOSP IP/OBS HIGH 75: CPT | Mod: AI | Performed by: INTERNAL MEDICINE

## 2021-07-25 PROCEDURE — 32556 INSERT CATH PLEURA W/O IMAGE: CPT

## 2021-07-25 PROCEDURE — 120N000013 HC R&B IMCU

## 2021-07-25 PROCEDURE — 0W9930Z DRAINAGE OF RIGHT PLEURAL CAVITY WITH DRAINAGE DEVICE, PERCUTANEOUS APPROACH: ICD-10-PCS | Performed by: EMERGENCY MEDICINE

## 2021-07-25 PROCEDURE — 71250 CT THORAX DX C-: CPT | Mod: MG

## 2021-07-25 PROCEDURE — 120N000001 HC R&B MED SURG/OB

## 2021-07-25 PROCEDURE — 999N000157 HC STATISTIC RCP TIME EA 10 MIN

## 2021-07-25 PROCEDURE — 84132 ASSAY OF SERUM POTASSIUM: CPT | Performed by: INTERNAL MEDICINE

## 2021-07-25 PROCEDURE — 96374 THER/PROPH/DIAG INJ IV PUSH: CPT

## 2021-07-25 PROCEDURE — 85025 COMPLETE CBC W/AUTO DIFF WBC: CPT | Performed by: EMERGENCY MEDICINE

## 2021-07-25 PROCEDURE — 80048 BASIC METABOLIC PNL TOTAL CA: CPT | Performed by: EMERGENCY MEDICINE

## 2021-07-25 PROCEDURE — 84484 ASSAY OF TROPONIN QUANT: CPT | Performed by: EMERGENCY MEDICINE

## 2021-07-25 PROCEDURE — 250N000013 HC RX MED GY IP 250 OP 250 PS 637: Performed by: INTERNAL MEDICINE

## 2021-07-25 PROCEDURE — 999N000105 HC STATISTIC NO DOCUMENTATION TO SUPPORT CHARGE

## 2021-07-25 PROCEDURE — 36415 COLL VENOUS BLD VENIPUNCTURE: CPT | Performed by: INTERNAL MEDICINE

## 2021-07-25 PROCEDURE — 99285 EMERGENCY DEPT VISIT HI MDM: CPT | Mod: 25

## 2021-07-25 PROCEDURE — 87635 SARS-COV-2 COVID-19 AMP PRB: CPT | Performed by: EMERGENCY MEDICINE

## 2021-07-25 PROCEDURE — 250N000013 HC RX MED GY IP 250 OP 250 PS 637: Performed by: EMERGENCY MEDICINE

## 2021-07-25 PROCEDURE — 94640 AIRWAY INHALATION TREATMENT: CPT | Mod: 76

## 2021-07-25 RX ORDER — IPRATROPIUM BROMIDE AND ALBUTEROL SULFATE 2.5; .5 MG/3ML; MG/3ML
SOLUTION RESPIRATORY (INHALATION)
Status: DISCONTINUED
Start: 2021-07-25 | End: 2021-07-25 | Stop reason: HOSPADM

## 2021-07-25 RX ORDER — ACETAMINOPHEN 650 MG/1
650 SUPPOSITORY RECTAL EVERY 6 HOURS PRN
Status: DISCONTINUED | OUTPATIENT
Start: 2021-07-25 | End: 2021-08-01 | Stop reason: HOSPADM

## 2021-07-25 RX ORDER — ONDANSETRON 2 MG/ML
4 INJECTION INTRAMUSCULAR; INTRAVENOUS EVERY 6 HOURS PRN
Status: DISCONTINUED | OUTPATIENT
Start: 2021-07-25 | End: 2021-07-29

## 2021-07-25 RX ORDER — NALOXONE HYDROCHLORIDE 0.4 MG/ML
0.2 INJECTION, SOLUTION INTRAMUSCULAR; INTRAVENOUS; SUBCUTANEOUS
Status: DISCONTINUED | OUTPATIENT
Start: 2021-07-25 | End: 2021-08-01 | Stop reason: HOSPADM

## 2021-07-25 RX ORDER — HYDROMORPHONE HYDROCHLORIDE 1 MG/ML
0.3 INJECTION, SOLUTION INTRAMUSCULAR; INTRAVENOUS; SUBCUTANEOUS
Status: DISCONTINUED | OUTPATIENT
Start: 2021-07-25 | End: 2021-07-29

## 2021-07-25 RX ORDER — AMOXICILLIN 250 MG
1 CAPSULE ORAL 2 TIMES DAILY PRN
Status: DISCONTINUED | OUTPATIENT
Start: 2021-07-25 | End: 2021-08-01 | Stop reason: HOSPADM

## 2021-07-25 RX ORDER — OXYCODONE HYDROCHLORIDE 5 MG/1
10 TABLET ORAL EVERY 4 HOURS PRN
Status: DISCONTINUED | OUTPATIENT
Start: 2021-07-25 | End: 2021-07-29

## 2021-07-25 RX ORDER — OXYCODONE HYDROCHLORIDE 5 MG/1
5 TABLET ORAL EVERY 4 HOURS PRN
Status: DISCONTINUED | OUTPATIENT
Start: 2021-07-25 | End: 2021-07-25

## 2021-07-25 RX ORDER — NALOXONE HYDROCHLORIDE 0.4 MG/ML
0.4 INJECTION, SOLUTION INTRAMUSCULAR; INTRAVENOUS; SUBCUTANEOUS
Status: DISCONTINUED | OUTPATIENT
Start: 2021-07-25 | End: 2021-08-01 | Stop reason: HOSPADM

## 2021-07-25 RX ORDER — LIDOCAINE 40 MG/G
CREAM TOPICAL
Status: DISCONTINUED | OUTPATIENT
Start: 2021-07-25 | End: 2021-07-26

## 2021-07-25 RX ORDER — OXYCODONE AND ACETAMINOPHEN 5; 325 MG/1; MG/1
1-2 TABLET ORAL ONCE
Status: COMPLETED | OUTPATIENT
Start: 2021-07-25 | End: 2021-07-25

## 2021-07-25 RX ORDER — ONDANSETRON 4 MG/1
4 TABLET, ORALLY DISINTEGRATING ORAL EVERY 6 HOURS PRN
Status: DISCONTINUED | OUTPATIENT
Start: 2021-07-25 | End: 2021-07-29

## 2021-07-25 RX ORDER — NICOTINE 21 MG/24HR
1 PATCH, TRANSDERMAL 24 HOURS TRANSDERMAL DAILY
Status: DISCONTINUED | OUTPATIENT
Start: 2021-07-25 | End: 2021-08-01 | Stop reason: HOSPADM

## 2021-07-25 RX ORDER — AMOXICILLIN 250 MG
2 CAPSULE ORAL 2 TIMES DAILY PRN
Status: DISCONTINUED | OUTPATIENT
Start: 2021-07-25 | End: 2021-08-01 | Stop reason: HOSPADM

## 2021-07-25 RX ORDER — ACETAMINOPHEN 325 MG/1
650 TABLET ORAL EVERY 6 HOURS PRN
Status: DISCONTINUED | OUTPATIENT
Start: 2021-07-25 | End: 2021-07-29

## 2021-07-25 RX ORDER — IBUPROFEN 200 MG
200 TABLET ORAL EVERY 6 HOURS PRN
Status: DISCONTINUED | OUTPATIENT
Start: 2021-07-25 | End: 2021-07-29

## 2021-07-25 RX ORDER — IPRATROPIUM BROMIDE AND ALBUTEROL SULFATE 2.5; .5 MG/3ML; MG/3ML
6 SOLUTION RESPIRATORY (INHALATION) ONCE
Status: COMPLETED | OUTPATIENT
Start: 2021-07-25 | End: 2021-07-25

## 2021-07-25 RX ORDER — METHYLPREDNISOLONE SODIUM SUCCINATE 125 MG/2ML
125 INJECTION, POWDER, LYOPHILIZED, FOR SOLUTION INTRAMUSCULAR; INTRAVENOUS ONCE
Status: COMPLETED | OUTPATIENT
Start: 2021-07-25 | End: 2021-07-25

## 2021-07-25 RX ORDER — POLYETHYLENE GLYCOL 3350 17 G/17G
17 POWDER, FOR SOLUTION ORAL DAILY PRN
Status: DISCONTINUED | OUTPATIENT
Start: 2021-07-25 | End: 2021-08-01 | Stop reason: HOSPADM

## 2021-07-25 RX ORDER — IPRATROPIUM BROMIDE AND ALBUTEROL SULFATE 2.5; .5 MG/3ML; MG/3ML
3 SOLUTION RESPIRATORY (INHALATION) EVERY 4 HOURS PRN
Status: DISCONTINUED | OUTPATIENT
Start: 2021-07-25 | End: 2021-07-26

## 2021-07-25 RX ORDER — POTASSIUM CHLORIDE 1.5 G/1.58G
40 POWDER, FOR SOLUTION ORAL ONCE
Status: COMPLETED | OUTPATIENT
Start: 2021-07-25 | End: 2021-07-25

## 2021-07-25 RX ORDER — HYDROMORPHONE HCL IN WATER/PF 6 MG/30 ML
0.2 PATIENT CONTROLLED ANALGESIA SYRINGE INTRAVENOUS
Status: DISCONTINUED | OUTPATIENT
Start: 2021-07-25 | End: 2021-07-25

## 2021-07-25 RX ORDER — LIDOCAINE 40 MG/G
CREAM TOPICAL
Status: DISCONTINUED | OUTPATIENT
Start: 2021-07-25 | End: 2021-08-01 | Stop reason: HOSPADM

## 2021-07-25 RX ORDER — OXYCODONE AND ACETAMINOPHEN 5; 325 MG/1; MG/1
1 TABLET ORAL ONCE
Status: COMPLETED | OUTPATIENT
Start: 2021-07-25 | End: 2021-07-25

## 2021-07-25 RX ADMIN — OXYCODONE HYDROCHLORIDE AND ACETAMINOPHEN 1 TABLET: 5; 325 TABLET ORAL at 15:17

## 2021-07-25 RX ADMIN — OXYCODONE HYDROCHLORIDE 5 MG: 5 TABLET ORAL at 21:12

## 2021-07-25 RX ADMIN — IPRATROPIUM BROMIDE AND ALBUTEROL SULFATE: .5; 3 SOLUTION RESPIRATORY (INHALATION) at 14:23

## 2021-07-25 RX ADMIN — POTASSIUM CHLORIDE 40 MEQ: 1.5 POWDER, FOR SOLUTION ORAL at 15:17

## 2021-07-25 RX ADMIN — NICOTINE 1 PATCH: 14 PATCH, EXTENDED RELEASE TRANSDERMAL at 20:50

## 2021-07-25 RX ADMIN — OXYCODONE HYDROCHLORIDE AND ACETAMINOPHEN 1 TABLET: 5; 325 TABLET ORAL at 15:29

## 2021-07-25 RX ADMIN — METHYLPREDNISOLONE SODIUM SUCCINATE 125 MG: 125 INJECTION, POWDER, FOR SOLUTION INTRAMUSCULAR; INTRAVENOUS at 14:32

## 2021-07-25 RX ADMIN — IPRATROPIUM BROMIDE AND ALBUTEROL SULFATE 6 ML: .5; 3 SOLUTION RESPIRATORY (INHALATION) at 14:28

## 2021-07-25 RX ADMIN — OXYCODONE HYDROCHLORIDE 10 MG: 5 TABLET ORAL at 22:48

## 2021-07-25 ASSESSMENT — MIFFLIN-ST. JEOR: SCORE: 1649.9

## 2021-07-25 ASSESSMENT — ENCOUNTER SYMPTOMS
FEVER: 0
SHORTNESS OF BREATH: 1
COUGH: 0

## 2021-07-25 ASSESSMENT — ACTIVITIES OF DAILY LIVING (ADL): ADLS_ACUITY_SCORE: 12

## 2021-07-25 NOTE — ED NOTES
[facility]  ED Nurse Handoff Report    Austyn Leach is a 66 year old male   ED Chief complaint: Shortness of Breath  . ED Diagnosis:   Final diagnoses:   None     Allergies:   Allergies   Allergen Reactions     Bee Venom Shortness Of Breath and Swelling       Code Status: Full Code  Activity level - Baseline/Home:  Independent. Activity Level - Current:   Assist X 1. Lift room needed: No. Bariatric: No   Needed: No   Isolation: No. Infection: Not Applicable.     Vital Signs:   Vitals:    07/25/21 1545 07/25/21 1550 07/25/21 1555 07/25/21 1645   BP: (!) 155/99   (!) 143/90   Pulse: 84 84 90 87   Resp: (!) 32 29 29 23   Temp:       TempSrc:       SpO2: 100% 100% 98% 98%       Cardiac Rhythm:  ,   Cardiac  Ectopy: Premature ventricular contractions  Ectopy Frequency: Trigeminal  Pain level:    Patient confused: No. Patient Falls Risk: Yes.   Elimination Status: pt states no urge to urinate   Patient Report - Initial Complaint: SOB. Focused Assessment:    Respiratory Respiratory - Respiratory WDL: .WDL except (c/o SOB ) Rhythm/Pattern, Respiratory: rate irregular        Tests Performed:   Chest CT w/o contrast   Preliminary Result   IMPRESSION:    1. Small right-sided pneumothorax. Right base chest tube in place.   2. Prominent region of consolidation at the right lower lobe is new   compared to 4/10/2020. This may be pneumonia versus other airspace   disease.   3. Recommend follow-up CT chest within 3 months to ensure improvement.   4. Diffuse emphysematous changes.      XR Chest Port 1 View   Final Result   IMPRESSION:    New pigtail style chest tube at right lung base with near total   evacuation of previous right pneumothorax. Minor residual air in the   inferior right hemithorax. Mediastinal structures have returned to   midline. No pneumothorax or pleural effusion on the left.      VICENTE JENSEN MD            SYSTEM ID:  [email or web address]      XR Chest Port 1 View   Final Result   Abnormal    IMPRESSION: Large right-sided pneumothorax with subtotal collapse of   the right lung. The mediastinum is shifted towards the left. Left lung   is clear. No effusion.      [Critical Result: Tension pneumothorax]      Finding was identified on 7/25/2021 2:28 PM.       Dr. Mckeon was contacted by me on 7/25/2021 2:30 PM and verbalized   understanding of the critical result.       VICENTE JENSEN MD            SYSTEM ID:  [email or web address]        Labs Ordered and Resulted from Time of ED Arrival Up to the Time of Departure from the ED   BASIC METABOLIC PANEL - Abnormal; Notable for the following components:       Result Value    Sodium 145 (*)     Potassium 3.2 (*)     Chloride 112 (*)     Glucose 109 (*)     All other components within normal limits   CBC WITH PLATELETS AND DIFFERENTIAL - Abnormal; Notable for the following components:    RBC Count 4.36 (*)     Hemoglobin 13.2 (*)     All other components within normal limits   ISTAT GASES LACTATE VENOUS POCT - Abnormal; Notable for the following components:    Bicarbonate Venous POCT 30 (*)     O2 Sat, Venous POCT 76 (*)     pCO2V Venous POCT 58 (*)     All other components within normal limits   TROPONIN I - Normal   SARS-COV2 (COVID-19) VIRUS RT-PCR - Normal    Narrative:     Testing was performed using the aaron  SARS-CoV-2 & Influenza A/B Assay on the aaron  [employer]am[employer]  System.  This test should be ordered for the detection of SARS-COV-2 in individuals who meet SARS-CoV-2 clinical and/or epidemiological criteria. Test performance is unknown in asymptomatic patients.  This test is for in vitro diagnostic use under the FDA EUA for laboratories certified under CLIA to perform moderate and/or high complexity testing. This test has not been FDA cleared or approved.  A negative test does not rule out the presence of PCR inhibitors in the specimen or target RNA in concentration below the limit of detection for the assay. The possibility of a false negative should be considered if the  patient's recent exposure or clinical presentation suggests COVID-19.  [facility] are certified under the Clinical Laboratory Improvement Amendments of 1988 (CLIA-88) as qualified to perform moderate and/or high complexity laboratory testing.   EXTRA BLUE TOP TUBE   EXTRA RED TOP TUBE   EXTRA HEPARINIZED SYRINGE   PROCALCITONIN   PERIPHERAL IV CATHETER   PULSE OXIMETRY NURSING   CARDIAC CONTINUOUS MONITORING   COVID-19 VIRUS (CORONAVIRUS) BY PCR    Narrative:     The following orders were created for panel order Asymptomatic COVID-19 Virus (Coronavirus) by PCR Nasopharyngeal.  Procedure                               Abnormality         Status                     ---------                               -----------         ------                     SARS-COV2 (COVID-19) Vir...[[id number]]  Normal              Final result                 Please view results for these tests on the individual orders.   CBC WITH PLATELETS & DIFFERENTIAL    Narrative:     The following orders were created for panel order CBC with platelets differential.  Procedure                               Abnormality         Status                     ---------                               -----------         ------                     CBC with platelets and d...[[id number]]  Abnormal            Final result                 Please view results for these tests on the individual orders.   EXTRA TUBE    Narrative:     The following orders were created for panel order Extra Tube ([place] Draw).  Procedure                               Abnormality         Status                     ---------                               -----------         ------                     Extra Blue Top Tube[[id number]]                              Final result               Extra Red Top Tube[[id number]]                               Final result               Extra Heparinized Syringe[[id number]]                        Final result                 Please view  results for these tests on the individual orders.       Treatments provided: see Emar and right 14 [place] chest tube inserted  Family Comments: Attempting to reach out to son for update  OBS brochure/video discussed/provided to patient:  N/A  ED Medications:   Medications   ipratropium - albuterol 0.5 mg/2.5 mg/3 mL (DUONEB) neb solution 6 mL (6 mLs Nebulization Given 7/25/21 1428)   methylPREDNISolone sodium succinate (solu-MEDROL) injection 125 mg (125 mg Intravenous Given 7/25/21 1432)   potassium chloride (KLOR-CON) Packet 40 mEq (40 mEq Oral Given 7/25/21 1517)   oxyCODONE-acetaminophen (PERCOCET) 5-325 MG per tablet 1-2 tablet (1 tablet Oral Given 7/25/21 1517)   oxyCODONE-acetaminophen (PERCOCET) 5-325 MG per tablet 1 tablet (1 tablet Oral Given 7/25/21 1529)     Drips infusing:  No  For the majority of the shift, the patient's behavior Green. Interventions performed were NA.    Sepsis treatment initiated: No     Patient tested for COVID 19 prior to admission: YES    ED Nurse Name/Phone Number: Yun Cole RN,   5:37 PM    RECEIVING UNIT ED HANDOFF REVIEW    Above ED Nurse Handoff Report was reviewed: yes  Reviewed by: Skylar Bruno RN on July 25, 2021 at 6:12 PM   Did you vocera the ED

## 2021-07-25 NOTE — ED TRIAGE NOTES
Presents to ED with sudden onset SOB. Pt has hx of right collapsed lung. Pt presents with sudden SOB this afternoon. RR 38. Diminished lung sounds in right lower lobes. Pt on 10L O2. Denies chest pain.

## 2021-07-25 NOTE — H&P
[facility]  History and Physical  Hospitalist - Christian Snow DO       Date of Admission:  7/25/2021    Chief Complaint   Shortness of breath    History is obtained from the patient, the emergency department physician, as well as the electronic medical record.    History of Present Illness   Austyn Leach is a 66 year old male with past medical history of spontaneous pneumothorax in 5/2021 status post pigtail catheter, COPD, active nicotine dependence with cigarettes, schizophrenia, bipolar 1 disorder, GERD who presented on 7/25/2021 with chief complaint of shortness of breath.  The patient states that this afternoon around 1 PM he developed sudden onset shortness of breath.  He called EMS who brought him to the emergency department.  In the emergency department the patient had a chest x-ray showing a large right-sided pneumothorax with leftward shift of the mediastinum.  The patient had a pigtail catheter placed with improvement of his symptoms. He does report an episode of choking on his dinner the night previous to admission requiring a Heimlich maneuver by his wife.  He denies any fevers or chills, cough, shortness of breath over the last few days, chest pain.    In the emergency department after the pigtail catheter was placed thoracic surgery was consulted who recommended a CT chest.  The patient had a CT chest showing a small right-sided pneumothorax, right base chest tube in place, prominent region of consolidation at the right lower lobe, diffuse emphysematous changes.    ASSESSMENT/PLAN    1.  Recurrent Spontaneous Right Sided Pneumothorax s/p Chest Tube  - Consult Thoracic Surgery  - Chest tube management per Thoracic Surgery  - Repeat CXR in AM  - Pain control    2.  COPD  - Not an acute exacerbation  - Duonebs prn    3.  Active Nicotine Dependence with Cigarettes  - Pt smokes 10 cigarettes per day  - Nicotine patch    4.  Schizophrenia  5.  Bipolar Disorder Type 1  - Resume home  psych medications once confirmed by pharmacy    6.  Mild Hypokalemia  - Replaced in ED  - Electrolyte replacement protocol    PLAN: Resume home medications as appropriate once confirmed by pharmacy.     DVT Prophylaxis: Pneumatic Compression Devices  Code Status: Full Code  Discharge Plan: Expected discharge: 1-2 days recommended to prior living arrangement once thoracic surgery evaluation complete.      Christian Snow, DO    Primary Care Physician   Kristin Zhong    -----------------------------------------------------------------------------------------------------------------------------------------------------------------------------------------------------    Past Medical History    I have reviewed this patient's medical history and updated it with pertinent information if needed.   Past Medical History:   Diagnosis Date     Bipolar 1 disorder (H)      GERD (gastroesophageal reflux disease)      Mild intermittent asthma     uses primatent     Schizophrenia (H)        Past Surgical History   I have reviewed this patient's surgical history and updated it with pertinent information if needed.  Past Surgical History:   Procedure Laterality Date     NO HISTORY OF SURGERY       TESTICLE SURGERY       VASECTOMY         Prior to Admission Medications   Prior to Admission Medications   Prescriptions Last Dose Informant Patient Reported? Taking?   acetaminophen (TYLENOL) 325 MG tablet   No No   Sig: Take 3 tablets (975 mg) by mouth 3 times daily as needed for mild pain   albuterol (PROAIR HFA/PROVENTIL HFA/VENTOLIN HFA) 108 (90 Base) MCG/ACT inhaler   No No   Sig: Inhale 1-2 puffs into the lungs every 6 hours as needed for shortness of breath / dyspnea or wheezing   ipratropium-albuterol (COMBIVENT RESPIMAT)  MCG/ACT inhaler   No No   Sig: Inhale 1 puff into the lungs 2 times daily   omeprazole (PRILOSEC) 20 MG DR capsule   No No   Sig: Take 1 capsule (20 mg) by mouth every morning (before breakfast)       Facility-Administered Medications: None     Allergies   Allergies   Allergen Reactions     Bee Venom Shortness Of Breath and Swelling       Social History   I have reviewed this patient's social history and updated it with pertinent information if needed. Austyn Leach  reports that he has been smoking cigarettes. He has a 58.00 pack-year smoking history. He has never used smokeless tobacco. He reports that he does not drink alcohol and does not use drugs.    Family History   I have reviewed this patient's family history and updated it with pertinent information if needed.   Family History   Problem Relation Age of Onset     C.A.D. Father      Hypertension Father      Breast Cancer Mother      Diabetes No family hx of      Cerebrovascular Disease No family hx of        -----------------------------------------------------------------------------------------------------------------------------------------------------------------------------------------------------    Review of Systems   The 10 point Review of Systems is negative other than noted in the HPI or here.     Physical Exam   Temp: 98.3  F (36.8  C) Temp src: Oral BP: (!) 143/90 Pulse: 87   Resp: 23 SpO2: 98 % O2 Device: None (Room air) Oxygen Delivery: 10 LPM  Vital Signs with Ranges  Temp:  [98.3  F (36.8  C)] 98.3  F (36.8  C)  Pulse:  [] 87  Resp:  [22-49] 23  BP: (143-175)/() 143/90  SpO2:  [96 %-100 %] 98 %  0 lbs 0 oz    Constitutional: Awake, alert, cooperative, no apparent distress.  Eyes: Conjunctiva and pupils examined and normal.  HEENT: Moist mucous membranes, normal dentition.  Respiratory: Clear to auscultation bilaterally, no crackles or wheezing.  Cardiovascular: Regular rate and rhythm, normal S1 and S2, and no murmur noted.  GI: Soft, non-distended, non-tender, normal bowel sounds.  Lymph/Hematologic: No anterior cervical or supraclavicular adenopathy.  Skin: No rashes, no cyanosis, no edema.  Musculoskeletal: No joint  swelling, erythema or tenderness.  Neurologic: Cranial nerves 2-12 intact, normal strength and sensation.  Psychiatric: Alert, oriented to person, place and time, no obvious anxiety or depression.     Data     Recent Labs   Lab 07/25/21  1426   WBC 6.3   HGB 13.2*   MCV 92      *   POTASSIUM 3.2*   CHLORIDE 112*   CO2 30   BUN 22   CR 0.87   ANIONGAP 3   MAL 8.7   *   TROPONIN <0.015       Recent Results (from the past 24 hour(s))   XR Chest Port 1 View   Result Value    Radiologist flags Tension pneumothorax (AA)    Narrative    CHEST ONE VIEW PORTABLE   7/25/2021 2:24 PM     HISTORY: Shortness of breath.    COMPARISON: 5/7/2021      Impression    IMPRESSION: Large right-sided pneumothorax with subtotal collapse of  the right lung. The mediastinum is shifted towards the left. Left lung  is clear. No effusion.    [Critical Result: Tension pneumothorax]    Finding was identified on 7/25/2021 2:28 PM.     Dr. Mckeon was contacted by me on 7/25/2021 2:30 PM and verbalized  understanding of the critical result.     VICENTE JENSEN MD         SYSTEM ID:  [email or web address]   XR Chest Port 1 View    Narrative    CHEST ONE VIEW PORTABLE   7/25/2021 3:11 PM     HISTORY: Chest tube placement.    COMPARISON: 7/25/2021      Impression    IMPRESSION:   New pigtail style chest tube at right lung base with near total  evacuation of previous right pneumothorax. Minor residual air in the  inferior right hemithorax. Mediastinal structures have returned to  midline. No pneumothorax or pleural effusion on the left.    VICENTE JENSEN MD         SYSTEM ID:  [email or web address]   Chest CT w/o contrast    Narrative    CT CHEST WITHOUT CONTRAST 7/25/2021 4:14 PM    CLINICAL HISTORY: Pneumothorax. Shortness of breath.    TECHNIQUE: CT chest without IV contrast. Multiplanar reformats were  obtained. Dose reduction techniques were used.  CONTRAST: None.    COMPARISON: Chest x-ray 7/25/2021. CT abdomen 4/10/2020.    FINDINGS:   LUNGS AND PLEURA:  Emphysema. There is a right base chest tube in  place. Small right-sided pneumothorax primarily at the right lower  chest but with a small component superiorly as well. Prominent region  of consolidation is noted at the right lower lobe. An area posteriorly  at the right lower lobe measures 7.6 x 5.5 cm series 7 image 248. Some  anterior additional consolidation noted and/or atelectasis. The left  lung shows no acute airspace process. No left-sided pneumothorax. Mild  biapical scarring.    MEDIASTINUM/AXILLAE: No acute mediastinal abnormality is seen within  the limits of unenhanced scanning. No adenopathy.    CORONARY ARTERY CALCIFICATION: None.    UPPER ABDOMEN: There are a few hepatic cysts suggested again. No acute  upper abdominal abnormality.    MUSCULOSKELETAL: No acute fracture identified.      Impression    IMPRESSION:   1. Small right-sided pneumothorax. Right base chest tube in place.  2. Prominent region of consolidation at the right lower lobe is new  compared to 4/10/2020. This may be pneumonia versus other airspace  disease.  3. Recommend follow-up CT chest within 3 months to ensure improvement.  4. Diffuse emphysematous changes.

## 2021-07-25 NOTE — ED PROVIDER NOTES
"  History   Chief Complaint:  Shortness of Breath     The history is provided by the patient. The history is limited by the condition of the patient.      Austyn Leach is a 66 year old male smoker with history of COPD and mental illness who presents via EMS with shortness of breath. Austyn had sudden onset shortness of breath just prior to arrival. He denies chest pain, fever, or cough. He was in his baseline state of health prior to this dyspnea aside from an episode of choking on steak last night for which his wife gave him the Heimlich.    Notably, Austyn had a spontaneous pneumothorax 2 months ago.    Review of Systems   Unable to perform ROS: Acuity of condition   Constitutional: Negative for fever.   Respiratory: Positive for shortness of breath. Negative for cough.    Cardiovascular: Negative for chest pain.     Allergies:  The patient has no known allergies.     Medications:  Albuterol inhaler   Combivent respimat   Prilosec    Past Medical History:    Bipolar 1 disorder  GERD  Asthma  Schizophrenia  Right pneumothorax  Suicide attempt  Pneumonia  COPD exacerbation     Past Surgical History:    Vasectomy    Family History:    CAD  HTN  Cancer    Social History:  Presents alone via EMS  Austyn is a smoker  .    Physical Exam     Patient Vitals for the past 24 hrs:   BP Temp Temp src Pulse Resp SpO2 Height Weight   07/25/21 1927 (!) 155/85 98.8  F (37.1  C) Oral 90 20 95 % 1.88 m (6' 2\") 80 kg (176 lb 6.4 oz)   07/25/21 1800 -- -- -- 92 23 -- -- --   07/25/21 1730 -- -- -- 92 20 -- -- --   07/25/21 1700 -- -- -- 89 26 91 % -- --   07/25/21 1645 (!) 143/90 -- -- 87 23 98 % -- --   07/25/21 1555 -- -- -- 90 29 98 % -- --   07/25/21 1550 -- -- -- 84 29 100 % -- --   07/25/21 1545 (!) 155/99 -- -- 84 (!) 32 100 % -- --   07/25/21 1540 -- -- -- 81 22 100 % -- --   07/25/21 1530 (!) 162/96 -- -- 87 (!) 34 -- -- --   07/25/21 1515 (!) 175/94 -- -- 84 (!) 35 99 % -- --   07/25/21 1450 -- -- -- 102 (!) 39 " 98 % -- --   07/25/21 1445 (!) 153/103 -- -- 105 24 97 % -- --   07/25/21 1440 -- -- -- 107 (!) 40 97 % -- --   07/25/21 1435 -- -- -- 105 (!) 42 97 % -- --   07/25/21 1430 (!) 167/94 -- -- 107 (!) 44 97 % -- --   07/25/21 1428 -- -- -- 112 (!) 49 97 % -- --   07/25/21 1425 -- -- -- 105 (!) 40 97 % -- --   07/25/21 1423 -- -- -- 107 (!) 47 97 % -- --   07/25/21 1420 -- -- -- 111 -- 99 % -- --   07/25/21 1415 (!) 173/121 -- -- 110 -- 96 % -- --   07/25/21 1407 (!) 164/107 98.3  F (36.8  C) Oral 108 (!) 38 98 % -- --       Physical Exam  General: Well-developed and well-nourished. Ill appearing elderly  man. Cooperative.  Head:  Atraumatic.  Eyes:  Conjunctivae, lids, and sclerae are normal.  Neck:  Supple. Normal range of motion.  CV:  Tachycardic rate and regular rhythm. Normal heart sounds with no murmurs, rubs, or gallops detected.  Resp:  Moderate respiratory distress with increased work of breathing.  Absent breath sounds over the right hemithorax.  Left hemithorax is generally clear.    GI:  Soft. Non-distended. Non-tender.    MS:  Normal ROM.   Skin:  Warm. Non-diaphoretic. No pallor.  Neuro:  Awake. A&Ox3. Normal strength.  Psych: Normal mood and affect. Normal speech.  Vitals reviewed.    Emergency Department Course   EKG  Indication: Shortness of breath  Time: 1638  Rate 89 bpm. ME interval 172. QRS duration 102. QT/QTc 364/442.   NSR  No acute ST changes.  No significant change as compared to prior, dated 05/05/21.    Imaging:  XR Chest Port 1 view 1435:  Large right-sided pneumothorax with subtotal collapse of   the right lung. The mediastinum is shifted towards the left. Left lung   is clear. No effusion, as per radiology.     XR Chest Port 1 view 1516:  New pigtail style chest tube at right lung base with near total   evacuation of previous right pneumothorax. Minor residual air in the   inferior right hemithorax. Mediastinal structures have returned to   midline. No pneumothorax or pleural  effusion on the left, as per radiology.     [Critical Result: Tension pneumothorax]   Finding was identified on 7/25/2021 2:28 PM.   Dr. Mckeon was contacted by me on 7/25/2021 2:30 PM and verbalized understanding of the critical result.     CT Chest w/o IV contrast:   1. Small right-sided pneumothorax. Right base chest tube in place.   2. Prominent region of consolidation at the right lower lobe is new   compared to 4/10/2020. This may be pneumonia versus other airspace   disease.   3. Recommend follow-up CT chest within 3 months to ensure improvement.   4. Diffuse emphysematous changes, as per radiology.    Laboratory:  CBC: WBC 6.3, HGB 13.2 (L),    BMP: Glucose 108 (H), Sodium: 145 (H), Potassium: 3.2 (L), Chloride: 112 (H), o/w WNL (Creatinine: 0.87)  Troponin (Collected 1425): <0.015    iStat Gases (lactate) venous, POCT: LACTW 0.8, Bicarb 30 (H), O2 Sat 76 (L), pCO2V 58 (H), pH 7.32, pO2 45    Procalcitonin: Pending    Asymptomatic COVID-19 PCR: Negative    [facility]    -Chest Tube Insertion    Date/Time: 7/25/2021 2:38 PM  Performed by: Nemo Mckeon MD  Authorized by: Nemo Mckeon MD       PRE-PROCEDURE DETAILS:     Skin preparation:  ChloraPrep  ANESTHESIA (see MAR for exact dosages):     Anesthesia method:  Local infiltration    Local anesthetic:  Lidocaine 1% w/o epi    PROCEDURE DETAILS:     Placement location:  R lateral    Scalpel size:  11    Tube size (Kenyan): 14.    Ultrasound guidance: no      Tension pneumothorax: yes      Tube connected to:  Heimlich valve    Drainage characteristics:  Air only    Suture material: 3-0 silk.    Dressing:  4x4 sterile gauze and petrolatum-impregnated gauze    POST-PROCEDURE DETAILS:     Post-insertion x-ray findings: tube in good position    PROCEDURE   Patient Tolerance:  Patient tolerated the procedure well with no immediate complications  Describe Procedure: Patient was placed in a semirecumbent position with the head of  the bed at 30 degree.  The side noted above was prepped and the patient was medicated as above. An incision was made and blunt dissection up and over the rib was performed. Access was then obtained to the pleural cavity. A chest tube was inserted and secured as above.    Emergency Department Course:    Reviewed:  I reviewed nursing notes, vitals, past medical history, and Care Everywhere.    Assessments:  1423 I obtained history and examined the patient as noted above.   1438    I placed a chest tube.  1544 I rechecked the patient and explained findings. We discussed why he cannot go home.   1659    I rechecked the patient.     Consults:   1434 I talked to Dr. Mei of radiology regarding the patient's right pneumothorax  1517 I talked to Dr. Villagomez of thoracic surgery who requests he be placed on Pleur-Evac rather than a flutter valve and CT chest.   1730 I consulted Dr. Saul Snow of the hospital regarding admission.    Interventions:  1428 Duoneb, 6 mL, Nebulizer   1432 solu-Medrol, 125 mg, IV  1517 Klor-Con, 40 mEq, Oral           Percocet, 1 tablet, Oral  1529 Percocet, 1 tablet, Oral    Disposition:  The patient was admitted to the hospital under the care of Dr. Saul Snow.     Impression & Plan   Medical Decision Making:  Austyn is a 66-year-old male smoker with COPD who had sudden onset of shortness of breath just prior to arrival. On exam he appears ill with increased respiratory rate and respiratory effort. He has absent breath sounds over the right hemithorax with bedside x-ray confirming large right pneumothorax.  We discussed risk and benefits of chest tube placement and I obtained written consent. A pigtail catheter was then placed in the right chest wall with appropriate decompression. Repeat x-ray reveals near total evacuation of this pneumothorax with residual air in the inferior right diaphragm. He nearly immediately had improvement in his symptoms and was actually requesting that I discharge him.  He had resolution of his tachycardia. He at no point was hypotensive. EKG is reassuring without acute ST changes or arrhythmias. COVID-19 is negative. Lactate is normal with a PCO2 of 58 likely reflective of his COPD. He also has mild hypokalemia to 3.2 which may be related to the DuoNeb he was given.  However, he was given oral potassium repletiono.  He has no kidney injury, troponin elevation, or leukocytosis. He was given corticosteroids for possible COPD exacerbation although I favor that this was simply a spontaneous rupture of a bleb associated with COPD and is less likely to be as COPD exacerbation based on his initial lung assessment. He was given Percocet for pain but required no further interventions. I discussed his case with Dr. Villagomez, thoracic surgery, who recommends chest CT for further evaluation. Because Austyn did have a spontaneous pneumothorax just 2 months ago he may be a candidate for pleurodesis. This chest CT shows the residual small pneumothorax as well as a prominent consolidation in the right lower lobe. This could be pneumonia but is felt to be unlikely given lack of recent fever, cough, or leukocytosis. I sent a procalcitonin for further characterization but will not give antibiotics. I updated Austyn and answered all his questions. He verbalized understanding and is amenable to admission. I discussed the patient's case with Dr. Saul Snow, hospitalist, who accepts admission and has no further orders.    Covid-19  Austyn Leach was evaluated during a global COVID-19 pandemic, which necessitated consideration that the patient might be at risk for infection with the SARS-CoV-2 virus that causes COVID-19.   Applicable protocols for evaluation were followed during the patient's care.   COVID-19 was considered as part of the patient's evaluation. The plan for testing is:  a test was obtained during this visit.    Diagnosis:    ICD-10-CM    1. Spontaneous pneumothorax  J93.83    2. Chronic  obstructive pulmonary disease, unspecified COPD type (H)  J44.9    3. Tobacco use  Z72.0    4. Hypokalemia  E87.6        Scribe Disclosure:  I, Florinda Pepe, am serving as a scribe at 2:18 PM on 7/25/2021 to document services personally performed by Nemo Mckeon MD based on my observations and the provider's statements to me.          Nemo Mckeon MD  07/25/21 [id number]    Addendum:  Critical Care time was 35 minutes for this patient excluding procedures.           Nemo Mckeon MD  08/28/21 2013

## 2021-07-25 NOTE — ED NOTES
Bed: ED01  Expected date: 7/25/21  Expected time: 2:03 PM  Means of arrival: Ambulance  Comments:  BV2  67yo  SOB

## 2021-07-26 ENCOUNTER — APPOINTMENT (OUTPATIENT)
Dept: GENERAL RADIOLOGY | Facility: CLINIC | Age: 66
DRG: 163 | End: 2021-07-26
Attending: INTERNAL MEDICINE
Payer: MEDICARE

## 2021-07-26 ENCOUNTER — APPOINTMENT (OUTPATIENT)
Dept: GENERAL RADIOLOGY | Facility: CLINIC | Age: 66
DRG: 163 | End: 2021-07-26
Attending: HOSPITALIST
Payer: MEDICARE

## 2021-07-26 LAB
ANION GAP SERPL CALCULATED.3IONS-SCNC: 5 MMOL/L (ref 3–14)
ATRIAL RATE - MUSE: 89 BPM
BUN SERPL-MCNC: 25 MG/DL (ref 7–30)
CALCIUM SERPL-MCNC: 8.7 MG/DL (ref 8.5–10.1)
CHLORIDE BLD-SCNC: 112 MMOL/L (ref 94–109)
CO2 SERPL-SCNC: 25 MMOL/L (ref 20–32)
CREAT SERPL-MCNC: 0.8 MG/DL (ref 0.66–1.25)
DIASTOLIC BLOOD PRESSURE - MUSE: NORMAL MMHG
ERYTHROCYTE [DISTWIDTH] IN BLOOD BY AUTOMATED COUNT: 13.5 % (ref 10–15)
GFR SERPL CREATININE-BSD FRML MDRD: >90 ML/MIN/1.73M2
GLUCOSE BLD-MCNC: 124 MG/DL (ref 70–99)
HCT VFR BLD AUTO: 38.4 % (ref 40–53)
HGB BLD-MCNC: 12.4 G/DL (ref 13.3–17.7)
INTERPRETATION ECG - MUSE: NORMAL
MCH RBC QN AUTO: 30.1 PG (ref 26.5–33)
MCHC RBC AUTO-ENTMCNC: 32.3 G/DL (ref 31.5–36.5)
MCV RBC AUTO: 93 FL (ref 78–100)
P AXIS - MUSE: 57 DEGREES
PLATELET # BLD AUTO: 195 10E3/UL (ref 150–450)
POTASSIUM BLD-SCNC: 4.1 MMOL/L (ref 3.4–5.3)
PR INTERVAL - MUSE: 172 MS
PROCALCITONIN SERPL-MCNC: <0.05 NG/ML
QRS DURATION - MUSE: 102 MS
QT - MUSE: 364 MS
QTC - MUSE: 442 MS
R AXIS - MUSE: -11 DEGREES
RBC # BLD AUTO: 4.12 10E6/UL (ref 4.4–5.9)
SODIUM SERPL-SCNC: 142 MMOL/L (ref 133–144)
SYSTOLIC BLOOD PRESSURE - MUSE: NORMAL MMHG
T AXIS - MUSE: 49 DEGREES
VENTRICULAR RATE- MUSE: 89 BPM
WBC # BLD AUTO: 8.9 10E3/UL (ref 4–11)

## 2021-07-26 PROCEDURE — 99233 SBSQ HOSP IP/OBS HIGH 50: CPT | Performed by: INTERNAL MEDICINE

## 2021-07-26 PROCEDURE — 250N000011 HC RX IP 250 OP 636: Performed by: INTERNAL MEDICINE

## 2021-07-26 PROCEDURE — 80048 BASIC METABOLIC PNL TOTAL CA: CPT | Performed by: INTERNAL MEDICINE

## 2021-07-26 PROCEDURE — 120N000013 HC R&B IMCU

## 2021-07-26 PROCEDURE — 250N000013 HC RX MED GY IP 250 OP 250 PS 637: Performed by: INTERNAL MEDICINE

## 2021-07-26 PROCEDURE — 71045 X-RAY EXAM CHEST 1 VIEW: CPT

## 2021-07-26 PROCEDURE — 85027 COMPLETE CBC AUTOMATED: CPT | Performed by: INTERNAL MEDICINE

## 2021-07-26 PROCEDURE — 99222 1ST HOSP IP/OBS MODERATE 55: CPT | Performed by: INTERNAL MEDICINE

## 2021-07-26 PROCEDURE — 71045 X-RAY EXAM CHEST 1 VIEW: CPT | Mod: 76

## 2021-07-26 PROCEDURE — 99221 1ST HOSP IP/OBS SF/LOW 40: CPT | Performed by: THORACIC SURGERY (CARDIOTHORACIC VASCULAR SURGERY)

## 2021-07-26 PROCEDURE — 84145 PROCALCITONIN (PCT): CPT | Performed by: INTERNAL MEDICINE

## 2021-07-26 PROCEDURE — 36415 COLL VENOUS BLD VENIPUNCTURE: CPT | Performed by: INTERNAL MEDICINE

## 2021-07-26 RX ORDER — IPRATROPIUM BROMIDE AND ALBUTEROL SULFATE 2.5; .5 MG/3ML; MG/3ML
3 SOLUTION RESPIRATORY (INHALATION) EVERY 4 HOURS PRN
Status: DISCONTINUED | OUTPATIENT
Start: 2021-07-26 | End: 2021-08-01 | Stop reason: HOSPADM

## 2021-07-26 RX ORDER — GUAIFENESIN 600 MG/1
1200 TABLET, EXTENDED RELEASE ORAL 2 TIMES DAILY
Status: DISCONTINUED | OUTPATIENT
Start: 2021-07-26 | End: 2021-08-01 | Stop reason: HOSPADM

## 2021-07-26 RX ORDER — ALBUTEROL SULFATE 0.83 MG/ML
2.5 SOLUTION RESPIRATORY (INHALATION)
Status: DISCONTINUED | OUTPATIENT
Start: 2021-07-26 | End: 2021-08-01 | Stop reason: HOSPADM

## 2021-07-26 RX ADMIN — TAZOBACTAM SODIUM AND PIPERACILLIN SODIUM 3.38 G: 375; 3 INJECTION, SOLUTION INTRAVENOUS at 13:22

## 2021-07-26 RX ADMIN — NICOTINE 1 PATCH: 14 PATCH, EXTENDED RELEASE TRANSDERMAL at 08:09

## 2021-07-26 RX ADMIN — GUAIFENESIN 1200 MG: 600 TABLET, EXTENDED RELEASE ORAL at 19:59

## 2021-07-26 RX ADMIN — OXYCODONE HYDROCHLORIDE 10 MG: 5 TABLET ORAL at 02:55

## 2021-07-26 RX ADMIN — TAZOBACTAM SODIUM AND PIPERACILLIN SODIUM 3.38 G: 375; 3 INJECTION, SOLUTION INTRAVENOUS at 18:27

## 2021-07-26 ASSESSMENT — ACTIVITIES OF DAILY LIVING (ADL)
ADLS_ACUITY_SCORE: 14

## 2021-07-26 NOTE — PHARMACY-ADMISSION MEDICATION HISTORY
Admission medication history interview status for this patient is complete. See [facility] admission navigator for allergy information, prior to admission medications and immunization status.     Medication history interview done, indicate source(s): Patient  Medication history resources (including written lists, pill bottles, clinic record):None  [place]    Changes made to PTA medication list:  Added: none  Changed: none  Reported as Not Taking: none  Removed: none    Actions taken by pharmacist (provider contacted, etc):None     Additional medication history information: Austyn noted he was out of the omeprazole and has difficulty using one of his inhalers.    Medication reconciliation/reorder completed by provider prior to medication history?  N    Prior to Admission medications    Medication Sig Last Dose Taking? Auth Provider   ipratropium-albuterol (COMBIVENT RESPIMAT)  MCG/ACT inhaler Inhale 1 puff into the lungs 2 times daily Past Month at Unknown time Yes Greer Tejada PA-C   acetaminophen (TYLENOL) 325 MG tablet Take 3 tablets (975 mg) by mouth 3 times daily as needed for mild pain Unknown at Unknown time  Greer Tejada PA-C   albuterol (PROAIR HFA/PROVENTIL HFA/VENTOLIN HFA) 108 (90 Base) MCG/ACT inhaler Inhale 1-2 puffs into the lungs every 6 hours as needed for shortness of breath / dyspnea or wheezing Unknown at Unknown time  Greer Tejada PA-C   omeprazole (PRILOSEC) 20 MG DR capsule Take 1 capsule (20 mg) by mouth every morning (before breakfast) Unknown at Unknown time  Greer Tejada PA-C

## 2021-07-26 NOTE — CONSULTS
THORACIC SURGERY CONSULT NOTE    Consult Reason: Recurrent right pneumothorax    HPI: Mr. Leach is a 66 year old current smoker who presented to the ED with shortness of breath on 7/24/2021. He was recently admitted for a first time secondary spontaneous pneumothorax, discharged on 5/7/2021. He started smoking at age 13 and on average has smoked 1 pack per day. He is now down to 1/2 pack per day and had been smoking marijuana daily until 6 months ago.    Of note, he was eating steak on 7/23/2021 when it got stuck and his wife tried to perform the Heimlich. She was unsuccessful. He drank water, but started coughing. The bolus finally passed spontaneously.     Today he has a dry cough, but says this is chronic. He can walk about 1/3 block and then must rest before continuing on. At most he can walk around the block, but with many breaks.    He is anxious to leave the hospital today because he has an important meeting tomorrow.    A/P: Patient is a 66 year old man with a second episode of a secondary spontaneous pneumothorax.  He likely has aspiration pneumonia, per the CT findings. He is a poor surgical candidate at the moment given the aspiration event and likely evolving pneumonia as well as his overall medical status.  1. Pulmonary consult to evaluate COPD and how to optimize his breathing, if possible  2. Continue chest tube to suction. Possible water seal in AM  3. Will schedule OR for 7/29, but I doubt he will be a good candidate for surgery   4.  Will consider bedside pleurodesis, but only once we are sure his aspiration pneumonia is not worsening. If it does worsen, he will likely not be able to undergo intervention for the recurrent pneumothorax for some time.    Will continue to follow.    Patient and plan discussed with attending.    Thank you for the opportunity to participate in the care of this patient.    PMH:  Past Medical History:   Diagnosis Date     Bipolar 1 disorder (H)      GERD (gastroesophageal  reflux disease)      Mild intermittent asthma     uses primatent     Schizophrenia (H)      Reviewed    PSH:  Past Surgical History:   Procedure Laterality Date     NO HISTORY OF SURGERY       TESTICLE SURGERY       VASECTOMY       Reviewed    FH:  family history includes Breast Cancer in his mother; C.A.D. in his father; Hypertension in his father.      SH:  Tobacco use:  53 years, average 1 pack per day, current smoker.  Illicit drug use:  Daily marijuana until 6 months ago    Allergies:  Allergies   Allergen Reactions     Bee Venom Shortness Of Breath and Swelling       Home Meds:  Medications Prior to Admission   Medication Sig Dispense Refill Last Dose     ipratropium-albuterol (COMBIVENT RESPIMAT)  MCG/ACT inhaler Inhale 1 puff into the lungs 2 times daily 4 g 0 Past Month at Unknown time     acetaminophen (TYLENOL) 325 MG tablet Take 3 tablets (975 mg) by mouth 3 times daily as needed for mild pain   Unknown at Unknown time     albuterol (PROAIR HFA/PROVENTIL HFA/VENTOLIN HFA) 108 (90 Base) MCG/ACT inhaler Inhale 1-2 puffs into the lungs every 6 hours as needed for shortness of breath / dyspnea or wheezing 6.7 g 0 Unknown at Unknown time     omeprazole (PRILOSEC) 20 MG DR capsule Take 1 capsule (20 mg) by mouth every morning (before breakfast) 30 capsule 0 Unknown at Unknown time       Physical Exam:  Temp:  [97  F (36.1  C)-99.1  F (37.3  C)] 98.9  F (37.2  C)  Pulse:  [] 77  Resp:  [14-49] 16  BP: (122-175)/() 127/82  SpO2:  [91 %-100 %] 93 %    Gen: NAD, resting comfortably in bed  Lungs: CTAB, Using accessory muscles to breathe  Chest tube no air leak  Neuro: AOx3    Labs:  ABG   Recent Labs   Lab 07/25/21  1428   PH 7.32     CBC  Recent Labs   Lab 07/26/21  0806 07/25/21  1426   WBC 8.9 6.3   HGB 12.4* 13.2*    190     BMP  Recent Labs   Lab 07/26/21  0806 07/25/21  2245 07/25/21  1426     --  145*   POTASSIUM 4.1 4.1 3.2*   CHLORIDE 112*  --  112*   CO2 25  --  30   BUN 25   --  22   CR 0.80  --  0.87   *  --  109*     Imaging:  CT chest 7/25/2021:      CXR 7/25/2021:        Benoit Villagomez MD

## 2021-07-26 NOTE — CONSULTS
[facility]  Pulmonary, Allergy, Critical Care and Sleep Medicine    Initial Consultation  07/26/2021    Austyn Leach MRN# [id number]   Age: 66 year old YOB: 1955     Date of Admission: 7/25/2021  Reason for Consultation: COPD optimization  Requesting Team: Medicine    Primary care provider: Kristin Zhong     Assessment and Recommendations:    Austyn Leach is a 66 year old male with a history of spontaneous pneumothorax in May 2021 (treated with pigtail chest tube), COPD, tobacco abuse, schizoprhrenia, bipolar 1, and GERD who presented on 7/25 with shortness of breath, found to have right sided pneumothorax, now s/p chest tube drainage, but with possible aspiration as well.     Tension pneumothorax, s/p chest tube drainage: improved on imaging. Second spontaneous pneumothorax in 2 months. No current air leak.   -Thoracic surgery consulted for possible pleurodesis  -chest tube to suction  -daily CXR    Emphysema: present on imaging. No PFTs. Previously managed with Combivent BID and albuterol PRN, but had stopped taking at home. Has tried other inhalers in the past as well. Does not report recent exacerbations requiring antibiotics or steroids.    -Will stop Combivent, and change to Anoro once daily for convenience   -albuterol PRN  -likely would benefit from addition on inhaled corticosteroid as well, based on extensive emphysema on imaging, will add Arnuity (Fluticasone) 100 mcg/inh daily, brush teeth after use  -needs to stop smoking   -Check ambulatory O2 to look for exertional hypoxia. Despite no O2 requirement currently, his clubbing of nails suggests chronic hypoxia   -would benefit from outpatient pulmonary follow up with PFTs  -consider echocardiogram, appears to have pulmonary artery enlargement on imaging concerning for pulmonary HTN (not ordered)   -consider standing guaifenesin 1200 mg BID (at least PRN) for mucous clearance  -flutter valve for mucous  clearance     Active tobacco use: 50+ pack year history, now 1/2 ppd. Relatively pre-contemplative.   -continue to  on complete smoking cessation, counseled extensively today.   -nicotine replacement products as needed     Possible aspiration pneumonia vs pneumonitis: consolidation seen in RLL on CT chest, however patient is afebrile, procal and WBC count normal. There was some scarring in this region on CXRs from May as well, though does appear more extensive.  -repeat procal (ordered)   -trend CBC  -consider empiric Augmentin or Unasyn, vs monitoring for development of pneumonia prior to treatment     Pulmonary will continue to follow peripherally. We are in house at [facility] Monday, Wednesday, and Friday  mornings. For assistance at other times, please page the on-call pulmonologist through [facility] or the .      Eleazar Reddy MD  Pulmonary and Critical Care     Chief Complaint and History of Present Illness:    CC:  Shortness of breath  HPI:   Mr. Leach is a 66 year old man with a history of spontaneous right sided pneumothorax in May 2021 (treated with pigtail chest tube), COPD, tobacco abuse, schizoprhrenia, bipolar 1, and GERD who presented on 7/25 with shortness of breath. At about 1 PM on 7/25, he developed sudden onset shortness of breath and called EMS. In the ED, he was found to have a right sided pneumothorax with leftward shift, and a chest tube was placed with improvement in symptoms. Thoracic surgery was consulted.     Of note, he did experience an episode of choking on food during dinner on 7/24 which required the Heimlich maneuver by his wife. This did not work, but he eventually coughed out the bolus. A CT chest was done in the ED, which showed consolidation at the RLL concerning for aspiration.     Thoracic surgery recommended pulmonary consult for optimization of COPD.     At baseline, he can walk only about 1/3 of a block before stopping. It takes him 30 seconds to catch hist  breath. He can walk up only 1/2 a fight of stairs without stopping. He has a chronic productive cough (but swallows his sputum). He does not feel this is any different currently than normal. He also has frequent wheeze. He denies congestion, rhinorrhea, sore throat, fevers, chills, chest pain, LE edema, or palpitations. He states his weight is stable. After last admission, he was prescribed Combivent BID and albuterol PRN at home. He stopped using these about 1 week ago. He has used other inhalers in the past, but has not been consistent with them.     He is a current everyday smoker, now using about 1/2 a ppd (previously 1 ppd since age 13). He also used marijuana daily until about 6 months ago. He is relatively pre-contemplative regarding smoking cessation, stating if his wife brought him cigarettes, he would smoke them in the bathroom.     He previously worked in construction, lawn care, and maintenance. 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     Review of Systems:  Complete 12 point ROS negative unless mentioned in HPI  Histories, Prior to Admission Medications, Allergies:    Past Medical History:  Past Medical History:   Diagnosis Date     Bipolar 1 disorder (H)      GERD (gastroesophageal reflux disease)      Mild intermittent asthma     uses primatent     Schizophrenia (H)        Past Surgical History:  Past Surgical History:   Procedure Laterality Date     NO HISTORY OF SURGERY       TESTICLE SURGERY       VASECTOMY         Past Social History:  Social History     Socioeconomic History     Marital status:      Spouse name: Angela     Number of children: 1     Years of education: Not on file     Highest education level: Not on file   Occupational History     Employer: [employer]   Tobacco Use     Smoking status: Current Every Day Smoker     Packs/day: 1.00     Years: 58.00     Pack years: 58.00     Types: Cigarettes     Smokeless tobacco:  Never Used   Substance and Sexual Activity     Alcohol use: No     Drug use: No     Sexual activity: Yes     Partners: Female   Other Topics Concern     Parent/sibling w/ CABG, MI or angioplasty before 65F 55M? Yes   Social History Narrative    Born and raised in [place] has 2 siblings that he does not have contact with raised by both mother and father no abuse history or neglect.  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  Has the one marriage from a mail order bride service as he describes it 1 previous child from a previous relationship and one child with his wife.  He has contact with his son.  He was never in the .  Currently lives in a house with his wife enjoys writing books and going to the gym and exercising.  No access to guns or weapons at the house.     Social Determinants of Health     Financial Resource Strain:      Difficulty of Paying Living Expenses:    Food Insecurity:      Worried About Running Out of Food in the Last Year:      Ran Out of Food in the Last Year:    Transportation Needs:      Lack of Transportation (Medical):      Lack of Transportation (Non-Medical):    Physical Activity:      Days of Exercise per Week:      Minutes of Exercise per Session:    Stress:      Feeling of Stress :    Social Connections:      Frequency of Communication with Friends and Family:      Frequency of Social Gatherings with Friends and Family:      Attends Hoahaoism Services:      Active Member of Clubs or Organizations:      Attends Club or Organization Meetings:      Marital Status:    Intimate Partner Violence:      Fear of Current or Ex-Partner:      Emotionally Abused:      Physically Abused:      Sexually Abused:        ETOH: no  Tobacco: current use, 1/2 ppd, prior 1 ppd for ~50 years  Significant inhalational exposures: marijuana in the past, quit about 6 months ago    Family History:  Family History   Problem  Relation Age of Onset     C.A.D. Father      Hypertension Father      Breast Cancer Mother      Diabetes No family hx of      Cerebrovascular Disease No family hx of      Medications:    ipratropium-albuterol  1 puff Inhalation 4x daily     nicotine  1 patch Transdermal Daily     nicotine   Transdermal Q8H     omeprazole  20 mg Oral QAM AC     piperacillin-tazobactam  3.375 g Intravenous Q6H     sodium chloride (PF)  3 mL Intracatheter Q8H     acetaminophen **OR** acetaminophen, albuterol, HYDROmorphone, ibuprofen, ipratropium - albuterol 0.5 mg/2.5 mg/3 mL, lidocaine 4%, lidocaine (buffered or not buffered), melatonin, naloxone **OR** naloxone **OR** naloxone **OR** naloxone, ondansetron **OR** ondansetron, oxyCODONE, polyethylene glycol, senna-docusate **OR** senna-docusate, sodium chloride (PF)    Allergies:     Allergies   Allergen Reactions     Bee Venom Shortness Of Breath and Swelling       Physical Exam:    Temp:  [97  F (36.1  C)-99.1  F (37.3  C)] 98.9  F (37.2  C)  Pulse:  [] 77  Resp:  [14-49] 16  BP: (122-175)/() 127/82  SpO2:  [91 %-100 %] 93 %    Intake/Output Summary (Last 24 hours) at 7/26/2021 1256  Last data filed at 7/26/2021 0700  Gross per 24 hour   Intake --   Output 0 ml   Net 0 ml     General: Sitting up in bed in NAD  HEENT: anicteric, moist mucosa. Poor dentition, with only 4 teeth present, some with caries.   Neck: no palpable lymphadenopathy  Chest: Rare cough. Significant expiratory wheeze throughout. No crackles. Right sided chest tube in place without air leak currently.   Cardiac: RRR no murmurs  Abdomen: Soft, flat, non tender, active BS  Extremities: No LE Edema, wwp   Neuro: A&Ox3, no focal deficits   Skin: no rash noted. Clubbing of fingernails bilaterally.     Laboratory, imaging, and microbiologic data:    All laboratory and imaging data reviewed.    Notable for: K 4.1 Cr 0.8  WBC 8.9 (From 6.3). Hgb 12.4 plts 195.   Procal negative  Trop negative    Admission VBG  7.32/45/58/30     7/25 COVID PCR negative     7/25 CXR: Large right-sided pneumothorax with subtotal collapse of the right lung. The mediastinum is shifted towards the left. Left lung is clear. No effusion.   7/25 CXR: New pigtail style chest tube at right lung base with near total evacuation of previous right pneumothorax. Minor residual air in the inferior right hemithorax. Mediastinal structures have returned to midline. No pneumothorax or pleural effusion on the left.   7/25 CT chest: 1. Small right-sided pneumothorax. Right base chest tube in place.  2. Prominent region of consolidation at the right lower lobe is new compared to 4/10/2020. This may be pneumonia versus other airspace disease. 3. Recommend follow-up CT chest within 3 months to ensure improvement. 4. Diffuse emphysematous changes.  7/26 CXR: Right basilar chest tube remains in place. Trace apical pleural air present in the right hemithorax. No evidence of tension. Left lung is clear.    No PFTs available

## 2021-07-26 NOTE — PLAN OF CARE
Pt is alert and oriented, up with standby assist in the room. Pt's chest tube to -20 wall suction, minimal output overnight. Pt taking oxycodone 10 mg every 4 hours for pain. IS instruction given. Pt to have a repeat xray this and and probable surgery at 1000 for a VATS procedure with talc pleurodesis. Tele reading SR with PCV. . Pt has been NPO except medications.

## 2021-07-26 NOTE — PLAN OF CARE
Pt up with 1 assist to bathroom and gets very SOB and gulps, coughs and mouth breathes while recovering. O2 sat 94% with spot check during bathroom visit. Excellent appetite. Lungs diminished. Chest tube patent to suction and serosanguinous drainage -20cm/H2o. Plan for surgery Thursday and thoracic and pulmonary involved. Zosyn for asp pneu. Tremors. Will monitor.

## 2021-07-26 NOTE — PLAN OF CARE
VSS, afeb., LS are dim with exp wheezes throughout, 95% on RA. Pt has good appetite, and had 3 turkey sandwiches this shift. Good fluid intake. Pt has R chest tube , to suction w/ -20 H2O. Pt given oxy 5 po for R side discomfort.  Pt will be NPO after midnight for a Vats procedure scheduled for 10 am tomorrow. Pt is Full code. Tele is SR.

## 2021-07-26 NOTE — PROGRESS NOTES
[facility]  Hospitalist Progress Note  Patient Name: Austyn Leach    MRN: [id number]  Provider: Mathew Johnson MD  07/26/21             Assessment and Plan:      Summary of Stay: Austyn Leach is a 66 year old male with history of spontaneous pneumothorax in 5/2021 status post pigtail catheter, COPD, active nicotine dependence with cigarettes, schizophrenia, bipolar 1 disorder, and GERD.  He presented to the [facility] ED on 7/25/2021 for evaluation of shortness of breath.  He had an episode of choking on his dinner the night previous to admission requiring the Heimlich maneuver by his wife.  He denied fevers, chills, cough, and shortness of breath over the day before presentation.  On the day of presentation he was having shortness of breath and cough.  ED evaluation included a chest x-ray which showed a large right-sided pneumothorax with leftward shift of the mediastinum.  A pigtail catheter placed with improvement of his symptoms.  Thoracic surgery was consulted and recommended a CT of chest.  CT showed a small right-sided pneumothorax, right base chest tube in place, prominent region of consolidation of the right lower lobe, and diffuse emphysematous changes.  Austyn was admitted to the hospital for further care. He was kept on chest tube suction. Thoracic surgery tentatively scheduled VATS for the morning after admission.  After review of CT and examination of patient showing dyspnea at rest thoracic surgery cancelled surgery, wanting to treat possible pneumonia and optimize pulmonary status prior to surgery.     ASSESSMENT/PLAN     Recurrent right pneumothorax   S/p Chest tube placement  Aspiration event requiring Heimlich maneuver preceding pneumothorax  Possible right lower lung aspiration pneumonia  - Thoracic Surgery consult appreciated  - Continue chest tube suction  - Start Zosyn for possible aspiration pneumonia  - Schedule duonebs four times daily  - As needed albuterol nebs ordered  -  "Pulmonary consult per thoracic surgery to optimize pulmonary status  - Surgery for pneumothorax tentatively planned for Thursday     COPD without acute exacerbation  - Schedule duonebs  - Prn albuterol nebs     Active Nicotine Dependence with Cigarettes  - Pt smokes 10 cigarettes per day  - Nicotine patch ordered     Schizophrenia  Bipolar Disorder Type 1  - Doesn't seem to be on meds     Mild Hypokalemia  - Electrolyte replacement protocol       Code Status: Full Code   DVT Prophylaxis: Pneumatic Compression Devices  Discharge Plan: Likely here until next weekend or Monday with tentative plan for surgery Thursday           Interval History:      No new problems.  Denies SOB at rest but some with activity.  Some cough. No ab pain, diarrhea, or dysuria.                  Physical Exam:      Last Vital Signs:  /76 (BP Location: Right arm)   Pulse 83   Temp 98.9  F (37.2  C) (Oral)   Resp 16   Ht 1.88 m (6' 2\")   Wt 80 kg (176 lb 6.4 oz)   SpO2 93%   BMI 22.65 kg/m      Intake/Output Summary (Last 24 hours) at 7/26/2021 1621  Last data filed at 7/26/2021 1355  Gross per 24 hour   Intake 240 ml   Output 12 ml   Net 228 ml       GENERAL:  Comfortable. Cooperative.  PSYCH: pleasant, oriented, No acute distress.  EYES: PERRLA, Normal conjunctiva.  HEART:  Regular rate and rhythm. No JVD. Pulses normal. No edema.  LUNGS:  Coarse breath sounds in right lung base to auscultation, normal Respiratory effort.  ABDOMEN:  Soft, no hepatosplenomegaly, normal bowel sounds.  EXTREMETIES: No clubbing, cyanosis or ischemia  SKIN:  Dry to touch, No rash.           Medications:      All current medications were reviewed.         Data:      All new lab and imaging data was reviewed.   Labs:  No results for input(s): CULT in the last 168 hours.       Lab Results   Component Value Date     07/26/2021     07/25/2021     05/06/2021     05/05/2021     04/24/2020    Lab Results   Component Value Date    " CHLORIDE 112 07/26/2021    CHLORIDE 112 07/25/2021    CHLORIDE 106 05/06/2021    CHLORIDE 106 05/05/2021    CHLORIDE 109 04/24/2020    Lab Results   Component Value Date    BUN 25 07/26/2021    BUN 22 07/25/2021    BUN 20 05/06/2021    BUN 17 05/05/2021    BUN 14 04/24/2020      Lab Results   Component Value Date    POTASSIUM 4.1 07/26/2021    POTASSIUM 4.1 07/25/2021    POTASSIUM 3.2 07/25/2021    POTASSIUM 4.4 05/06/2021    POTASSIUM 4.0 05/05/2021    POTASSIUM 3.8 04/26/2020    Lab Results   Component Value Date    CO2 25 07/26/2021    CO2 30 07/25/2021    CO2 30 05/06/2021    CO2 32 05/05/2021    CO2 28 04/24/2020    Lab Results   Component Value Date    CR 0.80 07/26/2021    CR 0.87 07/25/2021    CR 0.73 05/06/2021    CR 0.79 05/05/2021    CR 0.98 04/24/2020        Recent Labs   Lab 07/26/21  0806 07/25/21  1426   WBC 8.9 6.3   HGB 12.4* 13.2*   HCT 38.4* 40.3   MCV 93 92    190      Imaging:   Recent Results (from the past 24 hour(s))   XR Chest Port 1 View    Narrative    CHEST ONE VIEW PORTABLE   7/26/2021 8:27 AM     HISTORY:  Pneumothorax with chest tube.    COMPARISON: 7/25/2021      Impression    IMPRESSION: Right basilar chest tube remains in place. Trace apical  pleural air present in the right hemithorax. No evidence of tension.  Left lung is clear.    VICENTE JENSEN MD         SYSTEM ID:  [id number]

## 2021-07-27 ENCOUNTER — PREP FOR PROCEDURE (OUTPATIENT)
Dept: SURGERY | Facility: CLINIC | Age: 66
End: 2021-07-27

## 2021-07-27 ENCOUNTER — APPOINTMENT (OUTPATIENT)
Dept: CARDIOLOGY | Facility: CLINIC | Age: 66
DRG: 163 | End: 2021-07-27
Attending: PHYSICIAN ASSISTANT
Payer: MEDICARE

## 2021-07-27 LAB
LVEF ECHO: NORMAL
POTASSIUM BLD-SCNC: 3.8 MMOL/L (ref 3.4–5.3)

## 2021-07-27 PROCEDURE — 250N000013 HC RX MED GY IP 250 OP 250 PS 637: Performed by: INTERNAL MEDICINE

## 2021-07-27 PROCEDURE — 93306 TTE W/DOPPLER COMPLETE: CPT

## 2021-07-27 PROCEDURE — 120N000001 HC R&B MED SURG/OB

## 2021-07-27 PROCEDURE — 84132 ASSAY OF SERUM POTASSIUM: CPT | Performed by: INTERNAL MEDICINE

## 2021-07-27 PROCEDURE — 250N000011 HC RX IP 250 OP 636: Performed by: INTERNAL MEDICINE

## 2021-07-27 PROCEDURE — 99231 SBSQ HOSP IP/OBS SF/LOW 25: CPT | Performed by: PHYSICIAN ASSISTANT

## 2021-07-27 PROCEDURE — 99233 SBSQ HOSP IP/OBS HIGH 50: CPT | Performed by: INTERNAL MEDICINE

## 2021-07-27 PROCEDURE — 36415 COLL VENOUS BLD VENIPUNCTURE: CPT | Performed by: INTERNAL MEDICINE

## 2021-07-27 PROCEDURE — 93306 TTE W/DOPPLER COMPLETE: CPT | Mod: 26 | Performed by: INTERNAL MEDICINE

## 2021-07-27 RX ADMIN — TAZOBACTAM SODIUM AND PIPERACILLIN SODIUM 3.38 G: 375; 3 INJECTION, SOLUTION INTRAVENOUS at 18:20

## 2021-07-27 RX ADMIN — UMECLIDINIUM BROMIDE AND VILANTEROL TRIFENATATE 1 PUFF: 62.5; 25 POWDER RESPIRATORY (INHALATION) at 11:13

## 2021-07-27 RX ADMIN — TAZOBACTAM SODIUM AND PIPERACILLIN SODIUM 3.38 G: 375; 3 INJECTION, SOLUTION INTRAVENOUS at 13:19

## 2021-07-27 RX ADMIN — TAZOBACTAM SODIUM AND PIPERACILLIN SODIUM 3.38 G: 375; 3 INJECTION, SOLUTION INTRAVENOUS at 06:36

## 2021-07-27 RX ADMIN — NICOTINE 1 PATCH: 14 PATCH, EXTENDED RELEASE TRANSDERMAL at 08:37

## 2021-07-27 RX ADMIN — FLUTICASONE FUROATE 1 PUFF: 100 POWDER RESPIRATORY (INHALATION) at 11:13

## 2021-07-27 RX ADMIN — OMEPRAZOLE 20 MG: 20 CAPSULE, DELAYED RELEASE ORAL at 08:38

## 2021-07-27 RX ADMIN — GUAIFENESIN 1200 MG: 600 TABLET, EXTENDED RELEASE ORAL at 20:15

## 2021-07-27 RX ADMIN — GUAIFENESIN 1200 MG: 600 TABLET, EXTENDED RELEASE ORAL at 08:38

## 2021-07-27 RX ADMIN — TAZOBACTAM SODIUM AND PIPERACILLIN SODIUM 3.38 G: 375; 3 INJECTION, SOLUTION INTRAVENOUS at 01:06

## 2021-07-27 ASSESSMENT — ACTIVITIES OF DAILY LIVING (ADL)
ADLS_ACUITY_SCORE: 12

## 2021-07-27 ASSESSMENT — MIFFLIN-ST. JEOR: SCORE: 1670.31

## 2021-07-27 NOTE — PLAN OF CARE
"Patient a&ox4. Denies pain. Inspiratory and expiratory wheezes, 2LPM O2 satting 94%. Chest tube to sxn, minimal output. Dressing CDI. IV zosyn. CMS intact. Potential VATS on Thursday, pulmonology following.     BP (!) 134/91 (BP Location: Right arm)   Pulse 75   Temp 98.9  F (37.2  C) (Oral)   Resp 22   Ht 1.88 m (6' 2\")   Wt 80 kg (176 lb 6.4 oz)   SpO2 96%   BMI 22.65 kg/m      "

## 2021-07-27 NOTE — PLAN OF CARE
"BP (!) 150/90 (BP Location: Right arm)   Pulse 79   Temp 97.1  F (36.2  C) (Oral)   Resp 19   Ht 1.88 m (6' 2\")   Wt 82.1 kg (180 lb 14.4 oz)   SpO2 (!) 88%   BMI 23.23 kg/m      Pt is A&Ox 4, Denies pain. Ls diminished with inspiratory and expiratory wheezes. Weaned to 1 L O2 NC, denies SOB,but gets SERRANO. Refuses IS. Chest tube on R side. Dressing CDI, to water seal. No output this shift. On IV zosyn. CMS intact. Potential VATS on 7/29 @ 0700. Pulmonology following. Continue to monitor.   "

## 2021-07-27 NOTE — PROGRESS NOTES
THORACIC & FOREGUT SURGERY    S:  No overnight events.  Pt seen at bedside resting comfortably.       O:  Vitals:    07/27/21 0541 07/27/21 0752 07/27/21 0940 07/27/21 1000   BP:  (!) 150/90     BP Location:  Right arm     Pulse:  79     Resp:  19     Temp:  97.1  F (36.2  C)     TempSrc:  Oral     SpO2:  94% 95% 94%   Weight: 82.1 kg (180 lb 14.4 oz)      Height:           Alert  Breathing minimally labored  Skin warm and dry      A/P: Patient is a 66 year old man with a second episode of a secondary spontaneous pneumothorax.  He likely has aspiration pneumonia, per the CT findings. He is a poor surgical candidate at the moment given the aspiration event and likely evolving pneumonia as well as his overall medical status.  -Appreciate Pulmonary recommendations  -Echo ordered and discussed with pt  -Chest tube to water seal  -CXR tomorrow morning  -Scheduled in the OR on 7/29, but doubt he will be a good candidate for surgery  -Consider bedside pleurodesis, but only once we are sure his aspiration pneumonia is not worsening      Katie Kaur PA-C  Thoracic Surgery

## 2021-07-27 NOTE — PROGRESS NOTES
[facility]  Hospitalist Progress Note  Patient Name: Austyn Leach    MRN: [id number]  Provider: Mathew Johnson MD  07/27/21             Assessment and Plan:      Summary of Stay: Austyn Leach is a 66 year old male with history of spontaneous pneumothorax in 5/2021 status post pigtail catheter, COPD, active nicotine dependence with cigarettes, schizophrenia, bipolar 1 disorder, and GERD.  He presented to the [facility] ED on 7/25/2021 for evaluation of shortness of breath.  He had an episode of choking on his dinner the night previous to admission requiring the Heimlich maneuver by his wife.  He denied fevers, chills, cough, and shortness of breath over the day before presentation.  On the day of presentation he was having shortness of breath and cough.  ED evaluation included a chest x-ray which showed a large right-sided pneumothorax with leftward shift of the mediastinum.  A pigtail catheter placed with improvement of his symptoms.  Thoracic surgery was consulted and recommended a CT of chest.  CT showed a small right-sided pneumothorax, right base chest tube in place, prominent region of consolidation of the right lower lobe, and diffuse emphysematous changes.  Austyn was admitted to the hospital for further care. He was kept on chest tube suction. Thoracic surgery tentatively scheduled VATS for the morning after admission.  After review of CT and examination of patient showing dyspnea at rest thoracic surgery was cancelled with thoracic surgery wanting to treat possible pneumonia and optimize pulmonary status prior to surgery.     ASSESSMENT/PLAN     Recurrent right pneumothorax   S/p Chest tube placement  Aspiration event requiring Heimlich maneuver preceding pneumothorax  Possible right lower lung aspiration pneumonia  - Thoracic Surgery consult appreciated  - Continue chest tube suction  - Continue Zosyn for possible aspiration pneumonia- Austyn is feeling better  - Pulmonary consult  "appreciated:  Continue Anoro, Arnuity, and prn albuterol nebs  - Echo was done per Pulmonary rec and looks unremarkable  - Surgery for pneumothorax tentatively planned for Thursday     COPD without acute exacerbation  - Continue Anoro and Arnuity inhalers and Prn albuterol nebs     Active Nicotine Dependence with Cigarettes  - Pt smokes 10 cigarettes per day  - Nicotine patch ordered     Schizophrenia  Bipolar Disorder Type 1  - Doesn't seem to be on meds     Mild Hypokalemia  - Electrolyte replacement protocol        Code Status: Full Code   DVT Prophylaxis: Pneumatic Compression Devices  Discharge Plan: Likely here until next weekend or Monday with tentative plan for surgery Thursday        Interval History:      Feeling better today- cough resolved. Less SOB. No ab pain, nausea, vomiting, or dysuria.                   Physical Exam:      Last Vital Signs:  BP (!) 150/90 (BP Location: Right arm)   Pulse 79   Temp 97.1  F (36.2  C) (Oral)   Resp 19   Ht 1.88 m (6' 2\")   Wt 82.1 kg (180 lb 14.4 oz)   SpO2 95%   BMI 23.23 kg/m      Intake/Output Summary (Last 24 hours) at 7/27/2021 1627  Last data filed at 7/27/2021 1504  Gross per 24 hour   Intake 240 ml   Output 0 ml   Net 240 ml       GENERAL:  Comfortable. Cooperative.  PSYCH: pleasant, oriented, No acute distress.  EYES: PERRLA, Normal conjunctiva.  HEART:  Regular rate and rhythm. No JVD. Pulses normal. No edema.  LUNGS:  Some coarse breath sounds in lung bases to auscultation, normal Respiratory effort.  ABDOMEN:  Soft, no hepatosplenomegaly, normal bowel sounds.  EXTREMETIES: No clubbing, cyanosis or ischemia  SKIN:  Dry to touch, No rash.           Medications:      All current medications were reviewed.         Data:      All new lab and imaging data was reviewed.   Labs:  No results for input(s): CULT in the last 168 hours.       Lab Results   Component Value Date     07/26/2021     07/25/2021     05/06/2021     05/05/2021    "  04/24/2020    Lab Results   Component Value Date    CHLORIDE 112 07/26/2021    CHLORIDE 112 07/25/2021    CHLORIDE 106 05/06/2021    CHLORIDE 106 05/05/2021    CHLORIDE 109 04/24/2020    Lab Results   Component Value Date    BUN 25 07/26/2021    BUN 22 07/25/2021    BUN 20 05/06/2021    BUN 17 05/05/2021    BUN 14 04/24/2020      Lab Results   Component Value Date    POTASSIUM 3.8 07/27/2021    POTASSIUM 4.1 07/26/2021    POTASSIUM 4.1 07/25/2021    POTASSIUM 4.4 05/06/2021    POTASSIUM 4.0 05/05/2021    POTASSIUM 3.8 04/26/2020    Lab Results   Component Value Date    CO2 25 07/26/2021    CO2 30 07/25/2021    CO2 30 05/06/2021    CO2 32 05/05/2021    CO2 28 04/24/2020    Lab Results   Component Value Date    CR 0.80 07/26/2021    CR 0.87 07/25/2021    CR 0.73 05/06/2021    CR 0.79 05/05/2021    CR 0.98 04/24/2020        Recent Labs   Lab 07/26/21  0806 07/25/21  1426   WBC 8.9 6.3   HGB 12.4* 13.2*   HCT 38.4* 40.3   MCV 93 92    190

## 2021-07-27 NOTE — PROVIDER NOTIFICATION
Pt has increased resps and breathing is more labored. 02 sats are 88% on RA, 02 at 2L placed and now sats are 93%. Increased cough with production. CT site is [facility]. No crepitus noted. MD notified and portable cxr ordered. Will cont to monitor.

## 2021-07-27 NOTE — PLAN OF CARE
VSS, afeb., LS are dim with exp and ins wheezes throughout. Pt is on 2L NC and sats 93%, Pt has frequent cough with copious amts of clear sputum. CXR done this shift with no acute changes seen. Chest tube to suction, CT site is [facility]. Plan to have Vats procedure on Thursday morning if improving, IV Zosyn given Q6H for asp PNA. Tele is SR.

## 2021-07-27 NOTE — PLAN OF CARE
Patient A&Ox4, cooperative. States he wants to go home. RN stated that he is allowed to leave if that is truly his wish, but that it would be AMA and talked patient into staying for further treatment. Patient resting comfortably in bed. Will continue to monitor. Patient's CT to water seal per Thoracic surgery.     Justine DE OLIVEIRA Will on 7/27/2021 at 10:01 PM

## 2021-07-27 NOTE — CONSULTS
Brief sw note:    Sw aware of consult ordered on 7/25 for discharge planning.  Per chart review, the pt will not be ready for discharge until the weekend or Monday.  At this time, it appears the pt will have surgery on Thursday.    CM will follow the pt for discharge needs.    BOYD Rosas, [facility]  Inpatient Care Coordination  [facility]  [phone]

## 2021-07-28 ENCOUNTER — APPOINTMENT (OUTPATIENT)
Dept: CT IMAGING | Facility: CLINIC | Age: 66
DRG: 163 | End: 2021-07-28
Attending: PHYSICIAN ASSISTANT
Payer: MEDICARE

## 2021-07-28 ENCOUNTER — ANESTHESIA EVENT (OUTPATIENT)
Dept: SURGERY | Facility: CLINIC | Age: 66
DRG: 163 | End: 2021-07-28
Payer: MEDICARE

## 2021-07-28 LAB
ERYTHROCYTE [DISTWIDTH] IN BLOOD BY AUTOMATED COUNT: 13.2 % (ref 10–15)
HCT VFR BLD AUTO: 40.5 % (ref 40–53)
HGB BLD-MCNC: 13.2 G/DL (ref 13.3–17.7)
MCH RBC QN AUTO: 30.3 PG (ref 26.5–33)
MCHC RBC AUTO-ENTMCNC: 32.6 G/DL (ref 31.5–36.5)
MCV RBC AUTO: 93 FL (ref 78–100)
PLATELET # BLD AUTO: 184 10E3/UL (ref 150–450)
POTASSIUM BLD-SCNC: 3.8 MMOL/L (ref 3.4–5.3)
RBC # BLD AUTO: 4.36 10E6/UL (ref 4.4–5.9)
WBC # BLD AUTO: 7.7 10E3/UL (ref 4–11)

## 2021-07-28 PROCEDURE — 99232 SBSQ HOSP IP/OBS MODERATE 35: CPT | Performed by: PHYSICIAN ASSISTANT

## 2021-07-28 PROCEDURE — 71250 CT THORAX DX C-: CPT | Mod: MG

## 2021-07-28 PROCEDURE — 250N000013 HC RX MED GY IP 250 OP 250 PS 637: Performed by: INTERNAL MEDICINE

## 2021-07-28 PROCEDURE — 94640 AIRWAY INHALATION TREATMENT: CPT

## 2021-07-28 PROCEDURE — 250N000011 HC RX IP 250 OP 636: Performed by: INTERNAL MEDICINE

## 2021-07-28 PROCEDURE — 84132 ASSAY OF SERUM POTASSIUM: CPT | Performed by: INTERNAL MEDICINE

## 2021-07-28 PROCEDURE — 36415 COLL VENOUS BLD VENIPUNCTURE: CPT | Performed by: PHYSICIAN ASSISTANT

## 2021-07-28 PROCEDURE — 999N000157 HC STATISTIC RCP TIME EA 10 MIN

## 2021-07-28 PROCEDURE — 120N000001 HC R&B MED SURG/OB

## 2021-07-28 PROCEDURE — 36415 COLL VENOUS BLD VENIPUNCTURE: CPT | Performed by: INTERNAL MEDICINE

## 2021-07-28 PROCEDURE — 85027 COMPLETE CBC AUTOMATED: CPT | Performed by: PHYSICIAN ASSISTANT

## 2021-07-28 PROCEDURE — 99232 SBSQ HOSP IP/OBS MODERATE 35: CPT | Performed by: INTERNAL MEDICINE

## 2021-07-28 RX ADMIN — GUAIFENESIN 1200 MG: 600 TABLET, EXTENDED RELEASE ORAL at 20:36

## 2021-07-28 RX ADMIN — TAZOBACTAM SODIUM AND PIPERACILLIN SODIUM 3.38 G: 375; 3 INJECTION, SOLUTION INTRAVENOUS at 12:51

## 2021-07-28 RX ADMIN — TAZOBACTAM SODIUM AND PIPERACILLIN SODIUM 3.38 G: 375; 3 INJECTION, SOLUTION INTRAVENOUS at 18:58

## 2021-07-28 RX ADMIN — NICOTINE 1 PATCH: 14 PATCH, EXTENDED RELEASE TRANSDERMAL at 08:17

## 2021-07-28 RX ADMIN — UMECLIDINIUM BROMIDE AND VILANTEROL TRIFENATATE 1 PUFF: 62.5; 25 POWDER RESPIRATORY (INHALATION) at 07:31

## 2021-07-28 RX ADMIN — TAZOBACTAM SODIUM AND PIPERACILLIN SODIUM 3.38 G: 375; 3 INJECTION, SOLUTION INTRAVENOUS at 06:10

## 2021-07-28 RX ADMIN — FLUTICASONE FUROATE 1 PUFF: 100 POWDER RESPIRATORY (INHALATION) at 07:31

## 2021-07-28 RX ADMIN — TAZOBACTAM SODIUM AND PIPERACILLIN SODIUM 3.38 G: 375; 3 INJECTION, SOLUTION INTRAVENOUS at 01:00

## 2021-07-28 ASSESSMENT — ACTIVITIES OF DAILY LIVING (ADL)
ADLS_ACUITY_SCORE: 12

## 2021-07-28 NOTE — PROGRESS NOTES
[facility]  Hospitalist Progress Note  Tony Alicea MD 07/28/21    Reason for Stay (Diagnosis): Recurrent pneumothorax         Assessment and Plan:      Summary of Stay: Austyn Leach is a 66 year old male with history of spontaneous pneumothorax in 5/2021 status post pigtail catheter, COPD, active nicotine dependence with cigarettes, schizophrenia, bipolar 1 disorder, and GERD.  He presented to the [facility] ED on 7/25/2021 for evaluation of shortness of breath.  He had an episode of choking on his dinner the night previous to admission requiring the Heimlich maneuver by his wife.  He denied fevers, chills, cough, and shortness of breath over the day before presentation.  On the day of presentation he was having shortness of breath and cough.  ED evaluation included a chest x-ray which showed a large right-sided pneumothorax with leftward shift of the mediastinum.  A pigtail catheter placed with improvement of his symptoms.  Thoracic surgery was consulted and recommended a CT of chest.  CT showed a small right-sided pneumothorax, right base chest tube in place, prominent region of consolidation of the right lower lobe, and diffuse emphysematous changes.  Austyn was admitted to the hospital for further care. He was kept on chest tube suction.  Thoracic surgery was consulted.  After review of CT and examination of patient showing dyspnea at rest thoracic surgery recommended treating possible aspiration pneumonia with IV Zosyn.  He is remained afebrile without leukocytosis.  He has now weaned to room air.  Chest tube is on waterseal.  Thoracic surgery recommending repeat CT chest today which is pending.  Consideration for bedside talc pleurodesis tomorrow.     ASSESSMENT/PLAN  Recurrent right pneumothorax  S/p Chest tube placement  Aspiration event requiring Heimlich maneuver preceding pneumothorax  Possible right lower lung aspiration pneumonia  - Thoracic Surgery consult appreciated  - Continue chest  tube to waterseal for now per thoracic surgery  - Continue Zosyn for possible aspiration pneumonia seen on CT.  Has a minimally productive cough, but has not had fever or leukocytosis  - Pulmonary consult appreciated:  Continue Anoro, Arnuity, and prn albuterol nebs  - Echo was done per Pulmonary rec and looks unremarkable  -Currently on room air  - Repeat chest CT today per thoracic surgery, results pending.  - Consideration for bedside talc pleurodesis       COPD without acute exacerbation  - Continue Anoro and Arnuity inhalers and Prn albuterol nebs     Tobacco dependence: Continues to smoke 10 cigarettes/day.  I strongly recommended cessation especially with his recurrent pneumothorax and emphysema on CT.  - Nicotine patch ordered     Schizophrenia, Bipolar Disorder Type 1: Not on any medications for this.  Intermittently anxious here, but redirectable so far.     Mild Hypokalemia: Initial potassium 3.2.  Etiology unclear.  - Electrolyte replacement protocol    DVT Prophylaxis: Pneumatic Compression Devices  Code Status: Full Code  FEN: Regular diet  Discharge Dispo: Home  Estimated Disch Date / # of Days until Disch: Pending thoracic surgery plans for recurrent spontaneous pneumothorax.        Interval History (Subjective):      Assumed care today.  He was upset last night and just wants to go home.  I discussed with him that we need a more definitive plan for his recurrent pneumothorax or this will likely occur again.  Thoracic surgery recommended CT chest which is pending at this time.  Consideration for talc pleurodesis as opposed to VATS.  In either case I said he would likely need to remain in the hospital for a couple of days to make sure things are improving.  Today he denies any shortness of breath or chest pain.  He is coughing a little bit more and bringing up some sputum.  Has remained afebrile.                  Physical Exam:      Last Vital Signs:  BP (!) 148/86 (BP Location: Right arm)   Pulse 80  "  Temp 97.6  F (36.4  C) (Oral)   Resp 18   Ht 1.88 m (6' 2\")   Wt 82.1 kg (180 lb 14.4 oz)   SpO2 92%   BMI 23.23 kg/m        Intake/Output Summary (Last 24 hours) at 7/28/2021 1434  Last data filed at 7/28/2021 0600  Gross per 24 hour   Intake 360 ml   Output 4 ml   Net 356 ml       Constitutional: Awake, NAD  Eyes: sclera white  HEENT: MMM  Respiratory: no respiratory distress, on room air, right-sided chest tube in place  Cardiovascular: RRR.  No murmur   GI: non-tender, not distended, bowel sounds present  Skin: no rash    Musculoskeletal/extremities:   No edema  Neurologic: A&O   Psychiatric: calm, cooperative          Medications:      All current medications were reviewed with changes reflected in problem list.         Data:      All new lab and imaging data was reviewed.   Labs:  Recent Labs   Lab 07/28/21  1319 07/26/21  0806 07/25/21  1426   WBC 7.7 8.9 6.3   HGB 13.2* 12.4* 13.2*   HCT 40.5 38.4* 40.3   MCV 93 93 92    195 190      Imaging:   CT chest pending      Tony Alicea MD        "

## 2021-07-28 NOTE — PLAN OF CARE
Orientation: A&Ox4  VS: stable, hypertensive  Pain: denies  Tele: SR  Activity: SBA   Resp: SOB, dyspnea on exertion, LS dim w/ expiratory wheezes   GI: WDL  : WDL   Lines: PIV S/L  Other: Chest tube in place WDL, 0mL output this shift   Plan: surgery thursday

## 2021-07-28 NOTE — PLAN OF CARE
"Patient A&Ox4. Patient went for walk on PM shift and home O2 study performed on walk, see previous note. Patient stated that it \"felt good to walk\" and denied SOB. SERRANO noted. Minimal output through CT (CT to water seal), and patient aware that he will become NPO for tentative procedure in AM. Will continue to monitor.    Justine DE OLIVEIRA Will on 7/28/2021 at 8:40 PM      "

## 2021-07-28 NOTE — PROGRESS NOTES
THORACIC & FOREGUT SURGERY    S:  No overnight events.  Pt seen at bedside resting comfortably.  Just finished lunch. Would like to walk more. No new complaints, but would like to go home soon.     O:  Vitals:    07/27/21 2358 07/28/21 0731 07/28/21 0748 07/28/21 1145   BP: (!) 141/83  (!) 144/78 (!) 148/86   BP Location: Right arm  Right arm Right arm   Pulse: 85 84 84 80   Resp: 18 18 18 18   Temp: 97.2  F (36.2  C)  (!) 95.7  F (35.4  C) 97.6  F (36.4  C)   TempSrc: Oral  Oral Oral   SpO2: 95% 94% 92% 92%   Weight:       Height:           Alert  Breathing non-labored  Distal extremities warm.     A/P: Patient is a 66 year old man with a second episode of a secondary spontaneous pneumothorax.  He likely has aspiration pneumonia, per the CT findings. He is a poor surgical candidate at the moment given the aspiration event and likely evolving pneumonia as well as his overall medical status.  -Appreciate Pulmonary recommendations  -Echo unremarkable  -Keep chest tube to water seal  -CT chest today and may need to consider bedside talc pleurodesis (discussed with pt today) as he is not an ideal surgical candidate  -Ambulate  -Pain control    Katie Kaur PA-C  Thoracic Surgery

## 2021-07-28 NOTE — PLAN OF CARE
Pt up to bathroom with 1 assist. Chest tube patent and to water seal. Down for CT chest this afternoon and depending on results will have procedure done tomorrow. Alert and oriented. Agitated and curses from time to time but can mostly be redirected. Excellent appetite. SOB with activity and RA o2 92%. Will monitor.    Pt down for CT chest and afterward came back to floor and ambulated in king. RA sat 95% before walk and 93% after walk. Does get SOB with activity and causes him to cough. Resolved relatively quickly when back in bed. Hopes to walk again a couple times this evening and to sit up in chair.

## 2021-07-29 ENCOUNTER — ANESTHESIA (OUTPATIENT)
Dept: SURGERY | Facility: CLINIC | Age: 66
DRG: 163 | End: 2021-07-29
Payer: MEDICARE

## 2021-07-29 ENCOUNTER — APPOINTMENT (OUTPATIENT)
Dept: GENERAL RADIOLOGY | Facility: CLINIC | Age: 66
DRG: 163 | End: 2021-07-29
Attending: THORACIC SURGERY (CARDIOTHORACIC VASCULAR SURGERY)
Payer: MEDICARE

## 2021-07-29 LAB
CREAT SERPL-MCNC: 0.89 MG/DL (ref 0.66–1.25)
GFR SERPL CREATININE-BSD FRML MDRD: 89 ML/MIN/1.73M2
POTASSIUM BLD-SCNC: 3.9 MMOL/L (ref 3.4–5.3)

## 2021-07-29 PROCEDURE — 258N000003 HC RX IP 258 OP 636: Performed by: ANESTHESIOLOGY

## 2021-07-29 PROCEDURE — 250N000013 HC RX MED GY IP 250 OP 250 PS 637: Performed by: INTERNAL MEDICINE

## 2021-07-29 PROCEDURE — 82565 ASSAY OF CREATININE: CPT | Performed by: INTERNAL MEDICINE

## 2021-07-29 PROCEDURE — 370N000017 HC ANESTHESIA TECHNICAL FEE, PER MIN: Performed by: THORACIC SURGERY (CARDIOTHORACIC VASCULAR SURGERY)

## 2021-07-29 PROCEDURE — 36415 COLL VENOUS BLD VENIPUNCTURE: CPT | Performed by: INTERNAL MEDICINE

## 2021-07-29 PROCEDURE — 258N000003 HC RX IP 258 OP 636: Performed by: THORACIC SURGERY (CARDIOTHORACIC VASCULAR SURGERY)

## 2021-07-29 PROCEDURE — 250N000013 HC RX MED GY IP 250 OP 250 PS 637: Performed by: ANESTHESIOLOGY

## 2021-07-29 PROCEDURE — 250N000009 HC RX 250: Performed by: THORACIC SURGERY (CARDIOTHORACIC VASCULAR SURGERY)

## 2021-07-29 PROCEDURE — 250N000011 HC RX IP 250 OP 636: Performed by: NURSE ANESTHETIST, CERTIFIED REGISTERED

## 2021-07-29 PROCEDURE — 99233 SBSQ HOSP IP/OBS HIGH 50: CPT | Performed by: INTERNAL MEDICINE

## 2021-07-29 PROCEDURE — 32650 THORACOSCOPY W/PLEURODESIS: CPT | Performed by: THORACIC SURGERY (CARDIOTHORACIC VASCULAR SURGERY)

## 2021-07-29 PROCEDURE — 84132 ASSAY OF SERUM POTASSIUM: CPT | Performed by: INTERNAL MEDICINE

## 2021-07-29 PROCEDURE — 250N000009 HC RX 250: Performed by: ANESTHESIOLOGY

## 2021-07-29 PROCEDURE — 272N000002 HC OR SUPPLY OTHER OPNP: Performed by: THORACIC SURGERY (CARDIOTHORACIC VASCULAR SURGERY)

## 2021-07-29 PROCEDURE — 250N000009 HC RX 250: Performed by: NURSE ANESTHETIST, CERTIFIED REGISTERED

## 2021-07-29 PROCEDURE — 710N000009 HC RECOVERY PHASE 1, LEVEL 1, PER MIN: Performed by: THORACIC SURGERY (CARDIOTHORACIC VASCULAR SURGERY)

## 2021-07-29 PROCEDURE — 120N000001 HC R&B MED SURG/OB

## 2021-07-29 PROCEDURE — 3E0L4GC INTRODUCTION OF OTHER THERAPEUTIC SUBSTANCE INTO PLEURAL CAVITY, PERCUTANEOUS ENDOSCOPIC APPROACH: ICD-10-PCS | Performed by: THORACIC SURGERY (CARDIOTHORACIC VASCULAR SURGERY)

## 2021-07-29 PROCEDURE — 272N000001 HC OR GENERAL SUPPLY STERILE: Performed by: THORACIC SURGERY (CARDIOTHORACIC VASCULAR SURGERY)

## 2021-07-29 PROCEDURE — 250N000011 HC RX IP 250 OP 636: Performed by: THORACIC SURGERY (CARDIOTHORACIC VASCULAR SURGERY)

## 2021-07-29 PROCEDURE — 999N000141 HC STATISTIC PRE-PROCEDURE NURSING ASSESSMENT: Performed by: THORACIC SURGERY (CARDIOTHORACIC VASCULAR SURGERY)

## 2021-07-29 PROCEDURE — 999N000065 XR CHEST PORT 1 VIEW

## 2021-07-29 PROCEDURE — 99232 SBSQ HOSP IP/OBS MODERATE 35: CPT | Performed by: PHYSICIAN ASSISTANT

## 2021-07-29 PROCEDURE — 0B5N4ZZ DESTRUCTION OF RIGHT PLEURA, PERCUTANEOUS ENDOSCOPIC APPROACH: ICD-10-PCS | Performed by: THORACIC SURGERY (CARDIOTHORACIC VASCULAR SURGERY)

## 2021-07-29 PROCEDURE — 250N000013 HC RX MED GY IP 250 OP 250 PS 637: Performed by: THORACIC SURGERY (CARDIOTHORACIC VASCULAR SURGERY)

## 2021-07-29 PROCEDURE — 250N000011 HC RX IP 250 OP 636: Performed by: INTERNAL MEDICINE

## 2021-07-29 PROCEDURE — 250N000011 HC RX IP 250 OP 636: Performed by: ANESTHESIOLOGY

## 2021-07-29 PROCEDURE — 360N000077 HC SURGERY LEVEL 4, PER MIN: Performed by: THORACIC SURGERY (CARDIOTHORACIC VASCULAR SURGERY)

## 2021-07-29 RX ORDER — PROPOFOL 10 MG/ML
INJECTION, EMULSION INTRAVENOUS PRN
Status: DISCONTINUED | OUTPATIENT
Start: 2021-07-29 | End: 2021-07-29

## 2021-07-29 RX ORDER — ONDANSETRON 4 MG/1
4 TABLET, ORALLY DISINTEGRATING ORAL EVERY 30 MIN PRN
Status: DISCONTINUED | OUTPATIENT
Start: 2021-07-29 | End: 2021-07-29 | Stop reason: HOSPADM

## 2021-07-29 RX ORDER — DEXTROSE MONOHYDRATE, SODIUM CHLORIDE, AND POTASSIUM CHLORIDE 50; 1.49; 4.5 G/1000ML; G/1000ML; G/1000ML
INJECTION, SOLUTION INTRAVENOUS CONTINUOUS
Status: DISCONTINUED | OUTPATIENT
Start: 2021-07-29 | End: 2021-07-30

## 2021-07-29 RX ORDER — KETOROLAC TROMETHAMINE 15 MG/ML
15 INJECTION, SOLUTION INTRAMUSCULAR; INTRAVENOUS EVERY 6 HOURS PRN
Status: DISCONTINUED | OUTPATIENT
Start: 2021-07-29 | End: 2021-07-29 | Stop reason: HOSPADM

## 2021-07-29 RX ORDER — ACETAMINOPHEN 325 MG/1
650 TABLET ORAL EVERY 4 HOURS PRN
Status: DISCONTINUED | OUTPATIENT
Start: 2021-08-01 | End: 2021-08-01 | Stop reason: HOSPADM

## 2021-07-29 RX ORDER — LIDOCAINE 40 MG/G
CREAM TOPICAL
Status: DISCONTINUED | OUTPATIENT
Start: 2021-07-29 | End: 2021-07-29 | Stop reason: HOSPADM

## 2021-07-29 RX ORDER — ACETAMINOPHEN 325 MG/1
975 TABLET ORAL ONCE
Status: COMPLETED | OUTPATIENT
Start: 2021-07-29 | End: 2021-07-29

## 2021-07-29 RX ORDER — BUPIVACAINE HYDROCHLORIDE 2.5 MG/ML
INJECTION, SOLUTION INFILTRATION; PERINEURAL PRN
Status: DISCONTINUED | OUTPATIENT
Start: 2021-07-29 | End: 2021-07-29 | Stop reason: HOSPADM

## 2021-07-29 RX ORDER — CEFAZOLIN SODIUM 2 G/100ML
2 INJECTION, SOLUTION INTRAVENOUS
Status: COMPLETED | OUTPATIENT
Start: 2021-07-29 | End: 2021-07-29

## 2021-07-29 RX ORDER — LORAZEPAM 2 MG/ML
.5-1 INJECTION INTRAMUSCULAR
Status: DISCONTINUED | OUTPATIENT
Start: 2021-07-29 | End: 2021-07-29 | Stop reason: HOSPADM

## 2021-07-29 RX ORDER — AMOXICILLIN 250 MG
1 CAPSULE ORAL 2 TIMES DAILY
Status: DISCONTINUED | OUTPATIENT
Start: 2021-07-29 | End: 2021-07-29

## 2021-07-29 RX ORDER — ALBUTEROL SULFATE 0.83 MG/ML
2.5 SOLUTION RESPIRATORY (INHALATION) EVERY 4 HOURS PRN
Status: DISCONTINUED | OUTPATIENT
Start: 2021-07-29 | End: 2021-07-29 | Stop reason: HOSPADM

## 2021-07-29 RX ORDER — ACETAMINOPHEN 325 MG/1
975 TABLET ORAL EVERY 8 HOURS
Status: DISCONTINUED | OUTPATIENT
Start: 2021-07-29 | End: 2021-08-01 | Stop reason: HOSPADM

## 2021-07-29 RX ORDER — POLYETHYLENE GLYCOL 3350 17 G/17G
17 POWDER, FOR SOLUTION ORAL DAILY
Status: DISCONTINUED | OUTPATIENT
Start: 2021-07-30 | End: 2021-07-29

## 2021-07-29 RX ORDER — ONDANSETRON 2 MG/ML
4 INJECTION INTRAMUSCULAR; INTRAVENOUS EVERY 6 HOURS PRN
Status: DISCONTINUED | OUTPATIENT
Start: 2021-07-29 | End: 2021-08-01 | Stop reason: HOSPADM

## 2021-07-29 RX ORDER — DIPHENHYDRAMINE HCL 12.5MG/5ML
12.5 LIQUID (ML) ORAL EVERY 6 HOURS PRN
Status: DISCONTINUED | OUTPATIENT
Start: 2021-07-29 | End: 2021-08-01 | Stop reason: HOSPADM

## 2021-07-29 RX ORDER — SODIUM CHLORIDE, SODIUM LACTATE, POTASSIUM CHLORIDE, CALCIUM CHLORIDE 600; 310; 30; 20 MG/100ML; MG/100ML; MG/100ML; MG/100ML
INJECTION, SOLUTION INTRAVENOUS CONTINUOUS
Status: DISCONTINUED | OUTPATIENT
Start: 2021-07-29 | End: 2021-07-29 | Stop reason: HOSPADM

## 2021-07-29 RX ORDER — DIPHENHYDRAMINE HYDROCHLORIDE 50 MG/ML
12.5 INJECTION INTRAMUSCULAR; INTRAVENOUS EVERY 6 HOURS PRN
Status: DISCONTINUED | OUTPATIENT
Start: 2021-07-29 | End: 2021-07-29

## 2021-07-29 RX ORDER — HYDRALAZINE HYDROCHLORIDE 20 MG/ML
2.5-5 INJECTION INTRAMUSCULAR; INTRAVENOUS EVERY 10 MIN PRN
Status: DISCONTINUED | OUTPATIENT
Start: 2021-07-29 | End: 2021-07-29 | Stop reason: HOSPADM

## 2021-07-29 RX ORDER — PROCHLORPERAZINE MALEATE 5 MG
5 TABLET ORAL EVERY 6 HOURS PRN
Status: DISCONTINUED | OUTPATIENT
Start: 2021-07-29 | End: 2021-08-01 | Stop reason: HOSPADM

## 2021-07-29 RX ORDER — DIPHENHYDRAMINE HCL 12.5MG/5ML
12.5 LIQUID (ML) ORAL EVERY 6 HOURS PRN
Status: DISCONTINUED | OUTPATIENT
Start: 2021-07-29 | End: 2021-07-29

## 2021-07-29 RX ORDER — ONDANSETRON 4 MG/1
4 TABLET, ORALLY DISINTEGRATING ORAL EVERY 6 HOURS PRN
Status: DISCONTINUED | OUTPATIENT
Start: 2021-07-29 | End: 2021-08-01 | Stop reason: HOSPADM

## 2021-07-29 RX ORDER — FENTANYL CITRATE 50 UG/ML
25 INJECTION, SOLUTION INTRAMUSCULAR; INTRAVENOUS EVERY 5 MIN PRN
Status: DISCONTINUED | OUTPATIENT
Start: 2021-07-29 | End: 2021-07-29 | Stop reason: HOSPADM

## 2021-07-29 RX ORDER — IBUPROFEN 600 MG/1
600 TABLET, FILM COATED ORAL EVERY 6 HOURS PRN
Status: DISCONTINUED | OUTPATIENT
Start: 2021-07-29 | End: 2021-08-01 | Stop reason: HOSPADM

## 2021-07-29 RX ORDER — CEFAZOLIN SODIUM 2 G/100ML
2 INJECTION, SOLUTION INTRAVENOUS SEE ADMIN INSTRUCTIONS
Status: DISCONTINUED | OUTPATIENT
Start: 2021-07-29 | End: 2021-07-29 | Stop reason: HOSPADM

## 2021-07-29 RX ORDER — POLYETHYLENE GLYCOL 3350 17 G/17G
17 POWDER, FOR SOLUTION ORAL DAILY
Status: DISCONTINUED | OUTPATIENT
Start: 2021-07-29 | End: 2021-08-01 | Stop reason: HOSPADM

## 2021-07-29 RX ORDER — DIPHENHYDRAMINE HYDROCHLORIDE 50 MG/ML
12.5 INJECTION INTRAMUSCULAR; INTRAVENOUS EVERY 6 HOURS PRN
Status: DISCONTINUED | OUTPATIENT
Start: 2021-07-29 | End: 2021-08-01 | Stop reason: HOSPADM

## 2021-07-29 RX ORDER — ONDANSETRON 2 MG/ML
4 INJECTION INTRAMUSCULAR; INTRAVENOUS EVERY 6 HOURS PRN
Status: DISCONTINUED | OUTPATIENT
Start: 2021-07-29 | End: 2021-07-29

## 2021-07-29 RX ORDER — OXYCODONE HYDROCHLORIDE 5 MG/1
10 TABLET ORAL EVERY 4 HOURS PRN
Status: DISCONTINUED | OUTPATIENT
Start: 2021-07-29 | End: 2021-08-01 | Stop reason: HOSPADM

## 2021-07-29 RX ORDER — LABETALOL HYDROCHLORIDE 5 MG/ML
10 INJECTION, SOLUTION INTRAVENOUS
Status: COMPLETED | OUTPATIENT
Start: 2021-07-29 | End: 2021-07-29

## 2021-07-29 RX ORDER — AMOXICILLIN 250 MG
1 CAPSULE ORAL 2 TIMES DAILY
Status: DISCONTINUED | OUTPATIENT
Start: 2021-07-29 | End: 2021-08-01 | Stop reason: HOSPADM

## 2021-07-29 RX ORDER — HYDROMORPHONE HCL IN WATER/PF 6 MG/30 ML
0.2 PATIENT CONTROLLED ANALGESIA SYRINGE INTRAVENOUS EVERY 5 MIN PRN
Status: DISCONTINUED | OUTPATIENT
Start: 2021-07-29 | End: 2021-07-29 | Stop reason: HOSPADM

## 2021-07-29 RX ORDER — IPRATROPIUM BROMIDE AND ALBUTEROL SULFATE 2.5; .5 MG/3ML; MG/3ML
3 SOLUTION RESPIRATORY (INHALATION) ONCE
Status: COMPLETED | OUTPATIENT
Start: 2021-07-29 | End: 2021-07-29

## 2021-07-29 RX ORDER — ONDANSETRON 4 MG/1
4 TABLET, ORALLY DISINTEGRATING ORAL EVERY 6 HOURS PRN
Status: DISCONTINUED | OUTPATIENT
Start: 2021-07-29 | End: 2021-07-29

## 2021-07-29 RX ORDER — OXYCODONE HYDROCHLORIDE 5 MG/1
5 TABLET ORAL EVERY 4 HOURS PRN
Status: DISCONTINUED | OUTPATIENT
Start: 2021-07-29 | End: 2021-08-01 | Stop reason: HOSPADM

## 2021-07-29 RX ORDER — FENTANYL CITRATE 50 UG/ML
50 INJECTION, SOLUTION INTRAMUSCULAR; INTRAVENOUS
Status: DISCONTINUED | OUTPATIENT
Start: 2021-07-29 | End: 2021-07-29 | Stop reason: HOSPADM

## 2021-07-29 RX ORDER — ALBUTEROL SULFATE 0.83 MG/ML
2.5 SOLUTION RESPIRATORY (INHALATION)
Status: DISCONTINUED | OUTPATIENT
Start: 2021-07-29 | End: 2021-07-29

## 2021-07-29 RX ORDER — ONDANSETRON 2 MG/ML
INJECTION INTRAMUSCULAR; INTRAVENOUS PRN
Status: DISCONTINUED | OUTPATIENT
Start: 2021-07-29 | End: 2021-07-29

## 2021-07-29 RX ORDER — NEOSTIGMINE METHYLSULFATE 1 MG/ML
VIAL (ML) INJECTION PRN
Status: DISCONTINUED | OUTPATIENT
Start: 2021-07-29 | End: 2021-07-29

## 2021-07-29 RX ORDER — ONDANSETRON 2 MG/ML
4 INJECTION INTRAMUSCULAR; INTRAVENOUS EVERY 30 MIN PRN
Status: DISCONTINUED | OUTPATIENT
Start: 2021-07-29 | End: 2021-07-29 | Stop reason: HOSPADM

## 2021-07-29 RX ORDER — AMOXICILLIN 250 MG
2 CAPSULE ORAL 2 TIMES DAILY
Status: DISCONTINUED | OUTPATIENT
Start: 2021-07-29 | End: 2021-08-01 | Stop reason: HOSPADM

## 2021-07-29 RX ORDER — FENTANYL CITRATE 50 UG/ML
INJECTION, SOLUTION INTRAMUSCULAR; INTRAVENOUS PRN
Status: DISCONTINUED | OUTPATIENT
Start: 2021-07-29 | End: 2021-07-29

## 2021-07-29 RX ORDER — PROCHLORPERAZINE 25 MG
12.5 SUPPOSITORY, RECTAL RECTAL EVERY 12 HOURS PRN
Status: DISCONTINUED | OUTPATIENT
Start: 2021-07-29 | End: 2021-08-01 | Stop reason: HOSPADM

## 2021-07-29 RX ORDER — GLYCOPYRROLATE 0.2 MG/ML
INJECTION, SOLUTION INTRAMUSCULAR; INTRAVENOUS PRN
Status: DISCONTINUED | OUTPATIENT
Start: 2021-07-29 | End: 2021-07-29

## 2021-07-29 RX ORDER — OXYCODONE HYDROCHLORIDE 5 MG/1
5 TABLET ORAL EVERY 4 HOURS PRN
Status: DISCONTINUED | OUTPATIENT
Start: 2021-07-29 | End: 2021-07-29

## 2021-07-29 RX ORDER — LIDOCAINE HYDROCHLORIDE 20 MG/ML
INJECTION, SOLUTION INFILTRATION; PERINEURAL PRN
Status: DISCONTINUED | OUTPATIENT
Start: 2021-07-29 | End: 2021-07-29

## 2021-07-29 RX ORDER — DEXAMETHASONE SODIUM PHOSPHATE 4 MG/ML
INJECTION, SOLUTION INTRA-ARTICULAR; INTRALESIONAL; INTRAMUSCULAR; INTRAVENOUS; SOFT TISSUE PRN
Status: DISCONTINUED | OUTPATIENT
Start: 2021-07-29 | End: 2021-07-29

## 2021-07-29 RX ORDER — BISACODYL 10 MG
10 SUPPOSITORY, RECTAL RECTAL DAILY PRN
Status: DISCONTINUED | OUTPATIENT
Start: 2021-07-29 | End: 2021-08-01 | Stop reason: HOSPADM

## 2021-07-29 RX ORDER — MEPERIDINE HYDROCHLORIDE 25 MG/ML
12.5 INJECTION INTRAMUSCULAR; INTRAVENOUS; SUBCUTANEOUS
Status: DISCONTINUED | OUTPATIENT
Start: 2021-07-29 | End: 2021-07-29 | Stop reason: HOSPADM

## 2021-07-29 RX ORDER — PROCHLORPERAZINE MALEATE 5 MG
5 TABLET ORAL EVERY 6 HOURS PRN
Status: DISCONTINUED | OUTPATIENT
Start: 2021-07-29 | End: 2021-07-29

## 2021-07-29 RX ADMIN — MIDAZOLAM 2 MG: 1 INJECTION INTRAMUSCULAR; INTRAVENOUS at 07:28

## 2021-07-29 RX ADMIN — FENTANYL CITRATE 100 MCG: 50 INJECTION, SOLUTION INTRAMUSCULAR; INTRAVENOUS at 07:34

## 2021-07-29 RX ADMIN — ACETAMINOPHEN 975 MG: 325 TABLET, FILM COATED ORAL at 22:23

## 2021-07-29 RX ADMIN — LABETALOL HYDROCHLORIDE 10 MG: 5 INJECTION, SOLUTION INTRAVENOUS at 09:10

## 2021-07-29 RX ADMIN — LIDOCAINE HYDROCHLORIDE 50 MG: 20 INJECTION, SOLUTION INFILTRATION; PERINEURAL at 07:34

## 2021-07-29 RX ADMIN — ACETAMINOPHEN 975 MG: 325 TABLET, FILM COATED ORAL at 06:48

## 2021-07-29 RX ADMIN — GLYCOPYRROLATE 0.2 MG: 0.2 INJECTION, SOLUTION INTRAMUSCULAR; INTRAVENOUS at 07:42

## 2021-07-29 RX ADMIN — ROCURONIUM BROMIDE 50 MG: 10 INJECTION INTRAVENOUS at 07:34

## 2021-07-29 RX ADMIN — FENTANYL CITRATE 25 MCG: 50 INJECTION, SOLUTION INTRAMUSCULAR; INTRAVENOUS at 09:13

## 2021-07-29 RX ADMIN — FENTANYL CITRATE 25 MCG: 50 INJECTION, SOLUTION INTRAMUSCULAR; INTRAVENOUS at 09:19

## 2021-07-29 RX ADMIN — ONDANSETRON HYDROCHLORIDE 4 MG: 2 INJECTION, SOLUTION INTRAVENOUS at 08:03

## 2021-07-29 RX ADMIN — PROPOFOL 150 MG: 10 INJECTION, EMULSION INTRAVENOUS at 07:34

## 2021-07-29 RX ADMIN — TAZOBACTAM SODIUM AND PIPERACILLIN SODIUM 3.38 G: 375; 3 INJECTION, SOLUTION INTRAVENOUS at 01:47

## 2021-07-29 RX ADMIN — DEXAMETHASONE SODIUM PHOSPHATE 8 MG: 4 INJECTION, SOLUTION INTRA-ARTICULAR; INTRALESIONAL; INTRAMUSCULAR; INTRAVENOUS; SOFT TISSUE at 07:34

## 2021-07-29 RX ADMIN — GLYCOPYRROLATE 0.6 MG: 0.2 INJECTION, SOLUTION INTRAMUSCULAR; INTRAVENOUS at 08:33

## 2021-07-29 RX ADMIN — GUAIFENESIN 1200 MG: 600 TABLET, EXTENDED RELEASE ORAL at 20:04

## 2021-07-29 RX ADMIN — FENTANYL CITRATE 25 MCG: 50 INJECTION, SOLUTION INTRAMUSCULAR; INTRAVENOUS at 09:08

## 2021-07-29 RX ADMIN — NICOTINE 1 PATCH: 14 PATCH, EXTENDED RELEASE TRANSDERMAL at 11:22

## 2021-07-29 RX ADMIN — ACETAMINOPHEN 975 MG: 325 TABLET, FILM COATED ORAL at 13:46

## 2021-07-29 RX ADMIN — NEOSTIGMINE METHYLSULFATE 4 MG: 1 INJECTION, SOLUTION INTRAVENOUS at 08:33

## 2021-07-29 RX ADMIN — TAZOBACTAM SODIUM AND PIPERACILLIN SODIUM 3.38 G: 375; 3 INJECTION, SOLUTION INTRAVENOUS at 13:54

## 2021-07-29 RX ADMIN — CEFAZOLIN SODIUM 2 G: 2 INJECTION, SOLUTION INTRAVENOUS at 07:28

## 2021-07-29 RX ADMIN — FENTANYL CITRATE 50 MCG: 50 INJECTION, SOLUTION INTRAMUSCULAR; INTRAVENOUS at 08:09

## 2021-07-29 RX ADMIN — SODIUM CHLORIDE, POTASSIUM CHLORIDE, SODIUM LACTATE AND CALCIUM CHLORIDE: 600; 310; 30; 20 INJECTION, SOLUTION INTRAVENOUS at 09:43

## 2021-07-29 RX ADMIN — FENTANYL CITRATE 50 MCG: 50 INJECTION, SOLUTION INTRAMUSCULAR; INTRAVENOUS at 08:35

## 2021-07-29 RX ADMIN — OMEPRAZOLE 20 MG: 20 CAPSULE, DELAYED RELEASE ORAL at 11:20

## 2021-07-29 RX ADMIN — TAZOBACTAM SODIUM AND PIPERACILLIN SODIUM 3.38 G: 375; 3 INJECTION, SOLUTION INTRAVENOUS at 20:09

## 2021-07-29 RX ADMIN — SODIUM CHLORIDE, POTASSIUM CHLORIDE, SODIUM LACTATE AND CALCIUM CHLORIDE: 600; 310; 30; 20 INJECTION, SOLUTION INTRAVENOUS at 07:27

## 2021-07-29 RX ADMIN — Medication: at 09:28

## 2021-07-29 RX ADMIN — IPRATROPIUM BROMIDE AND ALBUTEROL SULFATE 3 ML: .5; 3 SOLUTION RESPIRATORY (INHALATION) at 06:48

## 2021-07-29 RX ADMIN — DOCUSATE SODIUM AND SENNOSIDES 2 TABLET: 8.6; 5 TABLET, FILM COATED ORAL at 20:04

## 2021-07-29 RX ADMIN — POTASSIUM CHLORIDE, DEXTROSE MONOHYDRATE AND SODIUM CHLORIDE: 150; 5; 450 INJECTION, SOLUTION INTRAVENOUS at 11:07

## 2021-07-29 ASSESSMENT — LIFESTYLE VARIABLES: TOBACCO_USE: 1

## 2021-07-29 ASSESSMENT — ACTIVITIES OF DAILY LIVING (ADL)
ADLS_ACUITY_SCORE: 12

## 2021-07-29 ASSESSMENT — MIFFLIN-ST. JEOR
SCORE: 1652.62
SCORE: 1654.88

## 2021-07-29 ASSESSMENT — COPD QUESTIONNAIRES
COPD: 1
CAT_SEVERITY: MODERATE

## 2021-07-29 NOTE — ANESTHESIA POSTPROCEDURE EVALUATION
Patient: Austyn Leach    Procedure(s):  Right thoracoscopy, talc and mechanical pleurodesis    Diagnosis:Secondary spontaneous pneumothorax [J93.12]  Diagnosis Additional Information: No value filed.    Anesthesia Type:  General    Note:  Disposition: Inpatient   Postop Pain Control: Uneventful            Sign Out: Well controlled pain   PONV: No   Neuro/Psych: Uneventful            Sign Out: Acceptable/Baseline neuro status   Airway/Respiratory: Uneventful            Sign Out: Acceptable/Baseline resp. status   CV/Hemodynamics: Uneventful            Sign Out: Acceptable CV status   Other NRE: NONE   DID A NON-ROUTINE EVENT OCCUR? No           Last vitals:  Vitals Value Taken Time   /86 07/29/21 1000   Temp 98.9  F (37.2  C) 07/29/21 0958   Pulse 70 07/29/21 1015   Resp 21 07/29/21 1015   SpO2 94 % 07/29/21 1010   Vitals shown include unvalidated device data.    Electronically Signed By: Zeus Pepe MD  July 29, 2021  5:45 PM

## 2021-07-29 NOTE — BRIEF OP NOTE
[facility]    Brief Operative Note    Pre-operative diagnosis: Secondary spontaneous pneumothorax [J93.12]  Post-operative diagnosis Same as pre-operative diagnosis    Procedure: Procedure(s):  Right thoracoscopy, talc and mechanical pleurodesis  Surgeon: Surgeon(s) and Role:     * Benoit Villagomez MD - Primary  Anesthesia: General   Estimated blood loss: Less than 30 ml  Drains: 28 Fr chest tube  Specimens: * No specimens in log *  Findings:   None.  Complications: None.  Implants: * No implants in log *

## 2021-07-29 NOTE — PLAN OF CARE
"Patient has been assessed for Home Oxygen needs. Oxygen readings:    *Pulse oximetry (SpO2) = 91% on room air at rest while awake.      *SpO2 = 89-90% on room air during activity/with exercise.     Patient walked down king for approx 10 min. The first half of the walk at a casual pace SpO2 90%. When patient \"ramped it up\" for the second half of the walk SpO2 dropped to 89%. When patient returned to room and sat down, SpO2 william to 90% and continue to rise to baseline quickly. Patient denied SOB, stating \"it feels good to walk\" however SERRANO observed.     Justine DE OLIVEIRA Will on 7/28/2021 at 7:08 PM        "

## 2021-07-29 NOTE — PLAN OF CARE
VSS. On room air. LS diminished. Chest tube in place to the right with minimal drainage (water seal). Potential Talc pleurodesis today. Thoracic and pulmonology consulted. Receiving IV zosyn. Continue to monitor patient.

## 2021-07-29 NOTE — OP NOTE
Preoperative diagnosis:                         Recurrent pneumothorax  Postoperative diagnosis:                       Recurrent pneumothorax     Procedure:   1. Right VATS mechanical and talc pleurodesis     Anesthesia: General   Surgeon: Benoit Villagomez   EBL:  30 cc     Complications: none immediate  Findings:  Adhesions of upper lobe.  No air leak on completion.      Description of procedure   The patient was brought to the room and placed supine upon the table.  After confirming the patient's identity and verifying the consent, appropriate monitoring devices were placed, as well as SCD boots. General anesthesia was administered.  The patient was intubated with a double-lumen endotracheal tube.  Proper position was confirmed by fiberoptic bronchoscopy. Intravenous antibiotic was administered within 1 hour prior to the incision.  The patient was turned to the left lateral decubitus position and all pressure points were padded.  An axillary roll was placed, the bed was flexed, and the bean bag was deflated into position.  The patient was secured to the table and the right arm was placed on an airplane board.  The previously placed pigtail catheter was removed.  The right chest was prepped with chlorhexidine and  draped in the standard surgical fashion.       A 10 mm incision was made in the seventh intercostal space in line with the anterior superior iliac spine.  This was carried down to the pleural cavity.  A 10 mm port was placed and a 10 mm, 30-degree thoracoscope was inserted into the pleural cavity.  There were adhesions of the right upper lobe apex and the medial aspect as well noted.  A 10 mm anterior incision was made and a port was placed.  Given the adhesions and no kristal bullous disease seen, I decided to proceed with pleurodesis only. A mechanical pleurodesis was then performed, extending from the 2nd to the 6th ribs, from 1 cm anterior to the sympathetic trunk and 1 cm posterior to the mammary vessels.   A talc pleurodesis was then performed with 10 mg of talc.  A multiple level intercostal nerve block was done using 0.25% marcaine.  A 28 Kittitian chest tube was then placed going posteriorly to the apex  through the initial camera port site.  This was secured to the skin using 0 silk suture.  The lung was observed to inflate appropriately.  The incisions were irrigated and closed in layers.  The areas were cleaned and dried and Exofin was applied.      At  the end of the case, sponge, needle, and instrument counts were correct.

## 2021-07-29 NOTE — ANESTHESIA PROCEDURE NOTES
Airway       Patient location during procedure: OR       Procedure Start/Stop Times: 7/29/2021 7:37 AM  Staff -        CRNA: Benjy Balderas APRN CRNA       Performed By: CRNAIndications and Patient Condition       Indications for airway management: cindy-procedural       Induction type:intravenous       Mask difficulty assessment: 1 - vent by mask    Final Airway Details       Final airway type: endotracheal airway       Successful airway: ETT - double lumen left  Endotracheal Airway Details        Cuffed: yes       Cuff volume (mL): 6       Successful intubation technique: direct laryngoscopy       DL Blade Type: Vasques 2       Grade View of Cords: 1       Adjucts: stylet       Position: Center       Measured from: lips       Bite block used: None       ETT Double lumen (fr): 39    Post intubation assessment        Placement verified by: capnometry, equal breath sounds and chest rise        Number of attempts at approach: 1       Secured with: plastic tape       Ease of procedure: easy       Dentition: Intact and Unchanged

## 2021-07-29 NOTE — ANESTHESIA CARE TRANSFER NOTE
Patient: Austyn Leach    Procedure(s):  Right thoracoscopy, talc and mechanical pleurodesis    Diagnosis: Secondary spontaneous pneumothorax [J93.12]  Diagnosis Additional Information: No value filed.    Anesthesia Type:   General     Note:    Oropharynx: oropharynx clear of all foreign objects  Level of Consciousness: awake  Oxygen Supplementation: face mask  Level of Supplemental Oxygen (L/min / FiO2): 6  Independent Airway: airway patency satisfactory and stable  Dentition: dentition unchanged  Vital Signs Stable: post-procedure vital signs reviewed and stable  Report to RN Given: handoff report given  Patient transferred to: PACU    Handoff Report: Identifed the Patient, Identified the Reponsible Provider, Reviewed the pertinent medical history, Discussed the surgical course, Reviewed Intra-OP anesthesia mangement and issues during anesthesia, Set expectations for post-procedure period and Allowed opportunity for questions and acknowledgement of understanding      Vitals:  Vitals Value Taken Time   BP     Temp     Pulse     Resp     SpO2 98 % 07/29/21 0844   Vitals shown include unvalidated device data.    Electronically Signed By: NAVEED Valenzuela CRNA  July 29, 2021  8:46 AM

## 2021-07-29 NOTE — PLAN OF CARE
Patient A&Ox4, dislikes wearing the O2/Capno and refuses to wear while eating. Patient weaned to 1 LPM on NC. Some complaints of moderate pain; being controlled with PCA and scheduled tylenol. Patient anxious to be able to go home. Will continue to monitor.     Justine DE OLIVEIRA Will on 7/29/2021 at 9:53 PM

## 2021-07-29 NOTE — PLAN OF CARE
A&O x 4. NSR on tele. Up with A x 2, refused gait belt. HOB > 30 degrees. Chest tube to water seal suction. Refused I.S. or to take deep breaths.O2 5L. Tolerating regular diet.   IV x 2 in place. Dilaudid PCA, D5 1/2 NS with 20 mEq of potassium at 50 ml/hr. Continue ABX.   K WNL per protocol- recheck tomorrow am  PT/OT/Thoracic surgery following

## 2021-07-29 NOTE — ANESTHESIA PREPROCEDURE EVALUATION
Anesthesia Pre-Procedure Evaluation    Patient: Austyn Leach   MRN: [id number] : 1955        Preoperative Diagnosis: Secondary spontaneous pneumothorax [J93.12]   Procedure : Procedure(s):  Right thoracoscopic bullectomy, talc pleurodesis     Past Medical History:   Diagnosis Date     Bipolar 1 disorder (H)      GERD (gastroesophageal reflux disease)      Mild intermittent asthma     uses primatent     Schizophrenia (H)       Past Surgical History:   Procedure Laterality Date     NO HISTORY OF SURGERY       TESTICLE SURGERY       VASECTOMY        Allergies   Allergen Reactions     Bee Venom Shortness Of Breath and Swelling      Social History     Tobacco Use     Smoking status: Current Every Day Smoker     Packs/day: 1.00     Years: 58.00     Pack years: 58.00     Types: Cigarettes     Smokeless tobacco: Never Used   Substance Use Topics     Alcohol use: No      Wt Readings from Last 1 Encounters:   21 80.3 kg (177 lb)        Anesthesia Evaluation   Pt has not had prior anesthetic         ROS/MED HX  ENT/Pulmonary: Comment: Spontaneous PTX    (+) tobacco use, Current use, Intermittent, asthma Treatment: Inhaler prn,  moderate,  COPD,     Neurologic:       Cardiovascular:     (+) -----Previous cardiac testing   Echo: Date: 2021 Results:  The visual ejection fraction is 55-60%.  Left ventricular systolic function is normal.  RV function normal  No sig valve disease  The ascending aorta is Mildly dilated.  Stress Test: Date: Results:    ECG Reviewed: Date: Results:    Cath: Date: Results:   (-) taking anticoagulants/antiplatelets   METS/Exercise Tolerance:     Hematologic:  - neg hematologic  ROS     Musculoskeletal:  - neg musculoskeletal ROS     GI/Hepatic:     (+) GERD,     Renal/Genitourinary:  - neg Renal ROS     Endo:  - neg endo ROS     Psychiatric/Substance Use:     (+) psychiatric history schizophrenia and bipolar     Infectious Disease:  - neg infectious disease ROS     Malignancy:        Other:            Physical Exam    Airway        Mallampati: I   TM distance: > 3 FB   Neck ROM: full   Mouth opening: > 3 cm    Respiratory Devices and Support         Dental     Comment: Most teeth missing.  4 on bottom, left incisor very black and slightly loose    (+) missing      Cardiovascular   cardiovascular exam normal          Pulmonary       Comment: Right pig tail catheter in place    (+) decreased breath sounds ( right)           OUTSIDE LABS:  CBC:   Lab Results   Component Value Date    WBC 7.7 07/28/2021    WBC 8.9 07/26/2021    HGB 13.2 (L) 07/28/2021    HGB 12.4 (L) 07/26/2021    HCT 40.5 07/28/2021    HCT 38.4 (L) 07/26/2021     07/28/2021     07/26/2021     BMP:   Lab Results   Component Value Date     07/26/2021     (H) 07/25/2021    POTASSIUM 3.8 07/28/2021    POTASSIUM 3.8 07/27/2021    CHLORIDE 112 (H) 07/26/2021    CHLORIDE 112 (H) 07/25/2021    CO2 25 07/26/2021    CO2 30 07/25/2021    BUN 25 07/26/2021    BUN 22 07/25/2021    CR 0.80 07/26/2021    CR 0.87 07/25/2021     (H) 07/26/2021     (H) 07/25/2021     COAGS: No results found for: PTT, INR, FIBR  POC: No results found for: BGM, HCG, HCGS  HEPATIC:   Lab Results   Component Value Date    ALBUMIN 4.0 05/05/2021    PROTTOTAL 7.9 05/05/2021    ALT 20 05/05/2021    AST 16 05/05/2021    ALKPHOS 64 05/05/2021    BILITOTAL 0.5 05/05/2021     OTHER:   Lab Results   Component Value Date    PH 7.32 07/25/2021    LACT 0.8 07/25/2021    MAL 8.7 07/26/2021    LIPASE 609 (H) 04/10/2020    TSH 0.97 04/26/2020    CRP 3.3 07/11/2008    SED 6 08/28/2015       Anesthesia Plan    ASA Status:  3   NPO Status:  NPO Appropriate    Anesthesia Type: General.     - Airway: ETT   Induction: Intravenous.   Maintenance: Balanced.   Techniques and Equipment:     - Airway: Double lumen ETT         Consents    Anesthesia Plan(s) and associated risks, benefits, and realistic alternatives discussed. Questions answered and  patient/representative(s) expressed understanding.     - Discussed with:  Patient      - Extended Intubation/Ventilatory Support Discussed: No.      - Patient is DNR/DNI Status: No    Use of blood products discussed: No .     Postoperative Care    Pain management: IV analgesics, Oral pain medications, Multi-modal analgesia.   PONV prophylaxis: Ondansetron (or other 5HT-3), Dexamethasone or Solumedrol     Comments:                Zeus Pepe MD

## 2021-07-29 NOTE — PROGRESS NOTES
[facility]  Hospitalist Progress Note  Tony Alicea MD 07/29/21    Reason for Stay (Diagnosis): Recurrent pneumothorax         Assessment and Plan:      Summary of Stay: Austyn Leach is a 66 year old male with history of spontaneous pneumothorax in 5/2021 status post pigtail catheter, COPD, active nicotine dependence with cigarettes, schizophrenia, bipolar 1 disorder, and GERD.  He presented to the [facility] ED on 7/25/2021 for evaluation of shortness of breath.  He had an episode of choking on his dinner the night previous to admission requiring the Heimlich maneuver by his wife.  He denied fevers, chills, cough, and shortness of breath over the day before presentation.  On the day of presentation he was having shortness of breath and cough.  ED evaluation included a chest x-ray which showed a large right-sided pneumothorax with leftward shift of the mediastinum.  A pigtail catheter placed with improvement of his symptoms.  Thoracic surgery was consulted and recommended a CT of chest.  CT showed a small right-sided pneumothorax, right base chest tube in place, prominent region of consolidation of the right lower lobe, and diffuse emphysematous changes.  Austyn was admitted to the hospital for further care. He was kept on chest tube suction.  Thoracic surgery was consulted.  After review of CT and examination of patient showing dyspnea at rest thoracic surgery recommended treating possible aspiration pneumonia with IV Zosyn.  He is remained afebrile without leukocytosis.  He has now weaned to room air.  Underwent right VATS with mechanical and talc pleurodesis this morning.  Chest tube to suction, Dilaudid PCA started for pain relief.     ASSESSMENT/PLAN  Recurrent right pneumothorax, possible aspiration pneumonia: This is the patient's second pneumothorax.  This occurred after requiring Heimlich maneuver for an aspiration event while eating.  Chest tube was placed and pneumothorax resolved.  Due to  his shortness of breath and the CT findings of possible infiltrate he was started on antibiotics for possible aspiration pneumonia.   -Thoracic Surgery consult appreciated  -Underwent right VATS with mechanical and talc pleurodesis this morning  -Continue Zosyn for possible aspiration pneumonia seen on CT.  Has a minimally productive cough, but has not had fever or leukocytosis  -Pulmonary consult appreciated:  Continue Anoro, Arnuity, and prn albuterol nebs  -Echo was done per Pulmonary rec and looks unremarkable  -Dilaudid PCA, oral acetaminophen, ibuprofen, and oxycodone as needed  -Chest tube to suction  -Continuous pulse ox and oxygen as needed  -Patient is wondering if he can continue with chiropractor therapy following this procedure, defer this question to thoracic surgery as I am unclear if this would be okay or not recommended    COPD without acute exacerbation  -Continue Anoro and Arnuity inhalers and Prn albuterol nebs     Tobacco dependence: Continues to smoke 10 cigarettes/day.  I strongly recommended cessation especially with his recurrent pneumothorax and emphysema on CT.  -Nicotine patch ordered     Schizophrenia, Bipolar Disorder Type 1: Not on any medications for this.  Intermittently anxious here, but redirectable so far.     Mild Hypokalemia: Initial potassium 3.2.  Etiology unclear.  -Electrolyte replacement protocol    DVT Prophylaxis: Lovenox  Code Status: Full Code  FEN: Regular diet  Discharge Dispo: Home  Estimated Disch Date / # of Days until Disch: Likely discharge Sunday pending thoracic surgery recommendations        Interval History (Subjective):      Patient underwent right VATS with mechanical and talc pleurodesis this morning.  Seen afterwards.  Chest tube is in place to suction.  There is a small pneumothorax.  He says he feels okay.  Denies any shortness of breath although is on some oxygen.  Has mild to moderate right-sided chest pain now.  Denies any cough at this time.          "         Physical Exam:      Last Vital Signs:  /74   Pulse 79   Temp (!) 95.3  F (35.2  C) (Oral)   Resp 25   Ht 1.88 m (6' 2\")   Wt 80.3 kg (177 lb)   SpO2 93%   BMI 22.73 kg/m      Intake/Output Summary (Last 24 hours) at 7/29/2021 1419  Last data filed at 7/29/2021 1358  Gross per 24 hour   Intake 1250 ml   Output 9 ml   Net 1241 ml       Constitutional: Awake, NAD  Eyes: sclera white  HEENT: MMM  Respiratory: no respiratory distress, no focal crackles or wheeze, right-sided chest tube in place  Cardiovascular: RRR.  No murmur   GI: non-tender, not distended, bowel sounds present  Skin: no rash    Musculoskeletal/extremities:   No edema  Neurologic: A&O   Psychiatric: calm, cooperative          Medications:      All current medications were reviewed with changes reflected in problem list.         Data:      All new lab and imaging data was reviewed.   Labs:  Potassium 3.9, creatinine 0.89    Imaging:   Recent Results (from the past 24 hour(s))   XR Chest Port 1 View    Narrative    CHEST PORTABLE ONE VIEW   7/29/2021 9:23 AM     HISTORY: Status post right pleurodesis.    COMPARISON: Chest CT on 7/28/2021      Impression    IMPRESSION: Single portable AP view of the chest was obtained.  Cardiomediastinal silhouette is within normal limits. Small left  pleural effusion. No significant right pleural effusion. Small right  apical pneumothorax, right apical chest tube is in place.    ALEYDA MURO MD         SYSTEM ID:  [id number]           Tony Alicea MD        "

## 2021-07-29 NOTE — PLAN OF CARE
Patient frequently removes O2/Capnometry, especially when eating. Patient refuses to wear O2/Capnometry while eating despite RN educating about his need for O2 to keep O2 levels at an adequate level.     Justine DE OLIVEIRA Will on 7/29/2021 at 4:51 PM

## 2021-07-30 ENCOUNTER — APPOINTMENT (OUTPATIENT)
Dept: GENERAL RADIOLOGY | Facility: CLINIC | Age: 66
DRG: 163 | End: 2021-07-30
Attending: THORACIC SURGERY (CARDIOTHORACIC VASCULAR SURGERY)
Payer: MEDICARE

## 2021-07-30 ENCOUNTER — APPOINTMENT (OUTPATIENT)
Dept: PHYSICAL THERAPY | Facility: CLINIC | Age: 66
DRG: 163 | End: 2021-07-30
Attending: THORACIC SURGERY (CARDIOTHORACIC VASCULAR SURGERY)
Payer: MEDICARE

## 2021-07-30 LAB — POTASSIUM BLD-SCNC: 4.3 MMOL/L (ref 3.4–5.3)

## 2021-07-30 PROCEDURE — 71046 X-RAY EXAM CHEST 2 VIEWS: CPT

## 2021-07-30 PROCEDURE — 250N000013 HC RX MED GY IP 250 OP 250 PS 637: Performed by: INTERNAL MEDICINE

## 2021-07-30 PROCEDURE — 250N000011 HC RX IP 250 OP 636: Performed by: INTERNAL MEDICINE

## 2021-07-30 PROCEDURE — 120N000001 HC R&B MED SURG/OB

## 2021-07-30 PROCEDURE — 258N000003 HC RX IP 258 OP 636: Performed by: THORACIC SURGERY (CARDIOTHORACIC VASCULAR SURGERY)

## 2021-07-30 PROCEDURE — 94640 AIRWAY INHALATION TREATMENT: CPT

## 2021-07-30 PROCEDURE — 84132 ASSAY OF SERUM POTASSIUM: CPT | Performed by: INTERNAL MEDICINE

## 2021-07-30 PROCEDURE — 99232 SBSQ HOSP IP/OBS MODERATE 35: CPT | Performed by: INTERNAL MEDICINE

## 2021-07-30 PROCEDURE — 97110 THERAPEUTIC EXERCISES: CPT | Mod: GP

## 2021-07-30 PROCEDURE — 36415 COLL VENOUS BLD VENIPUNCTURE: CPT | Performed by: INTERNAL MEDICINE

## 2021-07-30 PROCEDURE — 97161 PT EVAL LOW COMPLEX 20 MIN: CPT | Mod: GP

## 2021-07-30 PROCEDURE — 250N000013 HC RX MED GY IP 250 OP 250 PS 637: Performed by: THORACIC SURGERY (CARDIOTHORACIC VASCULAR SURGERY)

## 2021-07-30 PROCEDURE — 250N000011 HC RX IP 250 OP 636: Performed by: THORACIC SURGERY (CARDIOTHORACIC VASCULAR SURGERY)

## 2021-07-30 RX ORDER — HYDROMORPHONE HYDROCHLORIDE 1 MG/ML
0.3 INJECTION, SOLUTION INTRAMUSCULAR; INTRAVENOUS; SUBCUTANEOUS
Status: DISCONTINUED | OUTPATIENT
Start: 2021-07-30 | End: 2021-08-01 | Stop reason: HOSPADM

## 2021-07-30 RX ADMIN — TAZOBACTAM SODIUM AND PIPERACILLIN SODIUM 3.38 G: 375; 3 INJECTION, SOLUTION INTRAVENOUS at 09:22

## 2021-07-30 RX ADMIN — ACETAMINOPHEN 975 MG: 325 TABLET, FILM COATED ORAL at 06:38

## 2021-07-30 RX ADMIN — POLYETHYLENE GLYCOL 3350 17 G: 17 POWDER, FOR SOLUTION ORAL at 09:17

## 2021-07-30 RX ADMIN — DOCUSATE SODIUM AND SENNOSIDES 2 TABLET: 8.6; 5 TABLET, FILM COATED ORAL at 20:23

## 2021-07-30 RX ADMIN — TAZOBACTAM SODIUM AND PIPERACILLIN SODIUM 3.38 G: 375; 3 INJECTION, SOLUTION INTRAVENOUS at 14:45

## 2021-07-30 RX ADMIN — GUAIFENESIN 1200 MG: 600 TABLET, EXTENDED RELEASE ORAL at 20:23

## 2021-07-30 RX ADMIN — GUAIFENESIN 1200 MG: 600 TABLET, EXTENDED RELEASE ORAL at 09:18

## 2021-07-30 RX ADMIN — OMEPRAZOLE 20 MG: 20 CAPSULE, DELAYED RELEASE ORAL at 09:18

## 2021-07-30 RX ADMIN — TAZOBACTAM SODIUM AND PIPERACILLIN SODIUM 3.38 G: 375; 3 INJECTION, SOLUTION INTRAVENOUS at 02:41

## 2021-07-30 RX ADMIN — DOCUSATE SODIUM AND SENNOSIDES 2 TABLET: 8.6; 5 TABLET, FILM COATED ORAL at 09:17

## 2021-07-30 RX ADMIN — FLUTICASONE FUROATE 1 PUFF: 100 POWDER RESPIRATORY (INHALATION) at 08:22

## 2021-07-30 RX ADMIN — TAZOBACTAM SODIUM AND PIPERACILLIN SODIUM 3.38 G: 375; 3 INJECTION, SOLUTION INTRAVENOUS at 20:21

## 2021-07-30 RX ADMIN — NICOTINE 1 PATCH: 14 PATCH, EXTENDED RELEASE TRANSDERMAL at 09:16

## 2021-07-30 RX ADMIN — ENOXAPARIN SODIUM 40 MG: 40 INJECTION SUBCUTANEOUS at 06:38

## 2021-07-30 RX ADMIN — POTASSIUM CHLORIDE, DEXTROSE MONOHYDRATE AND SODIUM CHLORIDE: 150; 5; 450 INJECTION, SOLUTION INTRAVENOUS at 06:50

## 2021-07-30 RX ADMIN — ACETAMINOPHEN 975 MG: 325 TABLET, FILM COATED ORAL at 14:45

## 2021-07-30 RX ADMIN — UMECLIDINIUM BROMIDE AND VILANTEROL TRIFENATATE 1 PUFF: 62.5; 25 POWDER RESPIRATORY (INHALATION) at 08:22

## 2021-07-30 ASSESSMENT — MIFFLIN-ST. JEOR: SCORE: 1688.45

## 2021-07-30 ASSESSMENT — ACTIVITIES OF DAILY LIVING (ADL)
ADLS_ACUITY_SCORE: 13
ADLS_ACUITY_SCORE: 12
ADLS_ACUITY_SCORE: 13
ADLS_ACUITY_SCORE: 13
ADLS_ACUITY_SCORE: 12
ADLS_ACUITY_SCORE: 12

## 2021-07-30 NOTE — PROGRESS NOTES
"SPIRITUAL HEALTH SERVICES Progress Note  [facility]    Visited pt Austyn \"Kendall\" Hany per length of stay. Oriented to Spiritual Health Services. Kendall declined care at this time. SHS remain available for further consultation.    Dino Calvert   Intern  Pager [phone]   "

## 2021-07-30 NOTE — PLAN OF CARE
VSS. A&Ox4. Reports Rt sided chest pain related to chest tube insertion site. Chest tube dressing CDI, 8mL out this shift. Denies SOB. Remains on 1L O2. Dilaudid PCA pump infusing, capnography in place. D51/2NS with 20meq KCL infusing at 50mL. Ambulating Ax1/Ax2 with gait belt. Scheduled zosyn given. Tele shows SR with occasional PVC's. Discharge 2-3 days.

## 2021-07-30 NOTE — PROGRESS NOTES
[facility]  Hospitalist Progress Note  Tony Alicea MD 07/30/21    Reason for Stay (Diagnosis): Recurrent pneumothorax         Assessment and Plan:      Summary of Stay: Austyn Leach is a 66 year old male with history of spontaneous pneumothorax in 5/2021 status post pigtail catheter, COPD, active nicotine dependence with cigarettes, schizophrenia, bipolar 1 disorder, and GERD.  He presented to the [facility] ED on 7/25/2021 for evaluation of shortness of breath.  He had an episode of choking on his dinner the night previous to admission requiring the Heimlich maneuver by his wife.  He denied fevers, chills, cough, and shortness of breath over the day before presentation.  On the day of presentation he was having shortness of breath and cough.  ED evaluation included a chest x-ray which showed a large right-sided pneumothorax with leftward shift of the mediastinum.  A pigtail catheter placed with improvement of his symptoms.  Thoracic surgery was consulted and recommended a CT of chest.  CT showed a small right-sided pneumothorax, right base chest tube in place, prominent region of consolidation of the right lower lobe, and diffuse emphysematous changes.  Austyn was admitted to the hospital for further care. He was kept on chest tube suction.  Thoracic surgery was consulted.  After review of CT and examination of patient showing dyspnea at rest thoracic surgery recommended treating possible aspiration pneumonia with IV Zosyn.  He is remained afebrile without leukocytosis.  He has now weaned to room air.  Underwent right VATS with mechanical and talc pleurodesis 7/29.  Chest tube to suction, Dilaudid PCA started for pain relief. Minimal PCA use so discontinuing now.     ASSESSMENT/PLAN  Recurrent right pneumothorax, possible aspiration pneumonia: This is the patient's second pneumothorax.  This occurred after requiring Heimlich maneuver for an aspiration event while eating.  Chest tube was placed and  pneumothorax resolved.  Due to his shortness of breath and the CT findings of possible infiltrate he was started on antibiotics for possible aspiration pneumonia.   -Thoracic Surgery consult appreciated  -Underwent right VATS with mechanical and talc pleurodesis 7/29  -Continue Zosyn (started 7/26) for possible aspiration pneumonia seen on CT.  Has a minimally productive cough, but has not had fever or leukocytosis. Stop after 7 days  -Pulmonary consult appreciated:  Continue Anoro, Arnuity, and prn albuterol nebs  -Echo was done per Pulmonary rec and looks unremarkable  -Stop Dilaudid PCA and change to IV Dilaudid as needed, oral acetaminophen, ibuprofen, and oxycodone as needed  -Chest tube management per thoracic surgery, had repeat chest x-ray today that does not show any significant pneumothorax  -Continuous pulse ox and oxygen as needed  -Patient is wondering if he can continue with chiropractor therapy following this procedure, defer this question to thoracic surgery as I am unclear if this would be okay or not recommended    COPD without acute exacerbation  -Continue Anoro and Arnuity inhalers and Prn albuterol nebs     Tobacco dependence: Continues to smoke 10 cigarettes/day.  I strongly recommended cessation especially with his recurrent pneumothorax and emphysema on CT.  -Nicotine patch ordered     Schizophrenia, Bipolar Disorder Type 1: Not on any medications for this.  Intermittently anxious here, but redirectable so far.     Mild Hypokalemia: Initial potassium 3.2.  Etiology unclear.  -Electrolyte replacement protocol    DVT Prophylaxis: Lovenox  Code Status: Full Code  FEN: Regular diet  Discharge Dispo: Home  Estimated Disch Date / # of Days until Disch: Likely discharge Sunday pending thoracic surgery recommendations, perhaps as early as Saturday per my discussion with Dr. Villagomez        Interval History (Subjective):      No acute events overnight. Only mild right chest pain. Minimal Dilaudid PCA  "use so he requests this to be stopped. Denies any shortness of breath. He was able to walk in the halls today which he enjoyed and did not feel very short of breath with this.                  Physical Exam:      Last Vital Signs:  /69   Pulse 79   Temp (!) 96.6  F (35.9  C) (Oral)   Resp 24   Ht 1.88 m (6' 2\")   Wt 83.9 kg (184 lb 14.4 oz)   SpO2 94%   BMI 23.74 kg/m      Intake/Output Summary (Last 24 hours) at 7/30/2021 1213  Last data filed at 7/30/2021 1122  Gross per 24 hour   Intake 258 ml   Output 923 ml   Net -665 ml       Constitutional: Awake, NAD  Eyes: sclera white  HEENT: MMM  Respiratory: Right-sided chest tube in place. On 1 L via nasal cannula. No crackles or wheeze. Appears comfortable.  Cardiovascular: RRR.  No murmur   GI: non-tender, not distended, bowel sounds present  Skin: no rash    Musculoskeletal/extremities:   No edema  Neurologic: A&O   Psychiatric: calm, cooperative          Medications:      All current medications were reviewed with changes reflected in problem list.         Data:      All new lab and imaging data was reviewed.   Labs:  Potassium 4.3    Imaging:   Recent Results (from the past 24 hour(s))   XR Chest 2 Views    Narrative    XR CHEST 2 VW 7/30/2021 10:44 AM    HISTORY: s/p right pleurodesis    COMPARISON: 7/29/2021      Impression    IMPRESSION: Right chest tube. Mild pleural thickening at the right  lung apex. No significant pneumothorax. Mild left basilar linear  atelectasis. Normal heart size.    ALBERTO BECK MD         SYSTEM ID:  [id number]           Tony Alicea MD        "

## 2021-07-30 NOTE — CONSULTS
"CLINICAL NUTRITION SERVICES  -  ASSESSMENT NOTE      MALNUTRITION:  % Weight Loss:  None noted  % Intake:  Decreased intake does not meet criteria for malnutrition   Subcutaneous Fat Loss:  None observed   Muscle Loss:  Temporal region mild depletion, Clavicle bone region mild depletion, Acromion bone region mild depletion, Dorsal hand region mild depletion, Patellar region mild depletion and Anterior thigh region mild depletion  Fluid Retention: None noted or documented    Malnutrition Diagnosis: Patient does not meet two of the above criteria necessary for diagnosing malnutrition        REASON FOR ASSESSMENT  Austyn Leach is a 66 year old male seen by Registered Dietitian for LOS.    PMH of: Spontaneous pneumothorax 5/2021, COPD, schizophrenia, bipolar disorder, GERD.    Admit 2/2: SOB, recurrent spontaneous w/ chest tube, aspiration event.    NUTRITION HISTORY  - Information obtained from patient and chart.  - Diet at home: Regular reported.  - Usual intakes: Meals BID-TID, no set meals or snacks per report.  He is quite vague when discussing nutrition history and often changes subject, reports he wants \"cigarettes and an sarah tray\".  - Barriers to PO intakes: Denies PTA.  - Use of oral supplements: None.  - Chewing/swallowing issues: Denied - he has poor dentition/many missing teeth.  - Allergies: NKFA.      CURRENT NUTRITION ORDERS  Diet Order:     Regular    Current Intake/Tolerance:  100% intakes since admission.  Mostly ordering meals TID.  He denies that pain is impacting PO trends.      NUTRITION FOCUSED PHYSICAL ASSESSMENT FOR DIAGNOSING MALNUTRITION)  Yes     Obtained from Chart/Interdisciplinary Team:  - No documentation of PI  - Stooling patterns reviewed    ANTHROPOMETRICS  Height: 6' 2\"  Weight: 184 lbs 14.4 oz  Body mass index is 23.74 kg/m .  Weight Status:  Normal BMI  Weight History:  Wt Readings from Last 10 Encounters:   07/30/21 83.9 kg (184 lb 14.4 oz)   05/05/21 79.3 kg (174 lb 12.8 oz) "   10/26/20 80.3 kg (177 lb)   08/10/20 90.7 kg (200 lb)   07/07/20 91.6 kg (202 lb)   05/29/20 91.6 kg (202 lb)   05/11/20 90.7 kg (200 lb)   04/25/20 85.7 kg (189 lb)   04/11/20 87.5 kg (193 lb)   06/23/19 90.2 kg (198 lb 14.4 oz)     - No current documentation of edema.  Suspect wt of 177# may be more reflective of true wt.  Patient reports he does not consistently track wt at home and therefore cannot provide insight.  Would be consistent with wt trending from 5/2021.    LABS  Labs reviewed:  Electrolytes  Potassium (mmol/L)   Date Value   07/30/2021 4.3   07/29/2021 3.9   07/28/2021 3.8   05/06/2021 4.4   05/05/2021 4.0   04/26/2020 3.8    Blood Glucose  Glucose (mg/dL)   Date Value   07/26/2021 124 (H)   07/25/2021 109 (H)   05/06/2021 137 (H)   05/05/2021 119 (H)   04/24/2020 90   04/12/2020 99   04/11/2020 110 (H)    Inflammatory Markers  CRP Inflammation (mg/L)   Date Value   07/11/2008 3.3     WBC (10e9/L)   Date Value   05/06/2021 5.5   05/05/2021 6.2   04/24/2020 5.0     WBC Count (10e3/uL)   Date Value   07/28/2021 7.7   07/26/2021 8.9   07/25/2021 6.3     Albumin (g/dL)   Date Value   05/05/2021 4.0   04/24/2020 3.2 (L)   04/11/2020 2.4 (L)      Sodium (mmol/L)   Date Value   07/26/2021 142   07/25/2021 145 (H)   05/06/2021 138   05/05/2021 141   04/24/2020 144    Renal  Urea Nitrogen (mg/dL)   Date Value   07/26/2021 25   07/25/2021 22   05/06/2021 20   05/05/2021 17   04/24/2020 14     Creatinine (mg/dL)   Date Value   07/29/2021 0.89   07/26/2021 0.80   07/25/2021 0.87   05/06/2021 0.73   05/05/2021 0.79   04/24/2020 0.98     Additional  Triglycerides (mg/dL)   Date Value   04/26/2020 89   07/18/2008 138     Ketones Urine (mg/dL)   Date Value   04/24/2020 Negative        B/P: 134/76, T: 97.4, P: 77, R: 24      MEDICATIONS  Medications reviewed:    acetaminophen  975 mg Oral Q8H     enoxaparin ANTICOAGULANT  40 mg Subcutaneous Q24H     fluticasone  1 puff Inhalation Daily     guaiFENesin  1,200 mg  Oral BID     nicotine  1 patch Transdermal Daily     nicotine   Transdermal Q8H     omeprazole  20 mg Oral QAM AC     piperacillin-tazobactam  3.375 g Intravenous Q6H     polyethylene glycol  17 g Oral Daily     senna-docusate  1 tablet Oral BID    Or     senna-docusate  2 tablet Oral BID     sodium chloride (PF)  3 mL Intracatheter Q8H     umeclidinium-vilanterol  1 puff Inhalation Daily        dextrose 5% and 0.45% NaCl + KCl 20 mEq/L 50 mL/hr at 07/30/21 0650     HYDROmorphone            ASSESSED NUTRITION NEEDS PER APPROVED PRACTICE GUIDELINES:    Dosing Weight 80 kg   Estimated Energy Needs: 25-30 Kcal/Kg  Justification: maintenance  Estimated Protein Needs: 1-1.2 g pro/Kg  Justification: preservation of lean body mass  Estimated Fluid Needs: per MD      NUTRITION DIAGNOSIS:  Predicted inadequate nutrient intake (energy/protein) related to potential for decline in PO intakes during hospital admit.    NUTRITION INTERVENTIONS  Recommendations / Nutrition Prescription  Continue diet as ordered, self-selection of protein focus as able.    Declined prn supplement offering.    Implementation  Nutrition education: Provided education on above.    Collaboration and Referral of Nutrition care: Discussed POC with team during rounds.      Nutrition Goals  Patient to consume at least 75% of meals TID.       MONITORING AND EVALUATION:  Progress towards goals will be monitored and evaluated per protocol and Practice Guidelines          Denisa Fernández RDN, LD  Clinical Dietitian  3rd floor/ICU: [phone]  All other floors: [phone]  Weekend/holiday: [phone]

## 2021-07-30 NOTE — PROGRESS NOTES
THORACIC & FOREGUT SURGERY    S:  No overnight events.  Pt seen at bedside resting comfortably.  He tolerated pleurodesis pretty well and right chest tube is in place.  Pain is pretty well controlled.  He has been up walking frequently without difficulty.  Using spirometer occasionally.    O:  Vitals:    07/30/21 0822 07/30/21 0838 07/30/21 1020 07/30/21 1158   BP:  127/76  118/69   BP Location:       Pulse: 74 81 75 79   Resp: 20 22 24 24   Temp:  (!) 96.6  F (35.9  C)     TempSrc:  Oral     SpO2: 96% 92% 91% 94%   Weight:       Height:           Alert, NAD  Breathing non-labored  Distal extremities warm.     A/P: Austyn Leach is a 66 year old male with right pneumothorax and POD# 2 s/p right VATS mechanical and talc pleurodesis  -CXR with right chest tube in place, no pneumothorax  -Chest tube to suction through Saturday, no leak today, tube output 15mL  -Repeat CXR tomorrow morning  -Ambulate  -Pain control  -IS    Katie Kaur PA-C  Thoracic Surgery

## 2021-07-30 NOTE — PROGRESS NOTES
07/30/21 0854   Quick Adds   Type of Visit Initial PT Evaluation   Living Environment   People in home spouse   Current Living Arrangements house   Living Environment Comments Patient lives in 3 story split level home with spouse.  Spous works during the day   Self-Care   Usual Activity Tolerance good   Current Activity Tolerance moderate   Equipment Currently Used at Home none   Activity/Exercise/Self-Care Comment Patient reports being independent with mobility prior to admission   Disability/Function   Hearing Difficulty or Deaf no   Wear Glasses or Blind yes   Vision Management glasses   Fall history within last six months no   General Information   Onset of Illness/Injury or Date of Surgery 07/25/21   Referring Physician Benoit Villagomez MD   Patient/Family Therapy Goals Statement (PT) return to home   Pertinent History of Current Problem (include personal factors and/or comorbidities that impact the POC) Austyn Leach is a 66 year old male with history of spontaneous pneumothorax in 5/2021 status post pigtail catheter, COPD, active nicotine dependence with cigarettes, schizophrenia, bipolar 1 disorder, and GERD now admitted with recurrent right pneumothorax and COPD without acute exacerbation.   Existing Precautions/Restrictions oxygen therapy device and L/min  (1 L NC)   Cognition   Orientation Status (Cognition) oriented x 4   Safety Deficit (Cognition) moderate deficit;awareness of need for assistance;impulsivity;insight into deficits/self-awareness;safety precautions awareness   Pain Assessment   Patient Currently in Pain Yes, see Vital Sign flowsheet  (4/10 prior to ambulation)   Posture    Posture Forward head position;Protracted shoulders   Range of Motion (ROM)   ROM Comment WFL   Strength   Strength Comments Decreased global strength- utilizes UE support dueing sit to stand transfers, significant dcrease in activity tolerance with mobility   Bed Mobility   Comment (Bed Mobility) Independent    Transfers   Transfer Safety Comments SBA; therapist provided assist for line/tube management   Gait/Stairs (Locomotion)   [place] Level (Gait) supervision   Distance in Feet (Required for LE Total Joints) 200   Comment (Gait/Stairs) Patient amb 200 feet with SBA with assist for line/tube management on 1L NC.  Patient with significant increase in WOB, respiratory rate increased to 32, O2 sats between 87-95% during activity   Balance   Balance Comments Patient declined gait belt, even with education on purpose/need.  Patient with    Clinical Impression   Criteria for Skilled Therapeutic Intervention yes, treatment indicated   PT Diagnosis (PT) decreased independence with mobility   Influenced by the following impairments decreased activity tolerance, decreased strength   Functional limitations due to impairments difficulties with gait   Clinical Presentation Stable/Uncomplicated   Clinical Presentation Rationale complex pmh, stable presentation, fair social support   Clinical Decision Making (Complexity) low complexity   Therapy Frequency (PT) Daily   Predicted Duration of Therapy Intervention (days/wks) 3 days   Planned Therapy Interventions (PT) gait training;home exercise program;stair training;strengthening;transfer training;progressive activity/exercise;home program guidelines   Risk & Benefits of therapy have been explained evaluation/treatment results reviewed;care plan/treatment goals reviewed;risks/benefits reviewed;current/potential barriers reviewed;participants included;patient   PT Discharge Planning    PT Discharge Recommendation (DC Rec) home with home care physical therapy   PT Rationale for DC Rec Patient presents below baseline secondary to decreased activity tolerance and strength.  Anticipate that patient will demonstrate ability to perform functional houshold mobility at discharge; would benefit from home PT in order to increase strength, activity tolerance and increased independence with  mobility   Total Evaluation Time   Total Evaluation Time (Minutes) 10

## 2021-07-30 NOTE — PLAN OF CARE
Orientation: a&ox3  Vss afebrile  02: o2 1lnc 92-95%  Tele: sr  LS:clear, diminished right lung. CT intact. No airleak. No crepitus, drsg intact. No drsg changed needed per Katie BRUCE.  GI:bs+, no stool. Senna and miralax given  :voiding with out problems  Skin:intact, dry  Activity:up with assist of 1 . Refuses gait belt.  Pain: pca d\c'd. Scheduled tylenol. Prn pain meds available. Pt denies prn pain meds  Plan: iv abx, continuous pulse ox. Ambulate tid.chest tube, recheck xray in am.

## 2021-07-30 NOTE — CONSULTS
"Care Management Follow Up    Length of Stay (days): 5    Expected Discharge Date: 08/01/2021     Concerns to be Addressed:     Patient is not happy that a chest tube was placed.    Patient plan of care discussed at interdisciplinary rounds: Yes    Anticipated Discharge Disposition:  Home with home care.     Anticipated Discharge Services:  Home care    Anticipated Discharge DME:  None    Patient/family educated on Medicare website which has current facility and service quality ratings: NA     Education Provided on the Discharge Plan:  Attempted to discuss with patient but he didn't want to hear anything about home. He reported that he wished he never had the surgery.    Patient/Family in Agreement with the Plan:  Attempted to get a hold of wife, unable to leave message as mail box was full.    Referrals Placed by CM/SW:  No patient refused home care.      Additional Information:  Met with patient, visit was brief as patient didn't want to hear what I had to say. He just wanted to complain about his surgery that he wishes he never agreed to it. I explained that home care has been order  For RN, PT &OT, upon his discharge home. He refused home care, he stated \"I don't need or want home\".    NINA attempted to reach wife but her voice mail was full.    LISANDRO Dougherty   Inpatient Care Coordination   Supervisor  [facility]  [phone]    NIKKIE Edgar        "

## 2021-07-31 ENCOUNTER — APPOINTMENT (OUTPATIENT)
Dept: GENERAL RADIOLOGY | Facility: CLINIC | Age: 66
DRG: 163 | End: 2021-07-31
Attending: THORACIC SURGERY (CARDIOTHORACIC VASCULAR SURGERY)
Payer: MEDICARE

## 2021-07-31 ENCOUNTER — APPOINTMENT (OUTPATIENT)
Dept: GENERAL RADIOLOGY | Facility: CLINIC | Age: 66
DRG: 163 | End: 2021-07-31
Attending: PHYSICIAN ASSISTANT
Payer: MEDICARE

## 2021-07-31 PROCEDURE — 250N000013 HC RX MED GY IP 250 OP 250 PS 637: Performed by: THORACIC SURGERY (CARDIOTHORACIC VASCULAR SURGERY)

## 2021-07-31 PROCEDURE — 94640 AIRWAY INHALATION TREATMENT: CPT

## 2021-07-31 PROCEDURE — 250N000009 HC RX 250

## 2021-07-31 PROCEDURE — 250N000011 HC RX IP 250 OP 636: Performed by: INTERNAL MEDICINE

## 2021-07-31 PROCEDURE — 99232 SBSQ HOSP IP/OBS MODERATE 35: CPT | Performed by: INTERNAL MEDICINE

## 2021-07-31 PROCEDURE — 999N000157 HC STATISTIC RCP TIME EA 10 MIN

## 2021-07-31 PROCEDURE — 120N000001 HC R&B MED SURG/OB

## 2021-07-31 PROCEDURE — 71046 X-RAY EXAM CHEST 2 VIEWS: CPT | Mod: 77

## 2021-07-31 PROCEDURE — 250N000011 HC RX IP 250 OP 636: Performed by: THORACIC SURGERY (CARDIOTHORACIC VASCULAR SURGERY)

## 2021-07-31 PROCEDURE — 250N000013 HC RX MED GY IP 250 OP 250 PS 637: Performed by: INTERNAL MEDICINE

## 2021-07-31 PROCEDURE — 71046 X-RAY EXAM CHEST 2 VIEWS: CPT

## 2021-07-31 RX ORDER — GINSENG 100 MG
CAPSULE ORAL
Status: DISPENSED
Start: 2021-07-31 | End: 2021-08-01

## 2021-07-31 RX ORDER — GINSENG 100 MG
CAPSULE ORAL
Status: COMPLETED
Start: 2021-07-31 | End: 2021-07-31

## 2021-07-31 RX ADMIN — FLUTICASONE FUROATE 1 PUFF: 100 POWDER RESPIRATORY (INHALATION) at 07:46

## 2021-07-31 RX ADMIN — AMOXICILLIN AND CLAVULANATE POTASSIUM 1 TABLET: 875; 125 TABLET, FILM COATED ORAL at 10:53

## 2021-07-31 RX ADMIN — ACETAMINOPHEN 975 MG: 325 TABLET, FILM COATED ORAL at 22:10

## 2021-07-31 RX ADMIN — NICOTINE 1 PATCH: 14 PATCH, EXTENDED RELEASE TRANSDERMAL at 08:11

## 2021-07-31 RX ADMIN — ENOXAPARIN SODIUM 40 MG: 40 INJECTION SUBCUTANEOUS at 06:06

## 2021-07-31 RX ADMIN — BACITRACIN: 500 OINTMENT TOPICAL at 13:29

## 2021-07-31 RX ADMIN — GUAIFENESIN 1200 MG: 600 TABLET, EXTENDED RELEASE ORAL at 13:44

## 2021-07-31 RX ADMIN — ACETAMINOPHEN 975 MG: 325 TABLET, FILM COATED ORAL at 13:44

## 2021-07-31 RX ADMIN — DOCUSATE SODIUM AND SENNOSIDES 2 TABLET: 8.6; 5 TABLET, FILM COATED ORAL at 20:31

## 2021-07-31 RX ADMIN — TAZOBACTAM SODIUM AND PIPERACILLIN SODIUM 3.38 G: 375; 3 INJECTION, SOLUTION INTRAVENOUS at 01:04

## 2021-07-31 RX ADMIN — UMECLIDINIUM BROMIDE AND VILANTEROL TRIFENATATE 1 PUFF: 62.5; 25 POWDER RESPIRATORY (INHALATION) at 07:46

## 2021-07-31 RX ADMIN — AMOXICILLIN AND CLAVULANATE POTASSIUM 1 TABLET: 875; 125 TABLET, FILM COATED ORAL at 20:31

## 2021-07-31 RX ADMIN — OXYCODONE HYDROCHLORIDE 10 MG: 5 TABLET ORAL at 17:47

## 2021-07-31 RX ADMIN — POLYETHYLENE GLYCOL 3350 17 G: 17 POWDER, FOR SOLUTION ORAL at 08:13

## 2021-07-31 RX ADMIN — DOCUSATE SODIUM AND SENNOSIDES 2 TABLET: 8.6; 5 TABLET, FILM COATED ORAL at 08:14

## 2021-07-31 ASSESSMENT — ACTIVITIES OF DAILY LIVING (ADL)
ADLS_ACUITY_SCORE: 13
ADLS_ACUITY_SCORE: 14
ADLS_ACUITY_SCORE: 13
ADLS_ACUITY_SCORE: 13
ADLS_ACUITY_SCORE: 14
ADLS_ACUITY_SCORE: 13

## 2021-07-31 NOTE — PLAN OF CARE
Patient has had no further sersang fluid drain since Drsg was re-enforced . Patient continues to refuse some of his meds and IV flushes. Allowed a few VS to be taken. Calm and verbalizing his thoughts however seems to limited understanding as well as misunderstandings. Believes tylenol caused his earlier drainage. Attempted to educate. Believes we will charge him for IV flush. Patient is not receptive to education however RN continues to provide educations to correct misunderstandings. VSS Patient reports he is always SOB at home and does not feel like his breathing is much different than home.

## 2021-07-31 NOTE — PLAN OF CARE
Patient went down for chest xray and returned at 1430. Patient was sitting on a bath blanket that had a large amount of serosang drainage. Spoke with Dr. Becker who stated to reinforce dressing. VSS Os sat on room air 95% VSS.

## 2021-07-31 NOTE — PROGRESS NOTES
STAFF ADDENDUM:  I saw and evaluated Mr. Leach and I removed his chest tube. Follow-up CXR looks stable, no pneumothorax. He can be discharged anytime by the primary team.    Dressing instructions: Keep chest tube site covered until Monday 8/2/2021. Reinforce as needed.    Follow-up instructions: in 1 month with Dr. Villagomez with a CXR.    The patient had all questions answered and was in agreement with the plan.  Marcos Becker MD

## 2021-07-31 NOTE — PLAN OF CARE
VSS. SPO2 92-95% RA  Pt ambulated on hallway without oxygen was tachypneic but refused to wear oxygen back to room oxygen saturation 88 % but recovered quickly. LS: clear/RLL Diminished. Afebrile. Denies pain, refused scheduled tylenol. A&OX4, Up SBA. Pt complains that doctors lied to him for his surgery and wants to go home tomorrow, wanted on PIV removed because flushing both costs more money to him so the Left PIV removed. Chest tube intact Dressing CDI, No leak, Output at 30 ML. Pt on Regular Diet good appetite. Thoracic surgery following. PIV to the Right SL. Continue monitoring & POC.

## 2021-07-31 NOTE — PLAN OF CARE
This patient is refusing nursing care, vital signs, IV antibiotics and many of his medications. Says will take oral antibiotics. Threatening to walk out of the hospital later this day.  Addendum  Resp are 24-28 and using abd muscles. Refusing O2 Attempted to educated on the bennefit of O2 and of just wearing it for an hour or 2 to give his lungs a rest. Refused.    On K+ protocal but refusing lab draw and medication.

## 2021-07-31 NOTE — PLAN OF CARE
Pertinent assessments: AO. Denies pain. Refusing VS. RA. Lungs dim in bases. Denies SOB and nausea. Chest tube intact, no leak. No output. Voiding in urinal.  Major Shift Events: refusing cares  Plan (Upcoming Events): CXR in am  Discharge/Transfer Needs: none    Bedside Shift Report Completed : Y  Bedside Safety Check Completed: Y

## 2021-07-31 NOTE — PROGRESS NOTES
[facility]  Hospitalist Progress Note  Admit 7/25/2021  2:04 PM    Name: Austyn Leach    MRN: [id number]  Provider:  Kris Roberts MD, [facility]    Date of Service: 07/31/2021     Reason for Stay (Diagnosis): Recurrent spontaneous pneumothorax         Summary of hospital stay & Assessment/Plan:   Summary of Stay: Austyn Leach is a 66 year old male who was admitted on 7/25/2021     66 year old male with history of spontaneous pneumothorax in 5/2021 status post pigtail catheter, COPD, active nicotine dependence with cigarettes, schizophrenia, bipolar 1 disorder, and GERD.  He presented to the [facility] ED on 7/25/2021 for evaluation of shortness of breath.  He had an episode of choking on his dinner the night previous to admission requiring the Heimlich maneuver by his wife.  He denied fevers, chills, cough, and shortness of breath over the day before presentation.  On the day of presentation he was having shortness of breath and cough.  ED evaluation included a chest x-ray which showed a large right-sided pneumothorax with leftward shift of the mediastinum.  A pigtail catheter placed with improvement of his symptoms.  Thoracic surgery was consulted and recommended a CT of chest.  CT showed a small right-sided pneumothorax, right base chest tube in place, prominent region of consolidation of the right lower lobe, and diffuse emphysematous changes.  Austyn was admitted to the hospital for further care. He was kept on chest tube suction.  Thoracic surgery was consulted.  After review of CT and examination of patient showing dyspnea at rest thoracic surgery recommended treating possible aspiration pneumonia with IV Zosyn.  He is remained afebrile without leukocytosis.  He has now weaned to room air.  Underwent right VATS with mechanical and talc pleurodesis 7/29.  Chest tube to suction, Dilaudid PCA started for pain relief. Minimal PCA use so discontinued  now.     ASSESSMENT/PLAN  Recurrent right pneumothorax, possible aspiration pneumonia: This is the patient's second pneumothorax.  This occurred after requiring Heimlich maneuver for an aspiration event while eating.  Chest tube was placed and pneumothorax resolved.  Due to his shortness of breath and the CT findings of possible infiltrate he was started on antibiotics for possible aspiration pneumonia.   -Thoracic Surgery consult appreciated  -Underwent right VATS with mechanical and talc pleurodesis 7/29  -Continue Zosyn (started 7/26) for possible aspiration pneumonia seen on CT.  Has a minimally productive cough, but has not had fever or leukocytosis.   Changed to Augmentin oral now stop after 7 days  -Pulmonary consult appreciated:  Continue Anoro, Arnuity, and prn albuterol nebs  -Echo was done per Pulmonary rec and looks unremarkable  -oral acetaminophen, ibuprofen, and oxycodone as needed  -Chest tube management per thoracic surgery, had repeat chest x-ray today that does not show any significant pneumothorax  -Continuous pulse ox and oxygen as needed  -Patient is wondering if he can continue with chiropractor therapy following this procedure, defer this question to thoracic surgery as I am unclear if this would be okay or not recommended     COPD without acute exacerbation  -Continue Anoro and Arnuity inhalers and Prn albuterol nebs     Tobacco dependence: Continues to smoke 10 cigarettes/day.  I strongly recommended cessation especially with his recurrent pneumothorax and emphysema on CT.  -Nicotine patch ordered     Schizophrenia, Bipolar Disorder Type 1: Not on any medications for this.  Intermittently anxious here, but redirectable so far.  Today he is very paranoid if symptoms worsen consider psych evaluation while inpatient     Mild Hypokalemia: Initial potassium 3.2.  Etiology unclear.  -Electrolyte replacement protocol     DVT Prophylaxis: Lovenox  Code Status: Full Code      Disposition Plan      Expected discharge in 1 days to prior living arrangement once chest tube is removed and he is clinically stable.     Entered: Kris Roberts 07/31/2021, 10:20 AM                 Interval History:       Patient very paranoid this morning, refuses medication nursing care after long discussion accepted to have his antibiotic oral instead of IV and was willing to stay another day in the hospital, he still has a chest tube waiting for thoracic surgery to see him today.  Today's plan detailed above discussed with nursing               Physical Exam:   Physical Exam   Temp: 97.2  F (36.2  C) Temp src: Oral BP: 130/78 Pulse: 90   Resp: 28 SpO2: 91 % O2 Device: None (Room air) Oxygen Delivery: 1 LPM  Vitals:    07/29/21 0601 07/29/21 0639 07/30/21 0707   Weight: 80.5 kg (177 lb 8 oz) 80.3 kg (177 lb) 83.9 kg (184 lb 14.4 oz)     I/O last 3 completed shifts:  In: 172 [P.O.:120; I.V.:52]  Out: 2613 [Urine:2575; Chest Tube:38]      GENERAL:  Comfortable.   PSYCH: pleasant, oriented, No acute distress.  EYES: PERRLA, Normal conjunctiva.  HEART:  Normal S1, S2 with no edema.  LUNGS: Right-sided chest tube with basilar rhonchi otherwise clear to auscultation, normal Respiratory effort.  ABDOMEN:  Soft, no hepatosplenomegaly, normal bowel sounds.  SKIN:  Dry to touch, No rash.      Medications       acetaminophen  975 mg Oral Q8H     amoxicillin-clavulanate  1 tablet Oral Q12H VERITO     enoxaparin ANTICOAGULANT  40 mg Subcutaneous Q24H     fluticasone  1 puff Inhalation Daily     guaiFENesin  1,200 mg Oral BID     nicotine  1 patch Transdermal Daily     nicotine   Transdermal Q8H     omeprazole  20 mg Oral QAM AC     polyethylene glycol  17 g Oral Daily     senna-docusate  1 tablet Oral BID    Or     senna-docusate  2 tablet Oral BID     sodium chloride (PF)  3 mL Intracatheter Q8H     umeclidinium-vilanterol  1 puff Inhalation Daily     Data     -Data reviewed today:  I personally reviewed  all new labs and imaging results  over the last 24 hours.    Recent Labs   Lab 07/28/21  1319 07/26/21  0806 07/25/21  1426   WBC 7.7 8.9 6.3   HGB 13.2* 12.4* 13.2*   HCT 40.5 38.4* 40.3   MCV 93 93 92    195 190     Recent Labs   Lab 07/30/21  0710 07/29/21  0930 07/28/21  0650 07/26/21  0806 07/25/21  1426   NA  --   --   --  142 145*   POTASSIUM 4.3 3.9 3.8 4.1 3.2*   CHLORIDE  --   --   --  112* 112*   CO2  --   --   --  25 30   ANIONGAP  --   --   --  5 3   GLC  --   --   --  124* 109*   BUN  --   --   --  25 22   CR  --  0.89  --  0.80 0.87   GFRESTIMATED  --  89  --  >90 90   MAL  --   --   --  8.7 8.7       Recent Results (from the past 24 hour(s))   XR Chest 2 Views    Narrative    XR CHEST 2 VW 7/30/2021 10:44 AM    HISTORY: s/p right pleurodesis    COMPARISON: 7/29/2021      Impression    IMPRESSION: Right chest tube. Mild pleural thickening at the right  lung apex. No significant pneumothorax. Mild left basilar linear  atelectasis. Normal heart size.    ALBERTO BECK MD         SYSTEM ID:  [id number]       This document was produced using voice recognition software

## 2021-08-01 ENCOUNTER — APPOINTMENT (OUTPATIENT)
Dept: GENERAL RADIOLOGY | Facility: CLINIC | Age: 66
DRG: 163 | End: 2021-08-01
Attending: THORACIC SURGERY (CARDIOTHORACIC VASCULAR SURGERY)
Payer: MEDICARE

## 2021-08-01 ENCOUNTER — APPOINTMENT (OUTPATIENT)
Dept: PHYSICAL THERAPY | Facility: CLINIC | Age: 66
DRG: 163 | End: 2021-08-01
Payer: MEDICARE

## 2021-08-01 VITALS
TEMPERATURE: 97.6 F | WEIGHT: 178.3 LBS | DIASTOLIC BLOOD PRESSURE: 74 MMHG | HEART RATE: 83 BPM | BODY MASS INDEX: 22.88 KG/M2 | HEIGHT: 74 IN | SYSTOLIC BLOOD PRESSURE: 137 MMHG | OXYGEN SATURATION: 94 % | RESPIRATION RATE: 20 BRPM

## 2021-08-01 PROCEDURE — 71046 X-RAY EXAM CHEST 2 VIEWS: CPT

## 2021-08-01 PROCEDURE — 250N000013 HC RX MED GY IP 250 OP 250 PS 637: Performed by: INTERNAL MEDICINE

## 2021-08-01 PROCEDURE — 94640 AIRWAY INHALATION TREATMENT: CPT

## 2021-08-01 PROCEDURE — 999N000157 HC STATISTIC RCP TIME EA 10 MIN

## 2021-08-01 PROCEDURE — 97110 THERAPEUTIC EXERCISES: CPT | Mod: GP

## 2021-08-01 PROCEDURE — 250N000013 HC RX MED GY IP 250 OP 250 PS 637: Performed by: THORACIC SURGERY (CARDIOTHORACIC VASCULAR SURGERY)

## 2021-08-01 PROCEDURE — 99239 HOSP IP/OBS DSCHRG MGMT >30: CPT | Performed by: INTERNAL MEDICINE

## 2021-08-01 RX ORDER — AMOXICILLIN 250 MG
2 CAPSULE ORAL 2 TIMES DAILY PRN
Qty: 40 TABLET | Refills: 0 | Status: SHIPPED | OUTPATIENT
Start: 2021-08-01 | End: 2022-01-13

## 2021-08-01 RX ORDER — OXYCODONE HYDROCHLORIDE 5 MG/1
5-10 TABLET ORAL EVERY 6 HOURS PRN
Qty: 20 TABLET | Refills: 0 | Status: SHIPPED | OUTPATIENT
Start: 2021-08-01 | End: 2021-08-08

## 2021-08-01 RX ADMIN — GUAIFENESIN 1200 MG: 600 TABLET, EXTENDED RELEASE ORAL at 07:34

## 2021-08-01 RX ADMIN — UMECLIDINIUM BROMIDE AND VILANTEROL TRIFENATATE 1 PUFF: 62.5; 25 POWDER RESPIRATORY (INHALATION) at 08:06

## 2021-08-01 RX ADMIN — NICOTINE 1 PATCH: 14 PATCH, EXTENDED RELEASE TRANSDERMAL at 09:10

## 2021-08-01 RX ADMIN — FLUTICASONE FUROATE 1 PUFF: 100 POWDER RESPIRATORY (INHALATION) at 08:04

## 2021-08-01 RX ADMIN — AMOXICILLIN AND CLAVULANATE POTASSIUM 1 TABLET: 875; 125 TABLET, FILM COATED ORAL at 07:36

## 2021-08-01 RX ADMIN — OXYCODONE HYDROCHLORIDE 10 MG: 5 TABLET ORAL at 07:39

## 2021-08-01 RX ADMIN — OXYCODONE HYDROCHLORIDE 5 MG: 5 TABLET ORAL at 04:07

## 2021-08-01 ASSESSMENT — ACTIVITIES OF DAILY LIVING (ADL)
ADLS_ACUITY_SCORE: 13

## 2021-08-01 ASSESSMENT — MIFFLIN-ST. JEOR: SCORE: 1658.51

## 2021-08-01 NOTE — PLAN OF CARE
Afebrile. Patient pleasant and cooperative throughout much of night. Will not allow staff to flush IV line, educated on what it is and why we need to do it, pt still will not agree to have line flushed. Agreeable to taking tylenol for pain control earlier in the night which he had been refusing. Ambulated with SBA to bathroom to void. Chest tube site dressing CDI. C/o pain at site, prn oxy and apap given throughout night with good outcomes.

## 2021-08-01 NOTE — PROGRESS NOTES
Mr. Leach may go home with discharge instructions as outlined in my note from yesterday.    His CXR from today looks unchanged, no pneumothorax. Reinforce dressing as needed, and if necessary completely replace it.

## 2021-08-01 NOTE — DISCHARGE SUMMARY
[facility]  Discharge Summary  Hospitalist      Date of Admission:  7/25/2021  Date of Discharge:  8/1/2021  Provider:  Kris Roberts MD. [facility]  Date of Service (when I last saw the patient): 08/01/21      Primary Provider: Kristin Zhong          Discharge Diagnosis:     Discharge Diagnoses   Recurrent pneumothorax status post VATS and pleurodesis  COPD without acute exacerbation  Tobacco dependency  Schizophrenia and bipolar type I  Hypokalemia replaced    Other medical issues:  Past Medical History:   Diagnosis Date     Bipolar 1 disorder (H)      GERD (gastroesophageal reflux disease)      Mild intermittent asthma     uses primatent     Schizophrenia (H)          Please see the admission history and physical for full details.     Hospital Course     Austyn Leach was admitted on 7/25/2021.  The following problems were addressed during his hospitalization:  66 year old male with history of spontaneous pneumothorax in 5/2021 status post pigtail catheter, COPD, active nicotine dependence with cigarettes, schizophrenia, bipolar 1 disorder, and GERD.  [facility] for evaluation of shortness of breath.  He had an episode of choking on his dinner the night previous to admission requiring the Heimlich maneuver by his wife.  He denied fevers, chills, cough, and shortness of breath over the day before presentation.  On the day of presentation he was having shortness of breath and cough.  ED evaluation included a chest x-ray which showed a large right-sided pneumothorax with leftward shift of the mediastinum.  A pigtail catheter placed with improvement of his symptoms.  Thoracic surgery was consulted and recommended a CT of chest.  CT showed a small right-sided pneumothorax, right base chest tube in place, prominent region of consolidation of the right lower lobe, and diffuse emphysematous changes.  Austyn was admitted to the hospital for further care. He was kept on  chest tube suction.  Thoracic surgery was consulted.  After review of CT and examination of patient showing dyspnea at rest thoracic surgery recommended treating possible aspiration pneumonia with IV Zosyn.  He is remained afebrile without leukocytosis.  He has now weaned to room air.  Underwent right VATS with mechanical and talc pleurodesis 7/29.  Chest tube to suction, Dilaudid PCA started for pain relief. Minimal PCA use so discontinued.  Patient remained stable, x-ray showing resolution of pneumothorax, chest tube was removed on 7/31 by thoracic surgery with dressing to be removed on 8/2.     ASSESSMENT/PLAN  Recurrent right pneumothorax, possible aspiration pneumonia: This is the patient's second pneumothorax.  This occurred after requiring Heimlich maneuver for an aspiration event while eating.  Chest tube was placed and pneumothorax resolved.  Due to his shortness of breath and the CT findings of possible infiltrate he was started on antibiotics for possible aspiration pneumonia.   -Thoracic Surgery consult appreciated  -Underwent right VATS with mechanical and talc pleurodesis 7/29, chest tube removed on 7/31  -Continue Zosyn (started 7/26) for possible aspiration pneumonia seen on CT.  Has a minimally productive cough, but has not had fever or leukocytosis.   Changed to Augmentin oral on 7/31, prescription for 5 more days will be given at the time of discharge.  -Pulmonary consult appreciated:  Continue Anoro, Arnuity, and prn albuterol nebs  -Echo was done per Pulmonary rec and looks unremarkable  -oral acetaminophen, and oxycodone as needed       COPD without acute exacerbation-Continue Anoro and Arnuity inhalers and Prn albuterol nebs     Tobacco dependence: Continues to smoke 10 cigarettes/day.  I strongly recommended cessation especially with his recurrent pneumothorax and emphysema on CT.     Schizophrenia, Bipolar Disorder Type 1: Not on any medications for this.  Intermittently anxious here, but  redirectable so far.    Can follow-up with outpatient provider     Mild Hypokalemia: Initial potassium 3.2.        Pending Results   Unresulted Labs Ordered in the Past 30 Days of this Admission     No orders found from 6/25/2021 to 7/26/2021.          Discharge Orders      Reason for your hospital stay    Recurrent spontaneous pneumothorax     Follow-up and recommended labs and tests     Follow-up with primary care physician next weekFollow-up instructions: in 1 month with Dr. Villagomez with a CXR.     Activity    Your activity upon discharge: no heavy lifting, pushing, pulling  for 1 week     Diet    Follow this diet upon discharge: Orders Placed This Encounter      Regular Diet Adult       Code Status   Full Code       Primary Care Physician   Kristin Zhong    Physical Exam   Temp: 97.6  F (36.4  C) Temp src: Oral BP: 137/74 (right after going to the bathroom) Pulse: 83   Resp: 20 SpO2: 94 % O2 Device: None (Room air)    Vitals:    07/29/21 0639 07/30/21 0707 08/01/21 0407   Weight: 80.3 kg (177 lb) 83.9 kg (184 lb 14.4 oz) 80.9 kg (178 lb 4.8 oz)     Vital Signs with Ranges  Temp:  [97.6  F (36.4  C)-99.3  F (37.4  C)] 97.6  F (36.4  C)  Pulse:  [] 83  Resp:  [20-32] 20  BP: (112-137)/(65-75) 137/74  SpO2:  [93 %-96 %] 94 %  I/O last 3 completed shifts:  In: 970 [P.O.:970]  Out: 1475 [Urine:1475]    Constitutional:  alert, cooperative, no apparent distress  Respiratory: No increased work of breathing, good air exchange, no crackles or wheezing.  Cardiovascular: apical impulse,normal S1 and S2  GI: bowel sounds present, soft, non-distended, non-tender      Discharge Disposition   Discharged to home    Consultations This Hospital Stay   CARE MANAGEMENT / SOCIAL WORK IP CONSULT  SMOKING CESSATION PROGRAM IP CONSULT  THORACIC SURGERY IP CONSULT  PULMONARY IP CONSULT  PHYSICAL THERAPY ADULT IP CONSULT  OCCUPATIONAL THERAPY ADULT IP CONSULT  CARE MANAGEMENT / SOCIAL WORK IP CONSULT    Time Spent on this  Encounter   I, Kris Roberts MD, personally saw the patient today and spent greater than 30 minutes discharging this patient.      Discharge Medications   Current Discharge Medication List      START taking these medications    Details   amoxicillin-clavulanate (AUGMENTIN) 875-125 MG tablet Take 1 tablet by mouth every 12 hours for 5 days  Qty: 10 tablet, Refills: 0    Associated Diagnoses: Spontaneous pneumothorax      oxyCODONE (ROXICODONE) 5 MG tablet Take 1-2 tablets (5-10 mg) by mouth every 6 hours as needed for severe pain  Qty: 20 tablet, Refills: 0    Associated Diagnoses: Spontaneous pneumothorax      senna-docusate (SENOKOT-S/PERICOLACE) 8.6-50 MG tablet Take 2 tablets by mouth 2 times daily as needed for constipation  Qty: 40 tablet, Refills: 0    Associated Diagnoses: Spontaneous pneumothorax         CONTINUE these medications which have NOT CHANGED    Details   ipratropium-albuterol (COMBIVENT RESPIMAT)  MCG/ACT inhaler Inhale 1 puff into the lungs 2 times daily  Qty: 4 g, Refills: 0    Associated Diagnoses: COPD exacerbation (H)      acetaminophen (TYLENOL) 325 MG tablet Take 3 tablets (975 mg) by mouth 3 times daily as needed for mild pain    Associated Diagnoses: Pneumothorax on right      albuterol (PROAIR HFA/PROVENTIL HFA/VENTOLIN HFA) 108 (90 Base) MCG/ACT inhaler Inhale 1-2 puffs into the lungs every 6 hours as needed for shortness of breath / dyspnea or wheezing  Qty: 6.7 g, Refills: 0    Comments: Pharmacy may dispense brand covered by insurance (Proair, or proventil or ventolin or generic albuterol inhaler)  Associated Diagnoses: COPD exacerbation (H)      omeprazole (PRILOSEC) 20 MG DR capsule Take 1 capsule (20 mg) by mouth every morning (before breakfast)  Qty: 30 capsule, Refills: 0    Associated Diagnoses: Gastroesophageal reflux disease with esophagitis, unspecified whether hemorrhage           Allergies   Allergies   Allergen Reactions     Bee Venom Shortness Of Breath and  Swelling     Data   Most Recent 3 CBC's:Recent Labs   Lab Test 07/28/21  1319 07/26/21  0806 07/25/21  1426   WBC 7.7 8.9 6.3   HGB 13.2* 12.4* 13.2*   MCV 93 93 92    195 190      Most Recent 3 BMP's:  Recent Labs   Lab Test 07/30/21  0710 07/29/21  0930 07/28/21  0650 07/26/21  0806 07/25/21  1426 05/06/21  0551   NA  --   --   --  142 145* 138   POTASSIUM 4.3 3.9 3.8 4.1 3.2* 4.4   CHLORIDE  --   --   --  112* 112* 106   CO2  --   --   --  25 30 30   BUN  --   --   --  25 22 20   CR  --  0.89  --  0.80 0.87 0.73   ANIONGAP  --   --   --  5 3 2*   MAL  --   --   --  8.7 8.7 8.4*   GLC  --   --   --  124* 109* 137*     Most Recent 2 LFT's:  Recent Labs   Lab Test 05/05/21  0908 04/24/20  1621   AST 16 11   ALT 20 19   ALKPHOS 64 54   BILITOTAL 0.5 0.3     Most Recent INR's and Anticoagulation Dosing History:  Anticoagulation Dose History    There is no flowsheet data to display.       Most Recent 3 Troponin's:  Recent Labs   Lab Test 07/25/21  1426 05/05/21  0908   TROPI  --  <0.015   TROPONIN <0.015  --      Most Recent Cholesterol Panel:  Recent Labs   Lab Test 04/26/20  0706   CHOL 125   LDL 63   HDL 44   TRIG 89     Most Recent 6 Bacteria Isolates From Any Culture (See EPIC Reports for Culture Details):  Recent Labs   Lab Test 04/24/20  1704   CULT >100,000 colonies/mL  Pseudomonas aeruginosa  *  >100,000 colonies/mL  Enterococcus faecalis  *     Most Recent TSH, T4 and A1c Labs:  Recent Labs   Lab Test 04/26/20  0706   TSH 0.97     Results for orders placed or performed during the hospital encounter of 07/25/21   XR Chest Port 1 View     Value    Radiologist flags Tension pneumothorax (AA)    Narrative    CHEST ONE VIEW PORTABLE   7/25/2021 2:24 PM     HISTORY: Shortness of breath.    COMPARISON: 5/7/2021      Impression    IMPRESSION: Large right-sided pneumothorax with subtotal collapse of  the right lung. The mediastinum is shifted towards the left. Left lung  is clear. No effusion.    [Critical  Result: Tension pneumothorax]    Finding was identified on 7/25/2021 2:28 PM.     Dr. Mckeon was contacted by me on 7/25/2021 2:30 PM and verbalized  understanding of the critical result.     VICENTE JENSEN MD         SYSTEM ID:  [id number]   XR Chest Port 1 View    Narrative    CHEST ONE VIEW PORTABLE   7/25/2021 3:11 PM     HISTORY: Chest tube placement.    COMPARISON: 7/25/2021      Impression    IMPRESSION:   New pigtail style chest tube at right lung base with near total  evacuation of previous right pneumothorax. Minor residual air in the  inferior right hemithorax. Mediastinal structures have returned to  midline. No pneumothorax or pleural effusion on the left.    VICENTE JENSEN MD         SYSTEM ID:  [id number]   Chest CT w/o contrast    Narrative    CT CHEST WITHOUT CONTRAST 7/25/2021 4:14 PM    CLINICAL HISTORY: Pneumothorax. Shortness of breath.    TECHNIQUE: CT chest without IV contrast. Multiplanar reformats were  obtained. Dose reduction techniques were used.  CONTRAST: None.    COMPARISON: Chest x-ray 7/25/2021. CT abdomen 4/10/2020.    FINDINGS:   LUNGS AND PLEURA: Emphysema. There is a right base chest tube in  place. Small right-sided pneumothorax primarily at the right lower  chest but with a small component superiorly as well. Prominent region  of consolidation is noted at the right lower lobe. An area posteriorly  at the right lower lobe measures 7.6 x 5.5 cm series 7 image 248. Some  anterior additional consolidation noted and/or atelectasis. The left  lung shows no acute airspace process. No left-sided pneumothorax. Mild  biapical scarring.    MEDIASTINUM/AXILLAE: No acute mediastinal abnormality is seen within  the limits of unenhanced scanning. No adenopathy.    CORONARY ARTERY CALCIFICATION: None.    UPPER ABDOMEN: There are a few hepatic cysts suggested again. No acute  upper abdominal abnormality.    MUSCULOSKELETAL: No acute fracture identified.      Impression    IMPRESSION:   1. Small  right-sided pneumothorax. Right base chest tube in place.  2. Prominent region of consolidation at the right lower lobe is new  compared to 4/10/2020. This may be pneumonia versus other airspace  disease.  3. Recommend follow-up CT chest within 3 months to ensure improvement.  4. Diffuse emphysematous changes.    DONAL DURAN MD         SYSTEM ID:  [id number]   XR Chest Port 1 View    Narrative    CHEST ONE VIEW PORTABLE   7/26/2021 8:27 AM     HISTORY:  Pneumothorax with chest tube.    COMPARISON: 7/25/2021      Impression    IMPRESSION: Right basilar chest tube remains in place. Trace apical  pleural air present in the right hemithorax. No evidence of tension.  Left lung is clear.    VICENTE JENSEN MD         SYSTEM ID:  [id number]   XR Chest Port 1 View    Narrative    EXAM: XR CHEST PORT 1 VIEW  LOCATION: [facility]  DATE/TIME: 7/26/2021 8:26 PM    INDICATION: PTX  COMPARISON: 07/26/2021      Impression    IMPRESSION: Stable right basilar chest tube. No visible pneumothorax. Mild right basilar atelectasis/scar. The cardiac silhouette and pulmonary vasculature are normal.   CT Chest w/o Contrast    Narrative    CT CHEST WITHOUT CONTRAST July 28, 2021 2:08 PM    CLINICAL HISTORY: Right pneumothorax.    TECHNIQUE: CT chest without IV contrast. Multiplanar reformats were  obtained. Dose reduction techniques were used.  CONTRAST: None.    COMPARISON: CT chest 7/25/2021.    FINDINGS:   LUNGS AND PLEURA: Centrilobular emphysema. Scarring noted at the lung  apices. Multiple blebs and bullae are noted. Scattered mucoid  impactions lower lobe airways with left lower lobe atelectasis. Right  pleural drain noted in the major fissure. No residual right  pneumothorax. Trace pleural fluid bilaterally.    MEDIASTINUM/AXILLAE: Heart is normal in size. No pericardial fluid.  Esophagus is unremarkable. Thyroid gland appears normal in size. No  enlarged lymph nodes.    UPPER ABDOMEN: Scattered tiny low-attenuation  liver lesions probably  cysts but too small to characterize by CT.    MUSCULOSKELETAL: Degenerative spine changes.      Impression    IMPRESSION:   1.  No evidence of residual right pneumothorax. Right pleural drainage  catheter remains in place. Trace pleural fluid bilaterally.  2.  Centrilobular emphysema with scattered mucoid impactions lower  lobe airways. No infiltrate or consolidation is otherwise evident.  3.  Stable tiny low-attenuation liver lesions probably cysts. No  specific follow up suggested.    TARYN AKINS MD         SYSTEM ID:  [id number]   XR Chest Port 1 View    Narrative    CHEST PORTABLE ONE VIEW   7/29/2021 9:23 AM     HISTORY: Status post right pleurodesis.    COMPARISON: Chest CT on 7/28/2021      Impression    IMPRESSION: Single portable AP view of the chest was obtained.  Cardiomediastinal silhouette is within normal limits. Small left  pleural effusion. No significant right pleural effusion. Small right  apical pneumothorax, right apical chest tube is in place.    ALEYDA MURO MD         SYSTEM ID:  [id number]   XR Chest 2 Views    Narrative    XR CHEST 2 VW 7/30/2021 10:44 AM    HISTORY: s/p right pleurodesis    COMPARISON: 7/29/2021      Impression    IMPRESSION: Right chest tube. Mild pleural thickening at the right  lung apex. No significant pneumothorax. Mild left basilar linear  atelectasis. Normal heart size.    ALBERTO BECK MD         SYSTEM ID:  [id number]   XR Chest 2 Views    Narrative    CHEST TWO VIEWS  7/31/2021 8:30 AM     HISTORY:  Follow-up after right pneumothorax, with right pleurodesis  and chest tube.    COMPARISON: 7/30/2021.      Impression    IMPRESSION: No significant interval change. Single right apical chest  tube. No pneumothorax. Hyperdensity right pleural thickening is likely  related to recent talc pleurodesis procedure. The left lung is clear.  No pleural effusion. Heart size is stable.    SARIKA POLLARD MD         SYSTEM ID:  [id number]   XR Chest 2 Views     Narrative    CHEST TWO VIEWS  2021 2:15 PM     HISTORY:  Follow-up chest tube.    COMPARISON: 2021.      Impression    IMPRESSION: Single right chest tube has been removed. No pneumothorax  is identified. High density pleural thickening on the right is  unchanged, and is likely related to recent pleurodesis. There is a  small left pleural effusion, with mild associated infiltrate and/or  atelectasis at the left lung base.    SARIKA POLLARD MD         SYSTEM ID:  [id number]   XR Chest 2 Views    Narrative    CHEST TWO VIEWS  2021 8:29 AM    HISTORY: Follow-up post chest tube removal.    COMPARISON: Chest x-rays, most recently 2021. CT dated 2021.    FINDINGS: The cardiomediastinal silhouette and pulmonary vasculature  are within normal limits. Biapical lucencies consistent with  emphysematous changes. Mild biapical scarring and right apical  capping. Unchanged minimal blunting of the right costophrenic angle,  which may be due trace effusion versus pleural thickening/scarring.  Unchanged small left effusion. No pneumothorax. Old healed right  seventh rib fracture deformity.      Impression    IMPRESSION:  1.  Unchanged probable trace right effusion versus pleural  thickening/scarring. Unchanged small left effusion.    2.  Emphysematous changes with areas of scarring.    Echocardiogram Complete     Value    LVEF  55-60%    Narrative    [id number]  [id number]  [id number]  [id number]^GLENDA NDIAYE^RITA^L     [facility]  Echocardiography Laboratory  [address] [place] [address]     Name: ASHANTI ESPINOZA  MRN: [id number]  : 1955  Study Date: 2021 01:49 PM  Age: 66 yrs  Gender: Male  Patient Location: [facility]  Reason For Study: SOB  Ordering Physician: RITA ALVARADO  Performed By: Elena Brantley     BSA: 2.1 m2  Height: 74 in  Weight: 180 lb  BP: 150/90  mmHg  ______________________________________________________________________________  Procedure  Complete Portable Echo Adult.  ______________________________________________________________________________  Interpretation Summary     The visual ejection fraction is 55-60%.  Left ventricular systolic function is normal.  The ascending aorta is Mildly dilated.  ______________________________________________________________________________  Left Ventricle  The left ventricle is normal in size. There is normal left ventricular wall  thickness. Diastolic Doppler findings (E/E' ratio and/or other parameters)  suggest left ventricular filling pressures are normal. The visual ejection  fraction is 55-60%. Left ventricular systolic function is normal. Septal  motion is consistent with conduction abnormality.     Right Ventricle  The right ventricle is normal in size and function.     Atria  Normal left atrial size. Right atrial size is normal. There is no color  Doppler evidence of an atrial shunt.     Mitral Valve  There is trace mitral regurgitation.     Tricuspid Valve  There is trace tricuspid regurgitation. Right ventricular systolic pressure  could not be approximated due to inadequate tricuspid regurgitation.     Aortic Valve  The aortic valve is trileaflet. There is moderate trileaflet aortic sclerosis.  No aortic regurgitation is present. No hemodynamically significant valvular  aortic stenosis.     Pulmonic Valve  There is no pulmonic valvular regurgitation. Normal pulmonic valve velocity.     Vessels  The aortic root is normal size. The ascending aorta is Mildly dilated. IVC  diameter <2.1 cm collapsing >50% with sniff suggests a normal RA pressure of 3  mmHg.     Pericardium  There is no pericardial effusion.     Rhythm  Sinus rhythm was noted.  ______________________________________________________________________________  MMode/2D Measurements & Calculations  IVSd: 1.0 cm  LVIDd: 5.1 cm  LVIDs: 3.3 cm  LVPWd:  0.87 cm  FS: 33.9 %  LV mass(C)d: 171.1 grams  LV mass(C)dI: 82.3 grams/m2  Ao root diam: 3.6 cm  asc Aorta Diam: 3.7 cm  LVOT diam: 2.6 cm  LVOT area: 5.3 cm2  LA Volume (BP): 50.5 ml  LA Volume Index (BP): 24.3 ml/m2     RWT: 0.34     Doppler Measurements & Calculations  MV E max hue: 53.1 cm/sec  MV A max hue: 54.2 cm/sec  MV E/A: 0.98  MV dec time: 0.25 sec  E/E' av.6  Lateral E/e': 5.8  Medial E/e': 5.3     ______________________________________________________________________________  Report approved by: Slava Alexis 2021 02:58 PM                   Disclaimer: This note consists of symbols derived from keyboarding, dictation and/or voice recognition software. As a result, there may be errors in the script that have gone undetected. Please consider this when interpreting information found in this chart.

## 2021-08-01 NOTE — PLAN OF CARE
MD Becker called, spoke to unit regarding RN paging him about dressing status and becoming saturated; MD advised to change dressings frequently as needed and continue to monitor.

## 2021-08-01 NOTE — PLAN OF CARE
Physical Therapy Discharge Summary    Reason for therapy discharge:    Discharged to home.    Progress towards therapy goal(s). See goals on Care Plan in [facility] electronic health record for goal details.  Goals partially met.  Barriers to achieving goals:   discharge from facility.    Therapy recommendation(s):    Daily walking program

## 2021-08-01 NOTE — PLAN OF CARE
Chest tube site dsg CDI. VSS Discharge instructions and new meds reviewed with patient who verbalized understanding.

## 2021-08-02 ENCOUNTER — CARE COORDINATION (OUTPATIENT)
Dept: FAMILY MEDICINE | Facility: CLINIC | Age: 66
End: 2021-08-02

## 2021-08-02 NOTE — PROGRESS NOTES
Care Coordination Initial Assessment    The patient was admitted into [facility] on 7/25/2021 for a spontaneous pneumothorax on right side. He was discharged on 8/1/2021 with instructions to follow up with PCP within this week and to follow up with Dr. Villagomez in 1 month with a chest xray as well.    PCP: Kristin Zhong    Referral Source:  ED/IP List    Utilization:   Hospital Admits in last year: 2  Last PCP Appt.: 10/26/2020  Upcoming Appt.: Is doing PT at this time, no scheduled upcoming appts.    Health Maintenance Reviewed: Yes    Current Medical Health Concerns:   GI: Abdominal pain  Respiratory: CAP, pneumothorax on right, acute respiratory failure with hypoxia, COPD  Neurological: Intention tremor  Tobacco use, nicole, schizophrenia bipolar disorder, erectile dysfunction, delusions, agitation, suicide attempt     Patient/Caregiver Understanding: N/A    Medication Management:   N/A-Unable to review medications over the phone with the patient as he did not answer the phone.    Functional Status:   N/A-unable to assess    Current Behavioral Health Concerns:   Unable to assess     Patient/Caregiver Understanding:  N/A    Psychosocial:  N/A    Gaps:    N/A    Resources Given:    Unable to give over phone, pt did not answer.     Plan:   I attempted to reach the patient by phone but was unsuccessful. I did leave a message for him to call back to schedule a hospital follow up appt here.

## 2021-08-03 NOTE — PROGRESS NOTES
Patient called the clinic back and was agitated and told  to have us no longer call him anymore for hospital follow ups. He refuses to come in for a follow up and does not want to have anymore calls from us in the future.

## 2021-08-05 ENCOUNTER — OFFICE VISIT (OUTPATIENT)
Dept: FAMILY MEDICINE | Facility: CLINIC | Age: 66
End: 2021-08-05

## 2021-08-05 VITALS
DIASTOLIC BLOOD PRESSURE: 70 MMHG | HEART RATE: 78 BPM | TEMPERATURE: 98.2 F | HEIGHT: 74 IN | BODY MASS INDEX: 22.82 KG/M2 | RESPIRATION RATE: 20 BRPM | WEIGHT: 177.8 LBS | SYSTOLIC BLOOD PRESSURE: 118 MMHG

## 2021-08-05 DIAGNOSIS — G89.18 POST-OP PAIN: Primary | ICD-10-CM

## 2021-08-05 DIAGNOSIS — J93.83 RECURRENT SPONTANEOUS PNEUMOTHORAX: ICD-10-CM

## 2021-08-05 DIAGNOSIS — F25.0 SCHIZOAFFECTIVE DISORDER, BIPOLAR TYPE (H): ICD-10-CM

## 2021-08-05 PROCEDURE — 99495 TRANSJ CARE MGMT MOD F2F 14D: CPT | Performed by: FAMILY MEDICINE

## 2021-08-05 ASSESSMENT — MIFFLIN-ST. JEOR: SCORE: 1656.25

## 2021-08-05 NOTE — PROGRESS NOTES
Assessment & Plan     (G89.18) Post-op pain  (primary encounter diagnosis)  Comment: encouraged staying ahead of the pain/expect no further pain medications needed as he recovers  Plan: Tylenol regularly with food and oxycodone at bedtime with food. Expected improvement daily as he has already experienced. Potential medication side effects were discussed with the patient; let me know if any occur.      (J93.83) Recurrent spontaneous pneumothorax  Plan: Strongly urged the patient to quit smoking; discussed the nature of nicotine addiction and the numerous direct health benefits of smoking cessation.  Also discussed and encouraged behavioral therapy.  Repeat CXR, continue activity restrictions. Specialist appointment scheduled.    Review of prior external note(s) from - hospital  30 minutes spent on the date of the encounter doing chart review, history and exam, documentation and further activities per the note       Tobacco Cessation:   reports that he has been smoking cigarettes. He has a 58.00 pack-year smoking history. He has never used smokeless tobacco.      FUTURE APPOINTMENTS:       - 1 week CXR and recheck with specialists    Return in about 1 week (around 8/12/2021). prn here    Kristin Zhong MD  [facility]    Subjective   Austyn is a 66 year old who presents for the following health issues :    Recent hospitalization after right sided pneumothorax recurrent. Pleurodesis   With surgical procedure by Dr Villagomez, thoracic surgery. Using oxycodone twice daily with relief and has been not using anything since his morning dose doing better each day. Chest tube removed 7/31/2021. Finished antibiotics. Concerned about addiction but also about pain control.    Continues to smoke 5-10 cig/day. Mild daily cough and morning congestion in the sinus and ears resolves when up and about in hours.    Hospital Follow-up Visit:    Hospital/Nursing Home/IP Rehab Facility: [facility]  "Hospital  Date of Admission: 7/25/2021  Date of Discharge: 8/1/2021  Reason(s) for Admission: Spontaneous pneumothorax      Was your hospitalization related to COVID-19? No   Problems taking medications regularly:  None  Medication changes since discharge: Started taking augmentin, oxycodone and senna   Problems adhering to non-medication therapy:  None but patient wants pain medication    Summary of hospitalization:  [facility] discharge summary reviewed  Diagnostic Tests/Treatments reviewed.  Follow up needed: CXR and recheck with surgery  Other Healthcare Providers Involved in Patient s Care:         Surgical follow-up appointment - next week  Update since discharge: improved. Post Discharge Medication Reconciliation: discharge medications reconciled and changed, per note/orders.  Plan of care communicated with patient              Review of Systems   Constitutional, HEENT, cardiovascular, pulmonary, gi and gu systems are negative, except as otherwise noted.      Objective    /70 (BP Location: Left arm, Patient Position: Sitting, Cuff Size: Adult Large)   Pulse 78   Temp 98.2  F (36.8  C) (Oral)   Resp 20   Ht 1.88 m (6' 2\")   Wt 80.6 kg (177 lb 12.8 oz)   BMI 22.83 kg/m    Body mass index is 22.83 kg/m .  Physical Exam   GENERAL: healthy, alert, no distress, elderly, appears older than stated age and very talkative with high energy.\" I am in no pain now:  HENT: normal cephalic/atraumatic, ear canals and TM's normal, nose and mouth without ulcers or lesions, oral mucous membranes moist and poor dental hygiene with multiple missing teeth and staining brown  NECK: no adenopathy, no asymmetry, masses, or scars and thyroid normal to palpation  RESP: lungs clear to auscultation and equal with distant breath sounds - no rales, rhonchi or wheezes  CV: regular rate and rhythm, normal S1 S2, no S3 or S4, no murmur, click or rub, no peripheral edema and peripheral pulses strong  ABDOMEN: soft, " nontender, no hepatosplenomegaly, no masses and bowel sounds normal  MS: no gross musculoskeletal defects noted, no edema    CXR 8/1/2021 reviewed with emphysematous changes and scarring reviewed

## 2021-08-05 NOTE — PATIENT INSTRUCTIONS
2 tylenol with breakfast, lunch and dinner. Stay ahead of the pain tapering back over the next 7-10 days  Take one oxycodone 1 hour before bedtime with toast/cracker and as needed.     Stop smoking      Follow up with specialists and CXR in 1 week

## 2022-01-05 NOTE — PROGRESS NOTES
" 19 1300   Art Therapy   Type of Intervention structured groups   Response participates with encouragement   Hours .5   Treatment Detail Art Therapy- safe place/ landscape of emotions   Problem-Schizoaffective disorder bipolar type  Tobacco use disorder  Rule out pornography or sexual addiction outside of the context of manic episode     Goal- cope, express, regulate emotions     Outcome- pt attended Art Therapy group for half of group. Pt had many odd behaviors in group. He was squeezing packages of cereal, potato chips and obdulio crackers very hard and then throwing excess contents on the floor. When writer inquired he said he did that so he could eat it with his missing teeth.  He did have an odd stare at writer. He wont engage in art and writer tried many other topics to engage him including games and puzzles and talking to him about safe place. He would say things like he didn't  and it didn't matter. He said his feeling word was disappointed because he wanted to go home. When writer asked about home, he said it was his \" dog house.\"                        " Consent Type: Consent 1 (Standard)

## 2022-01-13 ENCOUNTER — OFFICE VISIT (OUTPATIENT)
Dept: FAMILY MEDICINE | Facility: CLINIC | Age: 67
End: 2022-01-13

## 2022-01-13 VITALS
DIASTOLIC BLOOD PRESSURE: 96 MMHG | OXYGEN SATURATION: 97 % | HEART RATE: 84 BPM | RESPIRATION RATE: 20 BRPM | WEIGHT: 190 LBS | SYSTOLIC BLOOD PRESSURE: 144 MMHG | TEMPERATURE: 98 F | BODY MASS INDEX: 24.39 KG/M2

## 2022-01-13 DIAGNOSIS — R03.0 ELEVATED BP WITHOUT DIAGNOSIS OF HYPERTENSION: ICD-10-CM

## 2022-01-13 DIAGNOSIS — G25.2 INTENTION TREMOR: Primary | ICD-10-CM

## 2022-01-13 DIAGNOSIS — F99 PSYCHIATRIC PROBLEM: ICD-10-CM

## 2022-01-13 DIAGNOSIS — Z00.00 HEALTHCARE MAINTENANCE: ICD-10-CM

## 2022-01-13 DIAGNOSIS — J44.9 CHRONIC OBSTRUCTIVE PULMONARY DISEASE, UNSPECIFIED COPD TYPE (H): ICD-10-CM

## 2022-01-13 DIAGNOSIS — Z87.891 PERSONAL HISTORY OF TOBACCO USE, PRESENTING HAZARDS TO HEALTH: ICD-10-CM

## 2022-01-13 PROCEDURE — 99214 OFFICE O/P EST MOD 30 MIN: CPT | Performed by: STUDENT IN AN ORGANIZED HEALTH CARE EDUCATION/TRAINING PROGRAM

## 2022-01-13 RX ORDER — BENZTROPINE MESYLATE 1 MG/1
1 TABLET ORAL 2 TIMES DAILY
Qty: 180 TABLET | Refills: 1 | Status: SHIPPED | OUTPATIENT
Start: 2022-01-13 | End: 2022-08-23

## 2022-01-13 RX ORDER — BENZTROPINE MESYLATE 1 MG/1
1 TABLET ORAL 2 TIMES DAILY
COMMUNITY
Start: 2021-10-30 | End: 2022-08-23

## 2022-01-13 ASSESSMENT — ANXIETY QUESTIONNAIRES
1. FEELING NERVOUS, ANXIOUS, OR ON EDGE: NOT AT ALL
5. BEING SO RESTLESS THAT IT IS HARD TO SIT STILL: NOT AT ALL
GAD7 TOTAL SCORE: 4
3. WORRYING TOO MUCH ABOUT DIFFERENT THINGS: NOT AT ALL
IF YOU CHECKED OFF ANY PROBLEMS ON THIS QUESTIONNAIRE, HOW DIFFICULT HAVE THESE PROBLEMS MADE IT FOR YOU TO DO YOUR WORK, TAKE CARE OF THINGS AT HOME, OR GET ALONG WITH OTHER PEOPLE: NOT DIFFICULT AT ALL
2. NOT BEING ABLE TO STOP OR CONTROL WORRYING: NOT AT ALL
6. BECOMING EASILY ANNOYED OR IRRITABLE: NEARLY EVERY DAY
7. FEELING AFRAID AS IF SOMETHING AWFUL MIGHT HAPPEN: SEVERAL DAYS

## 2022-01-13 ASSESSMENT — PATIENT HEALTH QUESTIONNAIRE - PHQ9
5. POOR APPETITE OR OVEREATING: NOT AT ALL
SUM OF ALL RESPONSES TO PHQ QUESTIONS 1-9: 14

## 2022-01-13 NOTE — PROGRESS NOTES
Assessment & Plan     1. Intention tremor  3. Psychiatric problem  Cogentin started by psychiatry per pt, records unavailable. Pt has no plans to follow-up and also declines referral elsewhere. Records indicate hx of psychosis, possible bipolar vs schizoaffective. Discussed importance of specialized care and accurate diagnosis prior to starting any medications. Pt to consider.   - benztropine (COGENTIN) 1 MG tablet; Take 1 tablet (1 mg) by mouth 2 times daily  Dispense: 180 tablet; Refill: 1    2. Elevated BP without diagnosis of hypertension  No hx of HTN, will continue to monitor at follow-up    4. Healthcare maintenance  Recommended immunizations incl covid booster, pneumococcal and influenza. Pt declines.      5. Personal history of tobacco use, presenting hazards to health  6. Chronic obstructive pulmonary disease, unspecified COPD type (H)  Reports well controlled, not interested in quitting smoking at this time.      35 minutes spent on the date of the encounter doing chart review, history and exam, documentation and further activities per the note    Cruz Hermosillo MD, [facility]       Subjective     Austyn Leach is a 66 year old male who presents to clinic today for the following health issues:    HPI   Mental health Follow-up    How are you doing with your depression since your last visit? Worsened    Have you had a significant life event?  No     Are you feeling anxious or having panic attacks?   No    Do you have any concerns with your use of alcohol or other drugs? Using THC occasionally     No recent psychiatry follow-up.     Breathing stable, not using any inhalers    Not interested in quitting smoking    Social History     Tobacco Use     Smoking status: Current Every Day Smoker     Packs/day: 1.00     Years: 58.00     Pack years: 58.00     Types: Cigarettes     Smokeless tobacco: Never Used   Substance Use Topics     Alcohol use: No     Drug use: No     PHQ 4/28/2020  5/11/2020   PHQ-9 Total Score 18 11   Q9: Thoughts of better off dead/self-harm past 2 weeks More than half the days Not at all     JUAN RAMON-7 SCORE 4/28/2020   Total Score 10           Objective    BP (!) 144/96 (BP Location: Left arm, Patient Position: Sitting, Cuff Size: Adult Large)   Pulse 84   Temp 98  F (36.7  C) (Temporal)   Resp 20   Wt 86.2 kg (190 lb)   SpO2 97%   BMI 24.39 kg/m    Body mass index is 24.39 kg/m .  Physical Exam   Alert, NAD  NC/AT  Sclerae anicteric  Regular  Resp nonlabored  Skin warm and dry  No focal neuro deficits. Speech intact. Bilateral hands with intention tremor.   Limited insight, somewhat histrionic, no SI/HI

## 2022-01-13 NOTE — NURSING NOTE
Chief Complaint   Patient presents with     Recheck Medication     needs refill of medication for tremors, does not want to go back and see the provider who originally prescribed as it was not a good match, wants to possibly start taking depression medication     Pre-visit Screening:  Immunizations:  Is not up to date-patient declines doing   Colonoscopy: is due but patient declines to get this done   Mammogram: MITA  Asthma Action Test/Plan:  MITA  PHQ9: given today   GAD7:  Given today   Questioned patient about current smoking habits Pt. Current everyday smoker.   Ok to leave detailed message on voice mail for today's visit only YEs, phone # [phone]

## 2022-01-13 NOTE — PATIENT INSTRUCTIONS
Continue cogentin twice daily    Let me know if you want to see a Neurologist (tremor specialist) or a new mental health specialist    Follow-up with me in 3-6 months to see how you're doing, sooner if needed

## 2022-01-14 ASSESSMENT — ANXIETY QUESTIONNAIRES: GAD7 TOTAL SCORE: 4

## 2022-03-21 NOTE — PROGRESS NOTES
Was better with staff and other patients today with his behavior in the milieu. Was inappropriate with certain situations but overall has been cooperative while maintaining some level of irritability.    Patent

## 2022-04-12 NOTE — PROGRESS NOTES
"Continues to refuse vitals stating: \"No thank you, I'm not huizar today.\"  " Patient arrives by private car with sister. Patient has been SOB since yesterday. Receiving chemo for multiple myeloma. Next chemo scheduled for tomorrow    O2 at 75% put on 8L Nasal cannula in triage.

## 2022-04-25 NOTE — ED NOTES
"Continues to keep eyes closed and requesting a blindfold .  States he \" doesn't.t want to see men\"  "
"Reports has been taking home medications.  Writer requested urine sample from patient, patient replies\"you are not going to get urine\"     "
Arrived via EMS on transport hold,  Per EMS and police was standing in traffic this morning screaming at people for speeding.  911 called and was brought to the ED.  Since arrival minimal verbal interaction with writer,  Does not answer any questions,  Did ask for writers name and repeatedly has asked for a blindfold.  Keeps eyes closed ,refusing to open them even when walking.  Refuses to answer why he needs the blindfold and why he is keeping his eyes closed,  Cooperated with search,  Given warm blanket,  All past history obtained from previous admissions.   
Bed: ED12  Expected date:   Expected time:   Means of arrival:   Comments:  Timbo  Ye eval  
Behaviors escalating,  Refusing to go into room, standing in doorway, talking loudly and enticing another patient during a code 21.  Asked multiple times to return to room. escorted back into room by security  used code 21 team that was present to assist with medicating of patient. Patients arms and legs held for IM injection   Yelling and cursing at staff.     
Rambling conversation about police and hospital violating his contract.  Also rambling about 72 hour hold, will report us to the [facility],   
Sleeping in bed  
4

## 2022-08-23 ENCOUNTER — OFFICE VISIT (OUTPATIENT)
Dept: FAMILY MEDICINE | Facility: CLINIC | Age: 67
End: 2022-08-23

## 2022-08-23 VITALS
TEMPERATURE: 98.8 F | BODY MASS INDEX: 24.38 KG/M2 | HEART RATE: 92 BPM | DIASTOLIC BLOOD PRESSURE: 96 MMHG | WEIGHT: 190 LBS | SYSTOLIC BLOOD PRESSURE: 144 MMHG | OXYGEN SATURATION: 98 % | HEIGHT: 74 IN

## 2022-08-23 DIAGNOSIS — R25.1 TREMOR: ICD-10-CM

## 2022-08-23 DIAGNOSIS — F17.213 CIGARETTE NICOTINE DEPENDENCE WITH WITHDRAWAL: ICD-10-CM

## 2022-08-23 DIAGNOSIS — J43.8 OTHER EMPHYSEMA (H): ICD-10-CM

## 2022-08-23 DIAGNOSIS — Z87.891 PERSONAL HISTORY OF TOBACCO USE, PRESENTING HAZARDS TO HEALTH: ICD-10-CM

## 2022-08-23 DIAGNOSIS — R59.1 LYMPHADENOPATHY: ICD-10-CM

## 2022-08-23 DIAGNOSIS — R50.9 FEVER, UNSPECIFIED FEVER CAUSE: Primary | ICD-10-CM

## 2022-08-23 PROBLEM — R10.84 ABDOMINAL PAIN, GENERALIZED: Status: RESOLVED | Noted: 2020-04-10 | Resolved: 2022-08-23

## 2022-08-23 PROBLEM — R10.9 ABDOMINAL PAIN: Status: RESOLVED | Noted: 2020-04-10 | Resolved: 2022-08-23

## 2022-08-23 LAB — COVID-19: NEGATIVE

## 2022-08-23 PROCEDURE — 87635 SARS-COV-2 COVID-19 AMP PRB: CPT | Performed by: FAMILY MEDICINE

## 2022-08-23 PROCEDURE — G2023 SPECIMEN COLLECT COVID-19: HCPCS | Performed by: FAMILY MEDICINE

## 2022-08-23 PROCEDURE — 99214 OFFICE O/P EST MOD 30 MIN: CPT | Performed by: FAMILY MEDICINE

## 2022-08-23 RX ORDER — ALBUTEROL SULFATE 90 UG/1
2 AEROSOL, METERED RESPIRATORY (INHALATION) EVERY 6 HOURS
Qty: 18 G | Refills: 1 | Status: SHIPPED | OUTPATIENT
Start: 2022-08-23

## 2022-08-23 NOTE — PROGRESS NOTES
Assessment & Plan   Problem List Items Addressed This Visit        Nervous and Auditory    Cigarette nicotine dependence with withdrawal       Behavioral    Personal history of tobacco use, presenting hazards to health      Other Visit Diagnoses     Fever    -  Primary    Relevant Orders    COVID-19 ([facility]) (Completed)    Other emphysema (H)        Relevant Medications    albuterol (PROAIR HFA/PROVENTIL HFA/VENTOLIN HFA) 108 (90 Base) MCG/ACT inhaler    Tremor        Lymphadenopathy        Relevant Medications    albuterol (PROAIR HFA/PROVENTIL HFA/VENTOLIN HFA) 108 (90 Base) MCG/ACT inhaler         1. Fever  covid test was negative. He adamantly insisted multiple times throughout the apt that covid is non existant and that he doesn't have covid.   He was uphappy the second I told him that I didn't think an antibiotic would help his lymph node swelling, got up and angrily walked out the back door.   - COVID-19 ([facility])    2. Personal history of tobacco use, presenting hazards to health  Advised to cut back/quit.    3. Other emphysema (H)  He asked for a refill on his inhaler for breathing. There was nothing on his medication list but he was likely talking about albuterol inhaler. Sent.  I discussed further evaluation but he refused and walked out.  - albuterol (PROAIR HFA/PROVENTIL HFA/VENTOLIN HFA) 108 (90 Base) MCG/ACT inhaler; Inhale 2 puffs into the lungs every 6 hours  Dispense: 18 g; Refill: 1    4. Tremor  Not addressed today.    5. Cigarette nicotine dependence with withdrawal      6. Lymphadenopathy  I advised to recheck this in 1-2 weeks to recheck the lymph node swelling. He told me that he wasn't going to do this.    Note: I did not have time to complete conversation or evaluation today, due to patient becoming angry and walking out. I advised continued isolation because this could still be covid and he said that he would not be doing this.      N     Nicotine/Tobacco Cessation:  He reports that he has been  "smoking cigarettes. He has a 58.00 pack-year smoking history. He has never used smokeless tobacco.  Nicotine/Tobacco Cessation Plan:           FUTURE APPOINTMENTS:       - Follow-up visit as needed.    No follow-ups on file.    Tita Cifuentes MD  [facility]    Subjective     Nursing Notes:   Radha Quinteros CMA  8/23/2022 12:55 PM  Signed  Chief Complaint   Patient presents with     Covid Concern     Swollen glands on right side of throat, fever on and off for 4 days, feeling run down, body aches/ joint pain      Pre-visit Screening:  Immunizations:  not up to date - pt declines  Colonoscopy:  Pt declines  Mammogram: NA  Asthma Action Test/Plan:  NA  PHQ9:  NA  GAD7:  NA  Questioned patient about current smoking habits Pt. smokes 1 pack of cigerettes a day  Ok to leave detailed message on voice mail for today's visit only Yes, phone # [phone]           Austyn Leach is a 67 year old male who presents to clinic today for the following health issues   HPI     Swollen gland on right neck by throat and fever off and on for 4 days, also has some joint pain. Tired and body aches.    1 week ago, started with swollen gland in right neck, had a fever.   \"I don't believe in covid\". A friends test came up positive then the next day it was negative.  Didn't do a home test. No sore throat. Is always short of breath because he only has one lung. No shortness of breath.  Wants a refill on his inhaler.          Review of Systems   Constitutional, HEENT, cardiovascular, pulmonary, gi and gu systems are negative, except as otherwise noted.      Objective    BP (!) 144/96 (BP Location: Left arm, Patient Position: Sitting, Cuff Size: Adult Regular)   Pulse 92   Temp 98.8  F (37.1  C) (Temporal)   Ht 1.88 m (6' 2\")   Wt 86.2 kg (190 lb)   SpO2 98%   BMI 24.39 kg/m    Body mass index is 24.39 kg/m .  Physical Exam   GENERAL: healthy, alert and no distress  NECK: no adenopathy, no asymmetry, masses, or " scars and thyroid normal to palpation, I am not able to appreciate the node swelling under right anterior jaw  RESP: lungs bilateral scattered wheeze, mild  CV: regular rate and rhythm, normal S1 S2, no S3 or S4, no murmur, click or rub, no peripheral edema and peripheral pulses strong  MS: no gross musculoskeletal defects noted, no edema  NEURO: Normal strength and tone, mentation intact and speech normal  PSYCH: mentation appears normal, affect normal/bright    Results for orders placed or performed in visit on 08/23/22   COVID-19 ([facility])     Status: None   Result Value Ref Range    COVID-19 Negative

## 2022-08-23 NOTE — NURSING NOTE
Chief Complaint   Patient presents with     Covid Concern     Swollen glands on right side of throat, fever on and off for 4 days, feeling run down, body aches/ joint pain      Pre-visit Screening:  Immunizations:  not up to date - pt declines  Colonoscopy:  Pt declines  Mammogram: NA  Asthma Action Test/Plan:  NA  PHQ9:  NA  GAD7:  NA  Questioned patient about current smoking habits Pt. smokes 1 pack of cigerettes a day  Ok to leave detailed message on voice mail for today's visit only Yes, phone # [phone]

## 2022-08-26 ENCOUNTER — OFFICE VISIT (OUTPATIENT)
Dept: FAMILY MEDICINE | Facility: CLINIC | Age: 67
End: 2022-08-26

## 2022-08-26 VITALS
TEMPERATURE: 98.1 F | OXYGEN SATURATION: 93 % | DIASTOLIC BLOOD PRESSURE: 96 MMHG | RESPIRATION RATE: 20 BRPM | SYSTOLIC BLOOD PRESSURE: 142 MMHG | HEART RATE: 105 BPM

## 2022-08-26 DIAGNOSIS — R59.9 SWOLLEN GLAND: Primary | ICD-10-CM

## 2022-08-26 PROCEDURE — G2023 SPECIMEN COLLECT COVID-19: HCPCS | Performed by: PHYSICIAN ASSISTANT

## 2022-08-26 PROCEDURE — 87635 SARS-COV-2 COVID-19 AMP PRB: CPT | Performed by: PHYSICIAN ASSISTANT

## 2022-08-26 PROCEDURE — 99213 OFFICE O/P EST LOW 20 MIN: CPT | Performed by: PHYSICIAN ASSISTANT

## 2022-08-26 PROCEDURE — 87651 STREP A DNA AMP PROBE: CPT | Performed by: PHYSICIAN ASSISTANT

## 2022-08-26 RX ORDER — IBUPROFEN 400 MG/1
400 TABLET, FILM COATED ORAL EVERY 6 HOURS PRN
Qty: 30 TABLET | Refills: 0 | Status: SHIPPED | OUTPATIENT
Start: 2022-08-26 | End: 2022-09-19

## 2022-08-26 NOTE — PROGRESS NOTES
Assessment & Plan     Swollen gland-no concerning signs on exam outside of pain on palpation of R submandibular area, will treat symptoms and await improvement  -Rest, increase fluids, honey for cough suppression/sore throat  Seek emergency care for significant shortness of breath, chest pain, and/or fever >103F that cannot be controlled with antipyretics   - COVID-19 ([facility])  - Strep A ([facility])  - ibuprofen (ADVIL/MOTRIN) 400 MG tablet  Dispense: 30 tablet; Refill: 0-warned to not take this with ASA, patient displays understanding         Follow up if symptoms persist    No follow-ups on file.    Wisam Robison PA-C  [facility]    Umair Argueta is a 67 year old, presenting for the following health issues:  Follow Up (Still not feeling well, has swollen glands, wants a throat culture done )      HPI     Continuing R sided lymphadenopathy with pain per patient. Over 1 week of pain.  Has some SOB per patient.  Using IM shots for psychiatric meds.     Acute Illness  Acute illness concerns: SOB, continued throat pain  Onset/Duration: 1 week  Symptoms:  Fever: No  Chills/Sweats: No  Headache (location?): No  Sinus Pressure: No  Conjunctivitis:  No  Ear Pain: no  Rhinorrhea: No  Congestion: No  Sore Throat: YES  Cough: YES-non-productive  Wheeze: YES  Decreased Appetite: No  Nausea: No  Vomiting: No  Diarrhea: No  Dysuria/Freq.: No  Dysuria or Hematuria: No  Fatigue/Achiness: YES  Sick/Strep Exposure: YES  Therapies tried and outcome: ASA      Review of Systems   Constitutional, HEENT, cardiovascular, pulmonary, gi and gu systems are negative, except as otherwise noted.      Objective    BP (!) 142/96 (BP Location: Left arm, Patient Position: Sitting, Cuff Size: Adult Large)   Pulse 105   Temp 98.1  F (36.7  C) (Temporal)   Resp 20   SpO2 93%   There is no height or weight on file to calculate BMI.  Physical Exam   GENERAL: healthy, alert and no distress. Moderately poor historian.  HENT: ear canals  and TM's normal, nose and mouth without ulcers or lesions  NECK: no adenopathy, tender to palpation on R submandibular region, no asymmetry, masses, or scars and thyroid normal to palpation  RESP: scattered wheezes throughout lung fields  CV: regular rate and rhythm, normal S1 S2, no S3 or S4, no murmur, click or rub, no peripheral edema and peripheral pulses strong  ABDOMEN: soft, nontender, no hepatosplenomegaly, no masses and bowel sounds normal  MS: no gross musculoskeletal defects noted, no edema  PSYCH: judgement and insight impaired and appearance disheveled    COVID test: NEGATIVE  Strep A: NEGATIVE                .  ..

## 2022-08-26 NOTE — NURSING NOTE
Chief Complaint   Patient presents with     Follow Up     Still not feeling well, has swollen glands, wants a throat culture done      Pre-visit Screening:  Immunizations:  up to date  Colonoscopy:  is up to date  Mammogram: NA  Asthma Action Test/Plan:  NA  PHQ9:  NA  GAD7:  NA  Questioned patient about current smoking habits Pt. currently smokes.  Advised about smoking cessation.  Ok to leave detailed message on voice mail for today's visit only Yes, phone # [phone]

## 2022-09-12 LAB
COVID-19: NEGATIVE
STREP A: NEGATIVE

## 2022-09-19 ENCOUNTER — HOSPITAL ENCOUNTER (OUTPATIENT)
Facility: CLINIC | Age: 67
Setting detail: OBSERVATION
Discharge: HOME | End: 2022-09-20
Attending: EMERGENCY MEDICINE | Admitting: HOSPITALIST
Payer: MEDICARE

## 2022-09-19 ENCOUNTER — APPOINTMENT (OUTPATIENT)
Dept: GENERAL RADIOLOGY | Facility: CLINIC | Age: 67
End: 2022-09-19
Attending: EMERGENCY MEDICINE
Payer: MEDICARE

## 2022-09-19 DIAGNOSIS — M25.562 ARTHRALGIA OF BOTH KNEES: ICD-10-CM

## 2022-09-19 DIAGNOSIS — M25.462 EFFUSION OF BOTH KNEE JOINTS: ICD-10-CM

## 2022-09-19 DIAGNOSIS — M25.461 EFFUSION OF BOTH KNEE JOINTS: ICD-10-CM

## 2022-09-19 DIAGNOSIS — R26.2 INABILITY TO AMBULATE DUE TO KNEE: ICD-10-CM

## 2022-09-19 DIAGNOSIS — M25.562 ACUTE PAIN OF BOTH KNEES: ICD-10-CM

## 2022-09-19 DIAGNOSIS — M25.561 ACUTE PAIN OF BOTH KNEES: ICD-10-CM

## 2022-09-19 DIAGNOSIS — M25.561 ARTHRALGIA OF BOTH KNEES: ICD-10-CM

## 2022-09-19 LAB
ANION GAP SERPL CALCULATED.3IONS-SCNC: 10 MMOL/L (ref 7–15)
BASE EXCESS BLDV CALC-SCNC: 3.5 MMOL/L (ref -7.7–1.9)
BASOPHILS # BLD AUTO: 0 10E3/UL (ref 0–0.2)
BASOPHILS NFR BLD AUTO: 0 %
BUN SERPL-MCNC: 19 MG/DL (ref 8–23)
CALCIUM SERPL-MCNC: 9.1 MG/DL (ref 8.8–10.2)
CHLORIDE SERPL-SCNC: 106 MMOL/L (ref 98–107)
CREAT SERPL-MCNC: 0.95 MG/DL (ref 0.67–1.17)
CRP SERPL-MCNC: 12.91 MG/L
DEPRECATED HCO3 PLAS-SCNC: 27 MMOL/L (ref 22–29)
EOSINOPHIL # BLD AUTO: 0.1 10E3/UL (ref 0–0.7)
EOSINOPHIL NFR BLD AUTO: 1 %
ERYTHROCYTE [DISTWIDTH] IN BLOOD BY AUTOMATED COUNT: 14.1 % (ref 10–15)
ERYTHROCYTE [SEDIMENTATION RATE] IN BLOOD BY WESTERGREN METHOD: 14 MM/HR (ref 0–20)
ETHANOL SERPL-MCNC: <0.01 G/DL
GFR SERPL CREATININE-BSD FRML MDRD: 88 ML/MIN/1.73M2
GLUCOSE SERPL-MCNC: 128 MG/DL (ref 70–99)
HCO3 BLDV-SCNC: 29 MMOL/L (ref 21–28)
HCT VFR BLD AUTO: 45.9 % (ref 40–53)
HGB BLD-MCNC: 14.5 G/DL (ref 13.3–17.7)
HOLD SPECIMEN: NORMAL
IMM GRANULOCYTES # BLD: 0 10E3/UL
IMM GRANULOCYTES NFR BLD: 0 %
LYMPHOCYTES # BLD AUTO: 1.1 10E3/UL (ref 0.8–5.3)
LYMPHOCYTES NFR BLD AUTO: 14 %
MAGNESIUM SERPL-MCNC: 1.8 MG/DL (ref 1.7–2.3)
MCH RBC QN AUTO: 29.3 PG (ref 26.5–33)
MCHC RBC AUTO-ENTMCNC: 31.6 G/DL (ref 31.5–36.5)
MCV RBC AUTO: 93 FL (ref 78–100)
MONOCYTES # BLD AUTO: 0.8 10E3/UL (ref 0–1.3)
MONOCYTES NFR BLD AUTO: 11 %
NEUTROPHILS # BLD AUTO: 5.8 10E3/UL (ref 1.6–8.3)
NEUTROPHILS NFR BLD AUTO: 74 %
NRBC # BLD AUTO: 0 10E3/UL
NRBC BLD AUTO-RTO: 0 /100
O2/TOTAL GAS SETTING VFR VENT: 0 %
PCO2 BLDV: 47 MM HG (ref 40–50)
PH BLDV: 7.4 [PH] (ref 7.32–7.43)
PLATELET # BLD AUTO: 187 10E3/UL (ref 150–450)
PO2 BLDV: 34 MM HG (ref 25–47)
POTASSIUM SERPL-SCNC: 4 MMOL/L (ref 3.4–5.3)
RBC # BLD AUTO: 4.95 10E6/UL (ref 4.4–5.9)
SARS-COV-2 RNA RESP QL NAA+PROBE: NEGATIVE
SODIUM SERPL-SCNC: 143 MMOL/L (ref 136–145)
URATE SERPL-MCNC: 6 MG/DL (ref 3.4–7)
WBC # BLD AUTO: 7.8 10E3/UL (ref 4–11)

## 2022-09-19 PROCEDURE — U0005 INFEC AGEN DETEC AMPLI PROBE: HCPCS | Performed by: EMERGENCY MEDICINE

## 2022-09-19 PROCEDURE — 250N000011 HC RX IP 250 OP 636: Performed by: EMERGENCY MEDICINE

## 2022-09-19 PROCEDURE — 82374 ASSAY BLOOD CARBON DIOXIDE: CPT | Performed by: EMERGENCY MEDICINE

## 2022-09-19 PROCEDURE — 85025 COMPLETE CBC W/AUTO DIFF WBC: CPT | Performed by: EMERGENCY MEDICINE

## 2022-09-19 PROCEDURE — G0378 HOSPITAL OBSERVATION PER HR: HCPCS

## 2022-09-19 PROCEDURE — 82077 ASSAY SPEC XCP UR&BREATH IA: CPT | Performed by: EMERGENCY MEDICINE

## 2022-09-19 PROCEDURE — C9803 HOPD COVID-19 SPEC COLLECT: HCPCS

## 2022-09-19 PROCEDURE — 93005 ELECTROCARDIOGRAM TRACING: CPT

## 2022-09-19 PROCEDURE — 99220 PR INITIAL OBSERVATION CARE,LEVEL III: CPT | Performed by: PHYSICIAN ASSISTANT

## 2022-09-19 PROCEDURE — 83735 ASSAY OF MAGNESIUM: CPT | Performed by: EMERGENCY MEDICINE

## 2022-09-19 PROCEDURE — 250N000013 HC RX MED GY IP 250 OP 250 PS 637: Performed by: EMERGENCY MEDICINE

## 2022-09-19 PROCEDURE — 99285 EMERGENCY DEPT VISIT HI MDM: CPT | Mod: 25

## 2022-09-19 PROCEDURE — 36415 COLL VENOUS BLD VENIPUNCTURE: CPT | Performed by: EMERGENCY MEDICINE

## 2022-09-19 PROCEDURE — 85652 RBC SED RATE AUTOMATED: CPT | Performed by: PHYSICIAN ASSISTANT

## 2022-09-19 PROCEDURE — 250N000012 HC RX MED GY IP 250 OP 636 PS 637: Performed by: EMERGENCY MEDICINE

## 2022-09-19 PROCEDURE — 96374 THER/PROPH/DIAG INJ IV PUSH: CPT

## 2022-09-19 PROCEDURE — 250N000009 HC RX 250: Performed by: PHYSICIAN ASSISTANT

## 2022-09-19 PROCEDURE — 82803 BLOOD GASES ANY COMBINATION: CPT | Performed by: EMERGENCY MEDICINE

## 2022-09-19 PROCEDURE — 73562 X-RAY EXAM OF KNEE 3: CPT | Mod: 50

## 2022-09-19 PROCEDURE — 86140 C-REACTIVE PROTEIN: CPT | Performed by: PHYSICIAN ASSISTANT

## 2022-09-19 PROCEDURE — 250N000013 HC RX MED GY IP 250 OP 250 PS 637: Performed by: PHYSICIAN ASSISTANT

## 2022-09-19 PROCEDURE — 84550 ASSAY OF BLOOD/URIC ACID: CPT | Performed by: PHYSICIAN ASSISTANT

## 2022-09-19 PROCEDURE — 36415 COLL VENOUS BLD VENIPUNCTURE: CPT | Performed by: PHYSICIAN ASSISTANT

## 2022-09-19 RX ORDER — FAMOTIDINE 10 MG
10 TABLET ORAL 2 TIMES DAILY
Status: DISCONTINUED | OUTPATIENT
Start: 2022-09-19 | End: 2022-09-20 | Stop reason: HOSPADM

## 2022-09-19 RX ORDER — ONDANSETRON 4 MG/1
4 TABLET, ORALLY DISINTEGRATING ORAL EVERY 6 HOURS PRN
Status: DISCONTINUED | OUTPATIENT
Start: 2022-09-19 | End: 2022-09-20 | Stop reason: HOSPADM

## 2022-09-19 RX ORDER — MORPHINE SULFATE 4 MG/ML
4 INJECTION, SOLUTION INTRAMUSCULAR; INTRAVENOUS ONCE
Status: COMPLETED | OUTPATIENT
Start: 2022-09-19 | End: 2022-09-19

## 2022-09-19 RX ORDER — PREDNISONE 20 MG/1
40 TABLET ORAL ONCE
Status: COMPLETED | OUTPATIENT
Start: 2022-09-19 | End: 2022-09-19

## 2022-09-19 RX ORDER — KETOROLAC TROMETHAMINE 15 MG/ML
15 INJECTION, SOLUTION INTRAMUSCULAR; INTRAVENOUS EVERY 12 HOURS PRN
Status: DISCONTINUED | OUTPATIENT
Start: 2022-09-19 | End: 2022-09-20 | Stop reason: HOSPADM

## 2022-09-19 RX ORDER — POLYETHYLENE GLYCOL 3350 17 G
4 POWDER IN PACKET (EA) ORAL
Status: DISCONTINUED | OUTPATIENT
Start: 2022-09-19 | End: 2022-09-20 | Stop reason: HOSPADM

## 2022-09-19 RX ORDER — ONDANSETRON 2 MG/ML
4 INJECTION INTRAMUSCULAR; INTRAVENOUS EVERY 6 HOURS PRN
Status: DISCONTINUED | OUTPATIENT
Start: 2022-09-19 | End: 2022-09-20 | Stop reason: HOSPADM

## 2022-09-19 RX ORDER — NAPROXEN 250 MG/1
250 TABLET ORAL EVERY 12 HOURS PRN
Status: DISCONTINUED | OUTPATIENT
Start: 2022-09-19 | End: 2022-09-20 | Stop reason: HOSPADM

## 2022-09-19 RX ORDER — IPRATROPIUM BROMIDE AND ALBUTEROL SULFATE 2.5; .5 MG/3ML; MG/3ML
3 SOLUTION RESPIRATORY (INHALATION)
Status: DISCONTINUED | OUTPATIENT
Start: 2022-09-19 | End: 2022-09-20 | Stop reason: HOSPADM

## 2022-09-19 RX ORDER — OXYCODONE HYDROCHLORIDE 5 MG/1
5 TABLET ORAL ONCE
Status: COMPLETED | OUTPATIENT
Start: 2022-09-19 | End: 2022-09-19

## 2022-09-19 RX ORDER — ACETAMINOPHEN 325 MG/1
975 TABLET ORAL EVERY 8 HOURS
Status: DISCONTINUED | OUTPATIENT
Start: 2022-09-19 | End: 2022-09-20 | Stop reason: HOSPADM

## 2022-09-19 RX ORDER — ALBUTEROL SULFATE 90 UG/1
2 AEROSOL, METERED RESPIRATORY (INHALATION) EVERY 6 HOURS PRN
Status: DISCONTINUED | OUTPATIENT
Start: 2022-09-19 | End: 2022-09-20 | Stop reason: HOSPADM

## 2022-09-19 RX ORDER — GUAIFENESIN/DEXTROMETHORPHAN 100-10MG/5
10 SYRUP ORAL EVERY 4 HOURS PRN
Status: DISCONTINUED | OUTPATIENT
Start: 2022-09-19 | End: 2022-09-20 | Stop reason: HOSPADM

## 2022-09-19 RX ADMIN — ASPIRIN 325 MG: 325 TABLET, COATED ORAL at 20:12

## 2022-09-19 RX ADMIN — IPRATROPIUM BROMIDE AND ALBUTEROL SULFATE 3 ML: 2.5; .5 SOLUTION RESPIRATORY (INHALATION) at 22:31

## 2022-09-19 RX ADMIN — ACETAMINOPHEN 975 MG: 325 TABLET, FILM COATED ORAL at 20:12

## 2022-09-19 RX ADMIN — OXYCODONE HYDROCHLORIDE 5 MG: 5 TABLET ORAL at 11:36

## 2022-09-19 RX ADMIN — PREDNISONE 40 MG: 20 TABLET ORAL at 14:13

## 2022-09-19 RX ADMIN — FAMOTIDINE 10 MG: 10 TABLET ORAL at 20:12

## 2022-09-19 RX ADMIN — MORPHINE SULFATE 4 MG: 4 INJECTION, SOLUTION INTRAMUSCULAR; INTRAVENOUS at 15:04

## 2022-09-19 ASSESSMENT — ACTIVITIES OF DAILY LIVING (ADL)
ADLS_ACUITY_SCORE: 33
ADLS_ACUITY_SCORE: 37

## 2022-09-19 ASSESSMENT — ENCOUNTER SYMPTOMS
WHEEZING: 1
CHILLS: 0
COUGH: 0
ARTHRALGIAS: 1
NAUSEA: 0
SHORTNESS OF BREATH: 0
FEVER: 0
VOMITING: 0

## 2022-09-19 NOTE — PHARMACY-ADMISSION MEDICATION HISTORY
Admission medication history interview status for this patient is complete. See [facility] admission navigator for allergy information, prior to admission medications and immunization status.     Medication history interview source(s):Patient  Medication history resources (including written lists, pill bottles, clinic record):[facility] list, [facility], Care Everywhere  Primary pharmacy:[facility] ([address]    Changes made to PTA medication list:  Added: abilify IM, aspirin  Deleted: ibuprofen 400mg prn  Changed: ----    Actions taken by pharmacist (provider contacted, etc):None     Additional medication history information:None    Medication reconciliation/reorder completed by provider prior to medication history? No      For patients on insulin therapy:N    Prior to Admission medications    Medication Sig Last Dose Taking? Auth Provider Long Term End Date   albuterol (PROAIR HFA/PROVENTIL HFA/VENTOLIN HFA) 108 (90 Base) MCG/ACT inhaler Inhale 2 puffs into the lungs every 6 hours  at no recent usage Yes Tita Cifuentes MD Yes    ARIPiprazole ER (ABILIFY MAINTENA) 400 MG extended release inj syringe Inject 400 mg into the muscle every 28 days 9/8/2022 Yes Unknown, Entered By History Yes    aspirin (ASA) 325 MG EC tablet Take 325 mg by mouth daily 9/18/2022 at Unknown time Yes Unknown, Entered By History

## 2022-09-19 NOTE — H&P
[facility]  Outpatient/Observation Unit  Admission History and Physical Examination    NAME: Austyn Leach   : 1955   MRN: [id number]     Date of Admission:  2022    Assessment & Plan   Austyn Leach is a 67 year old male with PMhx significant for bipolar disorder, schizophrenia, GERD, asthma, osteoarthritis, who was admitted on 2022 after presenting with progressive bilateral knee pain.      On presentation to the ED Hypertensive, VSS.  Labs were unremarkable. XR of knees demonstrated arthritis, bilateral moderate size joint effusions, no fractures. No arthrocentesis pursued.     Discussed with Dr. Worrell in the ED, full chart review including lab work, imaging, and vital signs were reviewed. Patient received prednisone, oxycodone, in the ED. Staff were unable ambulate the patient in the ED. Admission was requested to observation for further cares, possible disposition assistance.     Subacute Bilateral Knee Pain -   Concern is for osteoarthritis. Lower suspicion for crystalline, infectious or spondyloarthropathy.   Plan to manage conservatively, initiate multimodal analgesia. If successfully able to mobilize with medications, recommend outpatient follow up with Orthopedic provider for further management & PT.  - register obs  - glucocorticoids: Given PO prednisone in the ED this evening. If fails to improve with other anti inflammatories and work up benign, may consider steroid injection, ?via IR vs PO prednisone taper.    - start schedule naprosyn, APAP. PRN oxycodone/toradol. Topical therapies.  - add esr, crp, uric acid  - if intractable may consider Ortho consultation for aspiration   - may need PT assessment once pain improved if still having ambulation issues     COPD - May be slightly decompensated as does appear somewhat dyspenic on evaluation but denies increase in chronic symptoms.   - scheduled duonebs for now, monitor sx  - Resume maintenance inhalers likely tomorrow  "and Prn albuterol.     Tobacco dependence: NRT PRN.  Patient has made it quite clear that he is not interested in smoking cessation at this time.  Schizophrenia, Bipolar Disorder Type 1: somewhat tangential but cooperative. maintained on ability q 28 days. Follow up with psychiatry as previously directed.      Awaiting formal pharmacy reconciliation to resume home medications.     DVT Prophylaxis: Pneumatic Compression Devices for now  Code Status: Full Code      Clinically Significant Risk Factors Present on Admission        Expected Discharge Date: 09/20/2022,  3:00 PM      Discharge Comments: pending symptom improvement - may need PT     Lissett Krishnamurthy PA-C    Primary Care Physician   *[facility]    Chief Complaint   Knee pain    History is obtained from the patient who is a poor historian, history obtained from the ED provider.      History of Present Illness   Austyn Leach is a 67 year old male who presents with knee pain, weakness.   Mr. Simon called 911 today due to severe knee pain with standing.   He reports progressive pain in both knees over the past 5 years.  In the past he noted that he was recommended to have knee replacements.  He reports that he has had such severe knee pain in the past that \"music in the car \"made his knees hurt.  He has had reduction in his knee pain after steroid injection historically, this has not been done for about 2 years.     Last night he noted that he had knee pain.  He had some alcohol which he believed worsened his knee pain.  Today he was having progressive difficulty standing bending his left knee. He had to slide down the stars in his home today.     He reports that his knees are chronically swollen.  He does not have any pain in other joints.  No fever or recent steroid injection into knees.    On presentation to the ED Hypertensive, VSS.  Labs were unremarkable. XR of knees demonstrated arthritis, bilateral moderate size joint effusions, no fractures. " EKG showed sinus tach with pvc's, no acute ST changes.     Discussed with Dr. Worrell in the ED, full chart review including lab work, imaging, and vital signs were reviewed. Patient received prednisone, oxycodone, in the ED. Staff were unable ambulate the patient in the ED. Admission was requested to observation for further cares, possible disposition assistance.     Past Medical History    I have reviewed this patient's medical history and updated it with pertinent information if needed.   Past Medical History:   Diagnosis Date     Bipolar 1 disorder (H)      GERD (gastroesophageal reflux disease)      Mild intermittent asthma     uses primatent     Schizophrenia (H)    Spontaneous PTx 5/2021  COPD  Nicotine Dependence       Past Surgical History   I have reviewed this patient's surgical history and updated it with pertinent information if needed.  Past Surgical History:   Procedure Laterality Date     NO HISTORY OF SURGERY       TESTICLE SURGERY       THORACOSCOPIC PLEURODESIS Right 7/29/2021    Procedure: Right thoracoscopy, talc and mechanical pleurodesis;  Surgeon: Benoit Villagomez MD;  Location: [facility] OR     VASECTOMY         Prior to Admission Medications   Prior to Admission Medications   Prescriptions Last Dose Informant Patient Reported? Taking?   albuterol (PROAIR HFA/PROVENTIL HFA/VENTOLIN HFA) 108 (90 Base) MCG/ACT inhaler   No No   Sig: Inhale 2 puffs into the lungs every 6 hours   ibuprofen (ADVIL/MOTRIN) 400 MG tablet   No No   Sig: Take 1 tablet (400 mg) by mouth every 6 hours as needed for moderate pain or inflammatory pain      Facility-Administered Medications: None     Allergies   Allergies   Allergen Reactions     Bee Venom Shortness Of Breath and Swelling       Social History   I have reviewed this patient's social history and updated it with pertinent information if needed. Austyn Leach lives with his ex-wife, Angela. Does not use assistive devices to ambulate.  He  reports that he has been  smoking cigarettes. He has a 58.00 pack-year smoking history. He has never used smokeless tobacco. He reports that he does not drink alcohol and does not use drugs.    Family History   I have reviewed this patient's family history and updated it with pertinent information if needed.   Family History   Problem Relation Age of Onset     C.A.D. Father      Hypertension Father      Breast Cancer Mother      Diabetes No family hx of      Cerebrovascular Disease No family hx of        Review of Systems   The 10 point Review of Systems is negative other than noted in the HPI or here. Reports 9 days of generalized weakness.     Physical Exam   Temp: 97.6  F (36.4  C) Temp src: Oral BP: (!) 142/94 Pulse: 104   Resp: 18 SpO2: 94 % O2 Device: None (Room air)    Vital Signs with Ranges  Temp:  [97.6  F (36.4  C)] 97.6  F (36.4  C)  Pulse:  [104-114] 104  Resp:  [18] 18  BP: (134-151)/() 142/94  SpO2:  [94 %-95 %] 94 %  0 lbs 0 oz    Constitutional: Awake, alert,  no apparent distress.  Eyes: Conjunctiva and pupils examined and normal.  HEENT: Moist mucous membranes, normal dentition.  Respiratory: No abnormal work of breathing.  Some scattered exp wheezing.  Cardiovascular: Regular rate and rhythm, normal S1 and S2, and no murmur noted.  GI: Soft, non-distended, non-tender, bowel sounds present. No rebound tenderness or guarding.  Lymph/Hematologic: No anterior cervical or supraclavicular adenopathy.  Skin: No rashes, no cyanosis, no edema.  Musculoskeletal: Bilateral knee is with mild effusion.  Has limited knee flexion and extension due to pain.  Pain level is similar with passive range of motion as it is active.  Distal pulses are intact and symmetric.  No warmth or overlying erythema bilateral knees.  No pain with hip flexion or extension.  Moving ankles without issue.  Neurologic: No focal deficits noted. Tremor (chronc per patient) Speech is clear. Coordination and strength grossly normal.   Psychiatric: Appropriate  affect.    Data   Data reviewed today:    Imaging:   Recent Results (from the past 24 hour(s))   XR Knee Bilateral 3 Views    Narrative    KNEE BILATERAL THREE VIEWS  9/19/2022 12:06 PM     INDICATION: Bilateral knee pain.     COMPARISON: None.      Impression    IMPRESSION:  1.  Left knee: No fracture. Moderate degenerative arthrosis with  medial compartment narrowing. Moderate size joint effusion.  2.  Right knee: No fracture. Moderate-advanced degenerative arthrosis  with medial compartment narrowing. Moderate size joint effusion.    ENID SIERRA MD         SYSTEM ID:  [id number]       Recent Labs   Lab 09/19/22  1136   WBC 7.8   HGB 14.5   MCV 93         POTASSIUM 4.0   CHLORIDE 106   CO2 27   BUN 19.0   CR 0.95   ANIONGAP 10   MAL 9.1   *       Lissett Krishnamurthy PA-C  [facility]  September 19, 2022

## 2022-09-19 NOTE — ED NOTES
[facility]  ED Nurse Handoff Report    Austyn Leach is a 67 year old male   ED Chief complaint: Knee Pain  . ED Diagnosis:   Final diagnoses:   Acute pain of both knees   Effusion of both knee joints   Arthralgia of both knees   Inability to ambulate due to knee     Allergies:   Allergies   Allergen Reactions     Bee Venom Shortness Of Breath and Swelling       Code Status: Full Code  Activity level - Baseline/Home:  Independent. Activity Level - Current:   Assist X 1. Lift room needed: No. Bariatric: No   Needed: No   Isolation: No. Infection: Not Applicable.     Vital Signs:   Vitals:    09/19/22 1145 09/19/22 1215 09/19/22 1230 09/19/22 1235   BP: (!) 134/107  (!) 142/94    Pulse:   104    Resp:       Temp:       TempSrc:       SpO2: 95% 95% 94% 94%       Cardiac Rhythm:  ,      Pain level:    Patient confused: No. Patient Falls Risk: Yes.   Elimination Status: Has voided   Patient Report - Initial Complaint: Knee Pain.   Focused Assessment: Austyn Leach is a 67 year old male who presents with bilateral knee pain and weakness.  The patient states that over the last year he has had some degree of bilateral knee pain that he states seems worse at night, but states that laying down right now he has no pain.  The patient perceives that his pain worsens if he stands..  The left seems equal to the right.  There has been no new trauma.  The patient states that he was told about 1 year ago that he should have had his knees replaced.     The patient also notes 9 days of generalized weakness.     EMS reports (and the patient corroborates) that the patient drank Yan Rios last night.  The patient states he does not usually drink.     The patient also states that he has been wheezy, smokes 1 pack a day, but does not feel short of breath.  He declines a breathing treatment at this time.     Review of Systems   Constitutional: Negative for chills and fever.   Respiratory: Positive for  wheezing. Negative for cough and shortness of breath.    Gastrointestinal: Negative for nausea and vomiting.   Musculoskeletal: Positive for arthralgias.   All other systems reviewed and are negative.      Tests Performed: labs, imaging. Abnormal Results:   Labs Ordered and Resulted from Time of ED Arrival to Time of ED Departure   BASIC METABOLIC PANEL - Abnormal       Result Value    Sodium 143      Potassium 4.0      Chloride 106      Carbon Dioxide (CO2) 27      Anion Gap 10      Urea Nitrogen 19.0      Creatinine 0.95      Calcium 9.1      Glucose 128 (*)     GFR Estimate 88     ETHYL ALCOHOL LEVEL - Abnormal    Alcohol ethyl <0.01 (*)    BLOOD GAS VENOUS - Abnormal    pH Venous 7.40      pCO2 Venous 47      pO2 Venous 34      Bicarbonate Venous 29 (*)     Base Excess/Deficit (+/-) 3.5 (*)     FIO2 0     MAGNESIUM - Normal    Magnesium 1.8     CBC WITH PLATELETS AND DIFFERENTIAL    WBC Count 7.8      RBC Count 4.95      Hemoglobin 14.5      Hematocrit 45.9      MCV 93      MCH 29.3      MCHC 31.6      RDW 14.1      Platelet Count 187      % Neutrophils 74      % Lymphocytes 14      % Monocytes 11      % Eosinophils 1      % Basophils 0      % Immature Granulocytes 0      NRBCs per 100 WBC 0      Absolute Neutrophils 5.8      Absolute Lymphocytes 1.1      Absolute Monocytes 0.8      Absolute Eosinophils 0.1      Absolute Basophils 0.0      Absolute Immature Granulocytes 0.0      Absolute NRBCs 0.0     COVID-19 VIRUS (CORONAVIRUS) BY PCR     XR Knee Bilateral 3 Views   Final Result   IMPRESSION:   1.  Left knee: No fracture. Moderate degenerative arthrosis with   medial compartment narrowing. Moderate size joint effusion.   2.  Right knee: No fracture. Moderate-advanced degenerative arthrosis   with medial compartment narrowing. Moderate size joint effusion.      ENID SIERRA MD            SYSTEM ID:  [id number]        .   Treatments provided: See MAR  Family Comments: NA  OBS brochure/video discussed/provided  to patient:  Yes  ED Medications:   Medications   oxyCODONE (ROXICODONE) tablet 5 mg (5 mg Oral Given 9/19/22 1136)   predniSONE (DELTASONE) tablet 40 mg (40 mg Oral Given 9/19/22 1413)   morphine (PF) injection 4 mg (4 mg Intravenous Given 9/19/22 1504)     Drips infusing:  No  For the majority of the shift, the patient's behavior Green. Interventions performed were NA.    Sepsis treatment initiated: No     Patient tested for COVID 19 prior to admission: YES    ED Nurse Name/Phone Number: Ciara Santana RN,   3:05 PM    RECEIVING UNIT ED HANDOFF REVIEW    Above ED Nurse Handoff Report was reviewed: Yes  Reviewed by: Vannesa Mccrary RN on September 19, 2022 at 9:02 PM

## 2022-09-19 NOTE — ED PROVIDER NOTES
"  History     Chief Complaint:    Knee Pain      The history is provided by the patient.      Austyn Leach is a 67 year old male who presents with bilateral knee pain and weakness.  The patient states that over the last year he has had some degree of bilateral knee pain that he states seems worse at night, but states that laying down right now he has no pain.  The patient perceives that his pain worsens if he stands..  The left seems equal to the right.  There has been no new trauma.  The patient states that he was told about 1 year ago that he should have had his knees replaced.    The patient also notes 9 days of generalized weakness.    EMS reports (and the patient corroborates) that the patient drank Yan Rios last night.  The patient states he does not usually drink.    The patient also states that he has been wheezy, smokes 1 pack a day, but does not feel short of breath.  He declines a breathing treatment at this time.    Review of Systems   Constitutional: Negative for chills and fever.   Respiratory: Positive for wheezing. Negative for cough and shortness of breath.    Gastrointestinal: Negative for nausea and vomiting.   Musculoskeletal: Positive for arthralgias.   All other systems reviewed and are negative.    Allergies:  Bee Venom    Medications:    \"a shot\" once a month (unknown medication)       Past Medical History:   Past Surgical History:     Past Medical History:   Diagnosis Date     Bipolar 1 disorder (H)      GERD (gastroesophageal reflux disease)      Mild intermittent asthma      Schizophrenia (H)     Past Surgical History:   Procedure Laterality Date     NO HISTORY OF SURGERY       TESTICLE SURGERY       THORACOSCOPIC PLEURODESIS Right 7/29/2021    Procedure: Right thoracoscopy, talc and mechanical pleurodesis;  Surgeon: Benoit Villagomez MD;  Location: [facility] OR     VASECTOMY        Patient Active Problem List    Diagnosis Date Noted     Inability to ambulate due to knee 09/19/2022     " Priority: Medium     Effusion of both knee joints 09/19/2022     Priority: Medium     Arthralgia of both knees 09/19/2022     Priority: Medium     Acute pain of both knees 09/19/2022     Priority: Medium     Spontaneous pneumothorax 07/25/2021     Priority: Medium     COPD exacerbation (H) 05/06/2021     Priority: Medium     Pneumothorax on right 05/05/2021     Priority: Medium     Acute respiratory failure with hypoxia (H) 05/05/2021     Priority: Medium     Suicide attempt (H) 04/10/2020     Priority: Medium     CAP (community acquired pneumonia) 04/10/2020     Priority: Medium     Agitation 06/18/2019     Priority: Medium     Cigarette nicotine dependence with withdrawal 04/16/2018     Priority: Medium     Schizoaffective disorder, bipolar type (H) 04/10/2018     Priority: Medium     Rosa (H) 09/05/2017     Priority: Medium     Delusions (H) 06/08/2017     Priority: Medium     ACP (advance care planning) 08/28/2015     Priority: Medium     Advance Care Planning 8/28/2015: ACP Review and Resources Provided:  Reviewed chart for advance care plan.  Austyn Leach has no plan or code status on file. Discussed available resources and provided with information. Confirmed code status reflects current choices pending further ACP discussions.  Confirmed/documented legally designated decision maker(s). Added by Margie Soto             Living will, counseling/discussion 04/03/2014     Priority: Medium     Advance Care Planning:   ACP Review and Resources Provided:  Reviewed chart for advance care plan.  Austyn ALEKSANDRA Hany has no plan or code status on file. Discussed available resources and provided with information. Confirmed code status reflects current choices pending further ACP discussions.  Confirmed/documented designated decision maker(s). See permanent comments section of demographics in clinical tab.   Added by Lisa Bloch on 4/3/2014             Intention tremor 08/06/2010     Priority: Medium     Erectile  dysfunction 02/11/2009     Priority: Medium     Personal history of tobacco use, presenting hazards to health 10/07/2008     Priority: Medium     Health Care Home 09/09/2014     Priority: Low          Family History  Family History   Problem Relation Age of Onset     C.A.D. Father      Hypertension Father      Breast Cancer Mother      Diabetes No family hx of      Cerebrovascular Disease No family hx of        Social History:  PCP: [facility] Hermosillo  Presents to the ED via EMS    Physical Exam     Patient Vitals for the past 24 hrs:   BP Temp Temp src Pulse Resp SpO2   09/19/22 1235 -- -- -- -- -- 94 %   09/19/22 1230 (!) 142/94 -- -- 104 -- 94 %   09/19/22 1215 -- -- -- -- -- 95 %   09/19/22 1145 (!) 134/107 -- -- -- -- 95 %   09/19/22 1123 -- 97.6  F (36.4  C) Oral -- -- --   09/19/22 1120 (!) 151/93 -- -- 114 18 95 %       Physical Exam  Constitutional: Vital signs reviewed as above.   Head: No external signs of trauma noted.  Eyes: Pupils are equal, round, and reactive to light.   Neck: No JVD noted  Cardiovascular: Tachycardic rate, regular rhythm and normal heart sounds.  No murmur heard. Equal B/L peripheral pulses.  Pulmonary/Chest: No respiratory distress. Patient has B/L wheezes. Patient has no rales.   Gastrointestinal: Soft. There is no tenderness.   Musculoskeletal/Extremities: B/L knee swelling and tenderness.  Neurological: Patient is alert and oriented to person, place, and time. B/L UE tremors.  Skin: Skin is warm and dry. There is no diaphoresis noted.   Psychiatric: The patient appears calm.      Emergency Department Course   ECG:  ECG taken at 1130, ECG read at 1200  Sinus tachycardia with frequent PVCs  Otherwise normal ECG  When compared with ECG dated 7/25/2021, sinus tachycardia with PVCs are new today.  Otherwise there is no change.   Rate 107 bpm. UT interval 162 ms. QRS duration 94 ms. QT/QTc 336/448 ms. P-R-T axes 75 80 65.     Imaging:  XR Knee Bilateral 3 Views   Final Result    IMPRESSION:   1.  Left knee: No fracture. Moderate degenerative arthrosis with   medial compartment narrowing. Moderate size joint effusion.   2.  Right knee: No fracture. Moderate-advanced degenerative arthrosis   with medial compartment narrowing. Moderate size joint effusion.      ENID SIERRA MD            SYSTEM ID:  [id number]          Laboratory:  Labs Ordered and Resulted from Time of ED Arrival to Time of ED Departure   BASIC METABOLIC PANEL - Abnormal       Result Value    Sodium 143      Potassium 4.0      Chloride 106      Carbon Dioxide (CO2) 27      Anion Gap 10      Urea Nitrogen 19.0      Creatinine 0.95      Calcium 9.1      Glucose 128 (*)     GFR Estimate 88     ETHYL ALCOHOL LEVEL - Abnormal    Alcohol ethyl <0.01 (*)    BLOOD GAS VENOUS - Abnormal    pH Venous 7.40      pCO2 Venous 47      pO2 Venous 34      Bicarbonate Venous 29 (*)     Base Excess/Deficit (+/-) 3.5 (*)     FIO2 0     MAGNESIUM - Normal    Magnesium 1.8     COVID-19 VIRUS (CORONAVIRUS) BY PCR - Normal    SARS CoV2 PCR Negative     CBC WITH PLATELETS AND DIFFERENTIAL    WBC Count 7.8      RBC Count 4.95      Hemoglobin 14.5      Hematocrit 45.9      MCV 93      MCH 29.3      MCHC 31.6      RDW 14.1      Platelet Count 187      % Neutrophils 74      % Lymphocytes 14      % Monocytes 11      % Eosinophils 1      % Basophils 0      % Immature Granulocytes 0      NRBCs per 100 WBC 0      Absolute Neutrophils 5.8      Absolute Lymphocytes 1.1      Absolute Monocytes 0.8      Absolute Eosinophils 0.1      Absolute Basophils 0.0      Absolute Immature Granulocytes 0.0      Absolute NRBCs 0.0         Procedures:      Emergency Department Course:           Reviewed:    I reviewed nursing notes, vitals and past history    Assessments/Consults:   ED Course as of 09/19/22 1657   Mon Sep 19, 2022   1111 Dr. Worrell' evaluation (on EMS arrival)   1432 Rechecked and updated.  The patient wanted me to help him try and stand.  I had him  turned approximately 45 degrees to get out of his bed and when his left knee bent he noted extreme pain, tremors, and was unable to go any further nor put weight on it.   1457 D/W Lissett Krishnamurthy PA-C, accepting to obs for Dr. Mendez.       Interventions:  Medications   oxyCODONE (ROXICODONE) tablet 5 mg (5 mg Oral Given 9/19/22 1136)   predniSONE (DELTASONE) tablet 40 mg (40 mg Oral Given 9/19/22 1413)   morphine (PF) injection 4 mg (4 mg Intravenous Given 9/19/22 1504)       Disposition:  The patient was discharged to home.    Impression & Plan      CMS Diagnoses:         Medical Decision Making:    This 67-year-old male patient presents to the ED due to knee pain and generalized weakness.  Please see the HPI and exam for specifics.  A broad differential was considered including electrolyte dysfunction, arthritis, fracture, alcohol intoxication with resultant metabolic changes, respiratory acidosis given COPD history, smoking history, and wheezing today, etc.    The above work-up was undertaken.    X-ray of the knee is notable for: Arthritis and bilateral effusion.   Laboratory studies are fortunately reassuring.    Management of the above issues included: Steroids and pain medications.     The ED tech and I separately tried to help the patient's stand and ambulate but he had too much pain to do so. At this time, I think the patient should be admitted to the hospital for ongoing monitoring and  care        Critical Care time:  none    Covid-19  Austyn Leach was evaluated during a global COVID-19 pandemic, which necessitated consideration that the patient might be at risk for infection with the SARS-CoV-2 virus that causes COVID-19.  Applicable protocols for evaluation were followed during the patient's care. COVID-19 was considered as part of the patient's evaluation.       Diagnosis:    ICD-10-CM    1. Acute pain of both knees  M25.561     M25.562    2. Effusion of both knee joints  M25.461     M25.462    3.  Arthralgia of both knees  M25.561     M25.562    4. Inability to ambulate due to knee  R26.2        Discharge Medications:  New Prescriptions    No medications on file              Calvin Worrell DO  09/19/22 9819

## 2022-09-19 NOTE — ED TRIAGE NOTES
Pt arrives via EMS w/ complaints of bilateral knee pain w/ applied wt. Pt states knee pain started yesterday. Pt complains of gen weakness for past 9 days.

## 2022-09-20 ENCOUNTER — APPOINTMENT (OUTPATIENT)
Dept: PHYSICAL THERAPY | Facility: CLINIC | Age: 67
End: 2022-09-20
Attending: PHYSICIAN ASSISTANT
Payer: MEDICARE

## 2022-09-20 VITALS
SYSTOLIC BLOOD PRESSURE: 134 MMHG | WEIGHT: 194.5 LBS | HEART RATE: 91 BPM | BODY MASS INDEX: 24.96 KG/M2 | OXYGEN SATURATION: 92 % | RESPIRATION RATE: 18 BRPM | DIASTOLIC BLOOD PRESSURE: 77 MMHG | HEIGHT: 74 IN | TEMPERATURE: 98.5 F

## 2022-09-20 LAB
APPEARANCE FLD: ABNORMAL
ATRIAL RATE - MUSE: 107 BPM
CELL COUNT BODY FLUID SOURCE: ABNORMAL
COLOR FLD: ABNORMAL
CRYSTALS SNV MICRO: NORMAL
DIASTOLIC BLOOD PRESSURE - MUSE: NORMAL MMHG
EOSINOPHIL NFR FLD MANUAL: 1 %
ERYTHROCYTE [DISTWIDTH] IN BLOOD BY AUTOMATED COUNT: 14.4 % (ref 10–15)
HCT VFR BLD AUTO: 41.1 % (ref 40–53)
HGB BLD-MCNC: 13.1 G/DL (ref 13.3–17.7)
INTERPRETATION ECG - MUSE: NORMAL
LYMPHOCYTES NFR FLD MANUAL: 2 %
MCH RBC QN AUTO: 29.9 PG (ref 26.5–33)
MCHC RBC AUTO-ENTMCNC: 31.9 G/DL (ref 31.5–36.5)
MCV RBC AUTO: 94 FL (ref 78–100)
MONOS+MACROS NFR FLD MANUAL: 4 %
NEUTS BAND NFR FLD MANUAL: 93 %
P AXIS - MUSE: 75 DEGREES
PLATELET # BLD AUTO: 160 10E3/UL (ref 150–450)
PR INTERVAL - MUSE: 162 MS
QRS DURATION - MUSE: 94 MS
QT - MUSE: 336 MS
QTC - MUSE: 448 MS
R AXIS - MUSE: 80 DEGREES
RBC # BLD AUTO: 4.38 10E6/UL (ref 4.4–5.9)
SYSTOLIC BLOOD PRESSURE - MUSE: NORMAL MMHG
T AXIS - MUSE: 65 DEGREES
VENTRICULAR RATE- MUSE: 107 BPM
WBC # BLD AUTO: 8.5 10E3/UL (ref 4–11)
WBC # FLD AUTO: ABNORMAL /UL

## 2022-09-20 PROCEDURE — 89051 BODY FLUID CELL COUNT: CPT | Performed by: PHYSICIAN ASSISTANT

## 2022-09-20 PROCEDURE — 250N000013 HC RX MED GY IP 250 OP 250 PS 637: Performed by: PHYSICIAN ASSISTANT

## 2022-09-20 PROCEDURE — 87205 SMEAR GRAM STAIN: CPT | Performed by: PHYSICIAN ASSISTANT

## 2022-09-20 PROCEDURE — 94640 AIRWAY INHALATION TREATMENT: CPT | Mod: 76

## 2022-09-20 PROCEDURE — 999N000157 HC STATISTIC RCP TIME EA 10 MIN

## 2022-09-20 PROCEDURE — 97161 PT EVAL LOW COMPLEX 20 MIN: CPT | Mod: GP

## 2022-09-20 PROCEDURE — 97116 GAIT TRAINING THERAPY: CPT | Mod: GP

## 2022-09-20 PROCEDURE — 89060 EXAM SYNOVIAL FLUID CRYSTALS: CPT | Performed by: PHYSICIAN ASSISTANT

## 2022-09-20 PROCEDURE — 250N000011 HC RX IP 250 OP 636: Performed by: PHYSICIAN ASSISTANT

## 2022-09-20 PROCEDURE — G0378 HOSPITAL OBSERVATION PER HR: HCPCS

## 2022-09-20 PROCEDURE — 96375 TX/PRO/DX INJ NEW DRUG ADDON: CPT

## 2022-09-20 PROCEDURE — 250N000009 HC RX 250: Performed by: PHYSICIAN ASSISTANT

## 2022-09-20 PROCEDURE — 94640 AIRWAY INHALATION TREATMENT: CPT

## 2022-09-20 PROCEDURE — 99217 PR OBSERVATION CARE DISCHARGE: CPT | Performed by: PHYSICIAN ASSISTANT

## 2022-09-20 PROCEDURE — 36415 COLL VENOUS BLD VENIPUNCTURE: CPT | Performed by: PHYSICIAN ASSISTANT

## 2022-09-20 PROCEDURE — 85027 COMPLETE CBC AUTOMATED: CPT | Performed by: PHYSICIAN ASSISTANT

## 2022-09-20 PROCEDURE — 87075 CULTR BACTERIA EXCEPT BLOOD: CPT | Performed by: PHYSICIAN ASSISTANT

## 2022-09-20 RX ORDER — LIDOCAINE HYDROCHLORIDE 10 MG/ML
10 INJECTION, SOLUTION EPIDURAL; INFILTRATION; INTRACAUDAL; PERINEURAL ONCE
Status: DISCONTINUED | OUTPATIENT
Start: 2022-09-20 | End: 2022-09-20 | Stop reason: HOSPADM

## 2022-09-20 RX ORDER — METHYLPREDNISOLONE ACETATE 40 MG/ML
40 INJECTION, SUSPENSION INTRA-ARTICULAR; INTRALESIONAL; INTRAMUSCULAR; SOFT TISSUE ONCE
Status: DISCONTINUED | OUTPATIENT
Start: 2022-09-20 | End: 2022-09-20 | Stop reason: HOSPADM

## 2022-09-20 RX ORDER — NAPROXEN 250 MG/1
250 TABLET ORAL EVERY 12 HOURS PRN
COMMUNITY
Start: 2022-09-20

## 2022-09-20 RX ORDER — ACETAMINOPHEN 325 MG/1
975 TABLET ORAL EVERY 8 HOURS
COMMUNITY
Start: 2022-09-20

## 2022-09-20 RX ADMIN — ACETAMINOPHEN 975 MG: 325 TABLET, FILM COATED ORAL at 03:46

## 2022-09-20 RX ADMIN — ASPIRIN 325 MG: 325 TABLET, COATED ORAL at 08:14

## 2022-09-20 RX ADMIN — KETOROLAC TROMETHAMINE 15 MG: 15 INJECTION, SOLUTION INTRAMUSCULAR; INTRAVENOUS at 11:35

## 2022-09-20 RX ADMIN — FAMOTIDINE 10 MG: 10 TABLET ORAL at 08:14

## 2022-09-20 RX ADMIN — ACETAMINOPHEN 975 MG: 325 TABLET, FILM COATED ORAL at 11:18

## 2022-09-20 RX ADMIN — IPRATROPIUM BROMIDE AND ALBUTEROL SULFATE 3 ML: 2.5; .5 SOLUTION RESPIRATORY (INHALATION) at 07:44

## 2022-09-20 RX ADMIN — IPRATROPIUM BROMIDE AND ALBUTEROL SULFATE 3 ML: 2.5; .5 SOLUTION RESPIRATORY (INHALATION) at 12:35

## 2022-09-20 ASSESSMENT — ACTIVITIES OF DAILY LIVING (ADL)
ADLS_ACUITY_SCORE: 35
DEPENDENT_IADLS:: MEAL PREPARATION;SHOPPING;LAUNDRY;COOKING;CLEANING
ADLS_ACUITY_SCORE: 35

## 2022-09-20 NOTE — PLAN OF CARE
"PRIMARY DIAGNOSIS: ACUTE BILATERAL KNEE PAIN  OUTPATIENT/OBSERVATION GOALS TO BE MET BEFORE DISCHARGE:  1. Pain Status: Improved-controlled with oral pain medications.    2. Return to near baseline physical activity: No    3. Cleared for discharge by consultants (if involved): No    Discharge Planner Nurse   Safe discharge environment identified: Yes  Barriers to discharge: Yes       Entered by: Jessenia Claros RN 09/20/2022 3:59 AM    /79 (BP Location: Left arm, Cuff Size: Adult Regular)   Pulse 83   Temp 97.9  F (36.6  C) (Oral)   Resp 18   Ht 1.88 m (6' 2\")   Wt 88.2 kg (194 lb 8 oz)   SpO2 94%   BMI 24.97 kg/m     Pt complains of some knee pain, 8/10, managed with scheduled tylenol. Took nasal cannula off, says he doesn't want it on anymore, O2 sats 94% on room air. Plan is PT and social work consult. Will continue to monitor.   Please review provider order for any additional goals.   Nurse to notify provider when observation goals have been met and patient is ready for discharge.    "

## 2022-09-20 NOTE — CONSULTS
Care Management Initial Consult    General Information  Assessment completed with: Patient,    Type of CM/SW Visit: Initial Assessment    Primary Care Provider verified and updated as needed: Yes   Readmission within the last 30 days: no previous admission in last 30 days      Reason for Consult: care coordination/care conference, discharge planning    Communication Assessment  Patient's communication style: spoken language (English or Bilingual)    Hearing Difficulty or Deaf: no      Cognitive  Cognitive/Neuro/Behavioral: WDL                    Living Environment:   People in home: spouse     Current living Arrangements: house      Able to return to prior arrangements: yes     Family/Social Support:  Care provided by: self  Provides care for: no one  Marital Status:   Wife, Children          Description of Support System: Supportive, Involved    Support Assessment: Adequate family and caregiver support    Current Resources:   Patient receiving home care services: No  Community Resources: None  Equipment currently used at home: none  Supplies currently used at home: None    Lifestyle & Psychosocial Needs:  Social Determinants of Health     Tobacco Use: High Risk     Smoking Tobacco Use: Current Every Day Smoker     Smokeless Tobacco Use: Never Used   Alcohol Use: Not on file   Financial Resource Strain: Not on file   Food Insecurity: Not on file   Transportation Needs: Not on file   Physical Activity: Not on file   Stress: Not on file   Social Connections: Not on file   Intimate Partner Violence: Not on file   Depression: At risk     PHQ-2 Score: 6   Housing Stability: Not on file       Functional Status:  Prior to admission patient needed assistance:   Dependent ADLs:: Independent  Dependent IADLs:: Meal Preparation, Shopping, Laundry, Cooking, Cleaning  Assesssment of Functional Status: At functional baseline    Care Management Discharge Note    Discharge Date: 09/20/2022       Discharge Disposition:  Home    Discharge Services: None    Discharge DME: None    Discharge Transportation: family or friend will provide  Patient/Family in Agreement with the Plan: yes    Additional Information:  CM consulted for discharge planning, PT evaluated and recommended discharge to home with assist. Met with pt at bedside to assess for needs. Pt lives in a home with his wife, he is independent at baseline. Pt drives himself and manages his own medications. Wife assists with cooking, cleaning, laundry and shopping. Pt denies having any discharge needs at this time. Family to transport home at 1500, bedside RN updated.     Kati Laguerre RN BSN   Inpatient Care Coordination  [facility]   Phone [phone]

## 2022-09-20 NOTE — PLAN OF CARE
"PRIMARY DIAGNOSIS: ACUTE BILATERAL KNEE PAIN  OUTPATIENT/OBSERVATION GOALS TO BE MET BEFORE DISCHARGE:  1. Pain Status: Improved-controlled with oral pain medications.    2. Return to near baseline physical activity: No    3. Cleared for discharge by consultants (if involved): No    Discharge Planner Nurse   Safe discharge environment identified: Yes  Barriers to discharge: Yes       Entered by: Jessenia Claros RN 09/20/2022 1:03 AM    /57 (BP Location: Left arm, Patient Position: Semi-Gaviria's, Cuff Size: Adult Regular)   Pulse 89   Temp 97.9  F (36.6  C) (Oral)   Resp 18   Ht 1.88 m (6' 2\")   Wt 88.2 kg (194 lb 8 oz)   SpO2 94%   BMI 24.97 kg/m     Pt denies pain at this time, sleeping. PT and social work consult tomorrow. Will continue to monitor.   Please review provider order for any additional goals.   Nurse to notify provider when observation goals have been met and patient is ready for discharge.    "

## 2022-09-20 NOTE — PLAN OF CARE
[facility]      OUTPATIENT PHYSICAL THERAPY EVALUATION  PLAN OF TREATMENT FOR OUTPATIENT REHABILITATION  (COMPLETE FOR INITIAL CLAIMS ONLY)  Patient's Last Name, First Name, M.I.  YOB: 1955  [place]                        Provider's Name  [facility] Medical Record No.  [id number]                               Onset Date:  09/19/22   Start of Care Date:  09/20/22      Type:     _X_PT   ___OT   ___SLP Medical Diagnosis:  Knee pain                        PT Diagnosis:  Impaired gait   Visits from SOC:  1   _________________________________________________________________________________  Plan of Treatment/Functional Goals    Planned Interventions: bed mobility training, gait training, stair training, strengthening, transfer training     Goals: See Physical Therapy Goals on Care Plan in [facility] electronic health record.    Therapy Frequency: One time eval and treatment only  Predicted Duration of Therapy Intervention: 09/20/22  _________________________________________________________________________________    I CERTIFY THE NEED FOR THESE SERVICES FURNISHED UNDER        THIS PLAN OF TREATMENT AND WHILE UNDER MY CARE     (Physician co-signature of this document indicates review and certification of the therapy plan).              Certification date from: 09/20/22, Certification date to: 09/27/22    Referring Physician: Lissett Krishnamurthy PA-C            Initial Assessment        See Physical Therapy evaluation dated 09/20/22 in Epic electronic health record.

## 2022-09-20 NOTE — PROCEDURES
Following a sterile skin prep with betadine,  the knees were injected with 3 ml 1% lidocaine.    The left knee was then aspirated expressing 60 ml bloody synovial fluid, the knee was then injected with 20 mg depomedrol and 3 ml 1% lidocaine.  Patient tolerated procedure without complication.  All questions answered.  The right knee was aspirated expressing 40 ml bloody synovial fluid was aspirated.  The knee was then injected with 20mg depomedrol and 3 ml 1% lidocaine.  Patient tolerated procedure without complication.  All questions answered.  Fluid from the left knee was sent to lab for fluid analysis.

## 2022-09-20 NOTE — PLAN OF CARE
Physical Therapy Discharge Summary    Reason for therapy discharge:    All goals and outcomes met, no further needs identified.    Progress towards therapy goal(s). See goals on Care Plan in [facility] electronic health record for goal details.  Goals met    Therapy recommendation(s):    No further therapy is recommended.

## 2022-09-20 NOTE — ED NOTES
Pt repeatitively putting on call light q5min tedious tasks. RN answered call light every time. After seventh call light, RN explained to pt that RN also has other pts and she is happy to help pt, but it is important to not overuse call light and to try to group cares. Pt understood and verbally agreed. Will continue to monitor.

## 2022-09-20 NOTE — PROGRESS NOTES
PRIMARY DIAGNOSIS: ACUTE JOHANNA KNEE PAIN  OUTPATIENT/OBSERVATION GOALS TO BE MET BEFORE DISCHARGE:  1. Pain Status: Improved-controlled with oral pain medications.    2. Return to near baseline physical activity: No    3. Cleared for discharge by consultants (if involved): No    Discharge Planner Nurse   Safe discharge environment identified: Yes  Barriers to discharge: Yes       Entered by: Palmira Power RN 09/20/2022 8:03 AM   A&OX4, Regular diet. Room air. Use the urinal bedside. During safety round pt was awake ordering BF.   Please review provider order for any additional goals.   Nurse to notify provider when observation goals have been met and patient is ready for discharge.

## 2022-09-20 NOTE — PROGRESS NOTES
ROOM # 204-2    Living Situation (if not independent, order SW consult):Home with wife   Facility name:n/a  : Wife    Activity level at baseline: Indpt.  Activity level on admit:  Assist of one     Who will be transporting you at discharge: wife     Patient registered to observation; given Patient Bill of Rights; given the opportunity to ask questions about observation status and their plan of care.  Patient has been oriented to the observation room, bathroom and call light is in place.    Discussed discharge goals and expectations with patient/family.

## 2022-09-20 NOTE — PROGRESS NOTES
"PRIMARY DIAGNOSIS: ACUTE JOHANNA KNEE PAIN  OUTPATIENT/OBSERVATION GOALS TO BE MET BEFORE DISCHARGE:  1. Pain Status: Improved-controlled with oral pain medications.    2. Return to near baseline physical activity: No    3. Cleared for discharge by consultants (if involved): No    Discharge Planner Nurse   Safe discharge environment identified: Yes  Barriers to discharge: Yes       Entered by: Palmira Power RN 09/20/2022 12:18 PM   A&OX4, Regular diet. Room air. Use the urinal bedside. During safety round pt was awake ordering BF. Pt is SBA. Reported pain 5/10 received Toradol. PT eval pending. Ortho consult.   /76   Pulse 91   Temp 98.1  F (36.7  C) (Oral)   Resp 18   Ht 1.88 m (6' 2\")   Wt 88.2 kg (194 lb 8 oz)   SpO2 91%   BMI 24.97 kg/m      Please review provider order for any additional goals.   Nurse to notify provider when observation goals have been met and patient is ready for discharge.  "

## 2022-09-20 NOTE — PLAN OF CARE
Patient's After Visit Summary was reviewed with patient   Patient verbalized understanding of After Visit Summary, recommended follow up and was given an opportunity to ask questions.   Discharge medications sent home with patient/family: No   Discharged with son      OBSERVATION patient END time: 15:07

## 2022-09-20 NOTE — PROGRESS NOTES
09/20/22 1357   Quick Adds   Type of Visit Initial PT Evaluation   Living Environment   People in Home spouse   Current Living Arrangements house   Home Accessibility stairs within home   Number of Stairs, Within Home, Primary greater than 10 stairs   Stair Railings, Within Home, Primary railings safe and in good condition   Transportation Anticipated family or friend will provide   Self-Care   Usual Activity Tolerance moderate   Current Activity Tolerance moderate   Equipment Currently Used at Home none   Fall history within last six months no   Activity/Exercise/Self-Care Comment Pt IND at baseline, does not use AD. Pt does not get much activity   General Information   Onset of Illness/Injury or Date of Surgery 09/19/22   Referring Physician Lissett Krishnamurthy PA-C   Patient/Family Therapy Goals Statement (PT) Return home   Pertinent History of Current Problem (include personal factors and/or comorbidities that impact the POC) uAstyn Leach is a 67 year old male who presented to the ED with knee pain, weakness   Existing Precautions/Restrictions fall   General Observations Pt supine in bed upon therapist arrival, agreeable to treatment   Cognition   Affect/Mental Status (Cognition) WFL   Orientation Status (Cognition) oriented x 4   Follows Commands (Cognition) WFL   Pain Assessment   Patient Currently in Pain   (5/10 in B knees)   Integumentary/Edema   Integumentary/Edema no deficits were identifed   Posture    Posture Forward head position;Protracted shoulders   Range of Motion (ROM)   Range of Motion ROM is WFL   Strength (Manual Muscle Testing)   Strength (Manual Muscle Testing) Deficits observed during functional mobility   Bed Mobility   Comment, (Bed Mobility) Supine to sit Mod I   Transfers   Comment, (Transfers) Sit to stand IND   Gait/Stairs (Locomotion)   Comment, (Gait/Stairs) Pt amb w/ no AD and SBA   Balance   Balance Comments Good seated and fair standing   Sensory Examination   Sensory  Perception patient reports no sensory changes   Clinical Impression   Criteria for Skilled Therapeutic Intervention Yes, treatment indicated   PT Diagnosis (PT) Impaired gait   Influenced by the following impairments Pain, weakness, decreased activity tolerance, impaired balance   Functional limitations due to impairments Limited functional mobility requiring assist   Clinical Presentation (PT Evaluation Complexity) Stable/Uncomplicated   Clinical Presentation Rationale Based on PMH, current status, and social support   Clinical Decision Making (Complexity) low complexity   Planned Therapy Interventions (PT) bed mobility training;gait training;stair training;strengthening;transfer training   Risk & Benefits of therapy have been explained evaluation/treatment results reviewed;care plan/treatment goals reviewed;risks/benefits reviewed;current/potential barriers reviewed;participants voiced agreement with care plan;participants included;patient   PT Discharge Planning   PT Discharge Recommendation (DC Rec) home with assist   PT Rationale for DC Rec Pt is near baseline, currently Mod I - IND w/ all mobility. Pt demos mild instability, which is baseline. Recommend pt return home w/ assist from wife   PT Brief overview of current status IND - Mod I   Therapy Certification   Start of care date 09/20/22   Certification date from 09/20/22   Certification date to 09/27/22   Medical Diagnosis Knee pain   Total Evaluation Time   Total Evaluation Time (Minutes) 10   Physical Therapy Goals   PT Frequency One time eval and treatment only   PT Predicted Duration/Target Date for Goal Attainment 09/20/22   PT Goals Bed Mobility;Transfers;Gait;Stairs   PT: Bed Mobility Modified independent;Supine to/from sit   PT: Transfers Independent;Sit to/from stand   PT: Gait Independent;50 feet   PT: Stairs Supervision/stand-by assist;Greater than 10 stairs

## 2022-09-20 NOTE — DISCHARGE SUMMARY
[facility]    Discharge Summary  Hospitalist    Date of Admission:  9/19/2022  Date of Discharge:  9/20/2022  Provider:  Shantelle Jackson PA-C  Date of Service (when I last saw the patient): 09/20/22    Discharge Diagnoses   Subacute bilateral knee pain due to osteoarthritis     Other medical issues:  Past Medical History:   Diagnosis Date     Bipolar 1 disorder (H)      GERD (gastroesophageal reflux disease)      Mild intermittent asthma     uses primatent     Schizophrenia (H)      History of Present Illness   Austyn Leach is a 67 year old male with PMhx significant for bipolar disorder, schizophrenia, GERD, asthma, osteoarthritis, who was admitted on 9/19/2022 after presenting with progressive bilateral knee pain.       On presentation to the ED Hypertensive, VSS. Labs were unremarkable. XR of knees demonstrated arthritis, bilateral moderate size joint effusions, no fractures. No arthrocentesis pursued.      Discussed with Dr. Worrell in the ED, full chart review including lab work, imaging, and vital signs were reviewed. Patient received prednisone, oxycodone, in the ED. Staff were unable ambulate the patient in the ED. Admission was requested to observation for further cares, possible disposition assistance. Please see the admission history and physical for full details.    Hospital Course   Austyn Leach was admitted on 9/19/2022.  The following problems were addressed during his hospitalization:    #Subacute Bilateral Knee Pain due to osteoarthritis -   Concern is for osteoarthritis. Lower suspicion for crystalline, infectious or spondyloarthropathy. Initially treated conservatively with multimodal analgesia and improvement in pain but still having difficulty ambulating and has multiple stairs in home.  - received dose of PO prednisone in the ED on admissions    - initially started on naprosyn, APAP. PRN oxycodone/toradol, and topical therapies; recommend continuing Tylenol and Naproxen  "(short duration) at home for pain control   - ESR WNL, uric acid negative, and CRP only slightly elevated at 12.91  - has previously had improvement in pain with steroid injections, contacted orthopedic surgery and they were able to perform bilateral knee aspirations with steroid injections  - fluid from left knee was sent for analysis   - PT consulted and felt patient was safe to discharge home based on current mobility   - instructed patient to seek outpatient f/u with knee specialist in 2-3 weeks for recheck and further discussion regarding TKAs     #COPD - appeared slightly decompensated on admission due to appearing somewhat dyspenic on evaluation but denied increase in chronic symptoms.   - received scheduled duonebs with improvement in symptoms  - PRN albuterol at discharge, f/u with PCP for monitoring      #Tobacco dependence: discussed smoking cessation this admission, not currently interested in pursuing   #Schizophrenia, Bipolar Disorder Type 1: somewhat tangential but cooperative. Maintained on ability q 28 days. Follow up with psychiatry as previously directed.     Pending Results   Unresulted Labs Ordered in the Past 30 Days of this Admission     Date and Time Order Name Status Description    9/20/2022  1:03 PM Cell Count Body Fluid In process     9/20/2022 12:24 PM Anaerobic Bacterial Culture Routine In process     9/20/2022 12:24 PM Synovial fluid Aerobic Bacterial Culture Routine with Gram Stain In process     9/20/2022 12:24 PM Crystal ID Synovial Fluid In process         Code Status   Full Code       Primary Care Physician   Cruz Hermosillo    Exam:    /76   Pulse 84   Temp 98.1  F (36.7  C) (Oral)   Resp 18   Ht 1.88 m (6' 2\")   Wt 88.2 kg (194 lb 8 oz)   SpO2 93%   BMI 24.97 kg/m    Constitutional: Awake, alert,  no apparent distress.  Eyes: Conjunctiva and pupils examined and normal.  HEENT: Moist mucous membranes, normal dentition.  Respiratory: CTAB, no wheezing or rales. " Able to speak in full sentences.   Cardiovascular: Regular rate and rhythm, normal S1 and S2, and no murmur noted.  GI: Soft, non-distended, non-tender, bowel sounds present. No rebound tenderness or guarding.  Skin: No rashes, no cyanosis, no edema.  Musculoskeletal: Bilateral knees with mild effusion. Able to flex bilateral knees to approximately 80 degrees. Mild tenderness to palpation over medial joint of bilateral knees. Distal pulses are intact and symmetric.  No warmth or overlying erythema bilateral knees. No discomfort with flexion/extension of bilateral hips. Able to dorsiflex and plantar flex bilateral ankles.  Neurologic: No focal deficits noted. Tremor (chronc per patient) Speech is clear. Coordination and strength grossly normal.   Psychiatric: Appropriate affect. Tangential at times.    Discharge Disposition   Discharged to home    Consultations This Hospital Stay   PHYSICAL THERAPY ADULT IP CONSULT  CARE MANAGEMENT / SOCIAL WORK IP CONSULT  ORTHOPEDIC SURGERY IP CONSULT    Time Spent on this Encounter   ILynn PA-C, personally saw the patient today and spent greater than 30 minutes discharging this patient.    Discharge Orders      Reason for your hospital stay    You were admitted due to concerns for progressive bilateral knee pain.  Your pain appears to be the related to osteoarthritis.  There is low suspicion for an underlying gout flare or infection contributing based on your labs.  You had x-rays of your knees which showed no acute fractures but moderate bilateral effusions and evidence of arthritis.  You initially received a dose of oral steroids in the emergency room and started on a multimodal pain regimen.  Your pain did improve with initial interventions but due to ongoing decreased mobility you were seen in consultation by orthopedics, who performed bilateral knee aspirations with steroid injections.  You should continue to use over-the-counter Tylenol alternating with  naproxen for pain control.  While on naproxen you should take over-the-counter Pepcid 10 mg twice daily for GI protection.  You were seen in consultation by physical therapy prior to discharge and able to mobilize on stairs to ensure safety at home.  Due to concern for slight decompensation with your COPD on admission you were treated with scheduled nebulizers and should closely follow-up with your primary care for management of your COPD.     Follow-up and recommended labs and tests     Follow up with [facility] orthopedics about bilateral knee osteoarthritis in 2-3 weeks for recheck and further discussion about knee replacement. Contact at [phone] to set up appointment.     Activity    Your activity upon discharge: activity as tolerated     Diet    Follow this diet upon discharge: Orders Placed This Encounter      Regular Diet Adult     Discharge Medications   Current Discharge Medication List      START taking these medications    Details   acetaminophen (TYLENOL) 325 MG tablet Take 3 tablets (975 mg) by mouth every 8 hours    Associated Diagnoses: Acute pain of both knees      naproxen (NAPROSYN) 250 MG tablet Take 1 tablet (250 mg) by mouth every 12 hours as needed for moderate pain    Associated Diagnoses: Acute pain of both knees         CONTINUE these medications which have NOT CHANGED    Details   albuterol (PROAIR HFA/PROVENTIL HFA/VENTOLIN HFA) 108 (90 Base) MCG/ACT inhaler Inhale 2 puffs into the lungs every 6 hours  Qty: 18 g, Refills: 1    Comments: Pharmacy may dispense brand covered by insurance (Proair, or proventil or ventolin or generic albuterol inhaler)  Associated Diagnoses: Other emphysema (H)      ARIPiprazole ER (ABILIFY MAINTENA) 400 MG extended release inj syringe Inject 400 mg into the muscle every 28 days      aspirin (ASA) 325 MG EC tablet Take 325 mg by mouth daily           Allergies   Allergies   Allergen Reactions     Bee Venom Shortness Of Breath and Swelling     Data    Results for orders placed or performed during the hospital encounter of 09/19/22   XR Knee Bilateral 3 Views     Status: None    Narrative    KNEE BILATERAL THREE VIEWS  9/19/2022 12:06 PM     INDICATION: Bilateral knee pain.     COMPARISON: None.      Impression    IMPRESSION:  1.  Left knee: No fracture. Moderate degenerative arthrosis with  medial compartment narrowing. Moderate size joint effusion.  2.  Right knee: No fracture. Moderate-advanced degenerative arthrosis  with medial compartment narrowing. Moderate size joint effusion.    ENID SIERRA MD         SYSTEM ID:  [id number]   Basic metabolic panel     Status: Abnormal   Result Value Ref Range    Sodium 143 136 - 145 mmol/L    Potassium 4.0 3.4 - 5.3 mmol/L    Chloride 106 98 - 107 mmol/L    Carbon Dioxide (CO2) 27 22 - 29 mmol/L    Anion Gap 10 7 - 15 mmol/L    Urea Nitrogen 19.0 8.0 - 23.0 mg/dL    Creatinine 0.95 0.67 - 1.17 mg/dL    Calcium 9.1 8.8 - 10.2 mg/dL    Glucose 128 (H) 70 - 99 mg/dL    GFR Estimate 88 >60 mL/min/1.73m2   Magnesium     Status: Normal   Result Value Ref Range    Magnesium 1.8 1.7 - 2.3 mg/dL   Ethyl Alcohol Level     Status: Abnormal   Result Value Ref Range    Alcohol ethyl <0.01 (H) <=0.00 g/dL   [place] Draw     Status: None    Narrative    The following orders were created for panel order [place] Draw.  Procedure                               Abnormality         Status                     ---------                               -----------         ------                     Extra Blue Top Tube[[id number]]                              Final result               Extra Heparinized Syringe[[id number]]                                                     Please view results for these tests on the individual orders.   Blood gas venous     Status: Abnormal   Result Value Ref Range    pH Venous 7.40 7.32 - 7.43    pCO2 Venous 47 40 - 50 mm Hg    pO2 Venous 34 25 - 47 mm Hg    Bicarbonate Venous 29 (H) 21 - 28 mmol/L    Base  Excess/Deficit (+/-) 3.5 (H) -7.7 - 1.9 mmol/L    FIO2 0    CBC with platelets and differential     Status: None   Result Value Ref Range    WBC Count 7.8 4.0 - 11.0 10e3/uL    RBC Count 4.95 4.40 - 5.90 10e6/uL    Hemoglobin 14.5 13.3 - 17.7 g/dL    Hematocrit 45.9 40.0 - 53.0 %    MCV 93 78 - 100 fL    MCH 29.3 26.5 - 33.0 pg    MCHC 31.6 31.5 - 36.5 g/dL    RDW 14.1 10.0 - 15.0 %    Platelet Count 187 150 - 450 10e3/uL    % Neutrophils 74 %    % Lymphocytes 14 %    % Monocytes 11 %    % Eosinophils 1 %    % Basophils 0 %    % Immature Granulocytes 0 %    NRBCs per 100 WBC 0 <1 /100    Absolute Neutrophils 5.8 1.6 - 8.3 10e3/uL    Absolute Lymphocytes 1.1 0.8 - 5.3 10e3/uL    Absolute Monocytes 0.8 0.0 - 1.3 10e3/uL    Absolute Eosinophils 0.1 0.0 - 0.7 10e3/uL    Absolute Basophils 0.0 0.0 - 0.2 10e3/uL    Absolute Immature Granulocytes 0.0 <=0.4 10e3/uL    Absolute NRBCs 0.0 10e3/uL   Extra Blue Top Tube     Status: None   Result Value Ref Range    Hold Specimen JI    Asymptomatic COVID-19 Virus (Coronavirus) by PCR Nasopharyngeal     Status: Normal    Specimen: Nasopharyngeal; Swab   Result Value Ref Range    SARS CoV2 PCR Negative Negative    Narrative    Testing was performed using the Xpert Xpress SARS-CoV-2 Assay on the   Cepheid Gene-Xpert Instrument Systems. Additional information about   this Emergency Use Authorization (EUA) assay can be found via the Lab   Guide. This test should be ordered for the detection of SARS-CoV-2 in   individuals who meet SARS-CoV-2 clinical and/or epidemiological   criteria. Test performance is unknown in asymptomatic patients. This   test is for in vitro diagnostic use under the FDA EUA for   laboratories certified under CLIA to perform high complexity testing.   This test has not been FDA cleared or approved. A negative result   does not rule out the presence of PCR inhibitors in the specimen or   target RNA in concentration below the limit of detection for the   assay.  The possibility of a false negative should be considered if   the patient's recent exposure or clinical presentation suggests   COVID-19. This test was validated by the [facility]. This laboratory is certified under the Clinical Laboratory Improvement Amendments of 1988 (CLIA-88) as qualified to perform high complexity laboratory testing.     CRP inflammation     Status: Abnormal   Result Value Ref Range    CRP Inflammation 12.91 (H) <5.00 mg/L   Erythrocyte sedimentation rate auto     Status: Normal   Result Value Ref Range    Erythrocyte Sedimentation Rate 14 0 - 20 mm/hr   Uric acid     Status: Normal   Result Value Ref Range    Uric Acid 6.0 3.4 - 7.0 mg/dL   CBC with platelets     Status: Abnormal   Result Value Ref Range    WBC Count 8.5 4.0 - 11.0 10e3/uL    RBC Count 4.38 (L) 4.40 - 5.90 10e6/uL    Hemoglobin 13.1 (L) 13.3 - 17.7 g/dL    Hematocrit 41.1 40.0 - 53.0 %    MCV 94 78 - 100 fL    MCH 29.9 26.5 - 33.0 pg    MCHC 31.9 31.5 - 36.5 g/dL    RDW 14.4 10.0 - 15.0 %    Platelet Count 160 150 - 450 10e3/uL   EKG 12 lead     Status: None   Result Value Ref Range    Systolic Blood Pressure  mmHg    Diastolic Blood Pressure  mmHg    Ventricular Rate 107 BPM    Atrial Rate 107 BPM    MS Interval 162 ms    QRS Duration 94 ms     ms    QTc 448 ms    P Axis 75 degrees    R AXIS 80 degrees    T Axis 65 degrees    Interpretation ECG       Sinus tachycardia with frequent Premature ventricular complexes  Otherwise normal ECG  When compared with ECG of 25-JUL-2021 16:38,  Premature ventricular complexes are now Present  Confirmed by - EMERGENCY ROOM, PHYSICIAN ([id number]),  LILLIE KLEIN ([id number]) on 9/20/2022 6:42:00 AM     CBC with platelets differential     Status: None    Narrative    The following orders were created for panel order CBC with platelets differential.  Procedure                               Abnormality         Status                     ---------                                -----------         ------                     CBC with platelets and d...[[id number]]                      Final result                 Please view results for these tests on the individual orders.   Cell count with differential fluid     Status: None (In process)    Narrative    The following orders were created for panel order Cell count with differential fluid.  Procedure                               Abnormality         Status                     ---------                               -----------         ------                     Cell Count Body Fluid[[id number]]                            In process                   Please view results for these tests on the individual orders.       Lynn Jackson PA-C

## 2022-09-20 NOTE — CONSULTS
[facility]    Orthopedic Consultation    Austyn Leach MRN# [id number]   Age: 67 year old YOB: 1955     Date of Admission: 9/19/2022    Reason for consult: Bilateral knee osteoarthritis        Requesting provider: Lynn Jackson        Level of consult: Consult, follow and place orders           Assessment and Plan:   Assessment:   Bilateral knee osteoarthritis      Plan:   Patient agreeable to bilateral knee aspirations/injections.  WBAT with assist if needed   Ice prn   Recommend patient follow-up with knee specialist in 2-3 weeks for recheck and further discussion regarding TKAs.  [facility] number:  [phone]           Chief Complaint:   Bilateral knee pain          History of Present Illness:   Austyn Leach is a 67 year old male who presented to the ED with knee pain, weakness. He denies any specific trauma to his knees.  Notes he did drink alcohol the night prior to admission but denies falling.  He reports having progressive pain in both knees over the past 5 years.  He has received steroid injections in the past with some relief.  Believes his last injections were 2-3 years ago.     He is ambulating more comfortably today vs yesterday but continues to have pain.           Past Medical History:     Past Medical History:   Diagnosis Date     Bipolar 1 disorder (H)      GERD (gastroesophageal reflux disease)      Mild intermittent asthma     uses primatent     Schizophrenia (H)              Past Surgical History:     Past Surgical History:   Procedure Laterality Date     NO HISTORY OF SURGERY       TESTICLE SURGERY       THORACOSCOPIC PLEURODESIS Right 7/29/2021    Procedure: Right thoracoscopy, talc and mechanical pleurodesis;  Surgeon: Benoit Villagomez MD;  Location: [facility] OR     VASECTOMY               Social History:     Social History     Tobacco Use     Smoking status: Current Every Day Smoker     Packs/day: 1.00     Years: 58.00     Pack years: 58.00      Types: Cigarettes     Smokeless tobacco: Never Used   Substance Use Topics     Alcohol use: No             Family History:     Family History   Problem Relation Age of Onset     C.A.D. Father      Hypertension Father      Breast Cancer Mother      Diabetes No family hx of      Cerebrovascular Disease No family hx of              Immunizations:     VACCINE/DOSE   Diptheria   DPT   DTAP   HBIG   Hepatitis A   Hepatitis B   HIB   Influenza   Measles   Meningococcal   MMR   Mumps   Pneumococcal   Polio   Rubella   Small Pox   TDAP   Varicella   Zoster             Allergies:     Allergies   Allergen Reactions     Bee Venom Shortness Of Breath and Swelling             Medications:     Current Facility-Administered Medications   Medication     acetaminophen (TYLENOL) tablet 975 mg     albuterol (PROVENTIL HFA/VENTOLIN HFA) inhaler     aspirin (ASA) EC tablet 325 mg     famotidine (PEPCID) tablet 10 mg     guaiFENesin-dextromethorphan (ROBITUSSIN DM) 100-10 MG/5ML syrup 10 mL     ipratropium - albuterol 0.5 mg/2.5 mg/3 mL (DUONEB) neb solution 3 mL     naproxen (NAPROSYN) tablet 250 mg    Or     ketorolac (TORADOL) injection 15 mg     lidocaine (PF) (XYLOCAINE) 1 % injection 10 mL     melatonin tablet 1 mg     methylPREDNISolone (DEPO-MEDROL) injection 40 mg     nicotine (COMMIT) lozenge 4 mg     ondansetron (ZOFRAN ODT) ODT tab 4 mg    Or     ondansetron (ZOFRAN) injection 4 mg     oxyCODONE IR (ROXICODONE) half-tab 2.5-5 mg             Review of Systems:   ROS:  10 point ROS neg other than the symptoms noted above in the HPI.            Physical Exam:   All vitals have been reviewed  Patient Vitals for the past 24 hrs:   BP Temp Temp src Pulse Resp SpO2 Height Weight   09/20/22 1111 127/76 98.1  F (36.7  C) Oral 91 18 91 % -- --   09/20/22 0836 129/74 97.9  F (36.6  C) Oral 80 18 91 % -- --   09/20/22 0744 -- -- -- 79 18 94 % -- --   09/20/22 0346 139/79 97.9  F (36.6  C) Oral 83 18 94 % -- --   09/20/22 0005 117/57 97.9  " F (36.6  C) Oral 89 18 94 % -- --   09/19/22 2256 -- -- -- -- -- -- 1.88 m (6' 2\") 88.2 kg (194 lb 8 oz)   09/19/22 2152 (!) 158/92 98.8  F (37.1  C) Oral 88 18 97 % -- --   09/19/22 2113 -- -- -- -- -- 91 % -- --   09/19/22 2112 -- -- -- -- -- (!) 87 % -- --   09/19/22 2111 124/81 -- -- 91 -- -- -- --   09/19/22 2040 -- -- -- -- -- 91 % -- --   09/19/22 2030 -- -- -- -- -- 91 % -- --   09/19/22 2015 -- -- -- -- -- 94 % -- --   09/19/22 1930 (!) 144/95 -- -- 93 -- -- -- --   09/19/22 1235 -- -- -- -- -- 94 % -- --   09/19/22 1230 (!) 142/94 -- -- 104 -- 94 % -- --   09/19/22 1215 -- -- -- -- -- 95 % -- --       Intake/Output Summary (Last 24 hours) at 9/20/2022 1207  Last data filed at 9/20/2022 0829  Gross per 24 hour   Intake 480 ml   Output 350 ml   Net 130 ml         Physical Exam   Temp: 98.1  F (36.7  C) Temp src: Oral BP: 127/76 Pulse: 91   Resp: 18 SpO2: 91 % O2 Device: None (Room air) Oxygen Delivery: 2 LPM  Vital Signs with Ranges  Temp:  [97.9  F (36.6  C)-98.8  F (37.1  C)] 98.1  F (36.7  C)  Pulse:  [] 91  Resp:  [18] 18  BP: (117-158)/(57-95) 127/76  SpO2:  [87 %-97 %] 91 %  194 lbs 8 oz    Constitutional: Pleasant, alert, appropriate, following commands.  HEENT: Head atraumatic normocephalic. Pupils equal round and reactive to light.  Respiratory: Unlabored breathing no audible wheeze  Cardiovascular: Regular rate and rhythm per pulses  GI: Abdomen non-distended.  Lymph/Hematologic: No lymphadenopathy in areas examined  Genitourinary:  No wu  Skin: No rashes, no cyanosis, no edema.  Musculoskeletal: Bilateral LEs:  Patient has bilateral knee effusions.  No ecchymosis or erythema.  Left > right.  Mild TTP along the medial joint line.  He is able to flex to 80 degrees bilaterally.  Stable valgus/varus stress.  NVI with equal pulses and sensation.  Able to dorsi and plantar flex bilateral ankles.  Denies pain with hip rotation.    Neurologic: normal without focal findings, mental status, speech " normal, alert and oriented x iii  Neuropsychiatric: stable             Data:   All laboratory data reviewed  Results for orders placed or performed during the hospital encounter of 09/19/22   XR Knee Bilateral 3 Views     Status: None    Narrative    KNEE BILATERAL THREE VIEWS  9/19/2022 12:06 PM     INDICATION: Bilateral knee pain.     COMPARISON: None.      Impression    IMPRESSION:  1.  Left knee: No fracture. Moderate degenerative arthrosis with  medial compartment narrowing. Moderate size joint effusion.  2.  Right knee: No fracture. Moderate-advanced degenerative arthrosis  with medial compartment narrowing. Moderate size joint effusion.    ENID SIERRA MD         SYSTEM ID:  [id number]   Basic metabolic panel     Status: Abnormal   Result Value Ref Range    Sodium 143 136 - 145 mmol/L    Potassium 4.0 3.4 - 5.3 mmol/L    Chloride 106 98 - 107 mmol/L    Carbon Dioxide (CO2) 27 22 - 29 mmol/L    Anion Gap 10 7 - 15 mmol/L    Urea Nitrogen 19.0 8.0 - 23.0 mg/dL    Creatinine 0.95 0.67 - 1.17 mg/dL    Calcium 9.1 8.8 - 10.2 mg/dL    Glucose 128 (H) 70 - 99 mg/dL    GFR Estimate 88 >60 mL/min/1.73m2   Magnesium     Status: Normal   Result Value Ref Range    Magnesium 1.8 1.7 - 2.3 mg/dL   Ethyl Alcohol Level     Status: Abnormal   Result Value Ref Range    Alcohol ethyl <0.01 (H) <=0.00 g/dL   [place] Draw     Status: None    Narrative    The following orders were created for panel order [place] Draw.  Procedure                               Abnormality         Status                     ---------                               -----------         ------                     Extra Blue Top Tube[[id number]]                              Final result               Extra Heparinized Syringe[[id number]]                                                     Please view results for these tests on the individual orders.   Blood gas venous     Status: Abnormal   Result Value Ref Range    pH Venous 7.40 7.32 - 7.43    pCO2  Venous 47 40 - 50 mm Hg    pO2 Venous 34 25 - 47 mm Hg    Bicarbonate Venous 29 (H) 21 - 28 mmol/L    Base Excess/Deficit (+/-) 3.5 (H) -7.7 - 1.9 mmol/L    FIO2 0    CBC with platelets and differential     Status: None   Result Value Ref Range    WBC Count 7.8 4.0 - 11.0 10e3/uL    RBC Count 4.95 4.40 - 5.90 10e6/uL    Hemoglobin 14.5 13.3 - 17.7 g/dL    Hematocrit 45.9 40.0 - 53.0 %    MCV 93 78 - 100 fL    MCH 29.3 26.5 - 33.0 pg    MCHC 31.6 31.5 - 36.5 g/dL    RDW 14.1 10.0 - 15.0 %    Platelet Count 187 150 - 450 10e3/uL    % Neutrophils 74 %    % Lymphocytes 14 %    % Monocytes 11 %    % Eosinophils 1 %    % Basophils 0 %    % Immature Granulocytes 0 %    NRBCs per 100 WBC 0 <1 /100    Absolute Neutrophils 5.8 1.6 - 8.3 10e3/uL    Absolute Lymphocytes 1.1 0.8 - 5.3 10e3/uL    Absolute Monocytes 0.8 0.0 - 1.3 10e3/uL    Absolute Eosinophils 0.1 0.0 - 0.7 10e3/uL    Absolute Basophils 0.0 0.0 - 0.2 10e3/uL    Absolute Immature Granulocytes 0.0 <=0.4 10e3/uL    Absolute NRBCs 0.0 10e3/uL   Extra Blue Top Tube     Status: None   Result Value Ref Range    Hold Specimen JI    Asymptomatic COVID-19 Virus (Coronavirus) by PCR Nasopharyngeal     Status: Normal    Specimen: Nasopharyngeal; Swab   Result Value Ref Range    SARS CoV2 PCR Negative Negative    Narrative    Testing was performed using the Xpert Xpress SARS-CoV-2 Assay on the   Cepheid Gene-Xpert Instrument Systems. Additional information about   this Emergency Use Authorization (EUA) assay can be found via the Lab   Guide. This test should be ordered for the detection of SARS-CoV-2 in   individuals who meet SARS-CoV-2 clinical and/or epidemiological   criteria. Test performance is unknown in asymptomatic patients. This   test is for in vitro diagnostic use under the FDA EUA for   laboratories certified under CLIA to perform high complexity testing.   This test has not been FDA cleared or approved. A negative result   does not rule out the presence of PCR  inhibitors in the specimen or   target RNA in concentration below the limit of detection for the   assay. The possibility of a false negative should be considered if   the patient's recent exposure or clinical presentation suggests   COVID-19. This test was validated by the [facility]. This laboratory is certified under the Clinical Laboratory Improvement Amendments of 1988 (CLIA-88) as qualified to perform high complexity laboratory testing.     CRP inflammation     Status: Abnormal   Result Value Ref Range    CRP Inflammation 12.91 (H) <5.00 mg/L   Erythrocyte sedimentation rate auto     Status: Normal   Result Value Ref Range    Erythrocyte Sedimentation Rate 14 0 - 20 mm/hr   Uric acid     Status: Normal   Result Value Ref Range    Uric Acid 6.0 3.4 - 7.0 mg/dL   CBC with platelets     Status: Abnormal   Result Value Ref Range    WBC Count 8.5 4.0 - 11.0 10e3/uL    RBC Count 4.38 (L) 4.40 - 5.90 10e6/uL    Hemoglobin 13.1 (L) 13.3 - 17.7 g/dL    Hematocrit 41.1 40.0 - 53.0 %    MCV 94 78 - 100 fL    MCH 29.9 26.5 - 33.0 pg    MCHC 31.9 31.5 - 36.5 g/dL    RDW 14.4 10.0 - 15.0 %    Platelet Count 160 150 - 450 10e3/uL   EKG 12 lead     Status: None   Result Value Ref Range    Systolic Blood Pressure  mmHg    Diastolic Blood Pressure  mmHg    Ventricular Rate 107 BPM    Atrial Rate 107 BPM    UT Interval 162 ms    QRS Duration 94 ms     ms    QTc 448 ms    P Axis 75 degrees    R AXIS 80 degrees    T Axis 65 degrees    Interpretation ECG       Sinus tachycardia with frequent Premature ventricular complexes  Otherwise normal ECG  When compared with ECG of 25-JUL-2021 16:38,  Premature ventricular complexes are now Present  Confirmed by - EMERGENCY ROOM, PHYSICIAN ([id number]),  LILLIE KLEIN ([id number]) on 9/20/2022 6:42:00 AM     CBC with platelets differential     Status: None    Narrative    The following orders were created for panel order CBC with platelets  differential.  Procedure                               Abnormality         Status                     ---------                               -----------         ------                     CBC with platelets and d...[[id number]]                      Final result                 Please view results for these tests on the individual orders.          Attestation:  I have reviewed today's vital signs, notes, medications, labs and imaging with Dr. Adelso Pepe.  Amount of time performed on this consult: 40 minutes.    Cortney Frost PA-C

## 2022-09-25 LAB
BACTERIA SNV CULT: NO GROWTH
GRAM STAIN RESULT: NORMAL
GRAM STAIN RESULT: NORMAL

## 2022-10-04 LAB — BACTERIA SNV CULT: NORMAL

## 2023-01-01 NOTE — PROGRESS NOTES
"Patient has been awake and out of his room, in the lounge, and loudly talking to staff.  He did decrease the volume of his voice on request. Patient delusional, stating \"watch children's eyes, they see Orbs around you. It's in the Bible. And those that don't look at you are either huizar or lesbian.\" Patient was then quiet for a few minutes until another male patient woke up and came and sat in the lounge. Patient immediately started loudly exclaiming to the other patient \"Did you go to court? You did? I don't believe you!\", then he went and sat down next to that other patient, and began trying to argue with him.  Staff intervened and escorted the newer patient to the other lounge.  Austyn loudly bemoaned \"I stuck here for at leas a year, and everything I say \"they\" (staff) say is inappropriate!\".  Patient then went to his room, slamming his door twice, but then quieted down.    " [Normal Growth] : growth [Normal Development] : developmental [No Elimination Concerns] : elimination [Continue Regimen] : feeding [No Skin Concerns] : skin [Normal Sleep Pattern] : sleep [None] : no known medical problems [Anticipatory Guidance Given] : Anticipatory guidance addressed as per the history of present illness section [ Transition] :  transition [ Care] :  care [Nutritional Adequacy] : nutritional adequacy [Parental Well-Being] : parental well-being [Safety] : safety [Hepatitis B In Hospital] : Hepatitis B administered while in the hospital [No Vaccines] : no vaccines needed [No Medications] : ~He/She~ is not on any medications [Parent/Guardian] : Parent/Guardian [FreeTextEntry1] : Recommend exclusive breastfeeding, 8-12 feedings per day. Mother should continue prenatal vitamins and avoid alcohol. If formula is needed, recommend iron-fortified formulations every 2-3 hrs. When in car, patient should be in rear-facing car seat in back seat. Air dry umbillical stump. Put baby to sleep on back, in own crib with no loose or soft bedding. Limit baby's exposure to others, especially those with fever or unknown vaccine status.\par Weight and Jaundice check in 2-3 days\par

## 2023-06-06 ENCOUNTER — APPOINTMENT (OUTPATIENT)
Dept: GENERAL RADIOLOGY | Facility: CLINIC | Age: 68
End: 2023-06-06
Attending: EMERGENCY MEDICINE
Payer: MEDICARE

## 2023-06-06 ENCOUNTER — HOSPITAL ENCOUNTER (EMERGENCY)
Facility: CLINIC | Age: 68
Discharge: HOME | End: 2023-06-06
Attending: EMERGENCY MEDICINE | Admitting: EMERGENCY MEDICINE
Payer: MEDICARE

## 2023-06-06 VITALS
WEIGHT: 205.69 LBS | TEMPERATURE: 98.2 F | SYSTOLIC BLOOD PRESSURE: 142 MMHG | DIASTOLIC BLOOD PRESSURE: 84 MMHG | BODY MASS INDEX: 26.4 KG/M2 | OXYGEN SATURATION: 91 % | HEART RATE: 85 BPM | HEIGHT: 74 IN | RESPIRATION RATE: 26 BRPM

## 2023-06-06 DIAGNOSIS — J44.1 COPD EXACERBATION (H): ICD-10-CM

## 2023-06-06 LAB
ANION GAP SERPL CALCULATED.3IONS-SCNC: 12 MMOL/L (ref 7–15)
ATRIAL RATE - MUSE: 75 BPM
BASOPHILS # BLD AUTO: 0 10E3/UL (ref 0–0.2)
BASOPHILS NFR BLD AUTO: 0 %
BUN SERPL-MCNC: 17.7 MG/DL (ref 8–23)
CALCIUM SERPL-MCNC: 8.8 MG/DL (ref 8.8–10.2)
CHLORIDE SERPL-SCNC: 104 MMOL/L (ref 98–107)
CREAT SERPL-MCNC: 1.01 MG/DL (ref 0.67–1.17)
DEPRECATED HCO3 PLAS-SCNC: 25 MMOL/L (ref 22–29)
DIASTOLIC BLOOD PRESSURE - MUSE: NORMAL MMHG
EOSINOPHIL # BLD AUTO: 0.1 10E3/UL (ref 0–0.7)
EOSINOPHIL NFR BLD AUTO: 1 %
ERYTHROCYTE [DISTWIDTH] IN BLOOD BY AUTOMATED COUNT: 13.2 % (ref 10–15)
FLUAV RNA SPEC QL NAA+PROBE: NEGATIVE
FLUBV RNA RESP QL NAA+PROBE: NEGATIVE
GFR SERPL CREATININE-BSD FRML MDRD: 81 ML/MIN/1.73M2
GLUCOSE SERPL-MCNC: 108 MG/DL (ref 70–99)
HCO3 BLDV-SCNC: 29 MMOL/L (ref 21–28)
HCT VFR BLD AUTO: 42.2 % (ref 40–53)
HGB BLD-MCNC: 13.8 G/DL (ref 13.3–17.7)
HOLD SPECIMEN: NORMAL
HOLD SPECIMEN: NORMAL
IMM GRANULOCYTES # BLD: 0 10E3/UL
IMM GRANULOCYTES NFR BLD: 0 %
INTERPRETATION ECG - MUSE: NORMAL
LACTATE BLD-SCNC: 1.8 MMOL/L
LACTATE SERPL-SCNC: 1.7 MMOL/L (ref 0.7–2)
LYMPHOCYTES # BLD AUTO: 0.9 10E3/UL (ref 0.8–5.3)
LYMPHOCYTES NFR BLD AUTO: 14 %
MCH RBC QN AUTO: 30.2 PG (ref 26.5–33)
MCHC RBC AUTO-ENTMCNC: 32.7 G/DL (ref 31.5–36.5)
MCV RBC AUTO: 92 FL (ref 78–100)
MONOCYTES # BLD AUTO: 0.8 10E3/UL (ref 0–1.3)
MONOCYTES NFR BLD AUTO: 13 %
NEUTROPHILS # BLD AUTO: 4.5 10E3/UL (ref 1.6–8.3)
NEUTROPHILS NFR BLD AUTO: 72 %
NRBC # BLD AUTO: 0 10E3/UL
NRBC BLD AUTO-RTO: 0 /100
P AXIS - MUSE: 64 DEGREES
PCO2 BLDV: 52 MM HG (ref 40–50)
PH BLDV: 7.36 [PH] (ref 7.32–7.43)
PLATELET # BLD AUTO: 154 10E3/UL (ref 150–450)
PO2 BLDV: 19 MM HG (ref 25–47)
POTASSIUM SERPL-SCNC: 4 MMOL/L (ref 3.4–5.3)
PR INTERVAL - MUSE: 168 MS
QRS DURATION - MUSE: 106 MS
QT - MUSE: 382 MS
QTC - MUSE: 426 MS
R AXIS - MUSE: 49 DEGREES
RBC # BLD AUTO: 4.57 10E6/UL (ref 4.4–5.9)
RSV RNA SPEC NAA+PROBE: NEGATIVE
SAO2 % BLDV: 27 % (ref 94–100)
SARS-COV-2 RNA RESP QL NAA+PROBE: NEGATIVE
SODIUM SERPL-SCNC: 141 MMOL/L (ref 136–145)
SYSTOLIC BLOOD PRESSURE - MUSE: NORMAL MMHG
T AXIS - MUSE: 54 DEGREES
TROPONIN T SERPL HS-MCNC: 10 NG/L
VENTRICULAR RATE- MUSE: 75 BPM
WBC # BLD AUTO: 6.2 10E3/UL (ref 4–11)

## 2023-06-06 PROCEDURE — 250N000009 HC RX 250: Performed by: EMERGENCY MEDICINE

## 2023-06-06 PROCEDURE — 83605 ASSAY OF LACTIC ACID: CPT | Performed by: EMERGENCY MEDICINE

## 2023-06-06 PROCEDURE — 96375 TX/PRO/DX INJ NEW DRUG ADDON: CPT

## 2023-06-06 PROCEDURE — 83605 ASSAY OF LACTIC ACID: CPT | Mod: 91

## 2023-06-06 PROCEDURE — 85041 AUTOMATED RBC COUNT: CPT | Performed by: EMERGENCY MEDICINE

## 2023-06-06 PROCEDURE — 71046 X-RAY EXAM CHEST 2 VIEWS: CPT

## 2023-06-06 PROCEDURE — 96365 THER/PROPH/DIAG IV INF INIT: CPT

## 2023-06-06 PROCEDURE — 99285 EMERGENCY DEPT VISIT HI MDM: CPT | Mod: 25

## 2023-06-06 PROCEDURE — 94640 AIRWAY INHALATION TREATMENT: CPT

## 2023-06-06 PROCEDURE — 80048 BASIC METABOLIC PNL TOTAL CA: CPT | Performed by: EMERGENCY MEDICINE

## 2023-06-06 PROCEDURE — 250N000011 HC RX IP 250 OP 636: Performed by: EMERGENCY MEDICINE

## 2023-06-06 PROCEDURE — 93005 ELECTROCARDIOGRAM TRACING: CPT

## 2023-06-06 PROCEDURE — 87637 SARSCOV2&INF A&B&RSV AMP PRB: CPT | Performed by: EMERGENCY MEDICINE

## 2023-06-06 PROCEDURE — 82803 BLOOD GASES ANY COMBINATION: CPT

## 2023-06-06 PROCEDURE — 84484 ASSAY OF TROPONIN QUANT: CPT | Performed by: EMERGENCY MEDICINE

## 2023-06-06 PROCEDURE — 36415 COLL VENOUS BLD VENIPUNCTURE: CPT | Performed by: EMERGENCY MEDICINE

## 2023-06-06 PROCEDURE — 85014 HEMATOCRIT: CPT | Performed by: EMERGENCY MEDICINE

## 2023-06-06 RX ORDER — ALBUTEROL SULFATE 90 UG/1
1-2 AEROSOL, METERED RESPIRATORY (INHALATION) EVERY 4 HOURS PRN
Qty: 18 G | Refills: 1 | Status: SHIPPED | OUTPATIENT
Start: 2023-06-06

## 2023-06-06 RX ORDER — METHYLPREDNISOLONE SODIUM SUCCINATE 125 MG/2ML
125 INJECTION, POWDER, LYOPHILIZED, FOR SOLUTION INTRAMUSCULAR; INTRAVENOUS ONCE
Status: COMPLETED | OUTPATIENT
Start: 2023-06-06 | End: 2023-06-06

## 2023-06-06 RX ORDER — IPRATROPIUM BROMIDE AND ALBUTEROL SULFATE 2.5; .5 MG/3ML; MG/3ML
3 SOLUTION RESPIRATORY (INHALATION) ONCE
Status: COMPLETED | OUTPATIENT
Start: 2023-06-06 | End: 2023-06-06

## 2023-06-06 RX ORDER — DOXYCYCLINE 100 MG/1
100 CAPSULE ORAL 2 TIMES DAILY
Qty: 20 CAPSULE | Refills: 0 | Status: SHIPPED | OUTPATIENT
Start: 2023-06-06 | End: 2023-06-16

## 2023-06-06 RX ORDER — MAGNESIUM SULFATE HEPTAHYDRATE 40 MG/ML
2 INJECTION, SOLUTION INTRAVENOUS ONCE
Status: COMPLETED | OUTPATIENT
Start: 2023-06-06 | End: 2023-06-06

## 2023-06-06 RX ORDER — PREDNISONE 20 MG/1
TABLET ORAL
Qty: 10 TABLET | Refills: 0 | Status: SHIPPED | OUTPATIENT
Start: 2023-06-06 | End: 2024-04-02

## 2023-06-06 RX ORDER — IPRATROPIUM BROMIDE AND ALBUTEROL SULFATE 2.5; .5 MG/3ML; MG/3ML
1 SOLUTION RESPIRATORY (INHALATION) EVERY 4 HOURS PRN
Qty: 90 ML | Refills: 1 | Status: SHIPPED | OUTPATIENT
Start: 2023-06-06

## 2023-06-06 RX ADMIN — IPRATROPIUM BROMIDE AND ALBUTEROL SULFATE 3 ML: 2.5; .5 SOLUTION RESPIRATORY (INHALATION) at 14:21

## 2023-06-06 RX ADMIN — METHYLPREDNISOLONE SODIUM SUCCINATE 125 MG: 125 INJECTION INTRAMUSCULAR; INTRAVENOUS at 11:31

## 2023-06-06 RX ADMIN — IPRATROPIUM BROMIDE AND ALBUTEROL SULFATE 3 ML: 2.5; .5 SOLUTION RESPIRATORY (INHALATION) at 16:34

## 2023-06-06 RX ADMIN — MAGNESIUM SULFATE HEPTAHYDRATE 2 G: 2 INJECTION, SOLUTION INTRAVENOUS at 11:32

## 2023-06-06 ASSESSMENT — ACTIVITIES OF DAILY LIVING (ADL)
ADLS_ACUITY_SCORE: 37

## 2023-06-06 NOTE — ED TRIAGE NOTES
"A&O x4.  ABC's intact.      Pt arrives with c/o \"Asthma Attack\" that started this morning. Also has a cough.     Triage Assessment     Row Name 06/06/23 2115       Triage Assessment (Adult)    Airway WDL WDL       Respiratory WDL    Respiratory WDL X;rhythm/pattern;cough    Rhythm/Pattern, Respiratory shortness of breath    Cough Frequency frequent    Cough Type congested;loose              "

## 2023-06-06 NOTE — ED PROVIDER NOTES
"    History     Chief Complaint:  Shortness of Breath       The history is provided by the patient.      Austyn Leach is a 68 year old male with history of COPD who presents to the ED for evaluation of shortness of breath. Patient reports that he woke up with shortness of breath this morning. He used his albuterol inhaler 3 times this morning which slightly helped. He notes his breathing and lungs have been getting worse lately and smoking exacerbates shortness of breath. He has been smoking \"forever\", usually one pack a day but has recently reduced to 5 per day. He smoked one cigarette this morning before presenting to ED. Patient endorses rhinorrhea today. He has a history of COPD for 20 years and pneumothorax for which he was hospitalized in 2021. He denies allergies, chest pain, heart palpitations, constipation, or diarrhea. No daily medications, at-home O2, no history of BiPAP or intubation.    Independent Historian:    None    Review of External Notes:    Medications:    Albuterol  Abilify  Naprosyn    Past Medical History:    Bipolar 1 disorder  GERD  Asthma  Schizophrenia  Hx of tobacco use  Erectile dysfunction  Community acquired pneumonia  Acute respiratory failure with hypoxia  COPD  Pneumothorax  Effusion of both knee joints  Suicide attempt    Past Surgical History:    Testicular surgery  Vasectomy  Thoracoscopic pleurodesis, right    Physical Exam     Patient Vitals for the past 24 hrs:   BP Temp Temp src Pulse Resp SpO2 Height Weight   06/06/23 1616 (!) 142/84 -- -- 85 -- 91 % -- --   06/06/23 1559 135/74 -- -- 86 -- -- -- --   06/06/23 1539 127/78 -- -- 90 -- -- -- --   06/06/23 1519 (!) 154/76 -- -- 92 -- -- -- --   06/06/23 1140 139/84 -- -- 77 -- 92 % -- --   06/06/23 1132 135/68 -- -- 79 -- 92 % -- --   06/06/23 1102 130/82 -- -- 80 -- 91 % -- --   06/06/23 1047 (!) 156/90 -- -- -- -- 93 % -- --   06/06/23 1017 139/74 -- -- -- -- 96 % -- --   06/06/23 0950 (!) 155/84 98.2  F (36.8  C) Oral 86 " "26 94 % 1.88 m (6' 2\") 93.3 kg (205 lb 11 oz)        Physical Exam  General: Resting on the gurney    Patient has somewhat bluish lips that are pursed as he exhales for auto PEEP  Head:  The scalp, face, and head appear normal  Eyes:  The pupils are equal, round, and reactive to light    There is no nystagmus    Extraocular muscles are intact    Conjunctivae and sclerae are normal  ENT:    The nose is normal    Pinnae are normal    The oropharynx is normal    Uvula is in the midline  Neck:  Normal range of motion    There is no rigidity noted    There is no midline cervical spine pain/tenderness    Trachea is in the midline    No mass is detected  CV:  Regular rate and underlying rhythm     Normal S1/S2, no S3/S4    No pathological murmur detected  Resp:  Lungs reveal expiratory wheezing in all pulmonary fields    There is prolongation of expiratory phase    There is mild tachypnea    Mildly-labored    No rales  GI:  Abdomen is soft, there is no rigidity    No distension    No tympani    No rebound tenderness     Non-surgical without peritoneal features  MS:  Normal muscular tone    Symmetric motor strength    No major joint effusions    No asymmetric leg swelling, no calf tenderness  Skin:  No rash or acute skin lesions noted  Neuro: Speech is normal and fluent  Psych:  Awake. Alert.      Normal affect.  Appropriate interactions.  Lymph: No anterior cervical lymphadenopathy noted              Emergency Department Course   ECG  ECG taken at 1007, ECG read at 1010  Normal sinus rhythm  Normal ECG   Rate 75 bpm. UT interval 168 ms. QRS duration 106 ms. QT/QTc 382/426 ms. P-R-T axes 64 49 54.    Imaging:  XR Chest 2 Views   Final Result   IMPRESSION: Mild opacity near the right lung apex is unchanged, and   may represent scarring or recurrent pneumonia. The left lung is clear.   No pleural effusions. Pulmonary vascularity is within normal limits.   No pneumothorax.       SARIKA POLLARD MD            SYSTEM ID:  " [id number]        Report per radiology    Laboratory:  Labs Ordered and Resulted from Time of ED Arrival to Time of ED Departure   BASIC METABOLIC PANEL - Abnormal       Result Value    Sodium 141      Potassium 4.0      Chloride 104      Carbon Dioxide (CO2) 25      Anion Gap 12      Urea Nitrogen 17.7      Creatinine 1.01      Calcium 8.8      Glucose 108 (*)     GFR Estimate 81     ISTAT GASES LACTATE VENOUS POCT - Abnormal    Lactic Acid POCT 1.8      Bicarbonate Venous POCT 29 (*)     O2 Sat, Venous POCT 27 (*)     pCO2V Venous POCT 52 (*)     pH Venous POCT 7.36      pO2 Venous POCT 19 (*)    TROPONIN T, HIGH SENSITIVITY - Normal    Troponin T, High Sensitivity 10     LACTIC ACID WHOLE BLOOD - Normal    Lactic Acid 1.7     INFLUENZA A/B, RSV, & SARS-COV2 PCR - Normal    Influenza A PCR Negative      Influenza B PCR Negative      RSV PCR Negative      SARS CoV2 PCR Negative     CBC WITH PLATELETS AND DIFFERENTIAL    WBC Count 6.2      RBC Count 4.57      Hemoglobin 13.8      Hematocrit 42.2      MCV 92      MCH 30.2      MCHC 32.7      RDW 13.2      Platelet Count 154      % Neutrophils 72      % Lymphocytes 14      % Monocytes 13      % Eosinophils 1      % Basophils 0      % Immature Granulocytes 0      NRBCs per 100 WBC 0      Absolute Neutrophils 4.5      Absolute Lymphocytes 0.9      Absolute Monocytes 0.8      Absolute Eosinophils 0.1      Absolute Basophils 0.0      Absolute Immature Granulocytes 0.0      Absolute NRBCs 0.0        Emergency Department Course & Assessments:     Interventions:  Medications   methylPREDNISolone sodium succinate (solu-MEDROL) injection 125 mg (125 mg Intravenous $Given 6/6/23 1131)   magnesium sulfate 2 g in 50 mL sterile water intermittent infusion (0 g Intravenous Stopped 6/6/23 1235)   ipratropium - albuterol 0.5 mg/2.5 mg/3 mL (DUONEB) neb solution 3 mL (3 mLs Nebulization $Given 6/6/23 1421)   ipratropium - albuterol 0.5 mg/2.5 mg/3 mL (DUONEB) neb solution 3 mL (3 mLs  Nebulization $Given 6/6/23 0674)        Assessments:  1035 I obtained history and examined the patient as noted above.  1345 I rechecked the patient.   1503 I rechecked the patient and explained findings. We discussed plans for discharge and the patient is comfortable with this plan.    Independent Interpretation (X-rays, CTs, rhythm strip):  I review the patient's chest x-ray. No lobar pneumonia. Apical pleural base scarring is noted on the right.    Consultations/Discussion of Management or Tests:  None       Disposition:  The patient was discharged to home.     Impression & Plan    CMS Diagnoses: None    Medical Decision Making:  This patient presents to the emergency department with a history of COPD.  He has been feeling a bit more dyspneic with a little bit more cough in particular with exertion.  On pulmonary exam the patient does have diffuse expiratory wheezing with prolongation of expiratory phase.  His oxygen saturations are running around 90 to 92% on room air.  I do suspect that the patient runs a slightly hypoxic at baseline and likely has baseline hypercapnia.  Laboratory screening on the patient is within normal limits.  Viral markers are also negative.  Chest x-ray shows no acute focal pulmonary infiltrate of an acute nature.  Patient improved after nebulization and IV steroids along with intravenous magnesium.  In addition, the patient was set up for home nebulization.  He will be continued on antibiotics to cover atypical pathogens, he will be continued on steroids and we will start him on DuoNebs with his new nebulizer.  The patient initially required about staying in the hospital overnight.  I did inform the patient that there are no hospital beds at this facility or anywhere in the [facility] in the [facility] is extremely tight.  He does not meet specific criteria for need for hospitalization so we will proceed with outpatient management.  The patient worsens he may return to the emergency  department for additional care.    Diagnosis:    ICD-10-CM    1. COPD exacerbation (H)  J44.1            Discharge Medications:  New Prescriptions    ALBUTEROL (PROAIR HFA/PROVENTIL HFA/VENTOLIN HFA) 108 (90 BASE) MCG/ACT INHALER    Inhale 1-2 puffs into the lungs every 4 hours as needed for shortness of breath, wheezing or cough    DOXYCYCLINE HYCLATE (VIBRAMYCIN) 100 MG CAPSULE    Take 1 capsule (100 mg) by mouth 2 times daily for 10 days    IPRATROPIUM - ALBUTEROL 0.5 MG/2.5 MG/3 ML (DUONEB) 0.5-2.5 (3) MG/3ML NEB SOLUTION    Take 1 vial (3 mLs) by nebulization every 4 hours as needed for shortness of breath, wheezing or cough    PREDNISONE (DELTASONE) 20 MG TABLET    Take two tablets (= 40mg) each day for 5 (five) days        Scribe Disclosure:  IJere Hailie, am serving as a scribe at 10:40 AM on 6/6/2023 to document services personally performed by Kelton Beckman MD based on my observations and the provider's statements to me.    IVelvet, am serving as a scribe at 11:43 AM on 6/6/2023 to document services personally performed by Kelton Beckman MD based on my observations and the provider's statements to me.     6/6/2023   Kelton Beckman MD Rock, Michael P, MD  06/06/23 [id number]

## 2023-06-06 NOTE — PROGRESS NOTES
"         [facility]  Respiratory Care Note    Pt was instructed in the proper use and benefits of the home nebulizer machine that they are being discharged with. They had no further questions on the use, and was able to demonstrate and understand the instructions given.     Vital signs:  Temp: 98.2  F (36.8  C) Temp src: Oral BP: (!) 142/84 Pulse: 85   Resp: 26 SpO2: 91 % O2 Device: None (Room air)   Height: 188 cm (6' 2\") Weight: 93.3 kg (205 lb 11 oz)  Estimated body mass index is 26.41 kg/m  as calculated from the following:    Height as of this encounter: 1.88 m (6' 2\").    Weight as of this encounter: 93.3 kg (205 lb 11 oz).      Past Medical History:   Diagnosis Date     Bipolar 1 disorder (H)      GERD (gastroesophageal reflux disease)      Mild intermittent asthma     uses primatent     Schizophrenia (H)        Past Surgical History:   Procedure Laterality Date     NO HISTORY OF SURGERY       TESTICLE SURGERY       THORACOSCOPIC PLEURODESIS Right 7/29/2021    Procedure: Right thoracoscopy, talc and mechanical pleurodesis;  Surgeon: Benoit Villagomez MD;  Location: [facility] OR     VASECTOMY         Family History   Problem Relation Age of Onset     C.A.D. Father      Hypertension Father      Breast Cancer Mother      Diabetes No family hx of      Cerebrovascular Disease No family hx of        Social History     Tobacco Use     Smoking status: Every Day     Packs/day: 1.00     Years: 58.00     Pack years: 58.00     Types: Cigarettes     Smokeless tobacco: Never   Vaping Use     Vaping status: Not on file   Substance Use Topics     Alcohol use: No     Kelton Aviles RT  [facility]  6/6/2023      "

## 2023-09-05 NOTE — ED NOTES
Called unit 12 to get an update for sending pt to the unit, RN will call back when available.    Azathioprine Pregnancy And Lactation Text: This medication is Pregnancy Category D and isn't considered safe during pregnancy. It is unknown if this medication is excreted in breast milk.

## 2024-04-02 ENCOUNTER — OFFICE VISIT (OUTPATIENT)
Dept: FAMILY MEDICINE | Facility: CLINIC | Age: 69
End: 2024-04-02

## 2024-04-02 VITALS
BODY MASS INDEX: 25.67 KG/M2 | WEIGHT: 200 LBS | TEMPERATURE: 97.3 F | DIASTOLIC BLOOD PRESSURE: 82 MMHG | OXYGEN SATURATION: 98 % | HEART RATE: 99 BPM | SYSTOLIC BLOOD PRESSURE: 125 MMHG | HEIGHT: 74 IN

## 2024-04-02 DIAGNOSIS — R19.7 WATERY DIARRHEA: Primary | ICD-10-CM

## 2024-04-02 PROCEDURE — 99213 OFFICE O/P EST LOW 20 MIN: CPT | Performed by: PHYSICIAN ASSISTANT

## 2024-04-02 NOTE — NURSING NOTE
Chief Complaint   Patient presents with    Diarrhea     Pt here with severe diarrhea. Has been going on for 6 months. Has to go right after he eats, wakes up, etc. There is no sign of it coming. Is not sure if the Abilify injection for his mental health could be to blame    Pre-visit Screening:  Immunizations:  not up to date - prevnar  Colonoscopy:  is due  Mammogram: na  Asthma Action Test/Plan:    PHQ9:    GAD7:    Questioned patient about current smoking habits Pt. currently smokes.  Advised about smoking cessation.  Ok to leave detailed message on voice mail for today's visit only yes, phone # [phone]

## 2024-04-02 NOTE — PROGRESS NOTES
"CC: Ongoing Diarrhea    History:  Diarrhea:  Kendall is here today with 6 months of diarrhea. Describes it as mostly watery. Tends to happen when he wakes up in the morning, but the rest of the day often doesn't have a BM. Does not get any symptoms/warning leading up to the urgent BM such as cramping/abdominal pain. Estimates that half the time he will have a small amount of fecal incontinence if he doesn't make it to toilet in time. Takes Pepto Bismol which does help temporarily. This does make his stool darker, but denies any other dark or red colors in stool. No fever, sweats, chills, abdominal pain. Eats relatively well. Does think he's lost some weight in the past year. Had ER visit 6/2023 where he weight 205 lbs, down 5 lbs on different scales. Has tried cutting out pop, chocolate, peanut butter. Does not have any dairy other than chocolate.     Pt wonders if it could be related to Abilify injections that he does every month. This is for his bipolar 1 disder, and per pt this is court ordered.     Has never had a colonoscopy, as he declines.     PMH, MEDICATIONS, ALLERGIES, SOCIAL AND FAMILY HISTORY in [facility] and reviewed by me personally.    ROS negative other than the symptoms noted above in the HPI.      Examination   /82 (BP Location: Left arm, Patient Position: Sitting, Cuff Size: Adult Large)   Pulse 99   Temp 97.3  F (36.3  C) (Temporal)   Ht 1.88 m (6' 2\")   Wt 90.7 kg (200 lb)   SpO2 98%   BMI 25.68 kg/m       Constitutional: Sitting comfortably, in no acute distress. Vital signs noted  Neck:  no adenopathy, trachea midline and normal to palpation, thyroid normal to palpation  Cardiovascular:  regular rate and rhythm, no murmurs, clicks, or gallops  Respiratory:  normal respiratory rate and rhythm, lungs clear to auscultation  Abdomen: Abdomen soft, non-tender. BS normal. No masses, organomegaly  SKIN: No jaundice/pallor/rash.   Psychiatric: patient appearance somewhat unkempt, eyeglasses with " thick residue/debris, mentation appears disjointed consistent with bipolar, and affect flat    A/P    ICD-10-CM    1. Watery diarrhea  R19.7 C Difficile Toxin [facility] Rflx to PCR ([facility]          DISCUSSION:  Diarrhea:  Given persistent watery diarrhea for 6 months, I would like pt to have testing for C difficile (C dif). Will send home with sample kit today and he will bring back when able.     If C dif testing is positive, would treat. If C dif negative, will refer to GI Provider for likely labs, discuss colonoscopy. Pt declines referral now, but understands this is what I would recommend based on C dif test.     Asked Kendall to contact his psychiatry clinic to ask them to send us records of their treatments to have better timeline of when Abilify was started compared to when diarrhea started as there is a 3-5% chance this could cause diarrhea. Gave him our business card so he can give them our fax number. Should make sure they are aware that he is having diarrhea in case they feel Abilify could be causing.        Offered lab work today (CBC, CMP), but pt declines.      follow up visit: As needed    Valeria Lazcano PA-C  [facility]

## 2024-04-02 NOTE — PATIENT INSTRUCTIONS
Given persistent diarrhea for 6 months, I would like pt to have testing for C difficile (C dif). Will send home with sample kit today and he will bring back when able.     If C dif testing is positive, would treat. If C dif negative, will refer to GI Provider for likely labs, discuss colonoscopy.     Asked Kendall to contact psychiatry clinic to ask them to send us records of their treatments to have better timeline of when Abilify was started compared to when diarrhea started. Gave him our business card so he can give them our fax number. Should make sure they are aware that he is having diarrhea in case they feel Abilify could be causing.        Offered lab work today, but pt declines.

## 2025-04-14 ENCOUNTER — TRANSFERRED RECORDS (OUTPATIENT)
Dept: FAMILY MEDICINE | Facility: CLINIC | Age: 70
End: 2025-04-14

## 2025-04-25 NOTE — PROGRESS NOTES
Pt was admitted to unit with VS WNL in ED.  On admission Pt had BP  176/103 at 1600.  Pt was agitated and had difficulty staying still for VS.  Pt had VS rechecked at 2000, when he was calmer.  Pt BP was 163/95, but pt was still agitated and had difficulty holding still.  Writer called hospitalist.  Physician stated that the high BP was likely related to anxiety.  Will continue to monitor and assess.   No

## (undated) DEVICE — LINEN HALF SHEET 5512

## (undated) DEVICE — SU VICRYL 3-0 SH 27" UND J416H

## (undated) DEVICE — ENDO POUCH UNIV RETRIEVAL SYSTEM INZII 10MM CD001

## (undated) DEVICE — SUCTION CANISTER MEDIVAC LINER 3000ML W/LID 65651-530

## (undated) DEVICE — SU VICRYL 0 CT-1 36" J346H

## (undated) DEVICE — SYSTEM CLEARIFY VISUALIZATION 21-345

## (undated) DEVICE — LINEN FULL SHEET 5511

## (undated) DEVICE — BAG CLEAR TRASH 1.3M 39X33" P4040C

## (undated) DEVICE — LINEN TOWEL PACK X10 5473

## (undated) DEVICE — ESU CLEANER TIP 31142717

## (undated) DEVICE — TUBING SUCTION 12"X1/4" N612

## (undated) DEVICE — Device

## (undated) DEVICE — SOL WATER IRRIG 1000ML BOTTLE 2F7114

## (undated) DEVICE — ESU GROUND PAD ADULT W/CORD E7507

## (undated) DEVICE — DRAIN CHEST TUBE 28FR STR 8028

## (undated) DEVICE — PREP CHLORAPREP 26ML TINTED HI-LITE ORANGE 930815

## (undated) DEVICE — SUCTION DRY CHEST DRAIN OASIS 3600-100

## (undated) DEVICE — ANTIFOG SOLUTION W/FOAM PAD CF-1001

## (undated) DEVICE — DRAPE IOBAN INCISE 23X17" 6650EZ

## (undated) DEVICE — GLOVE PROTEXIS W/NEU-THERA 7.5  2D73TE75

## (undated) DEVICE — SU MONOCRYL 4-0 PS-2 27" UND Y426H

## (undated) DEVICE — PACK MAJOR SBA15MAFSI

## (undated) DEVICE — DRAPE BREAST/CHEST 29420

## (undated) DEVICE — SU VICRYL 2-0 CT-1 27" UND J259H

## (undated) DEVICE — SU SILK 0 FSL 18" 678G

## (undated) DEVICE — LINEN POUCH DBL 5427

## (undated) DEVICE — THORACOPORT 10.5MM

## (undated) DEVICE — NDL 22GA 1.5"

## (undated) RX ORDER — FENTANYL CITRATE 50 UG/ML
INJECTION, SOLUTION INTRAMUSCULAR; INTRAVENOUS
Status: DISPENSED
Start: 2021-07-29

## (undated) RX ORDER — BUPIVACAINE HYDROCHLORIDE 2.5 MG/ML
INJECTION, SOLUTION EPIDURAL; INFILTRATION; INTRACAUDAL
Status: DISPENSED
Start: 2021-07-29

## (undated) RX ORDER — NEOSTIGMINE METHYLSULFATE 1 MG/ML
VIAL (ML) INJECTION
Status: DISPENSED
Start: 2021-07-29

## (undated) RX ORDER — DEXAMETHASONE SODIUM PHOSPHATE 4 MG/ML
INJECTION, SOLUTION INTRA-ARTICULAR; INTRALESIONAL; INTRAMUSCULAR; INTRAVENOUS; SOFT TISSUE
Status: DISPENSED
Start: 2021-07-29

## (undated) RX ORDER — PROPOFOL 10 MG/ML
INJECTION, EMULSION INTRAVENOUS
Status: DISPENSED
Start: 2021-07-29

## (undated) RX ORDER — LIDOCAINE HYDROCHLORIDE 10 MG/ML
INJECTION, SOLUTION EPIDURAL; INFILTRATION; INTRACAUDAL; PERINEURAL
Status: DISPENSED
Start: 2021-07-29

## (undated) RX ORDER — ACETAMINOPHEN 325 MG/1
TABLET ORAL
Status: DISPENSED
Start: 2021-07-29

## (undated) RX ORDER — IPRATROPIUM BROMIDE AND ALBUTEROL SULFATE 2.5; .5 MG/3ML; MG/3ML
SOLUTION RESPIRATORY (INHALATION)
Status: DISPENSED
Start: 2021-07-29

## (undated) RX ORDER — ONDANSETRON 2 MG/ML
INJECTION INTRAMUSCULAR; INTRAVENOUS
Status: DISPENSED
Start: 2021-07-29

## (undated) RX ORDER — CEFAZOLIN SODIUM 2 G/100ML
INJECTION, SOLUTION INTRAVENOUS
Status: DISPENSED
Start: 2021-07-29

## (undated) RX ORDER — GLYCOPYRROLATE 0.2 MG/ML
INJECTION INTRAMUSCULAR; INTRAVENOUS
Status: DISPENSED
Start: 2021-07-29

## (undated) RX ORDER — LABETALOL HYDROCHLORIDE 5 MG/ML
INJECTION, SOLUTION INTRAVENOUS
Status: DISPENSED
Start: 2021-07-29